# Patient Record
Sex: FEMALE | Race: WHITE | Employment: FULL TIME | URBAN - METROPOLITAN AREA
[De-identification: names, ages, dates, MRNs, and addresses within clinical notes are randomized per-mention and may not be internally consistent; named-entity substitution may affect disease eponyms.]

---

## 2018-03-16 ENCOUNTER — TELEPHONE (OUTPATIENT)
Dept: PALLATIVE CARE | Age: 53
End: 2018-03-16

## 2018-03-16 NOTE — TELEPHONE ENCOUNTER
Patient called with questions regarding her Cymbalta. Dr. Radha Rodriguez had ordered an increase of Cymbalta to 40 mg at bedtime. Pt unable to fill order because insurance would not approve and she could not pay out of pocket. Called to day stating she is only taking Cymbalta 20 mg at bedtime, and requesting an increase in her Fentanyl patch so she could get off of Percocet. Instructed patient to take two Cymbalta 20 mg at bedtime to equal the 40 mg that doctor had prescribed. She agrees to trying this and states she does not need an increase in fentanyl and can wait to discuss with Dr. Radha Rodriguez at her next appointment 4/5.

## 2018-03-19 ENCOUNTER — EVALUATION (OUTPATIENT)
Dept: PHYSICAL THERAPY | Age: 53
End: 2018-03-19
Payer: COMMERCIAL

## 2018-03-19 DIAGNOSIS — M25.50 ARTHRALGIA, UNSPECIFIED JOINT: ICD-10-CM

## 2018-03-19 DIAGNOSIS — T45.1X5A CHEMOTHERAPY-INDUCED NEUROPATHY (HCC): Primary | ICD-10-CM

## 2018-03-19 DIAGNOSIS — G62.0 CHEMOTHERAPY-INDUCED NEUROPATHY (HCC): Primary | ICD-10-CM

## 2018-03-19 DIAGNOSIS — C50.512 MALIGNANT NEOPLASM OF LOWER-OUTER QUADRANT OF LEFT FEMALE BREAST, UNSPECIFIED ESTROGEN RECEPTOR STATUS (HCC): ICD-10-CM

## 2018-03-19 PROCEDURE — 97162 PT EVAL MOD COMPLEX 30 MIN: CPT | Performed by: PHYSICAL THERAPIST

## 2018-03-19 NOTE — PROGRESS NOTES
[facility] OUTPATIENT REHABILITATION  PHYSICAL THERAPY INITIAL EVALUATION    DIAGNOSIS:   Chemotherapy-induced neuropathy (HCC)  - Primary       Arthralgia, unspecified joint       Malignant neoplasm of lower-outer quadrant of left female breast, unspecified estrogen receptor status ([facility]         REFERRING PROVIDER: Pamela Quarles MD      SUBJECTIVE:  Fransisca Campos reports decreased functional mobility over the past year due to breast cancer, chemotherapy, and radiation. She has been diagnosed with chemotherapy-induced neuropathy both legs and arthralgia throughout body; which she says is due to a reaction to Arimidex. She adds she has been on different forms of Arimidex, and each one causes worse joint discomfort. She says the current plan is to stay on the medication and use pain medications and modalities for pain relief. She currently reports aching constant pain 6/10; which can flare up to 10/10. She states the pain is located in all of her joints. Denies muscle pain. Lower leg symptoms of numbness due to neuropathy are unpredictable, appear suddenly, and last 10-20 seconds. She says it gets so bad she cannot feel her legs. She has fallen as a result. Surgery for breast cancer has included lumpectomy and lymph node resection. She states lymphedema around the breast continues to be a problem; which she has drained occasionally. She also reports shoulder tightness and changes to the skin around the breast from radiation. She believes her shoulder tightness is from disuse. Treatment: Gabapentin, pain medications (see medication list), and turmeric. She began taking turmeric on her own; however she developed some stomach upset. She stopped, but is planning on restarting    Exercise regimen: she walks on a treadmill 15 minutes in the morning and 15 minutes in the evening. Speed is 2.5 to 3 mph. Work: RN with [email or web address].   Physical demands Uncoordinated movement pattern moving toward extension. Flexion, rotation, and side bending are present with reports of discomfort. Trunk   Grossly WFL. Reports back stiffness with all motions. (R) UE within normal limits, no joint edema or warmth noted   (L) UE L shoulder limited to 150 FE. Rotation is WFL. Remainder of L UE is WFL. No joint edema or warmth noted   (R) LE within normal limits, no joint edema or warmth noted   (L) LE within normal limits, no joint edema or warmth noted     MANUAL MUSCLE TESTS        C-Spine 4/5   Trunk   4/5   (R) UE 5/5   (L) UE 4/5   (R) LE 4/5   (L) LE 4/5       ASSESSMENT  Problems:   Pain limiting functional mobility   Decreased functional range of motion  Decreased functional strength  Limited ability to complete ADLs/IADLs    Short Term Goals (2-3 weeks)  Decrease pain 25%  Improved functional mobility    Long Term Goals (4-6 weeks)  Reduce pain to functional, tolerable level  ROM and Strength WFL  Ability to complete ADLs/IADLs safely and to patient satisfaction  Independent with HEP    Rehab Potential: good     PT G-Codes  Functional Limitation: Carrying, moving and handling objects  Carrying, Moving and Handling Objects Current Status (): At least 40 percent but less than 60 percent impaired, limited or restricted  Carrying, Moving and Handling Objects Goal Status (): At least 1 percent but less than 20 percent impaired, limited or restricted    PLAN OF CARE  Patient will be seen 1-3 times per week for 4-6 weeks. Treatment will consist of A/PROM, stretching, therapeutic exercise, strengthening, balance exercises, gait training, home program development. Thank you for the opportunity to work with your patient. If you have questions or comments, please contact me at [phone]; fax: [phone].     Bhavna Blackman, PT    I CERTIFY THAT THE ABOVE EVALUATION AND PLAN OF CARE FOR PHYSICAL THERAPY SERVICES ARE APPROPRIATE AND MEDICALLY

## 2018-03-19 NOTE — PROGRESS NOTES
Physical Therapy Daily Treatment Note    Date: 3/19/2018  Patient Name: Concepcion Johnson  : 1965   MRN: [id number]  DOInjury:   DIAGNOSIS:   Chemotherapy-induced neuropathy ([facility]  - Primary       Arthralgia, unspecified joint       Malignant neoplasm of lower-outer quadrant of left female breast, unspecified estrogen receptor status ([facility]          REFERRING PROVIDER: Sharon Floyd MD      PT G-Codes  Functional Limitation: Carrying, moving and handling objects  Carrying, Moving and Handling Objects Current Status (): At least 40 percent but less than 60 percent impaired, limited or restricted  Carrying, Moving and Handling Objects Goal Status (): At least 1 percent but less than 20 percent impaired, limited or restricted    S: See eval  O:  Time 3791-2793    Visit  G CODE EVERY 10 VISITS   Pain 6/10    ROM     Modalities          Exercise     Nustep       Elbow isometrics flex and ext 3 different angles 10-45 seconds     Rows H-M-L     Shrugs     Shoulder Press     Biceps curl     Triceps ext          Quad sets     [employer] [facility]     Alt. Sidekicks     Knee ext machine     HS curl machine     Sit/Stands          Gait training          Marching Gait     Sidestepping                              A:  Tolerated well. Above added to written HEP.   P: Continue with rehab plan  Michelle Quijano PT

## 2018-03-20 ENCOUNTER — HOSPITAL ENCOUNTER (OUTPATIENT)
Dept: INFUSION THERAPY | Age: 53
Discharge: HOME | End: 2018-03-20

## 2018-03-23 ENCOUNTER — TELEPHONE (OUTPATIENT)
Dept: PHYSICAL THERAPY | Age: 53
End: 2018-03-23

## 2018-04-05 ENCOUNTER — OFFICE VISIT (OUTPATIENT)
Dept: PALLATIVE CARE | Age: 53
End: 2018-04-05
Payer: COMMERCIAL

## 2018-04-05 ENCOUNTER — TELEPHONE (OUTPATIENT)
Dept: BREAST CENTER | Age: 53
End: 2018-04-05

## 2018-04-05 VITALS
BODY MASS INDEX: 26.78 KG/M2 | HEART RATE: 92 BPM | WEIGHT: 171 LBS | DIASTOLIC BLOOD PRESSURE: 72 MMHG | OXYGEN SATURATION: 100 % | SYSTOLIC BLOOD PRESSURE: 118 MMHG

## 2018-04-05 DIAGNOSIS — Z51.5 PALLIATIVE CARE BY SPECIALIST: ICD-10-CM

## 2018-04-05 DIAGNOSIS — C50.512 MALIGNANT NEOPLASM OF LOWER-OUTER QUADRANT OF LEFT FEMALE BREAST, UNSPECIFIED ESTROGEN RECEPTOR STATUS (HCC): ICD-10-CM

## 2018-04-05 DIAGNOSIS — R52 PAIN: Primary | ICD-10-CM

## 2018-04-05 DIAGNOSIS — N60.02 BREAST CYST, LEFT: Primary | ICD-10-CM

## 2018-04-05 PROCEDURE — G8427 DOCREV CUR MEDS BY ELIG CLIN: HCPCS | Performed by: FAMILY MEDICINE

## 2018-04-05 PROCEDURE — 3014F SCREEN MAMMO DOC REV: CPT | Performed by: FAMILY MEDICINE

## 2018-04-05 PROCEDURE — 1036F TOBACCO NON-USER: CPT | Performed by: FAMILY MEDICINE

## 2018-04-05 PROCEDURE — G8419 CALC BMI OUT NRM PARAM NOF/U: HCPCS | Performed by: FAMILY MEDICINE

## 2018-04-05 PROCEDURE — 99212 OFFICE O/P EST SF 10 MIN: CPT

## 2018-04-05 PROCEDURE — 3017F COLORECTAL CA SCREEN DOC REV: CPT | Performed by: FAMILY MEDICINE

## 2018-04-05 PROCEDURE — 99214 OFFICE O/P EST MOD 30 MIN: CPT | Performed by: FAMILY MEDICINE

## 2018-04-05 RX ORDER — FENTANYL 75 UG/H
1 PATCH TRANSDERMAL
Qty: 10 PATCH | Refills: 0 | Status: SHIPPED | OUTPATIENT
Start: 2018-04-05 | End: 2018-05-03 | Stop reason: SDUPTHER

## 2018-04-05 RX ORDER — OXYCODONE AND ACETAMINOPHEN 10; 325 MG/1; MG/1
1 TABLET ORAL EVERY 6 HOURS PRN
Qty: 120 TABLET | Refills: 0 | Status: SHIPPED | OUTPATIENT
Start: 2018-04-05 | End: 2018-05-03 | Stop reason: SDUPTHER

## 2018-04-06 ENCOUNTER — HOSPITAL ENCOUNTER (OUTPATIENT)
Dept: GENERAL RADIOLOGY | Age: 53
Discharge: HOME | End: 2018-04-08
Payer: COMMERCIAL

## 2018-04-06 DIAGNOSIS — N64.4 BREAST PAIN: ICD-10-CM

## 2018-04-06 DIAGNOSIS — N60.02 BREAST CYST, LEFT: ICD-10-CM

## 2018-04-06 PROCEDURE — 76642 ULTRASOUND BREAST LIMITED: CPT

## 2018-04-06 PROCEDURE — 76942 ECHO GUIDE FOR BIOPSY: CPT

## 2018-04-06 PROCEDURE — 2500000003 HC RX 250 WO HCPCS

## 2018-05-03 ENCOUNTER — HOSPITAL ENCOUNTER (OUTPATIENT)
Dept: INFUSION THERAPY | Age: 53
Discharge: HOME | End: 2018-05-03
Payer: COMMERCIAL

## 2018-05-03 ENCOUNTER — OFFICE VISIT (OUTPATIENT)
Dept: PALLATIVE CARE | Age: 53
End: 2018-05-03
Payer: COMMERCIAL

## 2018-05-03 VITALS
OXYGEN SATURATION: 96 % | BODY MASS INDEX: 27.25 KG/M2 | DIASTOLIC BLOOD PRESSURE: 60 MMHG | SYSTOLIC BLOOD PRESSURE: 102 MMHG | HEART RATE: 82 BPM | WEIGHT: 174 LBS

## 2018-05-03 DIAGNOSIS — G62.0 PERIPHERAL NEUROPATHY DUE TO CHEMOTHERAPY (HCC): ICD-10-CM

## 2018-05-03 DIAGNOSIS — R52 PAIN: ICD-10-CM

## 2018-05-03 DIAGNOSIS — Z51.5 PALLIATIVE CARE BY SPECIALIST: ICD-10-CM

## 2018-05-03 DIAGNOSIS — C80.1 CANCER (HCC): ICD-10-CM

## 2018-05-03 DIAGNOSIS — C50.512 MALIGNANT NEOPLASM OF LOWER-OUTER QUADRANT OF LEFT FEMALE BREAST, UNSPECIFIED ESTROGEN RECEPTOR STATUS (HCC): Primary | ICD-10-CM

## 2018-05-03 DIAGNOSIS — T45.1X5A PERIPHERAL NEUROPATHY DUE TO CHEMOTHERAPY (HCC): ICD-10-CM

## 2018-05-03 DIAGNOSIS — M25.50 ARTHRALGIA, UNSPECIFIED JOINT: ICD-10-CM

## 2018-05-03 PROCEDURE — G8417 CALC BMI ABV UP PARAM F/U: HCPCS | Performed by: FAMILY MEDICINE

## 2018-05-03 PROCEDURE — 6360000002 HC RX W HCPCS: Performed by: INTERNAL MEDICINE

## 2018-05-03 PROCEDURE — 99214 OFFICE O/P EST MOD 30 MIN: CPT | Performed by: FAMILY MEDICINE

## 2018-05-03 PROCEDURE — G8427 DOCREV CUR MEDS BY ELIG CLIN: HCPCS | Performed by: FAMILY MEDICINE

## 2018-05-03 PROCEDURE — 3017F COLORECTAL CA SCREEN DOC REV: CPT | Performed by: FAMILY MEDICINE

## 2018-05-03 PROCEDURE — 99212 OFFICE O/P EST SF 10 MIN: CPT

## 2018-05-03 PROCEDURE — 1036F TOBACCO NON-USER: CPT | Performed by: FAMILY MEDICINE

## 2018-05-03 PROCEDURE — 2580000003 HC RX 258: Performed by: INTERNAL MEDICINE

## 2018-05-03 PROCEDURE — 96523 IRRIG DRUG DELIVERY DEVICE: CPT

## 2018-05-03 RX ORDER — OXYCODONE AND ACETAMINOPHEN 10; 325 MG/1; MG/1
1 TABLET ORAL EVERY 6 HOURS PRN
Qty: 120 TABLET | Refills: 0 | Status: SHIPPED | OUTPATIENT
Start: 2018-05-03 | End: 2018-05-31 | Stop reason: SDUPTHER

## 2018-05-03 RX ORDER — NORTRIPTYLINE HYDROCHLORIDE 10 MG/1
10 CAPSULE ORAL NIGHTLY
Qty: 30 CAPSULE | Refills: 0 | Status: SHIPPED | OUTPATIENT
Start: 2018-05-03 | End: 2018-05-31 | Stop reason: SDUPTHER

## 2018-05-03 RX ORDER — OXYCODONE AND ACETAMINOPHEN 10; 325 MG/1; MG/1
1 TABLET ORAL EVERY 6 HOURS PRN
Qty: 120 TABLET | Refills: 0 | Status: SHIPPED | OUTPATIENT
Start: 2018-05-03 | End: 2018-05-03 | Stop reason: SDUPTHER

## 2018-05-03 RX ORDER — FENTANYL 75 UG/H
1 PATCH TRANSDERMAL
Qty: 10 PATCH | Refills: 0 | Status: SHIPPED | OUTPATIENT
Start: 2018-05-03 | End: 2018-05-31 | Stop reason: SDUPTHER

## 2018-05-03 RX ORDER — SODIUM CHLORIDE 0.9 % (FLUSH) 0.9 %
10 SYRINGE (ML) INJECTION PRN
Status: DISCONTINUED | OUTPATIENT
Start: 2018-05-03 | End: 2018-05-04 | Stop reason: HOSPADM

## 2018-05-03 RX ORDER — HEPARIN SODIUM (PORCINE) LOCK FLUSH IV SOLN 100 UNIT/ML 100 UNIT/ML
500 SOLUTION INTRAVENOUS PRN
Status: DISCONTINUED | OUTPATIENT
Start: 2018-05-03 | End: 2018-05-04 | Stop reason: HOSPADM

## 2018-05-03 RX ORDER — HEPARIN SODIUM (PORCINE) LOCK FLUSH IV SOLN 100 UNIT/ML 100 UNIT/ML
500 SOLUTION INTRAVENOUS PRN
Status: CANCELLED | OUTPATIENT
Start: 2018-05-03

## 2018-05-03 RX ORDER — SODIUM CHLORIDE 0.9 % (FLUSH) 0.9 %
10 SYRINGE (ML) INJECTION PRN
Status: CANCELLED | OUTPATIENT
Start: 2018-05-03

## 2018-05-03 RX ORDER — NORTRIPTYLINE HYDROCHLORIDE 10 MG/1
10 CAPSULE ORAL NIGHTLY
Qty: 30 CAPSULE | Refills: 0 | Status: SHIPPED | OUTPATIENT
Start: 2018-05-03 | End: 2018-05-03 | Stop reason: SDUPTHER

## 2018-05-03 RX ORDER — FENTANYL 75 UG/H
1 PATCH TRANSDERMAL
Qty: 10 PATCH | Refills: 0 | Status: SHIPPED | OUTPATIENT
Start: 2018-05-03 | End: 2018-05-03 | Stop reason: SDUPTHER

## 2018-05-03 RX ADMIN — Medication 20 ML: at 08:50

## 2018-05-03 RX ADMIN — Medication 500 UNITS: at 08:50

## 2018-05-15 ENCOUNTER — TELEPHONE (OUTPATIENT)
Dept: PHYSICAL THERAPY | Age: 53
End: 2018-05-15

## 2018-05-31 ENCOUNTER — OFFICE VISIT (OUTPATIENT)
Dept: PALLATIVE CARE | Age: 53
End: 2018-05-31
Payer: COMMERCIAL

## 2018-05-31 VITALS
SYSTOLIC BLOOD PRESSURE: 110 MMHG | DIASTOLIC BLOOD PRESSURE: 64 MMHG | HEART RATE: 80 BPM | BODY MASS INDEX: 26.78 KG/M2 | WEIGHT: 171 LBS | OXYGEN SATURATION: 99 %

## 2018-05-31 DIAGNOSIS — G62.0 PERIPHERAL NEUROPATHY DUE TO CHEMOTHERAPY (HCC): ICD-10-CM

## 2018-05-31 DIAGNOSIS — T45.1X5A PERIPHERAL NEUROPATHY DUE TO CHEMOTHERAPY (HCC): ICD-10-CM

## 2018-05-31 DIAGNOSIS — Z51.5 PALLIATIVE CARE BY SPECIALIST: ICD-10-CM

## 2018-05-31 DIAGNOSIS — R52 PAIN: ICD-10-CM

## 2018-05-31 DIAGNOSIS — M25.50 ARTHRALGIA, UNSPECIFIED JOINT: ICD-10-CM

## 2018-05-31 DIAGNOSIS — C50.512 MALIGNANT NEOPLASM OF LOWER-OUTER QUADRANT OF LEFT FEMALE BREAST, UNSPECIFIED ESTROGEN RECEPTOR STATUS (HCC): Primary | ICD-10-CM

## 2018-05-31 PROCEDURE — 3017F COLORECTAL CA SCREEN DOC REV: CPT | Performed by: FAMILY MEDICINE

## 2018-05-31 PROCEDURE — G8427 DOCREV CUR MEDS BY ELIG CLIN: HCPCS | Performed by: FAMILY MEDICINE

## 2018-05-31 PROCEDURE — 1036F TOBACCO NON-USER: CPT | Performed by: FAMILY MEDICINE

## 2018-05-31 PROCEDURE — 99212 OFFICE O/P EST SF 10 MIN: CPT | Performed by: FAMILY MEDICINE

## 2018-05-31 PROCEDURE — 99214 OFFICE O/P EST MOD 30 MIN: CPT | Performed by: FAMILY MEDICINE

## 2018-05-31 PROCEDURE — G8417 CALC BMI ABV UP PARAM F/U: HCPCS | Performed by: FAMILY MEDICINE

## 2018-05-31 RX ORDER — OXYCODONE AND ACETAMINOPHEN 10; 325 MG/1; MG/1
1 TABLET ORAL EVERY 6 HOURS PRN
Qty: 120 TABLET | Refills: 0 | Status: SHIPPED | OUTPATIENT
Start: 2018-05-31 | End: 2018-06-21 | Stop reason: SDUPTHER

## 2018-05-31 RX ORDER — NORTRIPTYLINE HYDROCHLORIDE 25 MG/1
25 CAPSULE ORAL NIGHTLY
Qty: 30 CAPSULE | Refills: 1 | Status: SHIPPED | OUTPATIENT
Start: 2018-05-31 | End: 2018-06-21 | Stop reason: SDUPTHER

## 2018-05-31 RX ORDER — FENTANYL 75 UG/H
1 PATCH TRANSDERMAL
Qty: 10 PATCH | Refills: 0 | Status: SHIPPED | OUTPATIENT
Start: 2018-05-31 | End: 2018-06-21 | Stop reason: SDUPTHER

## 2018-06-21 ENCOUNTER — OFFICE VISIT (OUTPATIENT)
Dept: PALLATIVE CARE | Age: 53
End: 2018-06-21
Payer: COMMERCIAL

## 2018-06-21 VITALS
TEMPERATURE: 98.9 F | SYSTOLIC BLOOD PRESSURE: 124 MMHG | WEIGHT: 172 LBS | DIASTOLIC BLOOD PRESSURE: 78 MMHG | BODY MASS INDEX: 26.94 KG/M2

## 2018-06-21 DIAGNOSIS — R52 PAIN: ICD-10-CM

## 2018-06-21 DIAGNOSIS — T45.1X5A PERIPHERAL NEUROPATHY DUE TO CHEMOTHERAPY (HCC): ICD-10-CM

## 2018-06-21 DIAGNOSIS — M25.50 ARTHRALGIA, UNSPECIFIED JOINT: ICD-10-CM

## 2018-06-21 DIAGNOSIS — Z51.5 PALLIATIVE CARE BY SPECIALIST: ICD-10-CM

## 2018-06-21 DIAGNOSIS — J40 BRONCHITIS: ICD-10-CM

## 2018-06-21 DIAGNOSIS — G62.0 PERIPHERAL NEUROPATHY DUE TO CHEMOTHERAPY (HCC): ICD-10-CM

## 2018-06-21 DIAGNOSIS — C50.512 MALIGNANT NEOPLASM OF LOWER-OUTER QUADRANT OF LEFT FEMALE BREAST, UNSPECIFIED ESTROGEN RECEPTOR STATUS (HCC): Primary | ICD-10-CM

## 2018-06-21 PROCEDURE — G8427 DOCREV CUR MEDS BY ELIG CLIN: HCPCS | Performed by: FAMILY MEDICINE

## 2018-06-21 PROCEDURE — G8417 CALC BMI ABV UP PARAM F/U: HCPCS | Performed by: FAMILY MEDICINE

## 2018-06-21 PROCEDURE — 99212 OFFICE O/P EST SF 10 MIN: CPT | Performed by: FAMILY MEDICINE

## 2018-06-21 PROCEDURE — 3017F COLORECTAL CA SCREEN DOC REV: CPT | Performed by: FAMILY MEDICINE

## 2018-06-21 PROCEDURE — 1036F TOBACCO NON-USER: CPT | Performed by: FAMILY MEDICINE

## 2018-06-21 PROCEDURE — 99215 OFFICE O/P EST HI 40 MIN: CPT | Performed by: FAMILY MEDICINE

## 2018-06-21 RX ORDER — FENTANYL 75 UG/H
1 PATCH TRANSDERMAL
Qty: 10 PATCH | Refills: 0 | Status: SHIPPED | OUTPATIENT
Start: 2018-06-21 | End: 2018-06-21 | Stop reason: SDUPTHER

## 2018-06-21 RX ORDER — FENTANYL 75 UG/H
1 PATCH TRANSDERMAL
Qty: 10 PATCH | Refills: 0 | Status: SHIPPED | OUTPATIENT
Start: 2018-06-21 | End: 2018-07-19

## 2018-06-21 RX ORDER — NORTRIPTYLINE HYDROCHLORIDE 50 MG/1
50 CAPSULE ORAL NIGHTLY
Qty: 30 CAPSULE | Refills: 1 | Status: SHIPPED | OUTPATIENT
Start: 2018-06-21 | End: 2018-08-23 | Stop reason: SDUPTHER

## 2018-06-21 RX ORDER — NORTRIPTYLINE HYDROCHLORIDE 50 MG/1
50 CAPSULE ORAL NIGHTLY
Qty: 30 CAPSULE | Refills: 1 | Status: SHIPPED | OUTPATIENT
Start: 2018-06-21 | End: 2018-06-21 | Stop reason: SDUPTHER

## 2018-06-21 RX ORDER — OXYCODONE AND ACETAMINOPHEN 10; 325 MG/1; MG/1
1 TABLET ORAL EVERY 6 HOURS PRN
Qty: 120 TABLET | Refills: 0 | Status: SHIPPED | OUTPATIENT
Start: 2018-06-21 | End: 2018-06-29 | Stop reason: SDUPTHER

## 2018-06-21 RX ORDER — CEFDINIR 300 MG/1
300 CAPSULE ORAL 2 TIMES DAILY
Qty: 20 CAPSULE | Refills: 0 | Status: SHIPPED | OUTPATIENT
Start: 2018-06-21 | End: 2018-07-01

## 2018-06-21 RX ORDER — CEFDINIR 300 MG/1
300 CAPSULE ORAL 2 TIMES DAILY
Qty: 20 CAPSULE | Refills: 0 | Status: SHIPPED | OUTPATIENT
Start: 2018-06-21 | End: 2018-06-21 | Stop reason: SDUPTHER

## 2018-06-21 RX ORDER — OXYCODONE AND ACETAMINOPHEN 10; 325 MG/1; MG/1
1 TABLET ORAL EVERY 6 HOURS PRN
Qty: 120 TABLET | Refills: 0 | Status: SHIPPED | OUTPATIENT
Start: 2018-06-21 | End: 2018-06-21 | Stop reason: SDUPTHER

## 2018-07-19 ENCOUNTER — OFFICE VISIT (OUTPATIENT)
Dept: PALLATIVE CARE | Age: 53
End: 2018-07-19
Payer: COMMERCIAL

## 2018-07-19 VITALS
DIASTOLIC BLOOD PRESSURE: 68 MMHG | OXYGEN SATURATION: 99 % | BODY MASS INDEX: 26.47 KG/M2 | SYSTOLIC BLOOD PRESSURE: 102 MMHG | HEART RATE: 92 BPM | WEIGHT: 169 LBS

## 2018-07-19 DIAGNOSIS — R52 PAIN: ICD-10-CM

## 2018-07-19 DIAGNOSIS — M25.50 ARTHRALGIA, UNSPECIFIED JOINT: ICD-10-CM

## 2018-07-19 DIAGNOSIS — T45.1X5A CHEMOTHERAPY-INDUCED NEUROPATHY (HCC): ICD-10-CM

## 2018-07-19 DIAGNOSIS — C50.512 MALIGNANT NEOPLASM OF LOWER-OUTER QUADRANT OF LEFT FEMALE BREAST, UNSPECIFIED ESTROGEN RECEPTOR STATUS (HCC): Primary | ICD-10-CM

## 2018-07-19 DIAGNOSIS — C50.512 MALIGNANT NEOPLASM OF LOWER-OUTER QUADRANT OF LEFT BREAST OF FEMALE, ESTROGEN RECEPTOR POSITIVE (HCC): ICD-10-CM

## 2018-07-19 DIAGNOSIS — T45.1X5A PERIPHERAL NEUROPATHY DUE TO CHEMOTHERAPY (HCC): ICD-10-CM

## 2018-07-19 DIAGNOSIS — Z17.0 MALIGNANT NEOPLASM OF LOWER-OUTER QUADRANT OF LEFT BREAST OF FEMALE, ESTROGEN RECEPTOR POSITIVE (HCC): ICD-10-CM

## 2018-07-19 DIAGNOSIS — Z51.5 PALLIATIVE CARE BY SPECIALIST: ICD-10-CM

## 2018-07-19 DIAGNOSIS — G62.0 CHEMOTHERAPY-INDUCED NEUROPATHY (HCC): ICD-10-CM

## 2018-07-19 DIAGNOSIS — G62.0 PERIPHERAL NEUROPATHY DUE TO CHEMOTHERAPY (HCC): ICD-10-CM

## 2018-07-19 PROCEDURE — G8417 CALC BMI ABV UP PARAM F/U: HCPCS | Performed by: FAMILY MEDICINE

## 2018-07-19 PROCEDURE — 1036F TOBACCO NON-USER: CPT | Performed by: FAMILY MEDICINE

## 2018-07-19 PROCEDURE — G8427 DOCREV CUR MEDS BY ELIG CLIN: HCPCS | Performed by: FAMILY MEDICINE

## 2018-07-19 PROCEDURE — 99215 OFFICE O/P EST HI 40 MIN: CPT | Performed by: FAMILY MEDICINE

## 2018-07-19 PROCEDURE — 3017F COLORECTAL CA SCREEN DOC REV: CPT | Performed by: FAMILY MEDICINE

## 2018-07-19 PROCEDURE — 99212 OFFICE O/P EST SF 10 MIN: CPT | Performed by: FAMILY MEDICINE

## 2018-07-19 RX ORDER — FENTANYL 50 UG/H
1 PATCH TRANSDERMAL
Qty: 5 PATCH | Refills: 0 | Status: SHIPPED | OUTPATIENT
Start: 2018-07-19 | End: 2018-08-10 | Stop reason: SDUPTHER

## 2018-07-19 RX ORDER — OXYCODONE AND ACETAMINOPHEN 10; 325 MG/1; MG/1
1 TABLET ORAL EVERY 6 HOURS PRN
Qty: 120 TABLET | Refills: 0 | Status: SHIPPED | OUTPATIENT
Start: 2018-07-19 | End: 2018-08-23 | Stop reason: SDUPTHER

## 2018-07-19 NOTE — PROGRESS NOTES
focus of metastatic carcinoma measures 11 mm in maximum dimension; Extranodal extension: Present. · pT3 pN3a Mx  Re-excision of postero-lateral wall of lumpectomy cavity was performed on 2016 with negative margins. Tumor markers were normal; CT abdomen/pelvis and bone scan were negative for metastatic disease. CT chest noted Mildly enlarged AP window lymph nodes measuring 11 x 7.5 mm. Pulmonary team consult appreciated (In view of the difficulty in approaching that lymph node by EBUS, Dr. Anselm Soulier will follow this LN with CT chest in 3 months). PET/CT scan negative for mediastinal LN uptake. Focal uptake in the proximal stomach, recommend correlate with EGD. (Hx of PUD with many EGDs in the past by Dr. Simran Delcid). GI team (Dr. Simran Delcid) consulted for repeat EGD (performed on 2016 and noted hiatal hernia and mild gastritis). We recommended systemic chemotherapy consisting of Dose-Dense AC-T followed by RT followed lastly by hormonal therapy (Arimidex 1 mg po daily for at least 5 years). Side effects of AC-T reviewed with patient. She agreed to proceed. Mediport was placed by Dr. Tee Sierra. 2DEcho noted EF 52%. Cycle # 7 weekly Taxol is scheduled for today 2016. Bone pain after chemo; cannot take Ibuprofen due to gastritis. Tylenol alone doesn't help. Palliative care team consulted for sx management. RTC next week for Cycle # 8 weekly Taxol.     Past Medical History:   Diagnosis Date    [facility] ([facility] [facility]     MTHFR mutation ([facility] [address] [facility]     Neuropathy ([facility] [address] [facility]        Past Surgical History:   Procedure Laterality Date    BREAST LUMPECTOMY       SECTION         Current Outpatient Prescriptions on File Prior to Visit   Medication Sig Dispense Refill    predniSONE (DELTASONE) 10 MG tablet 2 tabs x 2 days, 1 tab x 2 days 6 tablet 0    tamoxifen (NOLVADEX) 20 MG tablet Take 20 mg by mouth      TURMERIC PO Take 750 mg by mouth      oxyCODONE-acetaminophen (PERCOCET)  MG per tablet Take 1 rectal pain, diarrhea, constipation, . GENITOURINARY:  Burning, frequency, urgency, incontinence, discharge  INTEGUMENTARY: rash, wound, pruritis  HEMATOLOGIC/LYMPHATIC:  Swelling, sores, gum bleeding, easy bruising, pica. MUSCULOSKELETAL:  pain, edema, joint swelling or redness  NEUROLOGICAL:  light headed, dizziness, loss of consciousness, weakness, change                                   in memory, seizures, tremors    Social history:  Social History     Social History    Marital status:      Spouse name: N/A    Number of children: N/A    Years of education: N/A     Occupational History    nurse- [employer]     Social History Main Topics    Smoking status: Former Smoker     Packs/day: 1.50     Years: 20.00     Types: Cigarettes     Quit date: 6/16/2016    Smokeless tobacco: Never Used      Comment: quit smoking june 2016    Alcohol use No    Drug use: No    Sexual activity: Not on file     Other Topics Concern    Not on file     Social History Narrative    Lives in [place] with daughter Wilner Funk. Works as an RN. Family history:  Family History   Problem Relation Age of Onset    Cancer Father         prostate    Diabetes Mother         HTN / cholesterol    Diabetes Brother            PE: Vitals: There were no vitals filed for this visit.     Gen: WD, WN, NAD, awake, alert, able to voice their needs/thoughts   HEENT: normocephalic, atraumatic, sclera nonicteric, PERRLA, EOMI, MMM, good speech tone and quality  Neck: no LAD, no JVD, supple, no masses, normal speech, trachea midline,   Lungs: bilaterally clear to auscultation, good aeration, symmetric  Heart: regular rate and rhythm, no murmurs/rubs/gallops/ectopy appreciated, PMI WNL  Abd.: non-distended, soft, nontender, non-distended, normal bowel sounds  Ext.: no clubbing, cyanosis or edema, no joint tenderness  Skin: warm, adequate hydration without acute lesions  Neuro: awake, alert, oriented x 3, conversive, consult appreciated (In view of the difficulty in approaching that lymph node by EBUS, Dr. Ema Link will follow this LN with CT chest in 3 months). PET/CT scan negative for mediastinal LN uptake. Focal uptake in the proximal stomach, recommend correlate with EGD. (Hx of PUD with many EGDs in the past by Dr. Shadi Kingsley). GI team (Dr. Shadi Kingsley) consulted for repeat EGD (performed on 12/21/2016 and noted hiatal hernia and mild gastritis). We recommended systemic chemotherapy consisting of Dose-Dense AC-T followed by RT followed lastly by hormonal therapy (Arimidex 1 mg po daily for at least 5 years). Side effects of AC-T reviewed with patient. She agreed to proceed. Mediport was placed by Dr. Jacklein Herzog. 2DEcho noted EF 52%. Cycle # 7 weekly Taxol is scheduled for today 12/30/2016. Bone pain after chemo; cannot take Ibuprofen due to gastritis. Tylenol alone doesn't help. Palliative care team consulted for sx management. RTC next week for Cycle # 8 weekly Taxol. 01/03/17:  O[facility]RS report reviewed today revealing Robitussin-AC prescribed in November 2016, Percocet 7550 milligram tablets ×28 prescribed Dr. Monserrat Cordero filled on 10/07/16, Norco 5325 milligram tablets ×30 prescribed 3 times by Dr. Jackelin Herzog filled on August 24, July 23 and July 6 2016 and diazepam 10 mg tablet ×1 prescribed by the same physician filled on 06/13/16.     Chemotherapeutic agent review:  Anastrozole/Arimidex:  - Is a nonsteroidal aromatase inhibitor  - Indicated for metastatic breast cancer  Drug interactions include:  - None well-characterized  Toxicities include:  - Asthenia is most common occurring in 20% of patients  - Mild nausea, vomiting, constipation, diarrhea  - Hot flashes occur in 10% of patients  - Dry, scaling skin rash  - Arthralgias occur in up to 15% of patients involving hands, knees, hips, lower back and shoulders with early morning stiffness as usual presentation.  - Headache  - Peripheral edema and 7% of patients  - Flulike syndrome approximately a month. Patient states that occasionally she takes the Percocet during the day secondary to her increasing pain as it \"takes the edge off\". Patient states that secondary to the myalgias and arthralgias she still is taking Advil approximately 6 daily and knows that she should not. Patient also complains of fatigue as she is not sleeping well with decreased appetite and occasional nausea. Patient has 4 tablets of Percocet remaining. Options were discussed and I advised her not to take Advil but we did prescribe Mobic 7.5 mg q.12 hours with food p.r.n. time 60 tablets no refill. I advised her that if she does not need to take these and do not. We are continuing the Pamelor 10 mg daily that she takes in the afternoon 30 tablets with no refill. Patient was prescribed unchanged Percocet 5325 milligram tablets 1 p.o. q.6 hours p.r.n. instead of q.h.s. secondary to her worsening pain time 60 tablets. We also initiated Neurontin 300 mg 1 p.o. q.h.s. ×30 capsules with no refills. We will see her back in 4 weeks or sooner if she needs us and she was advised to call if she has any problems. She voiced understanding. 3/2:  Per Dr. Flaquita Cortez note 2/10:  ASSESSMENT/PLAN: Marisa Alejandra is doing well overall. Discussede about the role of adjuvant XRT to the left breast, the left supraclavicular fossae and the left axilla, to improve local control and may impact on survival. Mediastinal lymphadenopathy and subcentimeter pulmonary nodules are suspicious. She is under surveillance and also seeing pulmonologist. Left internal mammary lymph nodes are normal on CT, and especially with the findings in the chest, these would not be included as wouldn't impact on prognosis. This was discussed with her. Side effects of XRT were enumerated including statistical results. All her questions were answered. She gave her consent to proceed.   CT simulation date set up. Patient seen in room with no family present.  She help as well. We will see her back in 4 weeks or sooner if she needs us and at this time look the benefits of the Cymbalta, possibly titrating benefits of continue Nexium and of course the Zofran. Patient advised to call immediately if she has any difficulty with the Zofran and she voiced understanding. Zyprexa low dose may be an option for her as well but I am hoping that neuropathy treatment may help.  04/13/17: As per medical oncology assessment from 04/04/17:  RT was started on 03/13/2017 and will be completed on 04/26/2017. Presents today, 04/04/2017 to discuss hormonal therapy. Side effects of arimidex explained: including but not limited to hot flashes, aches, osteoporosis, edema, vasodilation, rash, GI upset, mood changes, sob/cough, dyslipidemia, thrombophlebitis, anemia, leukopenia, thromboembolic disorder (rare, more so with tamoxifen), hypersensitivity, vaginal bleeding (rare, more so with tamoxifen), pain. Will need DEXA scan prior to starting Arimidex. D/C Cymbalta in the interim. Biochemical testing drawn today; LMP was about 2 years ago per patient. RTC in 3 weeks to review DEXA scan and biochemical testing. Patient presents to the exam room today in no acute distress without sign of sedation and/or confusion able to voice her needs. Patient states that beginning approximately 1 week after she began the new Cymbalta she notices significant decrease in her bilateral leg symptoms stating that she was able to stand up in the morning without significant pain. Patient states that her medication was stopped by medical oncology as the above note and she has noticed after 4 days that her symptoms have increased. Patient denies any other new or uncontrolled symptoms.   Options were discussed and I reviewed the case with her medical oncologist Dr. Tiarra Orona as well as pharmacy here and we do not know of an interaction between Cymbalta and Arimidex or any other medications that she is on therefore She tolerated the treatments quite well with mild fatigue and grade 1 reaction. Towards the end she developed grade 2 reaction in inframammary area with yeast superinfection. This responded to topical measures. She was able to complete the anticipated therapy  Follow-up: 1 month    As per phone discussion with Lilly Hopson on 05/03/17:   Patient called to notify palliative clinic that she is \"afraid to take the cymbalta because an NP told me that it could possibly make the arimidex less effective. \" She said that the NP had offered to order her effexor instead. Patient unsure if effexor will help with her nerve pain as much as the cymbalta did. I notified Dr. Gabriela Cullen. He said that the effexor might possibly help her nerve pain and that it is okay if patient wants to follow through with the prescription. I called patient and updated her. She says that she will call NP and talk with her about getting med. Patient to be seen in palliative clinic May 11. Patient presents to the exam room today unaccompanied in no acute distress, talkative and smiling without sign of sedation and/or confusion able to voice her needs. Patient states that she was uncomfortable taking Cymbalta after talking to the nurse practitioner who prescribed Effexor 37.5 mg daily in which she started this 5 days ago. Patient states that this morning she thought she maybe noticed some benefit from it but prior has not yet. Patient states that she is taking her Percocet on the average of 3 times a day and has 3 remaining as per her history. Patient also compliant with Neurontin 3 mg q.h.s. stating that she still not is sleeping well. Patient denies any new and/or uncontrolled symptoms other than an overall not feeling well. Patient feels that this is worse since she started the Arimidex.   Options were discussed and I advised her that I feel that Effexor may help with her symptoms but possibly not for another couple of weeks and she voiced with other physicians involved in their care as directed (including but not limited to Medical Oncology, Primary Care, Pulmonary, and Surgery). As per medical oncology assessment from 05/31/17:  RT was started on 03/13/2017 and completed on 04/27/2017. DEXA scan on 04/18/2017: Normal study. Repeat 1 year (2018). Biochemical testing on 04/04/2017 confirmed post-menopausal state. Arimidex 1 mg po daily was started on 04/28/2017 which she is tolerating fairly well. Mild arthralgias and hot flashes, not interfering with quality of life. Continue Arimidex, Ca/VitD. RTC June 2017 with prior mammogram (at [facility])  Patient presents to the exam room in no acute distress unaccompanied without any sign of sedation and/or confusion able to voice her needs ambulating well. Patient states that the arthralgias are significant with the Arimidex that she takes nightly. Patient was phoned in [facility] by Iris Sandra on June 2 ×24 tablets and states that she has 6 tablets remaining. Patient states that the Percocet helps 50% for approximately 3 hours without sedation. Patient states that she has been taking the Effexor 75 for the past 4 days and is compliant with the Neurontin 300 mg q.h.s. that she does not feel sedation from. Patient states that the arthralgias are constant and worse with activity. 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 Options were discussed and today we are increasing her Percocet by 50% to 7. 5325 milligram tablets 1 p.o. q.6 hours p.r.n. holding for sedation prescribing 120 tablets today. We are also increasing her Neurontin from 300 mg q.h.s. to t.i.d. electronically prescribing. Patient will continue on her Effexor 75 mg daily and when we see her back in 3 weeks we will look at titrating this medication and the benefits from the above.   Patient asking if there is an alternative medicine for her cancer and I advised her to follow-up with her oncologist if she is intolerant to the medication. I advised the patient that with titrations of medication should be able to provide her some relief hopefully allowing her to continue the medication. Patient states that she sees Dr. Maribel Anderson at the end of June.  06/29/17:  Medical records reviewed and as per phone call by Christ Cuevas on 06/02/17:  Refilled prescription for percocet 5-325mg q6h prn pain for [facility]. Patient had 8 pills left (#90 prescribed on 5/11/17) and has been taking 4 times per day secondary to increasing pain. Next appointment on 6/8/17 with palliative medicine. Prescription electronically prescribed for #24 pills with no refills. Nesha Tuttle RN, MSN, [facility]  Patient presents to the exam office today unaccompanied in no acute distress but stating that her arthralgias have significantly increased over the past month. Patient states that she did not refill the Arimidex and took her last tablet Monday evening. Patient states that she sees Dr. Maribel Anderson tomorrow morning and will ask if she can begin something else because she cannot tolerate the Arimidex. Patient very apologetic concerning this but states that she has tried and feels that she is tough but states that it feels like she has the flu 24 7. Patient states that she takes the Percocet 7. 5325 milligram tablet on the average of 5 times daily receiving 1-1/2-2 hours relief and tries to ochoa it out until it's time to take another. Patient denies oversedation from this. Patient did not notice any difference from increasing the Neurontin to 300 mg t.i.d. for the Effexor 75 mg daily. Patient states that she will be starting a second job throughout the summer but does have some vacation coming up.   Options were discussed and I introduced the topic of a long-acting opioid that she is resistant to hoping that she can experience less arthralgias with a different medication therefore she deferred today against my suggestions of a Duragesic 12 µg q.72 hours. Today we increased her Percocet to 10325 milligram tablets 1 p.o. q.6 hours p.r.n. prescribing 60 tablets today as well as increased her Effexor  mg daily. We will monitor her closely seeing her back in 2 weeks and at this time look at her appointment with oncology tomorrow, symptoms, benefits from the above changes and add the Duragesic 12 µg patch if she agrees and still indicated. We will also look at increasing the Neurontin at that time as well. Patient was refractory to any other changes today other than the above.  07/13/17:   Medical records reviewed and as per medical oncology assessment from 06/30/17:  Bilateral Diagnostic Mammogram on 06/23/2017 Negative for malignancy. U/S guided seroma aspiration Left Breast: GS noted no PMN. No organisms seen. Body fluid Cx Growth not present. Very poor tolerance to Arimidex lately. She c/o significant arthralgias, affecting quality of life. She d/c Arimidex on 06/26/2017. We recommended Femara 2.5 mg po daily instead. 30 tabs of Femara sent to [facility]. RTC 4 weeks for Femara toxicity check. Patient presents to the exam room ambulatory without sign of sedation and/or confusion able to voice her needs. Patient states that she stop the Femara Monday which was 3 days ago secondary to worsening arthralgias, myalgias even as compared to the Arimidex. Patient states that she is awakening at 4 AM with the aches and pains. Patient states that she feels slightly better this morning but still has the same symptoms. Patient states she has been taking the medications as approved and with the new/worsening bodyaches cannot notice a difference with the increased Effexor. Patient states increased Percocet lasts approximate 4-1/2 hours which is slightly improved from previous. Patient states that she will be seeing Alize Carter tomorrow as Dr. Sandeep Galeana is out of town.   Options were discussed and patient confusion and/or sedation able to voice her needs. Patient states that 2 days ago she stopped the Arimidex stating that she cannot sleep secondary to the pain which is also throughout the day. Patient states that she sees Dr. Gloria Coon tomorrow and will discuss everything with him. Patient states \"this is no way to live\". Patient states that with the initiation of the Duragesic 12 µg q.72 hours she now can stretch the Percocet out to approximately every 5-1/2 hours but still needs to take them. Patient denies oversedation from them. Patient did state that with the increase of the Neurontin to 600 mg at night and does help her leg pain. 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 Patient also takes Xanax 0.5 mg one half tablet q.h.s. to help her sleep. Options were discussed and today we're doubling her Duragesic to 25 µg q.72 hours prescribing 10 patches today. We are continuing unchanged her Percocet 10325 milligram tablets 1 p.o. q.6 hours p.r.n. prescribing 120 tablets today, increasing her Neurontin to 600 mg t.i.d. 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 We are also continuing her Xanax 0.5 mg one half tablet q.h.s. p.r.n. as per Epic. We represcribed unchanged her Effexor 150 mg daily as per Epic. We will see her back 4 weeks or sooner if she needs us and I advised her that my goal is to continue titrating upwards the Neurontin and Duragesic to the point where she does not need the Percocet. Patient is interested in weaning off of the Duragesic if possible. 8/24/17:  Medical records reviewed and as per medical oncology assessment from 08/16/17:  Jeri polanco/neymar'sherri on 07/10/2017. Re-started Arimidex 1 mg daily 07/14/2017. She stopped the Arimidex on 07/25/2017 stating that she cannot sleep secondary to the pain which is also throughout the day.   We recommended Aromasin 25 mg difficulty from the increase in Duragesic to give us a call immediately. 09/21/17:  Medical records reviewed and as per Dr. Vinicio Arreola on 09/12/17:  Persistent edema of breast with significant discomfort. On the relief is when she applies a heating pad. Erythema has decreased on antibiotic therapy. However, cultures have never been positive of seroma aspirations. Having nipple discharge with seroma reaccumulation. 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 Will review literature to see if sclerosing seroma cavity has ever been effective in this situation. If not, we discussed the possibility of simple mastectomy. We discussed the possible consideration of delayed tissue-based reconstruction. Patient brought up contralateral prophylactic mastectomy. In light of her complications and extensive disease upon presentation I deferred this discussion.     Patient will be rescheduled for aspiration of left breast seroma. Hopefully, this will alleviate her discomfort. She will continue to wear a supportive bra and use heat as desired for relief of discomfort. Patient presents today ambulating well in no acute distress at her baseline without sign of sedation and/or confusion able to voice her needs. Patient started her Duragesic 75 µg patch yesterday evening and also picked up a short prescription of the Percocet 10. Patient complaining of pain previous and before, intolerant to an increase in Neurontin throughout the day taking 300 mg, 300 mg, 600 mg q.h.s. Cymbalta helped significantly with her symptoms in the past but medical oncology felt that it was unsafe to use with her previous aromatase inhibitor. Patient is on Aromasin currently and we will question medical oncology about Cymbalta usage with this aromatase inhibitor. Patient states that aspiration of her left breast helps some with the pain for approximately 2 days but then returns.   Patient complains the potential option of a completion left simple mastectomy. 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 Plastic surgery would need to be involved in light of the fact that a flap may need to be utilized to close her wound if ischemia demonstrated using the SPY. Plan:   Patient Will keep track of her left breast discomfort at this point, letting us know how long it takes for her discomfort in the left breast and axilla to recur. Seroma reaccumulation is usually associated with nipple discharge. We will also check results of the fluid cytology that was sent today. Office visit on a p.r.n. basis. Patient will call us with any questions. Patient presents ambulatory in no acute distress at her baseline without sign of sedation and/or confusion able to voice her needs. Patient states that she has noticed some decrease in her leg pain since beginning the Cymbalta without other good or bad effects. Patient is compliant with Duragesic patches with 0 remaining and still routinely takes her Percocet having 4 remaining as per her history. Patient states that she is having good days and bad days and called an  to question disability yesterday but also is applying for another job not knowing exactly what she can or wants to do. Patient also states that she had stomach flu but this is resolved since her last saw her. Patient also states that she stop the Neurontin thinking that she did not need it but had severe aches and pains therefore restarted it.   Options were discussed and we are increasing the Cymbalta to 20 mg b.i.d., continuing unchanged Duragesic 75 µg q.72 hours prescribing 10 patches today and the Percocet 10325 milligram tablets 1 p.o. q.6 hours p.r.n. prescribing 120 tablets today, Neurontin 300 mg, 300 mg and 600 mg daily unchanged that she will need a prescription for before January. I told her my goal is to continue to titrate the Cymbalta, possibly the Duragesic patch and then begin weaning the Percocet eventually. She reluctantly voiced understanding because of her symptoms. We will see her back in 4 weeks or sooner if she needs us. 12/14/17:  Medical records reviewed and as per Dr. Leonor Murray phone call on 12/11/17:  Romain Birmingham with pathology results-left message to return call to [phone]. Fine-needle aspiration cytology of her left breast seroma performed on 11/14/17 was inconclusive for malignant cells. Have discussed with radiology potential imaging modalities for the edematous left breast.  Concerned that most modalities would result in false positive imaging due to level of inflammation of the breast.  It is been a year and a half since patient was initially initially diagnosed with her locally advanced left breast cancer. At this point, prior to any further intervention, restaging including scan CAT scans of the chest abdomen and pelvis and bone scan would be appropriate. Left message for patient to call us back. Discussed all above with Dr. Geoff Porras. Patient presents today ambulating in no acute distress without sign of sedation and/or confusion able to voice her needs. Patient states that she had a bad month with a UTI, Keflex, nausea, back pain as well as chest pain left-sided requiring more Percocet stating that she is out. Patient also took a per Duragesic patch for 4 days for a drug screen for a new job and had worsening pain therefore took more Percocet. Patient cannot notice a difference from the b.i.d. Cymbalta. Patient does state that if she does take Neurontin 600 mg t.i.d. this helped significantly with her pain but it is too sedating during the day. Patient continues on 300, 300, 600 daily. Patient asking if we can do anything else as she desires to decrease the Percocet as she is afraid of the Tylenol.   Options were discussed and I educated her and initiated methadone 5 mg q.12 hours that she will start after we obtain an EKG. Patient denies any palpitations, syncope but does complain of left chest pain expecting to be from her cancer. Patient was advised that I would expect her requirement for Percocet to decrease with the methadone and she voiced understanding. Patient will call if she has any problems and hold for sedation. Today we also prescribed unchanged her Duragesic 75 µg q.72 hours prescribing 10 patches today, Percocet 10325 milligram tablets 1 p.o. q.6 hours p.r.n. prescribing 120 tablets today that she knows my goal is to wean her down from these. We also prescribed her Xanax unchanged as per Epic and she will continue the Neurontin 300, 300, 600. We also increased her Cymbalta to 60 mg nightly and she will continue her other medicines unchanged. We will see her back in 4 weeks or sooner if she needs us and we will call her after reviewing the EKG telling her to start the methadone. She will also call me next week for an update or sooner if she has any problems. 02/08/18:  Medical records reviewed and as per medical oncology assessment from 12/19/17:  Left breast discomfort; aspiration x 3 (08/16/2017, 08/30/2017 and 09/12/2017); 2 courses of Clindamycin. Culture no organisms seen. Cytology negative for malignant cells. Left Breast FNA on 11/14/2017 inconclusive for malignant cells. Breast Surgical Oncology team recommendations: Re-staging scans and left mastectomy if scans negative for metastatic disease. She has poor tolerance to Aromasin (significant arthralgias affecting quality of life). She has exhausted every possible hormonal therapy option; Referred to [facility] for clinical trial evaluation. As per phone discussion with Boni Haddad on 01/12/18:  Phone call from Tiffanie Naqvi asking about her PET CT. I reviewed the PET CT impression report verbatim with her. She has not made the apt.  With Dr. Colton Kitchen for clinical 75 µg patch q.72 hours ×10 patches, Percocet 10325 milligram tablets 1 p.o. q.6 hours p.r.n. holding for sedation ×120 tablets and Neurontin unchanged at 300, 300, 600 mg and she will continue her other medicines unchanged. We will see her back in 4 weeks and I advised her to call me at any time and she voiced understanding. 03/08/18:  Medical records reviewed and as per medical oncology appointment on 02/20/18:  PET scan done on 12/27/17 shows uptake at seroma and overall no significant FDG avidity consistent with disease. Patient reports still having joint pain though significantly improved since stopping Aromasin. Seen by Dr. Colton Kitchen on 02/14/2018 who recommended MRI Left breast given her left breast pain impacting her quality of life; To see a surgeon at [facility] for surgical evaluation (mastectomy/reconstruction ?); Review MTHFR mutation; Going back on Arimidex again; Exercise 30 minutes 5 days a week;   RTC 03/30/2018  Patient presents today frustrated and more tearful, appropriate without sign of sedation and/or confusion able to voice her needs without sign of sedation and/or confusion. Patient felt that the methadone in the morning was too sedating therefore she is only taking 2.5 mg nightly. Patient tried not taking the Percocet with significant increasing arthralgias. Patient feels that decreasing the Cymbalta has not helped but possibly hurt and she is not knowing what to do to help with her persistent symptoms. Patient reviewed with me there desire to do a mastectomy and patient back on Arimidex again. Patient states she has approximately 10 Percocet remaining. Patient has gained 3 pounds. Options were discussed and I reviewed her entire chart especially my notes. I talked her about physical therapy and we are ordering this to help with her arthralgias, neuropathic symptoms. Patient obviously more depressed but because of her daytime sedation we are changing her Cymbalta to 40 mg q.h.s.   We are Epic since I last saw her. Patient presents today ambulating at her baseline without sign of sedation and/or confusion able to voice her needs. Patient states that she has not noticed any difference from the Pamelor 10 mg q.h.s. good or bad and is still compliant with her other medications unchanged as outlined above. Patient complaining of left hand and arm lymphedema working outside in her garden and flowers. Patient denies new and/or uncontrolled symptoms but states that morning peripheral neuropathy is the worse. Options were discussed and we are increasing Pamelor to 25 mg q.h.s., continuing unchanged Cymbalta 20 mg q.h.s., Percocet 10325 milligram tablets 1 p.o. q.6 hours p.r.n. prescribing 120 tablets today, Duragesic 75 µg patches one patch q.72 hours prescribing 10 patches today and Neurontin unchanged as well. In review of her chart she complained of date times fatigue with methadone in the past.  Patient did not want to retry this today but may want on subsequent visits. We will see her back in 5 weeks or sooner if she needs us. 06/21/18:  Medical records reviewed and no documented physician visits in [facility] since I last saw her. Patient presents today at her baseline ambulatory in no acute distress without sign of sedation and/or confusion able to voice her needs with a significant cough and wheezing over the past multiple days. Patient states she feels like she has pneumonia again that she was treated for in October by her pulmonologist.  Patient states that she is able to put her feet on the ground better in the morning after the increase in Pamelor and states there's no change in how she is taken her medications otherwise and I prescribed voicing complaints. Patient states that she saw the plastic surgeon who wants to fix her left side with flap, cutting her trapezius muscle as he feels there is nerve entrapment before he touches the right side.   Patient unsure if she desires to pursue all PFTs next ofice visit for evaluation her COPD. FOLLOW UP   Follow up in 8 weeks. Patient presents today ambulatory at her baseline without sign of sedation and/or confusion able to voice her needs. Patient states that she notices some improvement of her lower extremities and pain with increasing the Pamelor without side effects. Patient states is no change to how she is taking her medication has tried to get rid of the afternoon Percocet but with worsening pain in the evening. Patient is going for a CT scan soon by pulmonology as above and will stop in at physical therapy to make an appointment at that time. Options were discussed and today we are decreasing Duragesic to 50 µg patch q.72 hours prescribing 5 patches today and continuing unchanged her Percocet 10325 milligram tablets 1 p.o. q.6 hours p.r.n. prescribing 120 tablets. Patient will try to take one half of a tablet if she can in the afternoon as per her suggestion. Patient advised to call me if her pain is much worse and of course prior to running out of her new Duragesic patches since I only prescribed prescribing 5. We will see her back in 4 weeks or sooner if she needs us and she will continue her other medications unchanged including Cymbalta 20 mg q.h.s., Pamelor 50 mg q.h.s., Neurontin 300, 300, 600. Controlled Substances Monitoring: Attestation: The Prescription Monitoring Report for this patient was reviewed today. (Kendrick Lezama MD)  Documentation: Possible medication side effects, risk of tolerance/dependence & alternative treatments discussed., No signs of potential drug abuse or diversion identified.  (Kendrick Lezama MD)  Time in minutes:    [] 15   [] 30   [] 45   [] 60   [x] 75   [] 90  Chart review/documentation:  [x] 15   [] 30   [] 45   [] 60   [] 75   [] 90  Assessment:     [x] 15   [] 30   [] 45   [] 60   [] 75   [] 90  Conversation patient/family/staff: [] 15   [] 30   [x] 45   [] 60   [] 75   []

## 2018-07-30 ENCOUNTER — TELEPHONE (OUTPATIENT)
Dept: PALLATIVE CARE | Age: 53
End: 2018-07-30

## 2018-08-10 ENCOUNTER — TELEPHONE (OUTPATIENT)
Dept: PALLATIVE CARE | Age: 53
End: 2018-08-10

## 2018-08-10 DIAGNOSIS — G62.0 PERIPHERAL NEUROPATHY DUE TO CHEMOTHERAPY (HCC): ICD-10-CM

## 2018-08-10 DIAGNOSIS — T45.1X5A PERIPHERAL NEUROPATHY DUE TO CHEMOTHERAPY (HCC): ICD-10-CM

## 2018-08-10 DIAGNOSIS — C50.512 MALIGNANT NEOPLASM OF LOWER-OUTER QUADRANT OF LEFT FEMALE BREAST, UNSPECIFIED ESTROGEN RECEPTOR STATUS (HCC): ICD-10-CM

## 2018-08-10 DIAGNOSIS — M25.50 ARTHRALGIA, UNSPECIFIED JOINT: ICD-10-CM

## 2018-08-10 RX ORDER — FENTANYL 50 UG/H
1 PATCH TRANSDERMAL
Qty: 5 PATCH | Refills: 0 | Status: SHIPPED | OUTPATIENT
Start: 2018-08-10 | End: 2018-08-23 | Stop reason: SDUPTHER

## 2018-08-23 ENCOUNTER — OFFICE VISIT (OUTPATIENT)
Dept: PALLATIVE CARE | Age: 53
End: 2018-08-23
Payer: COMMERCIAL

## 2018-08-23 ENCOUNTER — HOSPITAL ENCOUNTER (OUTPATIENT)
Dept: INFUSION THERAPY | Age: 53
Discharge: HOME | End: 2018-08-23
Payer: COMMERCIAL

## 2018-08-23 VITALS
DIASTOLIC BLOOD PRESSURE: 68 MMHG | HEART RATE: 89 BPM | WEIGHT: 177.8 LBS | OXYGEN SATURATION: 99 % | SYSTOLIC BLOOD PRESSURE: 108 MMHG | BODY MASS INDEX: 27.85 KG/M2

## 2018-08-23 DIAGNOSIS — T45.1X5A CHEMOTHERAPY-INDUCED NEUROPATHY (HCC): ICD-10-CM

## 2018-08-23 DIAGNOSIS — C50.512 MALIGNANT NEOPLASM OF LOWER-OUTER QUADRANT OF LEFT FEMALE BREAST, UNSPECIFIED ESTROGEN RECEPTOR STATUS (HCC): Primary | ICD-10-CM

## 2018-08-23 DIAGNOSIS — M25.50 ARTHRALGIA, UNSPECIFIED JOINT: ICD-10-CM

## 2018-08-23 DIAGNOSIS — G62.0 CHEMOTHERAPY-INDUCED NEUROPATHY (HCC): ICD-10-CM

## 2018-08-23 DIAGNOSIS — C80.1 CANCER (HCC): ICD-10-CM

## 2018-08-23 DIAGNOSIS — Z51.5 PALLIATIVE CARE BY SPECIALIST: ICD-10-CM

## 2018-08-23 DIAGNOSIS — T45.1X5A PERIPHERAL NEUROPATHY DUE TO CHEMOTHERAPY (HCC): ICD-10-CM

## 2018-08-23 DIAGNOSIS — G62.0 PERIPHERAL NEUROPATHY DUE TO CHEMOTHERAPY (HCC): ICD-10-CM

## 2018-08-23 PROCEDURE — G8417 CALC BMI ABV UP PARAM F/U: HCPCS | Performed by: FAMILY MEDICINE

## 2018-08-23 PROCEDURE — 1036F TOBACCO NON-USER: CPT | Performed by: FAMILY MEDICINE

## 2018-08-23 PROCEDURE — 99215 OFFICE O/P EST HI 40 MIN: CPT | Performed by: FAMILY MEDICINE

## 2018-08-23 PROCEDURE — 96523 IRRIG DRUG DELIVERY DEVICE: CPT

## 2018-08-23 PROCEDURE — 6360000002 HC RX W HCPCS: Performed by: INTERNAL MEDICINE

## 2018-08-23 PROCEDURE — G8427 DOCREV CUR MEDS BY ELIG CLIN: HCPCS | Performed by: FAMILY MEDICINE

## 2018-08-23 PROCEDURE — 99212 OFFICE O/P EST SF 10 MIN: CPT

## 2018-08-23 PROCEDURE — 99212 OFFICE O/P EST SF 10 MIN: CPT | Performed by: FAMILY MEDICINE

## 2018-08-23 PROCEDURE — 3017F COLORECTAL CA SCREEN DOC REV: CPT | Performed by: FAMILY MEDICINE

## 2018-08-23 PROCEDURE — 2580000003 HC RX 258: Performed by: INTERNAL MEDICINE

## 2018-08-23 RX ORDER — FENTANYL 50 UG/H
1 PATCH TRANSDERMAL
Qty: 10 PATCH | Refills: 0 | Status: SHIPPED | OUTPATIENT
Start: 2018-08-23 | End: 2018-09-20 | Stop reason: SDUPTHER

## 2018-08-23 RX ORDER — HEPARIN SODIUM (PORCINE) LOCK FLUSH IV SOLN 100 UNIT/ML 100 UNIT/ML
500 SOLUTION INTRAVENOUS PRN
Status: CANCELLED | OUTPATIENT
Start: 2018-08-23

## 2018-08-23 RX ORDER — NORTRIPTYLINE HYDROCHLORIDE 25 MG/1
25 CAPSULE ORAL NIGHTLY
Qty: 90 CAPSULE | Refills: 1 | Status: SHIPPED | OUTPATIENT
Start: 2018-08-23 | End: 2018-09-20 | Stop reason: SDUPTHER

## 2018-08-23 RX ORDER — OXYCODONE AND ACETAMINOPHEN 10; 325 MG/1; MG/1
1 TABLET ORAL EVERY 6 HOURS PRN
Qty: 120 TABLET | Refills: 0 | Status: SHIPPED | OUTPATIENT
Start: 2018-08-23 | End: 2018-08-23 | Stop reason: SDUPTHER

## 2018-08-23 RX ORDER — DULOXETIN HYDROCHLORIDE 20 MG/1
20 CAPSULE, DELAYED RELEASE ORAL DAILY
Qty: 90 CAPSULE | Refills: 1 | Status: SHIPPED
Start: 2018-08-23 | End: 2018-08-23 | Stop reason: SDUPTHER

## 2018-08-23 RX ORDER — HEPARIN SODIUM (PORCINE) LOCK FLUSH IV SOLN 100 UNIT/ML 100 UNIT/ML
500 SOLUTION INTRAVENOUS PRN
Status: DISCONTINUED | OUTPATIENT
Start: 2018-08-23 | End: 2018-08-24 | Stop reason: HOSPADM

## 2018-08-23 RX ORDER — SODIUM CHLORIDE 0.9 % (FLUSH) 0.9 %
10 SYRINGE (ML) INJECTION PRN
Status: DISCONTINUED | OUTPATIENT
Start: 2018-08-23 | End: 2018-08-24 | Stop reason: HOSPADM

## 2018-08-23 RX ORDER — DULOXETIN HYDROCHLORIDE 20 MG/1
20 CAPSULE, DELAYED RELEASE ORAL DAILY
Qty: 90 CAPSULE | Refills: 1 | Status: SHIPPED | OUTPATIENT
Start: 2018-08-23 | End: 2018-11-08 | Stop reason: SDUPTHER

## 2018-08-23 RX ORDER — NORTRIPTYLINE HYDROCHLORIDE 25 MG/1
25 CAPSULE ORAL NIGHTLY
Qty: 90 CAPSULE | Refills: 1 | Status: SHIPPED | OUTPATIENT
Start: 2018-08-23 | End: 2018-08-23 | Stop reason: SDUPTHER

## 2018-08-23 RX ORDER — FENTANYL 50 UG/H
1 PATCH TRANSDERMAL
Qty: 10 PATCH | Refills: 0 | Status: SHIPPED | OUTPATIENT
Start: 2018-08-23 | End: 2018-08-23 | Stop reason: SDUPTHER

## 2018-08-23 RX ORDER — SODIUM CHLORIDE 0.9 % (FLUSH) 0.9 %
10 SYRINGE (ML) INJECTION PRN
Status: CANCELLED | OUTPATIENT
Start: 2018-08-23

## 2018-08-23 RX ORDER — OXYCODONE AND ACETAMINOPHEN 10; 325 MG/1; MG/1
1 TABLET ORAL EVERY 6 HOURS PRN
Qty: 120 TABLET | Refills: 0 | Status: SHIPPED | OUTPATIENT
Start: 2018-08-23 | End: 2018-09-20 | Stop reason: SDUPTHER

## 2018-08-23 RX ADMIN — Medication 10 ML: at 15:15

## 2018-08-23 RX ADMIN — Medication 500 UNITS: at 15:14

## 2018-08-23 NOTE — PROGRESS NOTES
Palliative Medicine Outpatient Visit  2018    Provider: Bharati Graves MD        Referring Provider:    Reason for Consult:  [] Advanced Care Planning  [] Assist with goals of care  [] Transition of care  [x] Symptom Management    See below for recommendations, as indicated, concernin. Advanced Care Planning  2. Anticipatory Guidance  3. Transition of Care  4. Symptom Management      CC: Arthralgias     HPI:   As per medical oncology's assessment from 16:  45 y/o post-menopausal  female who underwent Stereotactic Left Breast mass core biopsy on 2016 revealing Invasive Lobular carcinoma, well differentiated. Associated lobular carcinoma in-situ. ER + (100%); OR + (100%) and HER-2/chay negative (1+)  FNA Right Breast Cyst FNA aspiration on 2016: NEGATIVE for malignancy. Cytologic findings consistent with cyst contents. 2016: Left breast excision/SLNB/Left axillary dissection was performed by Dr. Sharad Hair. Left sentinel and left breast sentinel node # 1 biopsy: Two lymph nodes with metastatic carcinoma. Lymph node, left axillary, left breast sentinel node # 2 biopsy: One lymph node with metastatic carcinoma. Breast, left, excision of mass:  Invasive lobular carcinoma. Lymph nodes, left axillary, biopsy with left axillary dissection (10/22): Metastatic carcinoma. · Comment: Sections demonstrate a large amount of residual invasive lobular carcinoma. · Invasive tumor estimated to be at least 6.0 cm in greatest dimension. · Histologic grade:   ¨ Glandular differentiation- score 3: < 10%of tumor area forming glandular/tubular structures. ¨ Nuclear pleomorphism- Score 1: Nuclei small with little increase in size in comparison with normal breast epithelial cells. ¨ Mitotic count: Score 1  ¨ Overall grade: Grade 1   · Carcinoma focally involves the posterolateral inked margin of excision.    · Total of 13/23 lymph nodes involved by metastatic carcinoma, largest focus of metastatic carcinoma measures 11 mm in maximum dimension; Extranodal extension: Present. · pT3 pN3a Mx  Re-excision of postero-lateral wall of lumpectomy cavity was performed on 2016 with negative margins. Tumor markers were normal; CT abdomen/pelvis and bone scan were negative for metastatic disease. CT chest noted Mildly enlarged AP window lymph nodes measuring 11 x 7.5 mm. Pulmonary team consult appreciated (In view of the difficulty in approaching that lymph node by EBUS, Dr. Iraj Tate will follow this LN with CT chest in 3 months). PET/CT scan negative for mediastinal LN uptake. Focal uptake in the proximal stomach, recommend correlate with EGD. (Hx of PUD with many EGDs in the past by Dr. Yenni Murphy). GI team (Dr. Yenni Murphy) consulted for repeat EGD (performed on 2016 and noted hiatal hernia and mild gastritis). We recommended systemic chemotherapy consisting of Dose-Dense AC-T followed by RT followed lastly by hormonal therapy (Arimidex 1 mg po daily for at least 5 years). Side effects of AC-T reviewed with patient. She agreed to proceed. Mediport was placed by Dr. Qian Ray. 2DEcho noted EF 52%. Cycle # 7 weekly Taxol is scheduled for today 2016. Bone pain after chemo; cannot take Ibuprofen due to gastritis. Tylenol alone doesn't help. Palliative care team consulted for sx management. RTC next week for Cycle # 8 weekly Taxol. Past Medical History:   Diagnosis Date    [facility] ([facility] [address]     MTHFR mutation ([facility] [address]     Neuropathy        Past Surgical History:   Procedure Laterality Date    BREAST LUMPECTOMY       SECTION         Current Outpatient Prescriptions on File Prior to Visit   Medication Sig Dispense Refill    fentaNYL (DURAGESIC) 50 MCG/HR Place 1 patch onto the skin every 72 hours for 15 days. Hold for increased sedation.  5 patch 0    predniSONE (DELTASONE) 10 MG tablet 2 tabs x 2 days, 1 tab x 2 days 6 tablet 0    tamoxifen (NOLVADEX) 20 MG tablet Take 20 mg by mouth      TURMERIC PO Take 750 mg by mouth      nortriptyline (PAMELOR) 50 MG capsule Take 1 capsule by mouth nightly 30 capsule 1    DULoxetine (CYMBALTA) 20 MG extended release capsule Take 1 capsule by mouth nightly 90 capsule 1    DULoxetine 40 MG CPEP Take 40 mg by mouth nightly 30 capsule 0    anastrozole (ARIMIDEX) 1 MG tablet Take 1 tablet by mouth daily 90 tablet 0    gabapentin (NEURONTIN) 300 MG capsule Take 2 capsules by mouth 3 times daily for 180 days. 540 capsule 1    ALPRAZolam (XANAX) 0.5 MG tablet 1/2 to 1 tablet orally before bed as needed for sleep/anxiety. 30 tablet 0    albuterol sulfate HFA (PROVENTIL HFA) 108 (90 Base) MCG/ACT inhaler Inhale 2 puffs into the lungs every 4 hours as needed for Wheezing 1 Inhaler 1    exemestane (AROMASIN) 25 MG chemo tablet Take 1 tablet by mouth daily 90 tablet 0    folic acid (FOLVITE) 1 MG tablet Take 1 mg by mouth daily       No current facility-administered medications on file prior to visit. Allergies:    Demerol hcl [meperidine]; Azithromycin; and Motrin [ibuprofen]    Symptom Assessment:       Symptom   Present   Medication   # Doses/24h    Pain           Dyspnea           N/V          Constipation          Insomnia           Pruritis       Anxiety/Depression          Decreased Intake          Weight Loss          Fatigue          Declining  Performance Status         ADL Dependent        ROS: UNLESS STATED ABOVE PATIENT DENIES:  CONSTITUTIONAL:  fever, chill, rigors, nausea, vomiting, fatigue. HEENT: blurry vision, double vision, hearing problem, tinnitus, hoarseness, dysphagia,               odynophagia  RESPIRATORY: cough, shortness of breath, sputum expectoration. CARDIOVASCULAR:  Chest pain/pressure, palpitation, syncope, irregular beats  GASTROINTESTINAL:  abdominal or rectal pain, diarrhea, constipation, .   GENITOURINARY:  Burning, frequency, urgency, incontinence, discharge  INTEGUMENTARY: rash, wound, Stereotactic Left Breast mass core biopsy on 06/17/2016 revealing Invasive Lobular carcinoma, well differentiated. Associated lobular carcinoma in-situ. ER + (100%); SD + (100%) and HER-2/chay negative (1+)  FNA Right Breast Cyst FNA aspiration on 06/23/2016: NEGATIVE for malignancy. Cytologic findings consistent with cyst contents. 07/06/2016: Left breast excision/SLNB/Left axillary dissection was performed by Dr. Leon Mata. Left sentinel and left breast sentinel node # 1 biopsy: Two lymph nodes with metastatic carcinoma. Lymph node, left axillary, left breast sentinel node # 2 biopsy: One lymph node with metastatic carcinoma. Breast, left, excision of mass:  Invasive lobular carcinoma. Lymph nodes, left axillary, biopsy with left axillary dissection (10/22): Metastatic carcinoma. · Comment: Sections demonstrate a large amount of residual invasive lobular carcinoma. · Invasive tumor estimated to be at least 6.0 cm in greatest dimension. · Histologic grade:   ¨ Glandular differentiation- score 3: < 10%of tumor area forming glandular/tubular structures. ¨ Nuclear pleomorphism- Score 1: Nuclei small with little increase in size in comparison with normal breast epithelial cells. ¨ Mitotic count: Score 1  ¨ Overall grade: Grade 1   · Carcinoma focally involves the posterolateral inked margin of excision. · Total of 13/23 lymph nodes involved by metastatic carcinoma, largest focus of metastatic carcinoma measures 11 mm in maximum dimension; Extranodal extension: Present. · pT3 pN3a Mx  Re-excision of postero-lateral wall of lumpectomy cavity was performed on 07/22/2016 with negative margins. Tumor markers were normal; CT abdomen/pelvis and bone scan were negative for metastatic disease. CT chest noted Mildly enlarged AP window lymph nodes measuring 11 x 7.5 mm.    Pulmonary team consult appreciated (In view of the difficulty in approaching that lymph node by EBUS, Dr. Elizabeth Hoffmann will follow this LN with CT chest in 3 months). PET/CT scan negative for mediastinal LN uptake. Focal uptake in the proximal stomach, recommend correlate with EGD. (Hx of PUD with many EGDs in the past by Dr. Rodolfo Feliciano). GI team (Dr. Rodolfo Feliciano) consulted for repeat EGD (performed on 12/21/2016 and noted hiatal hernia and mild gastritis). We recommended systemic chemotherapy consisting of Dose-Dense AC-T followed by RT followed lastly by hormonal therapy (Arimidex 1 mg po daily for at least 5 years). Side effects of AC-T reviewed with patient. She agreed to proceed. Mediport was placed by Dr. Jeffery Villeda. 2DEcho noted EF 52%. Cycle # 7 weekly Taxol is scheduled for today 12/30/2016. Bone pain after chemo; cannot take Ibuprofen due to gastritis. Tylenol alone doesn't help. Palliative care team consulted for sx management. RTC next week for Cycle # 8 weekly Taxol. 01/03/17:  [facility] report reviewed today revealing Robitussin-AC prescribed in November 2016, Percocet 3752 milligram tablets ×28 prescribed Dr. Sandeep Galeana filled on 10/07/16, Norco 5325 milligram tablets ×30 prescribed 3 times by Dr. Jeffery Villeda filled on August 24, July 23 and July 6 2016 and diazepam 10 mg tablet ×1 prescribed by the same physician filled on 06/13/16. Chemotherapeutic agent review:  Anastrozole/Arimidex:  - Is a nonsteroidal aromatase inhibitor  - Indicated for metastatic breast cancer  Drug interactions include:  - None well-characterized  Toxicities include:  - Asthenia is most common occurring in 20% of patients  - Mild nausea, vomiting, constipation, diarrhea  - Hot flashes occur in 10% of patients  - Dry, scaling skin rash  - Arthralgias occur in up to 15% of patients involving hands, knees, hips, lower back and shoulders with early morning stiffness as usual presentation.  - Headache  - Peripheral edema and 7% of patients  - Flulike syndrome in the form of fever, malaise, myalgias.   Paclitaxel/Taxol:  - Isolated from the bark of the [address] [place] [address] Splash tree  - Active in pain as it \"takes the edge off\". Patient states that secondary to the myalgias and arthralgias she still is taking Advil approximately 6 daily and knows that she should not. Patient also complains of fatigue as she is not sleeping well with decreased appetite and occasional nausea. Patient has 4 tablets of Percocet remaining. Options were discussed and I advised her not to take Advil but we did prescribe Mobic 7.5 mg q.12 hours with food p.r.n. time 60 tablets no refill. I advised her that if she does not need to take these and do not. We are continuing the Pamelor 10 mg daily that she takes in the afternoon 30 tablets with no refill. Patient was prescribed unchanged Percocet 5325 milligram tablets 1 p.o. q.6 hours p.r.n. instead of q.h.s. secondary to her worsening pain time 60 tablets. We also initiated Neurontin 300 mg 1 p.o. q.h.s. ×30 capsules with no refills. We will see her back in 4 weeks or sooner if she needs us and she was advised to call if she has any problems. She voiced understanding. 3/2:  Per Dr. Jaden Lyn note 2/10:  ASSESSMENT/PLAN: Clif Payne is doing well overall. Discussede about the role of adjuvant XRT to the left breast, the left supraclavicular fossae and the left axilla, to improve local control and may impact on survival. Mediastinal lymphadenopathy and subcentimeter pulmonary nodules are suspicious. She is under surveillance and also seeing pulmonologist. Left internal mammary lymph nodes are normal on CT, and especially with the findings in the chest, these would not be included as wouldn't impact on prognosis. This was discussed with her. Side effects of XRT were enumerated including statistical results. All her questions were answered. She gave her consent to proceed.   CT simulation date set up. Patient seen in room with no family present. She is alert/oriented and able to voice her concerns.  She appears anxious and states that she is frustrated with persistent pain. Patient states that she finished her chemotherapy 2/3 and subsequently had an increase in leg pain/neuropathy, nausea/vomiting. She stopped her pamelor, neurontin and mobic at that time. She continued to take the percocet taking 2 per day for pain. The pain persisted so she restarted the neurontin 300mg qhs approximately one week ago. She has noticed a slight improvement. C/O pain that is sharp and radiating to b/l hips and legs. She has neuropathies to b/l feet. She continues to use 2 percocet per day stating that she was hesitant to use more often. She also experienced nausea/vomiting and feels this is related to her anxiety/pain. She has had previous problems with gastritis and was told to stay off NSAIDs by her GI physician. She was also prescribed Nexium 20mg qday and has not been taking this. Her appetite is fair and she often has to force herself to eat. She also feels her appetite is affected by persistent pain/anxiety. Occasional problem with constipation for which she uses dulcolax tabs or miralax as needed. Encouraged her to increase fluids and take medication if no BM for 3 days. She notes increasing and profound fatigue. She is spacing her activities and resting as much as she is able to with work. She understands that the fatigue is a result of the treatment and will improve in the future. Options reviewed. Discussed taking the percocet q6h scheduled for several days to see if she is able to get in front of her pain. She will decrease to three times per day if pain controlled. Prescription provided for Percocet 5-325mg #60/no refills. Also to continue the neurontin 300mg qhs, no refill needed. Stop the pamelor as she did not feel this was effective. Added xanax 0.5mg 2x daily prn for anxiety (patient previously used xanax with good results). Prescription provided for xanax 0.5mg #30/no refill. She will take her nexium 20mg daily and stay off all NSAIDs.  She declined any medication for nausea should be able to provide her some relief hopefully allowing her to continue the medication. Patient states that she sees Dr. Daina Moctezuma at the end of June.  06/29/17:  Medical records reviewed and as per phone call by Komal Early on 06/02/17:  Refilled prescription for percocet 5-325mg q6h prn pain for [facility]. Patient had 8 pills left (#90 prescribed on 5/11/17) and has been taking 4 times per day secondary to increasing pain. Next appointment on 6/8/17 with palliative medicine. Prescription electronically prescribed for #24 pills with no refills. Paula Santamaria RN, MSN, [facility]  Patient presents to the exam office today unaccompanied in no acute distress but stating that her arthralgias have significantly increased over the past month. Patient states that she did not refill the Arimidex and took her last tablet Monday evening. Patient states that she sees Dr. Daina Moctezuma tomorrow morning and will ask if she can begin something else because she cannot tolerate the Arimidex. Patient very apologetic concerning this but states that she has tried and feels that she is tough but states that it feels like she has the flu 24 7. Patient states that she takes the Percocet 7. 5325 milligram tablet on the average of 5 times daily receiving 1-1/2-2 hours relief and tries to ochoa it out until it's time to take another. Patient denies oversedation from this. Patient did not notice any difference from increasing the Neurontin to 300 mg t.i.d. for the Effexor 75 mg daily. Patient states that she will be starting a second job throughout the summer but does have some vacation coming up. Options were discussed and I introduced the topic of a long-acting opioid that she is resistant to hoping that she can experience less arthralgias with a different medication therefore she deferred today against my suggestions of a Duragesic 12 µg q.72 hours.   Today we increased her Percocet to 10325 milligram tablets 1 p.o. q.6 the symptoms. Patient reluctantly and finally accepted a prescription for Duragesic 12 µg patch q.72 hours as directed and 5 patches were prescribed today. I advised her that this patch is equivalent to 2 of her Percocets over a 24-hour period and she felt slightly more comfortable with this. Patient also prescribed unchanged her Percocet 10325 milligram tablets 1 p.o. q.6 hours p.r.n. holding for sedation prescribing 60 tablets today. Patient states that she took her last tablet this morning of the 60 tablets are prescribed 2 weeks ago. Patient reluctant to increase her Neurontin but did agree with me 2 now takes Neurontin 300 mg in the morning, 300 mg in the afternoon and 600 mg in the evening. We will see her back in 2 weeks or sooner if she needs us, evaluate recommendations from Marky/oncology and the effects of the above. I would hope at that time she would allow me to increase the Duragesic further as well as the Neurontin.  07/27/17:  Medical records reviewed and as per medical oncology assessment from 07/14/17:  She presents today, 07/14/2017 for early toxicity check. C/O significant arthralgias that have significantly impacted her quality of life. She stopped the Femara on 07/10/2017 and presents today to discuss alternatives. Long discussion regarding options. She is not a candidate for Tamoxifen due to her MTHFR mutation first noted in 2001 (Miscarriage requiring 6 months of Heparin therapy). After further discussion, she would like to try Arimidex again, as she feels she tolerated that better than the Femara. Femara d/c'd on 07/10/2017. Will re-start Arimidex 1 mg daily today, 07/14/2017 (she has enough of the prescription at home) and return to see Dr. Axel Paiz on 07/28/2017 for toxicity check. Patient presents ambulatory today unaccompanied without sign of confusion and/or sedation able to voice her needs.   Patient states that 2 days ago she stopped the Arimidex stating that she cannot assessment on 09/12/17:  Persistent edema of breast with significant discomfort. On the relief is when she applies a heating pad. Erythema has decreased on antibiotic therapy. However, cultures have never been positive of seroma aspirations. Having nipple discharge with seroma reaccumulation. 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 Will review literature to see if sclerosing seroma cavity has ever been effective in this situation. If not, we discussed the possibility of simple mastectomy. We discussed the possible consideration of delayed tissue-based reconstruction. Patient brought up contralateral prophylactic mastectomy. In light of her complications and extensive disease upon presentation I deferred this discussion.     Patient will be rescheduled for aspiration of left breast seroma. Hopefully, this will alleviate her discomfort. She will continue to wear a supportive bra and use heat as desired for relief of discomfort. Patient presents today ambulating well in no acute distress at her baseline without sign of sedation and/or confusion able to voice her needs. Patient started her Duragesic 75 µg patch yesterday evening and also picked up a short prescription of the Percocet 10. Patient complaining of pain previous and before, intolerant to an increase in Neurontin throughout the day taking 300 mg, 300 mg, 600 mg q.h.s. Cymbalta helped significantly with her symptoms in the past but medical oncology felt that it was unsafe to use with her previous aromatase inhibitor. Patient is on Aromasin currently and we will question medical oncology about Cymbalta usage with this aromatase inhibitor. Patient states that aspiration of her left breast helps some with the pain for approximately 2 days but then returns.   Patient complains of constant seepage around her incision site and nipple throughout the day needing to wear a large pad to give compression over the or Percocet, Neurontin and denies new and/or uncontrolled symptoms. Patient states that the cough is more improved than previous. Options were discussed and she will continue the wean off the Effexor next week and then start Cymbalta 20 mg daily after that. We prescribed unchanged her Duragesic 75 µg q.72 hours prescribing 10 patches today as well as Percocet 10325 milligram tablets 1 p.o. q.6 hours p.r.n. holding for sedation prescribing 120 tablets today. Patient will continue her Neurontin 300 mg, 300 mg, 600 mg q.h.s. We will see her back in 4 weeks or sooner if she needs us and she knows that she can call us at any time. At this time we will look at titrating aggressively the Cymbalta as indicated. 11/16/17:  Medical records reviewed and as per Dr. Caterina Tyler on 11/14/17:  Chely Diehl 08/30/2017 for follow up of mastitis of left breast after 10 days of Clindamycin. Clinical breast examination reveals persistent edema and erythema of the left breast, left nipple inversion, induration of the left breast scar, and left axillary adenopathy. Increased discomfort with palpation. Locally advanced left breast cancer with marked breast edema and left axillary fibrosis leading to discomfort in her breast axilla and arm. She also has markedly limited range of motion of the left shoulder especially when fluid re-accumulates in the lumpectomy site. Aspiration in the office a day for 20 mL of clear fluid which in the past has been cytology negative. Relief of discomfort noted. Long discussion about options of therapy going forward. I reiterated that neither he nor ice would likely alleviate the chronic breast edema. The breast edema is attributable to her extensive surgery, complete axillary dissection, extensive poppy involvement, and regional radiation therapy. We discussed the potential option of a completion left simple mastectomy.   We discussed the fact that there might be significant problems with but does complain of left chest pain expecting to be from her cancer. Patient was advised that I would expect her requirement for Percocet to decrease with the methadone and she voiced understanding. Patient will call if she has any problems and hold for sedation. Today we also prescribed unchanged her Duragesic 75 µg q.72 hours prescribing 10 patches today, Percocet 10325 milligram tablets 1 p.o. q.6 hours p.r.n. prescribing 120 tablets today that she knows my goal is to wean her down from these. We also prescribed her Xanax unchanged as per Epic and she will continue the Neurontin 300, 300, 600. We also increased her Cymbalta to 60 mg nightly and she will continue her other medicines unchanged. We will see her back in 4 weeks or sooner if she needs us and we will call her after reviewing the EKG telling her to start the methadone. She will also call me next week for an update or sooner if she has any problems. 02/08/18:  Medical records reviewed and as per medical oncology assessment from 12/19/17:  Left breast discomfort; aspiration x 3 (08/16/2017, 08/30/2017 and 09/12/2017); 2 courses of Clindamycin. Culture no organisms seen. Cytology negative for malignant cells. Left Breast FNA on 11/14/2017 inconclusive for malignant cells. Breast Surgical Oncology team recommendations: Re-staging scans and left mastectomy if scans negative for metastatic disease. She has poor tolerance to Aromasin (significant arthralgias affecting quality of life). She has exhausted every possible hormonal therapy option; Referred to [facility] for clinical trial evaluation. As per phone discussion with Daniel Dale on 01/12/18:  Phone call from Lorenzo Redman asking about her PET CT. I reviewed the PET CT impression report verbatim with her. She has not made the apt. With Dr. Blossom Antony for clinical trial evaluation as of yet. She just \"wants the breasts taken off\".   Long conversation with her regarding the importance of the Duragesic 75 µg one patch q.72 hours prescribing 10 today, Percocet 10325 milligram tablets 1 p.o. q.6 hours p.r.n. holding for sedation prescribing 120 tablets today. Patient also states that she decreased her Neurontin down to 300 mg b.i.d. on her own and I am advising her to go back to 300, 300, 600 especially since her sedation was not any better with her decrease but her arthralgias are worse. We'll see her back in 4 weeks and our  met with her again today. She knows that she can call us at any time. We will monitor what her affect is like on subsequent visits and possibly increase her Cymbalta or even add Pamelor if we have not tried this. I'm hoping that physical therapy will help her affect is well. 04/05/18:  Medical records reviewed in [address] [place] [address] since my last visit. Patient presents today unaccompanied at her baseline without sign of sedation and/or confusion able to voice her needs. Patient states that she now is back on tamoxifen by a [facility] oncologist/trial and was told to begin weaning off of her Cymbalta in which she has been. Patient never increased to Cymbalta 40 mg daily because insurance would not cover them and now is taking 20 mg every other day weaning herself off. She is awaiting a call from [facility] oncologist to make sure she needs to come off of the Cymbalta. Patient did not have good results from Effexor prior to Cymbalta. Patient states is no change in how she is taking her medications otherwise. Patient still complains of bilateral leg neuropathy and is regressed indicating getting her left chest wall drained as per her history. Patient was evaluated by physical therapy but did not go back this week but will try next week.   Options were discussed and we are continuing her same medications Duragesic 75 µg 1 patch q.72 hours prescribing 10 patches today, Percocet 10325 milligram tablets 1 p.o. q.6 hours p.r.n. prescribing 120 tablets today and continues her Neurontin and other medications unchanged. In the future if she is staying on Cymbalta and we may increase and if not we'll try Pamelor if we have not or readdress the methadone again to help with her neuropathy. We will see her back in 4 weeks or sooner if she needs us. 05/03/18:  Medical records reviewed and no documented physician visits in [facility] since my last visit with her. Patient presents today ambulatory in no acute distress without sign of sedation and/or confusion able to voice her needs. Patient states that she definitely feels less neuropathy although it is still present on the Cymbalta low-dose 20 mg daily that her oncologist agreed to. Patient denies side effects but does state that she has had increased stress, hot flashes and occasional headaches attributing the stress and headaches to taking care of her father who has dementia. Patient states there is no change in how she is taking the other medications and denies new and/or uncontrolled symptoms otherwise. Patient also complaining of right tennis elbow and will be seeing her physical therapist about this. Options were discussed and today we are initiating Pamelor 10 mg q.h.s. and educated her on the medication. We are continuing unchanged her Cymbalta 20 mg q.h.s., Percocet 10325 milligram tablets 1 p.o. q.6 hours p.r.n. prescribing 120 tablets today, Duragesic 75 µg one patch q.72 hours prescribing 10 patches today and her Neurontin unchanged. Patient knows that she can call us at any time with any concerns. We will see her back in 4 weeks or sooner if she needs us and at this time as we talked about today we will consider weaning down the Percocet possibly to 7.5 in an attempt to wean the opioids. If symptoms do not allow we will not do this next time. 05/31/18:  Medical records reviewed and no documented physician visits in [facility] since I last saw her.   Patient presents today ambulating at her baseline without sign of sedation and/or confusion

## 2018-08-23 NOTE — PROGRESS NOTES
PORT FLUSH    Patient presents to clinic for [facility] today. single  SQ port accessed per policy using 26F, 1 inch Olvera needle for good blood return. .  Site flushed easily with 10 mL NSS followed by 5 mL Heparin solution 100 units/ml rinse prior to de-access. Dry sterile dressing to area. Tolerated procedure well. Encouraged to schedule port flush every 4 weeks.

## 2018-09-11 ENCOUNTER — HOSPITAL ENCOUNTER (OUTPATIENT)
Age: 53
Discharge: HOME | End: 2018-09-13
Payer: COMMERCIAL

## 2018-09-11 DIAGNOSIS — J84.89 INTERSTITIAL PNEUMONITIS (HCC): ICD-10-CM

## 2018-09-11 LAB — EOSINOPHILS ABSOLUTE COUNT: 260 /UL (ref 50–250)

## 2018-09-11 PROCEDURE — 86005 ALLG SPEC IGE MULTIALLG SCR: CPT

## 2018-09-11 PROCEDURE — 85048 AUTOMATED LEUKOCYTE COUNT: CPT

## 2018-09-11 PROCEDURE — 86331 IMMUNODIFFUSION OUCHTERLONY: CPT

## 2018-09-11 PROCEDURE — 86003 ALLG SPEC IGE CRUDE XTRC EA: CPT

## 2018-09-11 PROCEDURE — 82785 ASSAY OF IGE: CPT

## 2018-09-11 PROCEDURE — 86606 ASPERGILLUS ANTIBODY: CPT

## 2018-09-14 LAB
Lab: NORMAL
REPORT: NORMAL
THIS TEST SENT TO: NORMAL

## 2018-09-18 ENCOUNTER — HOSPITAL ENCOUNTER (OUTPATIENT)
Dept: RADIATION ONCOLOGY | Age: 53
Discharge: HOME | End: 2018-09-18
Payer: COMMERCIAL

## 2018-09-18 DIAGNOSIS — C50.512 MALIGNANT NEOPLASM OF LOWER-OUTER QUADRANT OF LEFT FEMALE BREAST, UNSPECIFIED ESTROGEN RECEPTOR STATUS (HCC): Primary | ICD-10-CM

## 2018-09-18 LAB
Lab: NORMAL
REPORT: NORMAL
THIS TEST SENT TO: NORMAL

## 2018-09-18 PROCEDURE — 99212 OFFICE O/P EST SF 10 MIN: CPT

## 2018-09-18 PROCEDURE — 99213 OFFICE O/P EST LOW 20 MIN: CPT | Performed by: NURSE PRACTITIONER

## 2018-09-18 NOTE — PROGRESS NOTES
Issues: incontinence, frequency 0 (3)   3. Ambulatory: use of assistive devices (walker, cane, off-loading devices),        attached to equipment (IV pole, oxygen) 0 (2)   4.  Sensory Limitations: dizziness, vertigo, impaired vision 0 (3)   5. Age less than 65                 0 (0)   6. Age 72 or greater 0 (1)   7. Medication: diuretics, strong analgesics, hypnotics, sedatives,        antihypertensive agents 0 (3)   8. Falls:  recent history of falls within the last 3 months (not to include slipping or        tripping) 0 (7)   TOTAL 0  If score of 4 or greater was education given? No         TABLE 2   Risk Score Risk Level Plan of Care   0-3 Little or  No Risk 1. Provide assistance as indicated for ambulation activities  2. Reorient confused/cognitively impaired patient  3. Call-light/bell within patient's reach  4. Chair/bed in low position, stretcher/bed with siderails up except when performing patient care activities  5. Educate patient/family/caregiver on falls prevention  6.  Reassess in 12 weeks or with any noted change in patient condition which places them at a risk for a fall   4-6 Moderate Risk 1. Provide assistance as indicated for ambulation activities  2. Reorient confused/cognitively impaired patient  3. Call-light/bell within patient's reach  4. Chair/bed in low position, stretcher/bed with siderails up except when performing patient care activities  5. Educate patient/family/caregiver on falls prevention  6. Falls risk precaution (Yellow sticker Level II) placed on patient chart   7 or   Higher High Risk 1. Place patient in easily observable treatment room  2. Patient attended at all times by family member or staff  3. Provide assistance as indicated for ambulation activities  4. Reorient confused/cognitively impaired patient  5. Call-light/bell within patient's reach  6.   Chair/bed in low position, stretcher/bed with siderails up except when performing patient care

## 2018-09-18 NOTE — PROGRESS NOTES
Radiation Oncology   Follow Up Note        9/18/18    Jennifer Braswell  Vitals:   BP: 121/80  HR: 94  RR: 18  TEMP: 97.7 F      Wt Readings from Last 1 Encounters:   09/18/18 170 lb 12.8 oz (77.5 kg)         No primary care provider on file.,      CC: Patient is here for follow up for post-radiation completion. HPI:  Michelle Michaels is a pleasant 52 y.o. female with a diagnosis of invasive ductal carcinoma left breast (ER+, AK+), status post conservative surgery followed by chemotherapy. The patient underwent a course of radiation therapy to the left breast, which was completed on 4/27/17. States that she is having many issues with her breast and admits to this even being a problem prior to radiation initiation. She states that she has had her breast drained in the past (seroma) and the pain was alleviated for about a week before it \"refilled again\" per her report. She feels that it could be drained now but she doesn't know if she wants to do it yet. She has met with a surgeons at [address] [place] [address] to discuss mastectomy/reconstruction but she states \"I might want a second opinion. \"    Patient was previously following with Dr Geoff Porras for medical oncology. She is now being seen by Dr. Richelle Guzman at [address] [place] [address], last visit on 3/28/2018. Instructed to start Tamoxifen at the time d/t intolerance of multiple other endocrine therapies. Saw surgeons (Dr Jason Mejias 3/28/18 for breast surgery and Dr Romain Noguera for plastic surgery) at [address] [place] [address] for surgical evaluation (mastectomy/reconstruction). Does not know if she wants to have surgery done and may want a second opinion prior to making her final decision. Patient is currently on Tamoxifen, tolerating better than the other medications. Next appt with Dr Richelle Guzman in October 2018. Patient also follows with Dr Gabriela Cullen for palliative medicine. Last seen 8/23/18, see progress note. Will follow up again on 9/20/18.     Patient also follows with Dr Ezequiel Finley d/t pulmonary nodules found on CT pulmonary opacities most severe in the   left lower lobe. 2. Pleural parenchymal opacities peripherally in the left upper lobe   are increased in the interval possibly related to prior radiation   therapy, infection, other inflammation. Neoplasm not excluded. 3. Nodularity in the left axillary region should be further assessed   with physical exam. Scar versus tumor. 4. Mildly enlarged mediastinal lymph nodes could be reactive or   possibly neoplastic. MRI BREAST W WO CONTRAST, 2/21/18: IMPRESSION:    1.  NO EVIDENCE OF MALIGNANCY IN EITHER BREAST    2. POST  LUMPECTOMY AND POST RADIATION CHANGES IN THE LEFT BREAST WITH   PROMINENT SKIN THICKENING AND MILD SKIN ENHANCEMENT PRESUMABLY ALL   RELATED TO RADIATION. 3.  SEROMA LEFT BREAST AT LUMPECTOMY SITE.  THIS IS RECURRENT AFTER   MULTIPLE DRAINAGES. 4. PATCHY DENSITIES AND ENHANCEMENT IN THE LINGULA LIKELY RELATED TO POST   RADIATION CHANGE (BETTER VISUALIZED RECENT PET CT).   RECOMMEND   CORRELATION WITH REPORT FROM THAT STUDY AND CONSIDER SHORT INTERVAL   FOLLOW UP BY CT IMAGING. OVERALL BIRADS ASSESSMENT:    RIGHT BREAST: BI-RADS 1.  NEGATIVE    LEFT BREAST: BI-RADS  2. BENIGN  ___________________________________________________________________________      PET CT, [place]K, 12/27/17:  Impression   1. Improving but persistent cystic lesion in the left breast with mild   metabolic activity which may be due to inflammatory process with   resolving hematoma, seroma or necrotic malignancy. There is also   improving but persistent left axillary lymph nodes with improving   metabolic activity concerning for improving metastatic involvement in   the axillary lymph nodes or inflammation from surgery. There is also   skin thickening with edema and mild inflammation in the left breast   with a marginal improvement.       2. Small nodular infiltrative densities in the left upper lobe with   mild metabolic activity probably inflammatory.  Metastatic nurses notes were reviewed and incorporated into this assessment and plan.           Sincerely,    Lorraine Cordero, MSN, RN, APRN-CNP  Nurse Practitioner for Radiation Oncology    PHYSICIANS [facility]) [facility]: [phone] ([facility]: [phone])  [facility]:  [phone] ([facility]:  [phone])  [facility]: [phone] ([facility]: [phone])

## 2018-09-19 VITALS
RESPIRATION RATE: 18 BRPM | HEART RATE: 94 BPM | BODY MASS INDEX: 26.75 KG/M2 | DIASTOLIC BLOOD PRESSURE: 80 MMHG | WEIGHT: 170.8 LBS | TEMPERATURE: 97.7 F | SYSTOLIC BLOOD PRESSURE: 121 MMHG

## 2018-09-20 ENCOUNTER — OFFICE VISIT (OUTPATIENT)
Dept: PALLATIVE CARE | Age: 53
End: 2018-09-20
Payer: COMMERCIAL

## 2018-09-20 VITALS — WEIGHT: 169 LBS | SYSTOLIC BLOOD PRESSURE: 110 MMHG | BODY MASS INDEX: 26.47 KG/M2 | DIASTOLIC BLOOD PRESSURE: 72 MMHG

## 2018-09-20 DIAGNOSIS — C50.512 MALIGNANT NEOPLASM OF LOWER-OUTER QUADRANT OF LEFT FEMALE BREAST, UNSPECIFIED ESTROGEN RECEPTOR STATUS (HCC): ICD-10-CM

## 2018-09-20 DIAGNOSIS — M25.50 ARTHRALGIA, UNSPECIFIED JOINT: ICD-10-CM

## 2018-09-20 DIAGNOSIS — Z17.0 MALIGNANT NEOPLASM OF LOWER-OUTER QUADRANT OF LEFT BREAST OF FEMALE, ESTROGEN RECEPTOR POSITIVE (HCC): ICD-10-CM

## 2018-09-20 DIAGNOSIS — T45.1X5A CHEMOTHERAPY-INDUCED NEUROPATHY (HCC): ICD-10-CM

## 2018-09-20 DIAGNOSIS — C50.512 MALIGNANT NEOPLASM OF LOWER-OUTER QUADRANT OF LEFT BREAST OF FEMALE, ESTROGEN RECEPTOR POSITIVE (HCC): ICD-10-CM

## 2018-09-20 DIAGNOSIS — G62.0 PERIPHERAL NEUROPATHY DUE TO CHEMOTHERAPY (HCC): Primary | ICD-10-CM

## 2018-09-20 DIAGNOSIS — Z51.5 PALLIATIVE CARE BY SPECIALIST: ICD-10-CM

## 2018-09-20 DIAGNOSIS — G62.0 CHEMOTHERAPY-INDUCED NEUROPATHY (HCC): ICD-10-CM

## 2018-09-20 DIAGNOSIS — T45.1X5A PERIPHERAL NEUROPATHY DUE TO CHEMOTHERAPY (HCC): Primary | ICD-10-CM

## 2018-09-20 DIAGNOSIS — R52 PAIN: ICD-10-CM

## 2018-09-20 PROCEDURE — 3017F COLORECTAL CA SCREEN DOC REV: CPT | Performed by: FAMILY MEDICINE

## 2018-09-20 PROCEDURE — G8428 CUR MEDS NOT DOCUMENT: HCPCS | Performed by: FAMILY MEDICINE

## 2018-09-20 PROCEDURE — 99214 OFFICE O/P EST MOD 30 MIN: CPT | Performed by: FAMILY MEDICINE

## 2018-09-20 PROCEDURE — G8417 CALC BMI ABV UP PARAM F/U: HCPCS | Performed by: FAMILY MEDICINE

## 2018-09-20 PROCEDURE — 1036F TOBACCO NON-USER: CPT | Performed by: FAMILY MEDICINE

## 2018-09-20 PROCEDURE — 99212 OFFICE O/P EST SF 10 MIN: CPT | Performed by: FAMILY MEDICINE

## 2018-09-20 RX ORDER — NORTRIPTYLINE HYDROCHLORIDE 50 MG/1
50 CAPSULE ORAL NIGHTLY
Qty: 90 CAPSULE | Refills: 1 | Status: SHIPPED | OUTPATIENT
Start: 2018-09-20 | End: 2019-02-14 | Stop reason: SDUPTHER

## 2018-09-20 RX ORDER — OXYCODONE AND ACETAMINOPHEN 10; 325 MG/1; MG/1
1 TABLET ORAL EVERY 6 HOURS PRN
Qty: 120 TABLET | Refills: 0 | Status: SHIPPED | OUTPATIENT
Start: 2018-09-20 | End: 2018-10-18 | Stop reason: SDUPTHER

## 2018-09-20 RX ORDER — FENTANYL 50 UG/H
1 PATCH TRANSDERMAL
Qty: 10 PATCH | Refills: 0 | Status: SHIPPED | OUTPATIENT
Start: 2018-09-20 | End: 2018-09-20 | Stop reason: SDUPTHER

## 2018-09-20 RX ORDER — GABAPENTIN 300 MG/1
600 CAPSULE ORAL 3 TIMES DAILY
Qty: 540 CAPSULE | Refills: 1 | Status: SHIPPED | OUTPATIENT
Start: 2018-09-20 | End: 2019-02-14 | Stop reason: SDUPTHER

## 2018-09-20 RX ORDER — FENTANYL 50 UG/H
1 PATCH TRANSDERMAL
Qty: 10 PATCH | Refills: 0 | Status: SHIPPED | OUTPATIENT
Start: 2018-09-20 | End: 2018-10-18

## 2018-09-20 NOTE — PROGRESS NOTES
focus of metastatic carcinoma measures 11 mm in maximum dimension; Extranodal extension: Present. · pT3 pN3a Mx  Re-excision of postero-lateral wall of lumpectomy cavity was performed on 2016 with negative margins. Tumor markers were normal; CT abdomen/pelvis and bone scan were negative for metastatic disease. CT chest noted Mildly enlarged AP window lymph nodes measuring 11 x 7.5 mm. Pulmonary team consult appreciated (In view of the difficulty in approaching that lymph node by EBUS, Dr. Karly Sutton will follow this LN with CT chest in 3 months). PET/CT scan negative for mediastinal LN uptake. Focal uptake in the proximal stomach, recommend correlate with EGD. (Hx of PUD with many EGDs in the past by Dr. Frankie Mas). GI team (Dr. Frankie Mas) consulted for repeat EGD (performed on 2016 and noted hiatal hernia and mild gastritis). We recommended systemic chemotherapy consisting of Dose-Dense AC-T followed by RT followed lastly by hormonal therapy (Arimidex 1 mg po daily for at least 5 years). Side effects of AC-T reviewed with patient. She agreed to proceed. Mediport was placed by Dr. Sivan Nye. 2DEcho noted EF 52%. Cycle # 7 weekly Taxol is scheduled for today 2016. Bone pain after chemo; cannot take Ibuprofen due to gastritis. Tylenol alone doesn't help. Palliative care team consulted for sx management. RTC next week for Cycle # 8 weekly Taxol.     Past Medical History:   Diagnosis Date    [facility]     MTHFR mutation ([facility]     Neuropathy        Past Surgical History:   Procedure Laterality Date    BREAST LUMPECTOMY       SECTION         Current Outpatient Prescriptions on File Prior to Visit   Medication Sig Dispense Refill    Fluticasone Furoate-Vilanterol (BREO ELLIPTA) 200-25 MCG/INH AEPB Inhale 1 puff into the lungs daily 30 each 6    DULoxetine (CYMBALTA) 20 MG extended release capsule Take 1 capsule by mouth daily 90 capsule 1    fentaNYL (DURAGESIC) 50 MCG/HR Place 1 patch onto the skin every 72 hours for 30 days. Hold for increased sedation. 10 patch 0    nortriptyline (PAMELOR) 25 MG capsule Take 1 capsule by mouth nightly 90 capsule 1    oxyCODONE-acetaminophen (PERCOCET)  MG per tablet Take 1 tablet by mouth every 6 hours as needed for Pain (hold for sedation) for up to 30 days. . 120 tablet 0    tamoxifen (NOLVADEX) 20 MG tablet Take 20 mg by mouth      TURMERIC PO Take 750 mg by mouth      DULoxetine 40 MG CPEP Take 40 mg by mouth nightly 30 capsule 0    gabapentin (NEURONTIN) 300 MG capsule Take 2 capsules by mouth 3 times daily for 180 days. 540 capsule 1    ALPRAZolam (XANAX) 0.5 MG tablet 1/2 to 1 tablet orally before bed as needed for sleep/anxiety. 30 tablet 0    albuterol sulfate HFA (PROVENTIL HFA) 108 (90 Base) MCG/ACT inhaler Inhale 2 puffs into the lungs every 4 hours as needed for Wheezing 1 Inhaler 1     No current facility-administered medications on file prior to visit. Allergies:    Demerol hcl [meperidine]; Azithromycin; and Motrin [ibuprofen]    Symptom Assessment:       Symptom   Present   Medication   # Doses/24h    Pain           Dyspnea           N/V          Constipation          Insomnia           Pruritis       Anxiety/Depression          Decreased Intake          Weight Loss          Fatigue          Declining  Performance Status         ADL Dependent        ROS: UNLESS STATED ABOVE PATIENT DENIES:  CONSTITUTIONAL:  fever, chill, rigors, nausea, vomiting, fatigue. HEENT: blurry vision, double vision, hearing problem, tinnitus, hoarseness, dysphagia,               odynophagia  RESPIRATORY: cough, shortness of breath, sputum expectoration. CARDIOVASCULAR:  Chest pain/pressure, palpitation, syncope, irregular beats  GASTROINTESTINAL:  abdominal or rectal pain, diarrhea, constipation, .   GENITOURINARY:  Burning, frequency, urgency, incontinence, discharge  INTEGUMENTARY: rash, wound, pruritis  HEMATOLOGIC/LYMPHATIC:  Swelling, sores, gum bleeding, easy bruising, pica. MUSCULOSKELETAL:  pain, edema, joint swelling or redness  NEUROLOGICAL:  light headed, dizziness, loss of consciousness, weakness, change                                   in memory, seizures, tremors    Social history:  Social History     Social History    Marital status:      Spouse name: N/A    Number of children: N/A    Years of education: N/A     Occupational History    nurse- [employer]     Social History Main Topics    Smoking status: Former Smoker     Packs/day: 1.50     Years: 20.00     Types: Cigarettes     Quit date: 6/16/2016    Smokeless tobacco: Never Used      Comment: quit smoking june 2016    Alcohol use No    Drug use: No    Sexual activity: Not on file     Other Topics Concern    Not on file     Social History Narrative    Lives in [place] with daughter Uvaldo Deshpande. Works as an RN. Family history:  Family History   Problem Relation Age of Onset    Cancer Father         prostate    Diabetes Mother         HTN / cholesterol    Diabetes Brother            PE: Vitals: There were no vitals filed for this visit.     Gen: WD, WN, NAD, awake, alert, able to voice their needs/thoughts   HEENT: normocephalic, atraumatic, sclera nonicteric, PERRLA, EOMI, MMM, good speech tone and quality  Neck: no LAD, no JVD, supple, no masses, normal speech, trachea midline,   Lungs: bilaterally clear to auscultation, good aeration, symmetric  Heart: regular rate and rhythm, no murmurs/rubs/gallops/ectopy appreciated, PMI WNL  Abd.: non-distended, soft, nontender, non-distended, normal bowel sounds  Ext.: no clubbing, cyanosis or edema, no joint tenderness  Skin: warm, adequate hydration without acute lesions  Neuro: awake, alert, oriented x 3, conversive,     Impression:  As per medical oncology's assessment from 12/30/16:  45 y/o post-menopausal  female who underwent Stereotactic Left Breast mass core biopsy on 06/17/2016 revealing Invasive Lobular carcinoma, well differentiated. Associated lobular carcinoma in-situ. ER + (100%); OH + (100%) and HER-2/chay negative (1+)  FNA Right Breast Cyst FNA aspiration on 06/23/2016: NEGATIVE for malignancy. Cytologic findings consistent with cyst contents. 07/06/2016: Left breast excision/SLNB/Left axillary dissection was performed by Dr. El Roa. Left sentinel and left breast sentinel node # 1 biopsy: Two lymph nodes with metastatic carcinoma. Lymph node, left axillary, left breast sentinel node # 2 biopsy: One lymph node with metastatic carcinoma. Breast, left, excision of mass:  Invasive lobular carcinoma. Lymph nodes, left axillary, biopsy with left axillary dissection (10/22): Metastatic carcinoma. · Comment: Sections demonstrate a large amount of residual invasive lobular carcinoma. · Invasive tumor estimated to be at least 6.0 cm in greatest dimension. · Histologic grade:   ¨ Glandular differentiation- score 3: < 10%of tumor area forming glandular/tubular structures. ¨ Nuclear pleomorphism- Score 1: Nuclei small with little increase in size in comparison with normal breast epithelial cells. ¨ Mitotic count: Score 1  ¨ Overall grade: Grade 1   · Carcinoma focally involves the posterolateral inked margin of excision. · Total of 13/23 lymph nodes involved by metastatic carcinoma, largest focus of metastatic carcinoma measures 11 mm in maximum dimension; Extranodal extension: Present. · pT3 pN3a Mx  Re-excision of postero-lateral wall of lumpectomy cavity was performed on 07/22/2016 with negative margins. Tumor markers were normal; CT abdomen/pelvis and bone scan were negative for metastatic disease. CT chest noted Mildly enlarged AP window lymph nodes measuring 11 x 7.5 mm. Pulmonary team consult appreciated (In view of the difficulty in approaching that lymph node by EBUS, Dr. Jerome Stock will follow this LN with CT chest in 3 months). PET/CT scan negative for mediastinal LN uptake. Focal uptake in the proximal stomach, recommend correlate with EGD. (Hx of PUD with many EGDs in the past by Dr. Pascual Rey). GI team (Dr. Pascual Rey) consulted for repeat EGD (performed on 12/21/2016 and noted hiatal hernia and mild gastritis). We recommended systemic chemotherapy consisting of Dose-Dense AC-T followed by RT followed lastly by hormonal therapy (Arimidex 1 mg po daily for at least 5 years). Side effects of AC-T reviewed with patient. She agreed to proceed. Mediport was placed by Dr. Josefina Wang. 2DEcho noted EF 52%. Cycle # 7 weekly Taxol is scheduled for today 12/30/2016. Bone pain after chemo; cannot take Ibuprofen due to gastritis. Tylenol alone doesn't help. Palliative care team consulted for sx management. RTC next week for Cycle # 8 weekly Taxol. 01/03/17:  [facility]RS report reviewed today revealing Robitussin-AC prescribed in November 2016, Percocet 2658 milligram tablets ×28 prescribed Dr. Goran Ojeda filled on 10/07/16, Norco 5325 milligram tablets ×30 prescribed 3 times by Dr. Josefina Wang filled on August 24, July 23 and July 6 2016 and diazepam 10 mg tablet ×1 prescribed by the same physician filled on 06/13/16. Chemotherapeutic agent review:  Anastrozole/Arimidex:  - Is a nonsteroidal aromatase inhibitor  - Indicated for metastatic breast cancer  Drug interactions include:  - None well-characterized  Toxicities include:  - Asthenia is most common occurring in 20% of patients  - Mild nausea, vomiting, constipation, diarrhea  - Hot flashes occur in 10% of patients  - Dry, scaling skin rash  - Arthralgias occur in up to 15% of patients involving hands, knees, hips, lower back and shoulders with early morning stiffness as usual presentation.  - Headache  - Peripheral edema and 7% of patients  - Flulike syndrome in the form of fever, malaise, myalgias.   Paclitaxel/Taxol:  - Isolated from the bark of the [address] [place] [address] [employer] tree  - Active in mitosis  - Indicated for ovarian, breast, lung, head and neck, esophageal, prostate, bladder cancers, AIDS related Kaposi's sarcoma. Drug interactions include:  - Is a radiosensitizing agent. - Phenytoin, phenobarbital accelerate its metabolism. - Cisplatin, carboplatin, cyclophosphamide use increases myelosuppression  - Reduces the plasma clearance of doxorubicin by 30% resulting in increased severity of myelosuppression. Toxicities include: - Myelosuppression with neutropenia and need are days 8-10, recovery by day 15-21. - Infusion reactions up to 40% of patients with generalized skin rash, flushing, erythema, hypotension, dyspnea, bronchospasm usually within the first 2-3 minutes and almost always within 10 minutes. - Neurotoxicity in the form of sensory neuropathy with numbness and paresthesias which is dose dependent.  - Transient asymptomatic sinus bradycardia occurring in 30% of patients with sometimes other rhythm disturbances including Mobitz type I and 2, third-degree heart block and ventricular arrhythmias. - Alopecia and nearly all patients. - Mucositis and/or diarrhea and up to 40% of patients, mild to moderate nausea and vomiting.  - Transient elevations in serum transaminases, bilirubin and alkaline phosphatase. - Onycholysis mainly observed after 6 courses on the weekly schedule not seen with q.3 week schedule. Patient presents today to the exam room in no acute distress able to voice her needs without sign of sedation and/or confusion. Patient's 1 complaint is \"arthralgias\". Patient does complain of numbness tingling to her fingers time which is mild. Patient states that she had an EGD performed 2 weeks ago which revealed gastritis and was advised not to take anti-inflammatory/NSAIDs. Patient then stated that the surgeon advised her to always take a PPI if she has to secondary to her pain. Patient states that she is taking during the day 1 OTC Advil every \"couple of hours\" which helps approximately 50%.   Patient states that she only does this the myalgias and arthralgias she still is taking Advil approximately 6 daily and knows that she should not. Patient also complains of fatigue as she is not sleeping well with decreased appetite and occasional nausea. Patient has 4 tablets of Percocet remaining. Options were discussed and I advised her not to take Advil but we did prescribe Mobic 7.5 mg q.12 hours with food p.r.n. time 60 tablets no refill. I advised her that if she does not need to take these and do not. We are continuing the Pamelor 10 mg daily that she takes in the afternoon 30 tablets with no refill. Patient was prescribed unchanged Percocet 5325 milligram tablets 1 p.o. q.6 hours p.r.n. instead of q.h.s. secondary to her worsening pain time 60 tablets. We also initiated Neurontin 300 mg 1 p.o. q.h.s. ×30 capsules with no refills. We will see her back in 4 weeks or sooner if she needs us and she was advised to call if she has any problems. She voiced understanding. 3/2:  Per Dr. Oren Mckinney note 2/10:  ASSESSMENT/PLAN: Laurence Tabares is doing well overall. Discussede about the role of adjuvant XRT to the left breast, the left supraclavicular fossae and the left axilla, to improve local control and may impact on survival. Mediastinal lymphadenopathy and subcentimeter pulmonary nodules are suspicious. She is under surveillance and also seeing pulmonologist. Left internal mammary lymph nodes are normal on CT, and especially with the findings in the chest, these would not be included as wouldn't impact on prognosis. This was discussed with her. Side effects of XRT were enumerated including statistical results. All her questions were answered. She gave her consent to proceed.   CT simulation date set up. Patient seen in room with no family present. She is alert/oriented and able to voice her concerns. She appears anxious and states that she is frustrated with persistent pain.  Patient states that she finished her chemotherapy 2/3 and the Zofran. Patient advised to call immediately if she has any difficulty with the Zofran and she voiced understanding. Zyprexa low dose may be an option for her as well but I am hoping that neuropathy treatment may help.  04/13/17: As per medical oncology assessment from 04/04/17:  RT was started on 03/13/2017 and will be completed on 04/26/2017. Presents today, 04/04/2017 to discuss hormonal therapy. Side effects of arimidex explained: including but not limited to hot flashes, aches, osteoporosis, edema, vasodilation, rash, GI upset, mood changes, sob/cough, dyslipidemia, thrombophlebitis, anemia, leukopenia, thromboembolic disorder (rare, more so with tamoxifen), hypersensitivity, vaginal bleeding (rare, more so with tamoxifen), pain. Will need DEXA scan prior to starting Arimidex. D/C Cymbalta in the interim. Biochemical testing drawn today; LMP was about 2 years ago per patient. RTC in 3 weeks to review DEXA scan and biochemical testing. Patient presents to the exam room today in no acute distress without sign of sedation and/or confusion able to voice her needs. Patient states that beginning approximately 1 week after she began the new Cymbalta she notices significant decrease in her bilateral leg symptoms stating that she was able to stand up in the morning without significant pain. Patient states that her medication was stopped by medical oncology as the above note and she has noticed after 4 days that her symptoms have increased. Patient denies any other new or uncontrolled symptoms. Options were discussed and I reviewed the case with her medical oncologist Dr. Laurence Pedroza as well as pharmacy here and we do not know of an interaction between Cymbalta and Arimidex or any other medications that she is on therefore today I am restarting Cymbalta at the same dose 20 mg daily and we will see her back in 4 weeks or sooner if she needs us.   At this time we will titrate the medication if there is still room for improvement. We also continued unchanged her Percocet as above ×90 tablets in which she still takes 2-3 tablets a day as well as her Neurontin 300 mg q.h.s.  05/11/17:  Medical records reviewed and as per medical oncology assessment on 04/25/17:  RT was started on 03/13/2017 and will be completed on 04/27/2017. DEXA scan on 04/18/2017: Normal study. Repeat 1 year (2018). Biochemical testing on 04/04/2017 confirmed post-menopausal state. Arimidex 1 mg po daily will be started on 04/28/2017. To take Ca+/Vit D while on Arimidex. Side effects of arimidex explained: including but not limited to hot flashes, aches, osteoporosis, edema, vasodilation, rash, GI upset, mood changes, sob/cough, dyslipidemia, thrombophlebitis, anemia, leukopenia, thromboembolic disorder (rare, more so with tamoxifen), hypersensitivity, vaginal bleeding (rare, more so with tamoxifen), pain - pt understands and agrees to proceed. RTC 4 weeks for Arimidex toxicity check. As per radiation oncology assessment on 05/05/17:  Diagnosis: Invasive ductal carcinoma left breast, status post conservative surgery followed by chemotherapy. Stage IIIc, ER/GA positive  Narrative: Patient just completed a course of adjuvant XRT to the left breast/left SC/ax. Radiation therapy was started on 3/13/17 and completed on 4/27/17. The left breast received a dose of 5040 cGy in 28 fractions, ED 38. Treatment was delivered with tangential beams, 6 MV photons. The left SC received a dose of 4500 cGy in 25 fractions. This was treated with 15 MV photons. A PAB boost was applied to take the midplane dose to 4500 cGy. Following this the tumor bed received an additional dose of 1000 cGy in 5 fractions. This was treated with 6/15 MV photons 3-D technique   Response/Tolerance: She tolerated the treatments quite well with mild fatigue and grade 1 reaction. Towards the end she developed grade 2 reaction in inframammary area with yeast superinfection.  This responded to topical measures. She was able to complete the anticipated therapy  Follow-up: 1 month    As per phone discussion with Pamela Rollins on 05/03/17:   Patient called to notify palliative clinic that she is \"afraid to take the cymbalta because an NP told me that it could possibly make the arimidex less effective. \" She said that the NP had offered to order her effexor instead. Patient unsure if effexor will help with her nerve pain as much as the cymbalta did. I notified Dr. Ajit Yoder. He said that the effexor might possibly help her nerve pain and that it is okay if patient wants to follow through with the prescription. I called patient and updated her. She says that she will call NP and talk with her about getting med. Patient to be seen in palliative clinic May 11. Patient presents to the exam room today unaccompanied in no acute distress, talkative and smiling without sign of sedation and/or confusion able to voice her needs. Patient states that she was uncomfortable taking Cymbalta after talking to the nurse practitioner who prescribed Effexor 37.5 mg daily in which she started this 5 days ago. Patient states that this morning she thought she maybe noticed some benefit from it but prior has not yet. Patient states that she is taking her Percocet on the average of 3 times a day and has 3 remaining as per her history. Patient also compliant with Neurontin 3 mg q.h.s. stating that she still not is sleeping well. Patient denies any new and/or uncontrolled symptoms other than an overall not feeling well. Patient feels that this is worse since she started the Arimidex. Options were discussed and I advised her that I feel that Effexor may help with her symptoms but possibly not for another couple of weeks and she voiced understanding.   I prescribed a higher dose of Effexor 75 mg daily that she will start after her 30 days of the lower dose, unchanged Percocet ×90 tablets as above and electronically her Neurontin 300 mg continue the medication. Patient states that she sees Dr. Ines Villanueva at the end of June.  06/29/17:  Medical records reviewed and as per phone call by Byron Self on 06/02/17:  Refilled prescription for percocet 5-325mg q6h prn pain for [facility]. Patient had 8 pills left (#90 prescribed on 5/11/17) and has been taking 4 times per day secondary to increasing pain. Next appointment on 6/8/17 with palliative medicine. Prescription electronically prescribed for #24 pills with no refills. Delma Kenyon RN, MSN, [facility]  Patient presents to the exam office today unaccompanied in no acute distress but stating that her arthralgias have significantly increased over the past month. Patient states that she did not refill the Arimidex and took her last tablet Monday evening. Patient states that she sees Dr. Ines Villanueva tomorrow morning and will ask if she can begin something else because she cannot tolerate the Arimidex. Patient very apologetic concerning this but states that she has tried and feels that she is tough but states that it feels like she has the flu 24 7. Patient states that she takes the Percocet 7. 5325 milligram tablet on the average of 5 times daily receiving 1-1/2-2 hours relief and tries to ochoa it out until it's time to take another. Patient denies oversedation from this. Patient did not notice any difference from increasing the Neurontin to 300 mg t.i.d. for the Effexor 75 mg daily. Patient states that she will be starting a second job throughout the summer but does have some vacation coming up. Options were discussed and I introduced the topic of a long-acting opioid that she is resistant to hoping that she can experience less arthralgias with a different medication therefore she deferred today against my suggestions of a Duragesic 12 µg q.72 hours.   Today we increased her Percocet to 10325 milligram tablets 1 p.o. q.6 hours p.r.n. prescribing 60 tablets today as well as increased her significant discomfort. On the relief is when she applies a heating pad. Erythema has decreased on antibiotic therapy. However, cultures have never been positive of seroma aspirations. Having nipple discharge with seroma reaccumulation. 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 Will review literature to see if sclerosing seroma cavity has ever been effective in this situation. If not, we discussed the possibility of simple mastectomy. We discussed the possible consideration of delayed tissue-based reconstruction. Patient brought up contralateral prophylactic mastectomy. In light of her complications and extensive disease upon presentation I deferred this discussion.     Patient will be rescheduled for aspiration of left breast seroma. Hopefully, this will alleviate her discomfort. She will continue to wear a supportive bra and use heat as desired for relief of discomfort. Patient presents today ambulating well in no acute distress at her baseline without sign of sedation and/or confusion able to voice her needs. Patient started her Duragesic 75 µg patch yesterday evening and also picked up a short prescription of the Percocet 10. Patient complaining of pain previous and before, intolerant to an increase in Neurontin throughout the day taking 300 mg, 300 mg, 600 mg q.h.s. Cymbalta helped significantly with her symptoms in the past but medical oncology felt that it was unsafe to use with her previous aromatase inhibitor. Patient is on Aromasin currently and we will question medical oncology about Cymbalta usage with this aromatase inhibitor. Patient states that aspiration of her left breast helps some with the pain for approximately 2 days but then returns. Patient complains of constant seepage around her incision site and nipple throughout the day needing to wear a large pad to give compression over the incision.   Patient reviews with me Dr. Elizabeth Hams assessment. Options were discussed and we are questioning medical oncology concerning the above as she had dramatic improvement of her symptoms on Cymbalta for short period of time and continuing Duragesic 75 µg q.72 hours and Percocet 10325 milligram tablets 1 p.o. q.6 hours p.r.n. prescribing 120 tablets today, Neurontin 300 mg, 300 mg, 600 mg q.h.s., Effexor  mg daily and her other medications unchanged. We will contact the patient about medical oncology's recommendations and let her know she can restart the Cymbalta. We will see her back in 4 weeks or sooner if she needs us. 10/19/17:  Medical records reviewed and as per medical oncology assessment from 09/26/17:  Mary Parsons d/c'd on 07/10/2017. Re-started Arimidex 1 mg daily 07/14/2017. She stopped the Arimidex on 07/25/2017 stating that she cannot sleep secondary to the pain which is also throughout the day. We recommended Aromasin 25 mg po daily; Aromasin was started on 07/28/2017 with better tolerance so far. Left breast discomfort; aspiration x 3 (08/16/2017, 08/30/2017 and 09/12/2017); 2 courses of Clindamycin. Culture no organisms seen. Cytology negative for malignant cells. Breast Surgical Oncology team recommendations: Continue to wear a supportive bra and use heat as desired for relief of discomfort. Literature search to see if sclerosing seroma cavity has ever been effective in this situation. If not,they also discussed the possibility of simple mastectomy. She will contact Dr. Cici Buck team this week. Continue Aromasin, Ca/VitD. RTC in 4 months. Patient presents ambulatory in no acute distress at her baseline without sign of sedation and/or confusion able to voice her needs. Patient states that she has noticed increased aches and pains especially the week that she was sick URI as she has been weaning off of the Effexor.   Patient states there's been no change in how she is taking her patches or Percocet, Neurontin and denies new and/or uncontrolled symptoms. Patient states that the cough is more improved than previous. Options were discussed and she will continue the wean off the Effexor next week and then start Cymbalta 20 mg daily after that. We prescribed unchanged her Duragesic 75 µg q.72 hours prescribing 10 patches today as well as Percocet 10325 milligram tablets 1 p.o. q.6 hours p.r.n. holding for sedation prescribing 120 tablets today. Patient will continue her Neurontin 300 mg, 300 mg, 600 mg q.h.s. We will see her back in 4 weeks or sooner if she needs us and she knows that she can call us at any time. At this time we will look at titrating aggressively the Cymbalta as indicated. 11/16/17:  Medical records reviewed and as per Dr. Luzma Verma on 11/14/17:  [facility] [place] 08/30/2017 for follow up of mastitis of left breast after 10 days of Clindamycin. Clinical breast examination reveals persistent edema and erythema of the left breast, left nipple inversion, induration of the left breast scar, and left axillary adenopathy. Increased discomfort with palpation. Locally advanced left breast cancer with marked breast edema and left axillary fibrosis leading to discomfort in her breast axilla and arm. She also has markedly limited range of motion of the left shoulder especially when fluid re-accumulates in the lumpectomy site. Aspiration in the office a day for 20 mL of clear fluid which in the past has been cytology negative. Relief of discomfort noted. Long discussion about options of therapy going forward. I reiterated that neither he nor ice would likely alleviate the chronic breast edema. The breast edema is attributable to her extensive surgery, complete axillary dissection, extensive poppy involvement, and regional radiation therapy. We discussed the potential option of a completion left simple mastectomy.   We discussed the fact that there might be significant problems with wound healing due to the fact that she has voiced understanding because of her symptoms. We will see her back in 4 weeks or sooner if she needs us. 12/14/17:  Medical records reviewed and as per Dr. Murillo Level phone call on 12/11/17:  Ana Goyal with pathology results-left message to return call to [phone]. Fine-needle aspiration cytology of her left breast seroma performed on 11/14/17 was inconclusive for malignant cells. Have discussed with radiology potential imaging modalities for the edematous left breast.  Concerned that most modalities would result in false positive imaging due to level of inflammation of the breast.  It is been a year and a half since patient was initially initially diagnosed with her locally advanced left breast cancer. At this point, prior to any further intervention, restaging including scan CAT scans of the chest abdomen and pelvis and bone scan would be appropriate. Left message for patient to call us back. Discussed all above with Dr. Katy Pierre. Patient presents today ambulating in no acute distress without sign of sedation and/or confusion able to voice her needs. Patient states that she had a bad month with a UTI, Keflex, nausea, back pain as well as chest pain left-sided requiring more Percocet stating that she is out. Patient also took a per Duragesic patch for 4 days for a drug screen for a new job and had worsening pain therefore took more Percocet. Patient cannot notice a difference from the b.i.d. Cymbalta. Patient does state that if she does take Neurontin 600 mg t.i.d. this helped significantly with her pain but it is too sedating during the day. Patient continues on 300, 300, 600 daily. Patient asking if we can do anything else as she desires to decrease the Percocet as she is afraid of the Tylenol. Options were discussed and I educated her and initiated methadone 5 mg q.12 hours that she will start after we obtain an EKG.   Patient denies any palpitations, syncope but does complain of left chest pain expecting to be from her cancer. Patient was advised that I would expect her requirement for Percocet to decrease with the methadone and she voiced understanding. Patient will call if she has any problems and hold for sedation. Today we also prescribed unchanged her Duragesic 75 µg q.72 hours prescribing 10 patches today, Percocet 10325 milligram tablets 1 p.o. q.6 hours p.r.n. prescribing 120 tablets today that she knows my goal is to wean her down from these. We also prescribed her Xanax unchanged as per Epic and she will continue the Neurontin 300, 300, 600. We also increased her Cymbalta to 60 mg nightly and she will continue her other medicines unchanged. We will see her back in 4 weeks or sooner if she needs us and we will call her after reviewing the EKG telling her to start the methadone. She will also call me next week for an update or sooner if she has any problems. 02/08/18:  Medical records reviewed and as per medical oncology assessment from 12/19/17:  Left breast discomfort; aspiration x 3 (08/16/2017, 08/30/2017 and 09/12/2017); 2 courses of Clindamycin. Culture no organisms seen. Cytology negative for malignant cells. Left Breast FNA on 11/14/2017 inconclusive for malignant cells. Breast Surgical Oncology team recommendations: Re-staging scans and left mastectomy if scans negative for metastatic disease. She has poor tolerance to Aromasin (significant arthralgias affecting quality of life). She has exhausted every possible hormonal therapy option; Referred to [facility] for clinical trial evaluation. As per phone discussion with Abdoul Rubi on 01/12/18:  Phone call from Ishmael Santillan asking about her PET CT. I reviewed the PET CT impression report verbatim with her. She has not made the apt. With Dr. Chelsey Lundberg for clinical trial evaluation as of yet. She just \"wants the breasts taken off\".   Long conversation with her regarding the importance of the endocrine therapy, which she has been 10 today, Percocet 10325 milligram tablets 1 p.o. q.6 hours p.r.n. holding for sedation prescribing 120 tablets today. Patient also states that she decreased her Neurontin down to 300 mg b.i.d. on her own and I am advising her to go back to 300, 300, 600 especially since her sedation was not any better with her decrease but her arthralgias are worse. We'll see her back in 4 weeks and our  met with her again today. She knows that she can call us at any time. We will monitor what her affect is like on subsequent visits and possibly increase her Cymbalta or even add Pamelor if we have not tried this. I'm hoping that physical therapy will help her affect is well. 04/05/18:  Medical records reviewed in [address] [place] [address] since my last visit. Patient presents today unaccompanied at her baseline without sign of sedation and/or confusion able to voice her needs. Patient states that she now is back on tamoxifen by a [facility] oncologist/trial and was told to begin weaning off of her Cymbalta in which she has been. Patient never increased to Cymbalta 40 mg daily because insurance would not cover them and now is taking 20 mg every other day weaning herself off. She is awaiting a call from [facility] oncologist to make sure she needs to come off of the Cymbalta. Patient did not have good results from Effexor prior to Cymbalta. Patient states is no change in how she is taking her medications otherwise. Patient still complains of bilateral leg neuropathy and is regressed indicating getting her left chest wall drained as per her history. Patient was evaluated by physical therapy but did not go back this week but will try next week. Options were discussed and we are continuing her same medications Duragesic 75 µg 1 patch q.72 hours prescribing 10 patches today, Percocet 10325 milligram tablets 1 p.o. q.6 hours p.r.n. prescribing 120 tablets today and continues her Neurontin and other medications unchanged.   In the future if she is staying on Cymbalta and we may increase and if not we'll try Pamelor if we have not or readdress the methadone again to help with her neuropathy. We will see her back in 4 weeks or sooner if she needs us. 05/03/18:  Medical records reviewed and no documented physician visits in [facility] since my last visit with her. Patient presents today ambulatory in no acute distress without sign of sedation and/or confusion able to voice her needs. Patient states that she definitely feels less neuropathy although it is still present on the Cymbalta low-dose 20 mg daily that her oncologist agreed to. Patient denies side effects but does state that she has had increased stress, hot flashes and occasional headaches attributing the stress and headaches to taking care of her father who has dementia. Patient states there is no change in how she is taking the other medications and denies new and/or uncontrolled symptoms otherwise. Patient also complaining of right tennis elbow and will be seeing her physical therapist about this. Options were discussed and today we are initiating Pamelor 10 mg q.h.s. and educated her on the medication. We are continuing unchanged her Cymbalta 20 mg q.h.s., Percocet 10325 milligram tablets 1 p.o. q.6 hours p.r.n. prescribing 120 tablets today, Duragesic 75 µg one patch q.72 hours prescribing 10 patches today and her Neurontin unchanged. Patient knows that she can call us at any time with any concerns. We will see her back in 4 weeks or sooner if she needs us and at this time as we talked about today we will consider weaning down the Percocet possibly to 7.5 in an attempt to wean the opioids. If symptoms do not allow we will not do this next time. 05/31/18:  Medical records reviewed and no documented physician visits in [facility] since I last saw her. Patient presents today ambulating at her baseline without sign of sedation and/or confusion able to voice her needs.   Patient states that she has able to voice her needs. Patient states that she notices some improvement of her lower extremities and pain with increasing the Pamelor without side effects. Patient states is no change to how she is taking her medication has tried to get rid of the afternoon Percocet but with worsening pain in the evening. Patient is going for a CT scan soon by pulmonology as above and will stop in at physical therapy to make an appointment at that time. Options were discussed and today we are decreasing Duragesic to 50 µg patch q.72 hours prescribing 5 patches today and continuing unchanged her Percocet 10325 milligram tablets 1 p.o. q.6 hours p.r.n. prescribing 120 tablets. Patient will try to take one half of a tablet if she can in the afternoon as per her suggestion. Patient advised to call me if her pain is much worse and of course prior to running out of her new Duragesic patches since I only prescribed prescribing 5. We will see her back in 4 weeks or sooner if she needs us and she will continue her other medications unchanged including Cymbalta 20 mg q.h.s., Pamelor 50 mg q.h.s., Neurontin 300, 300, 600.  08/23/18:  Medical records reviewed and as per phone discussion with Aydee Acevedo our nurse on 08/10/18:  Patient called for fentanyl patch refill. Patient states she notices more pain to legs with decreased fentanyl dose, but she can tolerate it is just \"bothersome\". Dr. José Luis Baer notified, advised to refill 50mcq. ДМИРТИЙ Alcantar notified, refill e-prescribed. Patient aware. Patient presents today distress without sign of sedation and or confusion able to voice her needs ambulating well. Patient states that she has worsening numbness and tingling to bilateral legs that she can tolerate with a decrease in the fentanyl. Patient states there's no change in how she is taking her Percocet sometimes taking one half of a tablet and afternoon but not routinely.   Patient is complaining of activation at night, grogginess in the morning and is wondering if it is the Cymbalta at night or 2 higher dose of Pamelor. Patient tried taking the Neurontin but with extreme sedation. Options were discussed and she will move the Cymbalta 20 mg daily to the morning instead of at night and we're decreasing the Pamelor to 25 mg q.h.s. hoping to help with her morning fatigue. We are reordering physical therapy as she was unsatisfied with [facility] results stating that \"that estevan didn't want to do anything\". We are continuing unchanged her Duragesic 50 µg 1 patch q.72 hours prescribing 10 patches today, unchanged Percocet 10325 milligram tablets 1 p.o. q.6 hours p.r.n. prescribing 120 tablets today and she will also continue her Neurontin 300, 300, 600. We'll see her back extended her appointment up to 8 weeks and she knows to call prior to running out of her medications and we will electronically prescribed unchanged. 09/20/18:  Medical records reviewed including Dr. Edison Krueger on 09/11/18: Flora Black was seen today for pneumonia, follow-up from hospital and pulmonary nodule. Diagnoses and all orders for this visit:  Infiltrate noted on imaging study  Pulmonary nodules  -     FULL PFT STUDY WITH PRE AND POST  COPD, moderate (HCC)  -     FULL PFT STUDY WITH PRE AND POST  Interstitial pneumonitis (HCC)  -     FULL PFT STUDY WITH PRE AND POST  -     Fluticasone Furoate-Vilanterol (BREO ELLIPTA) 200-25 MCG/INH AEPB; Inhale 1 puff into the lungs daily  -     MISCELLANEOUS SENDOUT 1; Future  -     EOSINOPHIL COUNT; Future  -     Hypersensitivity Pneumonitis Extended Panel; Future  Other orders  -     Cancel: CT CHEST WO CONTRAST; Future  Will follow up ct in 608 weeks. Start Breo 200/5 one puff daily. CHeck hypersensitivity panel and IgE and Eosinophil count. FOLLOW UP  Return in about 4 weeks (around 10/11/2018) for ct scan.   As per radiation oncology assessment from 09/18/18:   Patient is doing well

## 2018-10-18 ENCOUNTER — OFFICE VISIT (OUTPATIENT)
Dept: PALLATIVE CARE | Age: 53
End: 2018-10-18
Payer: COMMERCIAL

## 2018-10-18 VITALS
SYSTOLIC BLOOD PRESSURE: 117 MMHG | DIASTOLIC BLOOD PRESSURE: 71 MMHG | WEIGHT: 175 LBS | BODY MASS INDEX: 27.41 KG/M2 | HEART RATE: 94 BPM

## 2018-10-18 DIAGNOSIS — M25.50 ARTHRALGIA, UNSPECIFIED JOINT: ICD-10-CM

## 2018-10-18 DIAGNOSIS — Z51.5 PALLIATIVE CARE BY SPECIALIST: ICD-10-CM

## 2018-10-18 DIAGNOSIS — C50.512 MALIGNANT NEOPLASM OF LOWER-OUTER QUADRANT OF LEFT BREAST OF FEMALE, ESTROGEN RECEPTOR POSITIVE (HCC): ICD-10-CM

## 2018-10-18 DIAGNOSIS — T45.1X5A CHEMOTHERAPY-INDUCED NEUROPATHY (HCC): ICD-10-CM

## 2018-10-18 DIAGNOSIS — Z17.0 MALIGNANT NEOPLASM OF LOWER-OUTER QUADRANT OF LEFT BREAST OF FEMALE, ESTROGEN RECEPTOR POSITIVE (HCC): ICD-10-CM

## 2018-10-18 DIAGNOSIS — C50.512 MALIGNANT NEOPLASM OF LOWER-OUTER QUADRANT OF LEFT FEMALE BREAST, UNSPECIFIED ESTROGEN RECEPTOR STATUS (HCC): ICD-10-CM

## 2018-10-18 DIAGNOSIS — G62.0 PERIPHERAL NEUROPATHY DUE TO CHEMOTHERAPY (HCC): Primary | ICD-10-CM

## 2018-10-18 DIAGNOSIS — G62.0 CHEMOTHERAPY-INDUCED NEUROPATHY (HCC): ICD-10-CM

## 2018-10-18 DIAGNOSIS — T45.1X5A PERIPHERAL NEUROPATHY DUE TO CHEMOTHERAPY (HCC): Primary | ICD-10-CM

## 2018-10-18 PROCEDURE — 99212 OFFICE O/P EST SF 10 MIN: CPT | Performed by: FAMILY MEDICINE

## 2018-10-18 PROCEDURE — 99214 OFFICE O/P EST MOD 30 MIN: CPT | Performed by: FAMILY MEDICINE

## 2018-10-18 PROCEDURE — 3017F COLORECTAL CA SCREEN DOC REV: CPT | Performed by: FAMILY MEDICINE

## 2018-10-18 PROCEDURE — G8417 CALC BMI ABV UP PARAM F/U: HCPCS | Performed by: FAMILY MEDICINE

## 2018-10-18 PROCEDURE — G8484 FLU IMMUNIZE NO ADMIN: HCPCS | Performed by: FAMILY MEDICINE

## 2018-10-18 PROCEDURE — 1036F TOBACCO NON-USER: CPT | Performed by: FAMILY MEDICINE

## 2018-10-18 PROCEDURE — G8428 CUR MEDS NOT DOCUMENT: HCPCS | Performed by: FAMILY MEDICINE

## 2018-10-18 RX ORDER — OXYCODONE AND ACETAMINOPHEN 10; 325 MG/1; MG/1
1 TABLET ORAL EVERY 6 HOURS PRN
Qty: 120 TABLET | Refills: 0 | Status: SHIPPED | OUTPATIENT
Start: 2018-10-18 | End: 2018-11-15 | Stop reason: SDUPTHER

## 2018-10-18 RX ORDER — FENTANYL 25 UG/H
1 PATCH TRANSDERMAL
Qty: 10 PATCH | Refills: 0 | Status: SHIPPED | OUTPATIENT
Start: 2018-10-18 | End: 2018-11-15

## 2018-10-18 NOTE — PROGRESS NOTES
revealing Invasive Lobular carcinoma, well differentiated. Associated lobular carcinoma in-situ. ER + (100%); IA + (100%) and HER-2/chay negative (1+)  FNA Right Breast Cyst FNA aspiration on 06/23/2016: NEGATIVE for malignancy. Cytologic findings consistent with cyst contents. 07/06/2016: Left breast excision/SLNB/Left axillary dissection was performed by Dr. Sandeep Rodriges. Left sentinel and left breast sentinel node # 1 biopsy: Two lymph nodes with metastatic carcinoma. Lymph node, left axillary, left breast sentinel node # 2 biopsy: One lymph node with metastatic carcinoma. Breast, left, excision of mass:  Invasive lobular carcinoma. Lymph nodes, left axillary, biopsy with left axillary dissection (10/22): Metastatic carcinoma. · Comment: Sections demonstrate a large amount of residual invasive lobular carcinoma. · Invasive tumor estimated to be at least 6.0 cm in greatest dimension. · Histologic grade:   ¨ Glandular differentiation- score 3: < 10%of tumor area forming glandular/tubular structures. ¨ Nuclear pleomorphism- Score 1: Nuclei small with little increase in size in comparison with normal breast epithelial cells. ¨ Mitotic count: Score 1  ¨ Overall grade: Grade 1   · Carcinoma focally involves the posterolateral inked margin of excision. · Total of 13/23 lymph nodes involved by metastatic carcinoma, largest focus of metastatic carcinoma measures 11 mm in maximum dimension; Extranodal extension: Present. · pT3 pN3a Mx  Re-excision of postero-lateral wall of lumpectomy cavity was performed on 07/22/2016 with negative margins. Tumor markers were normal; CT abdomen/pelvis and bone scan were negative for metastatic disease. CT chest noted Mildly enlarged AP window lymph nodes measuring 11 x 7.5 mm. Pulmonary team consult appreciated (In view of the difficulty in approaching that lymph node by EBUS, Dr. Loida Delgado will follow this LN with CT chest in 3 months).    PET/CT scan negative for mediastinal on the days after her Taxol. Patient states that prior she was taking the Percocet 5-325 milligram tabs q.h.s. p.r.n. from Sunday through Tuesday night which helped her sleep but also helped 100% with the symptoms. Upon further questioning she was still taking the Advil during the day at this time. Patient states that Abigail Suzie gave her migraine headaches and Tylenol or Aleve do not help. Patient states that she just needs something to get her through the next 5 weeks of Taxol treatment. Upon questioning she does complain of tearfulness and depression at times with all of the above. Patient states that approximately 3 years ago she was prescribed Xanax 0.5 mg tablets that she used only around 4 times yearly but now uses 3 times monthly. Patient was introduced to Palliative Medicine, signed a pain contract and a UDS was prescribed and sent today. Patient denies any street drugs or any other medications. Options were discussed and today we prescribed Percocet 5-325 milligram tablets 1 p.o. q.h.s. p.r.n. ×30 tablets, initiated Pamelor 10 mg q.h.s. ×30 capsules and will see her back in 4 weeks or sooner if she needs us. Patient advised trying to not take the Advil during the day and was introduced to the idea of meloxicam if an anti-inflammatory would be needed down the road. I am hopeful that the above medications will help and she will stop the Advil. 02/02/17:  UDS from January 2017 WNL. Patient presents in no acute distress without sign of sedation and/or confusion. Patient states that she is now taking the Pamelor 10 mg in the afternoon which works well for her as it appears to be activating if she takes it at night. Patient states she is undergoing her last chemotherapy tomorrow and radiation therapy will start in approximately a month. Patient states that occasionally she takes the Percocet during the day secondary to her increasing pain as it \"takes the edge off\".   Patient states that secondary to improve as her pain/anxiety improves. We will see her back in 2 weeks or sooner if needed. 03/16/17:  Medical records reviewed and patient saw Dr. Angus Serna since her last visit with us with a good report, continuation of therapy. Patient presents to the exam room today alert, oriented with no sign of distress but complaining of persistent nausea, bilateral calf pain and persistent fatigue. Patient states that she has not tried the Xanax because her anxiety has been \"okay\". Patient states that she takes the Percocet 2-3 times a day, tried 4 times a day but this \"whacked her out\". Patient has approximately 25 tablets remaining. Patient is compliant with Nexium 20 mg daily for the past 2 weeks and still sneaks in some anti-inflammatories against our suggestions. Patient states she has not noticed a difference in her symptoms since stopping the Pamelor and is compliant with the gabapentin 300 mg q.h.s. Patient states that in the past Norco gave her migraines and she is questioning but is not sure if Percocet aggravates her nausea. Patient and review of medications had severe shakes with chemotherapy when she had Compazine requiring medications and also tried Zofran with the second chemotherapy and had shakes as well. Patient has never tried Zofran since but states that she would like to. Options were discussed and I feel that many of her symptoms are neuropathic in nature. I explained this to her and we represcribed unchanged her Percocet as above ×90 tablets. Today we added Cymbalta 20 mg q.h.s. and she will continue her Neurontin 300 mg q.h.s. Patient also desires to try Zofran for her persistent nausea and she has a bottle remaining at home. Patient advised that the Cymbalta may help with the nausea and Xanax p.r.n. can help as well.   We will see her back in 4 weeks or sooner if she needs us and at this time look the benefits of the Cymbalta, possibly titrating benefits of continue Nexium and of course q.h.s. We will see her back in 4 weeks and continue to titrate the medications as indicated. 06/08/17:  Medical records reviewed and as per medical oncology assessment from 05/23/17: As per radiation oncology assessment from 05/25/17:  Invasive ductal carcinoma left breast, status post conservative surgery followed by chemotherapy. Stage IIIc, ER/MI positive  Subjective: On 4/27/17, Audrey Pencarmela completed 5040 cGy in 28 fractions directed to the left breast/left SC/ax for management of left breast cancer. States she is doing well but notes her energy isn't improving as she feels it should be. She was started on Effexor at a lower dose and was instructed to increase the dose with her next refill. Skin is improving but she notes itching especially to the left supraclav. Has been using Aquaphor. Pt is following with Dr Cal Cabral for medical oncology. Tolerating Arimidex, but notes increasing joint pain. States that she continues to take Percocet to get through her work day. Mammogram planned prior to next follow up in June 2017. Pain: Joint aches and pains. Patient is doing well post-radiation completion. Skin care and sun care reviewed. Continue to use Aquaphor and start hydrocortisone OTC for itching. Imaging per med onc, planned mammogram prior to next follow up in June 2017. Monthly self breast exams encouraged. Encouraged to come back sooner if redness/heat or increased firmness noted to left breast, or fever noted. Verbalized understanding. Check TSH if continues with decreased energy at next follow up. I discussed follow up plans with Audrey Miller. At this time, Dr Vero Gonzalez will see the patient back in 3 months for a post-radiation completion follow-up visit. Audrey Miller is to follow up with other physicians involved in their care as directed (including but not limited to Medical Oncology, Primary Care, Pulmonary, and Surgery).    As per medical oncology assessment from 05/31/17:  RT was Effexor  mg daily. We will monitor her closely seeing her back in 2 weeks and at this time look at her appointment with oncology tomorrow, symptoms, benefits from the above changes and add the Duragesic 12 µg patch if she agrees and still indicated. We will also look at increasing the Neurontin at that time as well. Patient was refractory to any other changes today other than the above.  07/13/17:   Medical records reviewed and as per medical oncology assessment from 06/30/17:  Bilateral Diagnostic Mammogram on 06/23/2017 Negative for malignancy. U/S guided seroma aspiration Left Breast: GS noted no PMN. No organisms seen. Body fluid Cx Growth not present. Very poor tolerance to Arimidex lately. She c/o significant arthralgias, affecting quality of life. She d/c Arimidex on 06/26/2017. We recommended Femara 2.5 mg po daily instead. 30 tabs of Femara sent to [facility]. RTC 4 weeks for Femara toxicity check. Patient presents to the exam room ambulatory without sign of sedation and/or confusion able to voice her needs. Patient states that she stop the Femara Monday which was 3 days ago secondary to worsening arthralgias, myalgias even as compared to the Arimidex. Patient states that she is awakening at 4 AM with the aches and pains. Patient states that she feels slightly better this morning but still has the same symptoms. Patient states she has been taking the medications as approved and with the new/worsening bodyaches cannot notice a difference with the increased Effexor. Patient states increased Percocet lasts approximate 4-1/2 hours which is slightly improved from previous. Patient states that she will be seeing [facility] tomorrow as Dr. Lore Alcala is out of town. Options were discussed and patient very reluctant to adjust her medications upward as she is trying to get off of them. Patient is aware that she needs help with the symptoms.   Patient reluctantly and finally accepted a prescription for Duragesic 12 µg patch q.72 hours as directed and 5 patches were prescribed today. I advised her that this patch is equivalent to 2 of her Percocets over a 24-hour period and she felt slightly more comfortable with this. Patient also prescribed unchanged her Percocet  milligram tablets 1 p.o. q.6 hours p.r.n. holding for sedation prescribing 60 tablets today. Patient states that she took her last tablet this morning of the 60 tablets are prescribed 2 weeks ago. Patient reluctant to increase her Neurontin but did agree with me 2 now takes Neurontin 300 mg in the morning, 300 mg in the afternoon and 600 mg in the evening. We will see her back in 2 weeks or sooner if she needs us, evaluate recommendations from Marky/oncology and the effects of the above. I would hope at that time she would allow me to increase the Duragesic further as well as the Neurontin.  07/27/17:  Medical records reviewed and as per medical oncology assessment from 07/14/17:  She presents today, 07/14/2017 for early toxicity check. C/O significant arthralgias that have significantly impacted her quality of life. She stopped the Femara on 07/10/2017 and presents today to discuss alternatives. Long discussion regarding options. She is not a candidate for Tamoxifen due to her MTHFR mutation first noted in 2001 (Miscarriage requiring 6 months of Heparin therapy). After further discussion, she would like to try Arimidex again, as she feels she tolerated that better than the Femara. Femara d/c'd on 07/10/2017. Will re-start Arimidex 1 mg daily today, 07/14/2017 (she has enough of the prescription at home) and return to see Dr. Zheng Garcia on 07/28/2017 for toxicity check. Patient presents ambulatory today unaccompanied without sign of confusion and/or sedation able to voice her needs.   Patient states that 2 days ago she stopped the Arimidex stating that she cannot sleep secondary to the pain which is also throughout the day.  Patient states that she sees Dr. Dannie Little tomorrow and will discuss everything with him. Patient states \"this is no way to live\". Patient states that with the initiation of the Duragesic 12 µg q.72 hours she now can stretch the Percocet out to approximately every 5-1/2 hours but still needs to take them. Patient denies oversedation from them. Patient did state that with the increase of the Neurontin to 600 mg at night and does help her leg pain. 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 Patient also takes Xanax 0.5 mg one half tablet q.h.s. to help her sleep. Options were discussed and today we're doubling her Duragesic to 25 µg q.72 hours prescribing 10 patches today. We are continuing unchanged her Percocet  milligram tablets 1 p.o. q.6 hours p.r.n. prescribing 120 tablets today, increasing her Neurontin to 600 mg t.i.d. 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 We are also continuing her Xanax 0.5 mg one half tablet q.h.s. p.r.n. as per Epic. We represcribed unchanged her Effexor 150 mg daily as per Epic. We will see her back 4 weeks or sooner if she needs us and I advised her that my goal is to continue titrating upwards the Neurontin and Duragesic to the point where she does not need the Percocet. Patient is interested in weaning off of the Duragesic if possible. 8/24/17:  Medical records reviewed and as per medical oncology assessment from 08/16/17:  Ara Cockayne d/neymar'd on 07/10/2017. Re-started Arimidex 1 mg daily 07/14/2017. She stopped the Arimidex on 07/25/2017 stating that she cannot sleep secondary to the pain which is also throughout the day. We recommended Aromasin 25 mg po daily. \"  Started Aromasin 25 mg daily by Dr. Dannie Little on 07/31/2017 with fair tolerance to date. Presents today, 08/16/2017 for complaints of recurrent seroma.   Clinical breast assessment. Options were discussed and we are questioning medical oncology concerning the above as she had dramatic improvement of her symptoms on Cymbalta for short period of time and continuing Duragesic 75 µg q.72 hours and Percocet  milligram tablets 1 p.o. q.6 hours p.r.n. prescribing 120 tablets today, Neurontin 300 mg, 300 mg, 600 mg q.h.s., Effexor  mg daily and her other medications unchanged. We will contact the patient about medical oncology's recommendations and let her know she can restart the Cymbalta. We will see her back in 4 weeks or sooner if she needs us. 10/19/17:  Medical records reviewed and as per medical oncology assessment from 09/26/17:  Romie Han d/neymar'd on 07/10/2017. Re-started Arimidex 1 mg daily 07/14/2017. She stopped the Arimidex on 07/25/2017 stating that she cannot sleep secondary to the pain which is also throughout the day. We recommended Aromasin 25 mg po daily; Aromasin was started on 07/28/2017 with better tolerance so far. Left breast discomfort; aspiration x 3 (08/16/2017, 08/30/2017 and 09/12/2017); 2 courses of Clindamycin. Culture no organisms seen. Cytology negative for malignant cells. Breast Surgical Oncology team recommendations: Continue to wear a supportive bra and use heat as desired for relief of discomfort. Literature search to see if sclerosing seroma cavity has ever been effective in this situation. If not,they also discussed the possibility of simple mastectomy. She will contact Dr. oRd Cope team this week. Continue Aromasin, Ca/VitD. RTC in 4 months. Patient presents ambulatory in no acute distress at her baseline without sign of sedation and/or confusion able to voice her needs. Patient states that she has noticed increased aches and pains especially the week that she was sick URI as she has been weaning off of the Effexor.   Patient states there's been no change in how she is taking her patches or Percocet, Neurontin and denies new and/or uncontrolled symptoms. Patient states that the cough is more improved than previous. Options were discussed and she will continue the wean off the Effexor next week and then start Cymbalta 20 mg daily after that. We prescribed unchanged her Duragesic 75 µg q.72 hours prescribing 10 patches today as well as Percocet  milligram tablets 1 p.o. q.6 hours p.r.n. holding for sedation prescribing 120 tablets today. Patient will continue her Neurontin 300 mg, 300 mg, 600 mg q.h.s. We will see her back in 4 weeks or sooner if she needs us and she knows that she can call us at any time. At this time we will look at titrating aggressively the Cymbalta as indicated. 11/16/17:  Medical records reviewed and as per Dr. Smitha Han on 11/14/17:  [address] 08/30/2017 for follow up of mastitis of left breast after 10 days of Clindamycin. Clinical breast examination reveals persistent edema and erythema of the left breast, left nipple inversion, induration of the left breast scar, and left axillary adenopathy. Increased discomfort with palpation. Locally advanced left breast cancer with marked breast edema and left axillary fibrosis leading to discomfort in her breast axilla and arm. She also has markedly limited range of motion of the left shoulder especially when fluid re-accumulates in the lumpectomy site. Aspiration in the office a day for 20 mL of clear fluid which in the past has been cytology negative. Relief of discomfort noted. Long discussion about options of therapy going forward. I reiterated that neither he nor ice would likely alleviate the chronic breast edema. The breast edema is attributable to her extensive surgery, complete axillary dissection, extensive poppy involvement, and regional radiation therapy. We discussed the potential option of a completion left simple mastectomy.   We discussed the fact that there might be significant problems with wound healing due to the fact that she has unable to tolerate to date. She also was intermittently compliant when she did take it. I also advised her to make an apt. With Dr. Roberth Braga as soon as possible, and to take a copy of her imaging on CD with her to that apt. She is reluctant, but verbalizes understanding and agrees. I personally gave her Dr. Tony Prabhakar phone number today to follow up with his office for an appointment. Patient presents today unaccompanied in no acute distress at her baseline but frustrated without sign of sedation and/or confusion able to voice her needs. Patient reviewing with me her appointment coming up on Valentine's Day at [facility] with Dr. Roberth Braga.  Patient states that she could not tolerate methadone 5 mg q.12 hours secondary to sedation therefore is only taking 5 mg q.h.s. that sometime she takes around midnight with some morning sedation. Patient states that instead of taking 4-5 Percocet daily she is now taking 3-4 still complaining of significant arthralgias. Patient has been off of her Arimidex for 3-1/2 weeks as per her history secondary to intolerance with some improvement of her arthralgias. Patient also feels that she is more depressed after going up on the Cymbalta 60 mg q.h.s. and desires to back off. I did point out that she is going through more stressors with a recent CAT scan PET scan in up coming appointment and she voiced understanding. Options were discussed and as per her wish we are decreasing Cymbalta to 20 mg b.i.d. the dosage that she had been on prior and I advised her that if she notices her symptoms worsening we can always go back up and she voiced understanding. Patient now will take methadone 5 mg tablets one half tablet q.12 hours and again tried to decrease her Percocet as appropriate.   Today I prescribed unchanged Duragesic 75 µg patch q.72 hours ×10 patches, Percocet  milligram tablets 1 p.o. q.6 hours p.r.n. holding for sedation ×120 tablets and Neurontin unchanged at 300, 300, 600 other medications unchanged including Cymbalta 20 mg q.h.s., Percocet  milligram tablets 1 p.o. q.6 hours p.r.n. prescribing 120 tablets today, Duragesic 75 µg patches one patch q.72 hours prescribing 10 patches today and Neurontin unchanged as well. We'll see her back in 4 weeks or sooner if she needs us and look at the benefit of the increased Pamelor and resolution of her URI.  07/19/18:  Medical records reviewed and as per pulmonology assessment from 07/03/18: Neisha Helton is a 45y.o. year old female here for evaluation of her  pulmonary status. She was follow by Dr. Mindy Rueda up to April 2017. In summary:  Bob Braswell is a pleasant 45 y. o. female with a diagnosis of invasive ductal carcinoma left breast (ER+, [place]+), status post conservative surgery followed by chemotherapy.  The patient underwent a course of radiation therapy to the left breast, which was completed on 4/27/17.    During her treatment she was at one pint on taxol and following that developed acute interstitial pneumonitis which improved after changing her taxol and a course of prednisone. Her follow up CT in March 2017 showed resolution of the GGO on her previous CT scans . A follow up Ct in December 2017 showed new EBENEZER nodules. She is for follow up and further evaluation. Cherri Russell was seen today for consultation, cancer and results. diagnoses and all orders for this visit:  Pulmonary nodules  Smoking history  Cough  Interstitial pneumonitis ([address]  EBENEZER nodular infiltrates on CT 12/2017 were new in compare to the CT scan in 03/2017. PET scan did not show any FDG actiity. Patient continues to have a chronic cough . Will obtain a CT scan now for 6 months follow up. Will give her short course prednisone and mucinex for her bronchitis. Stephan schedule her for PFTs next ofice visit for evaluation her COPD. FOLLOW UP   Follow up in 8 weeks.   Patient presents today ambulatory at her baseline without sign of sedation and/or confusion unchanged her Percocet  milligram tablets 1 p.o. q.6 hours p.r.n. prescribing 120 tablets as she has 3 remaining, Pamelor 50 mg q.h.s. unchanged. Patient advised to begin increasing her Neurontin from the above amount possibly adding an afternoon capsule and then an evening capsule to help with her nighttime symptoms. Patient will report to me results and we will see her back 4 weeks from now or sooner if she needs us. Controlled Substances Monitoring: Attestation: The Prescription Monitoring Report for this patient was reviewed today. (Adelina Aguilera MD)  Documentation: Possible medication side effects, risk of tolerance/dependence & alternative treatments discussed., No signs of potential drug abuse or diversion identified.  (Adelina Aguilera MD)  Time in minutes:    [] 15   [] 30   [] 45   [] 60   [x] 75   [] 90  Chart review/documentation:  [x] 15   [] 30   [] 45   [] 60   [] 75   [] 90  Assessment:     [x] 15   [] 30   [] 45   [] 60   [] 75   [] 90  Conversation patient/family/staff: [] 15   [] 30   [x] 45   [] 60   [] 75   [] 90    Adelina Aguilera MD    10/18/2018

## 2018-11-08 ENCOUNTER — TELEPHONE (OUTPATIENT)
Dept: PALLATIVE CARE | Age: 53
End: 2018-11-08

## 2018-11-08 RX ORDER — DULOXETIN HYDROCHLORIDE 20 MG/1
20 CAPSULE, DELAYED RELEASE ORAL DAILY
Qty: 90 CAPSULE | Refills: 1 | Status: SHIPPED | OUTPATIENT
Start: 2018-11-08 | End: 2019-04-04 | Stop reason: SDUPTHER

## 2018-11-15 ENCOUNTER — OFFICE VISIT (OUTPATIENT)
Dept: PALLATIVE CARE | Age: 53
End: 2018-11-15
Payer: COMMERCIAL

## 2018-11-15 VITALS
SYSTOLIC BLOOD PRESSURE: 106 MMHG | WEIGHT: 176 LBS | BODY MASS INDEX: 27.57 KG/M2 | OXYGEN SATURATION: 98 % | DIASTOLIC BLOOD PRESSURE: 68 MMHG | HEART RATE: 104 BPM

## 2018-11-15 DIAGNOSIS — G62.0 PERIPHERAL NEUROPATHY DUE TO CHEMOTHERAPY (HCC): ICD-10-CM

## 2018-11-15 DIAGNOSIS — M25.50 ARTHRALGIA, UNSPECIFIED JOINT: ICD-10-CM

## 2018-11-15 DIAGNOSIS — C50.512 MALIGNANT NEOPLASM OF LOWER-OUTER QUADRANT OF LEFT FEMALE BREAST, UNSPECIFIED ESTROGEN RECEPTOR STATUS (HCC): ICD-10-CM

## 2018-11-15 DIAGNOSIS — G89.3 CHRONIC PAIN DUE TO NEOPLASM: Primary | ICD-10-CM

## 2018-11-15 DIAGNOSIS — Z51.5 PALLIATIVE CARE BY SPECIALIST: ICD-10-CM

## 2018-11-15 DIAGNOSIS — T45.1X5A PERIPHERAL NEUROPATHY DUE TO CHEMOTHERAPY (HCC): ICD-10-CM

## 2018-11-15 PROCEDURE — 3017F COLORECTAL CA SCREEN DOC REV: CPT | Performed by: FAMILY MEDICINE

## 2018-11-15 PROCEDURE — G8484 FLU IMMUNIZE NO ADMIN: HCPCS | Performed by: FAMILY MEDICINE

## 2018-11-15 PROCEDURE — 99215 OFFICE O/P EST HI 40 MIN: CPT | Performed by: NURSE PRACTITIONER

## 2018-11-15 PROCEDURE — 99212 OFFICE O/P EST SF 10 MIN: CPT | Performed by: FAMILY MEDICINE

## 2018-11-15 PROCEDURE — G8427 DOCREV CUR MEDS BY ELIG CLIN: HCPCS | Performed by: FAMILY MEDICINE

## 2018-11-15 PROCEDURE — G8417 CALC BMI ABV UP PARAM F/U: HCPCS | Performed by: FAMILY MEDICINE

## 2018-11-15 PROCEDURE — 1036F TOBACCO NON-USER: CPT | Performed by: FAMILY MEDICINE

## 2018-11-15 RX ORDER — FENTANYL 50 UG/H
1 PATCH TRANSDERMAL
Qty: 10 PATCH | Refills: 0 | Status: SHIPPED | OUTPATIENT
Start: 2018-11-15 | End: 2018-12-12 | Stop reason: SDUPTHER

## 2018-11-15 RX ORDER — OXYCODONE AND ACETAMINOPHEN 10; 325 MG/1; MG/1
1 TABLET ORAL EVERY 6 HOURS PRN
Qty: 120 TABLET | Refills: 0 | Status: SHIPPED | OUTPATIENT
Start: 2018-11-15 | End: 2018-12-12 | Stop reason: SDUPTHER

## 2018-11-15 NOTE — PROGRESS NOTES
Palliative Medicine Outpatient Visit  11/15/2018    Provider: Herrera NOLASCO        Referring Provider:    Reason for Consult:  [] Advanced Care Planning  [] Assist with goals of care  [] Transition of care  [x] Symptom Management    See below for recommendations, as indicated, concernin. Advanced Care Planning  2. Anticipatory Guidance  3. Transition of Care  4. Symptom Management      CC: Arthralgias     HPI:   As per medical oncology's assessment from 16:  45 y/o post-menopausal  female who underwent Stereotactic Left Breast mass core biopsy on 2016 revealing Invasive Lobular carcinoma, well differentiated. Associated lobular carcinoma in-situ. ER + (100%); TX + (100%) and HER-2/chay negative (1+)  FNA Right Breast Cyst FNA aspiration on 2016: NEGATIVE for malignancy. Cytologic findings consistent with cyst contents. 2016: Left breast excision/SLNB/Left axillary dissection was performed by Dr. Margarito Tellez. Left sentinel and left breast sentinel node # 1 biopsy: Two lymph nodes with metastatic carcinoma. Lymph node, left axillary, left breast sentinel node # 2 biopsy: One lymph node with metastatic carcinoma. Breast, left, excision of mass:  Invasive lobular carcinoma. Lymph nodes, left axillary, biopsy with left axillary dissection (10/22): Metastatic carcinoma. · Comment: Sections demonstrate a large amount of residual invasive lobular carcinoma. · Invasive tumor estimated to be at least 6.0 cm in greatest dimension. · Histologic grade:   ¨ Glandular differentiation- score 3: < 10%of tumor area forming glandular/tubular structures. ¨ Nuclear pleomorphism- Score 1: Nuclei small with little increase in size in comparison with normal breast epithelial cells. ¨ Mitotic count: Score 1  ¨ Overall grade: Grade 1   · Carcinoma focally involves the posterolateral inked margin of excision.    · Total of 13/23 lymph nodes involved by metastatic carcinoma, largest focus of metastatic carcinoma measures 11 mm in maximum dimension; Extranodal extension: Present. · pT3 pN3a Mx  Re-excision of postero-lateral wall of lumpectomy cavity was performed on 2016 with negative margins. Tumor markers were normal; CT abdomen/pelvis and bone scan were negative for metastatic disease. CT chest noted Mildly enlarged AP window lymph nodes measuring 11 x 7.5 mm. Pulmonary team consult appreciated (In view of the difficulty in approaching that lymph node by EBUS, Dr. Dana Dunlap will follow this LN with CT chest in 3 months). PET/CT scan negative for mediastinal LN uptake. Focal uptake in the proximal stomach, recommend correlate with EGD. (Hx of PUD with many EGDs in the past by Dr. Chriss Ron). GI team (Dr. Chriss Ron) consulted for repeat EGD (performed on 2016 and noted hiatal hernia and mild gastritis). We recommended systemic chemotherapy consisting of Dose-Dense AC-T followed by RT followed lastly by hormonal therapy (Arimidex 1 mg po daily for at least 5 years). Side effects of AC-T reviewed with patient. She agreed to proceed. Mediport was placed by Dr. Bre Farrell. 2DEcho noted EF 52%. Cycle # 7 weekly Taxol is scheduled for today 2016. Bone pain after chemo; cannot take Ibuprofen due to gastritis. Tylenol alone doesn't help. Palliative care team consulted for sx management. RTC next week for Cycle # 8 weekly Taxol.     Past Medical History:   Diagnosis Date    [facility] ([facility] [address]     MTHFR mutation ([facility] [address]     Neuropathy        Past Surgical History:   Procedure Laterality Date    BREAST LUMPECTOMY       SECTION         Current Outpatient Prescriptions on File Prior to Visit   Medication Sig Dispense Refill    DULoxetine (CYMBALTA) 20 MG extended release capsule Take 1 capsule by mouth daily 90 capsule 1    nortriptyline (PAMELOR) 50 MG capsule Take 1 capsule by mouth nightly 90 capsule 1    gabapentin (NEURONTIN) 300 MG capsule Take 2 capsules by mouth 3 medical oncology's assessment from 12/30/16:  47 y/o post-menopausal  female who underwent Stereotactic Left Breast mass core biopsy on 06/17/2016 revealing Invasive Lobular carcinoma, well differentiated. Associated lobular carcinoma in-situ. ER + (100%); DE + (100%) and HER-2/chay negative (1+)  FNA Right Breast Cyst FNA aspiration on 06/23/2016: NEGATIVE for malignancy. Cytologic findings consistent with cyst contents. 07/06/2016: Left breast excision/SLNB/Left axillary dissection was performed by Dr. Landrum Oppenheim. Left sentinel and left breast sentinel node # 1 biopsy: Two lymph nodes with metastatic carcinoma. Lymph node, left axillary, left breast sentinel node # 2 biopsy: One lymph node with metastatic carcinoma. Breast, left, excision of mass:  Invasive lobular carcinoma. Lymph nodes, left axillary, biopsy with left axillary dissection (10/22): Metastatic carcinoma. · Comment: Sections demonstrate a large amount of residual invasive lobular carcinoma. · Invasive tumor estimated to be at least 6.0 cm in greatest dimension. · Histologic grade:   ¨ Glandular differentiation- score 3: < 10%of tumor area forming glandular/tubular structures. ¨ Nuclear pleomorphism- Score 1: Nuclei small with little increase in size in comparison with normal breast epithelial cells. ¨ Mitotic count: Score 1  ¨ Overall grade: Grade 1   · Carcinoma focally involves the posterolateral inked margin of excision. · Total of 13/23 lymph nodes involved by metastatic carcinoma, largest focus of metastatic carcinoma measures 11 mm in maximum dimension; Extranodal extension: Present. · pT3 pN3a Mx  Re-excision of postero-lateral wall of lumpectomy cavity was performed on 07/22/2016 with negative margins. Tumor markers were normal; CT abdomen/pelvis and bone scan were negative for metastatic disease. CT chest noted Mildly enlarged AP window lymph nodes measuring 11 x 7.5 mm.    Pulmonary team consult appreciated (In view voice her concerns. She appears anxious and states that she is frustrated with persistent pain. Patient states that she finished her chemotherapy 2/3 and subsequently had an increase in leg pain/neuropathy, nausea/vomiting. She stopped her pamelor, neurontin and mobic at that time. She continued to take the percocet taking 2 per day for pain. The pain persisted so she restarted the neurontin 300mg qhs approximately one week ago. She has noticed a slight improvement. C/O pain that is sharp and radiating to b/l hips and legs. She has neuropathies to b/l feet. She continues to use 2 percocet per day stating that she was hesitant to use more often. She also experienced nausea/vomiting and feels this is related to her anxiety/pain. She has had previous problems with gastritis and was told to stay off NSAIDs by her GI physician. She was also prescribed Nexium 20mg qday and has not been taking this. Her appetite is fair and she often has to force herself to eat. She also feels her appetite is affected by persistent pain/anxiety. Occasional problem with constipation for which she uses dulcolax tabs or miralax as needed. Encouraged her to increase fluids and take medication if no BM for 3 days. She notes increasing and profound fatigue. She is spacing her activities and resting as much as she is able to with work. She understands that the fatigue is a result of the treatment and will improve in the future. Options reviewed. Discussed taking the percocet q6h scheduled for several days to see if she is able to get in front of her pain. She will decrease to three times per day if pain controlled. Prescription provided for Percocet 5-325mg #60/no refills. Also to continue the neurontin 300mg qhs, no refill needed. Stop the pamelor as she did not feel this was effective. Added xanax 0.5mg 2x daily prn for anxiety (patient previously used xanax with good results). Prescription provided for xanax 0.5mg #30/no refill.  She will 12 µg q.72 hours. Today we increased her Percocet to  milligram tablets 1 p.o. q.6 hours p.r.n. prescribing 60 tablets today as well as increased her Effexor  mg daily. We will monitor her closely seeing her back in 2 weeks and at this time look at her appointment with oncology tomorrow, symptoms, benefits from the above changes and add the Duragesic 12 µg patch if she agrees and still indicated. We will also look at increasing the Neurontin at that time as well. Patient was refractory to any other changes today other than the above.  07/13/17:   Medical records reviewed and as per medical oncology assessment from 06/30/17:  Bilateral Diagnostic Mammogram on 06/23/2017 Negative for malignancy. U/S guided seroma aspiration Left Breast: GS noted no PMN. No organisms seen. Body fluid Cx Growth not present. Very poor tolerance to Arimidex lately. She c/o significant arthralgias, affecting quality of life. She d/c Arimidex on 06/26/2017. We recommended Femara 2.5 mg po daily instead. 30 tabs of Femara sent to [facility]. RTC 4 weeks for Femara toxicity check. Patient presents to the exam room ambulatory without sign of sedation and/or confusion able to voice her needs. Patient states that she stop the Femara Monday which was 3 days ago secondary to worsening arthralgias, myalgias even as compared to the Arimidex. Patient states that she is awakening at 4 AM with the aches and pains. Patient states that she feels slightly better this morning but still has the same symptoms. Patient states she has been taking the medications as approved and with the new/worsening bodyaches cannot notice a difference with the increased Effexor. Patient states increased Percocet lasts approximate 4-1/2 hours which is slightly improved from previous. Patient states that she will be seeing Bela Klein tomorrow as Dr. Devi Guevara is out of town.   Options were discussed and patient very reluctant to adjust her to voice her needs. Patient states that 2 days ago she stopped the Arimidex stating that she cannot sleep secondary to the pain which is also throughout the day. Patient states that she sees Dr. Millie Bryant tomorrow and will discuss everything with him. Patient states \"this is no way to live\". Patient states that with the initiation of the Duragesic 12 µg q.72 hours she now can stretch the Percocet out to approximately every 5-1/2 hours but still needs to take them. Patient denies oversedation from them. Patient did state that with the increase of the Neurontin to 600 mg at night and does help her leg pain. 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 Patient also takes Xanax 0.5 mg one half tablet q.h.s. to help her sleep. Options were discussed and today we're doubling her Duragesic to 25 µg q.72 hours prescribing 10 patches today. We are continuing unchanged her Percocet  milligram tablets 1 p.o. q.6 hours p.r.n. prescribing 120 tablets today, increasing her Neurontin to 600 mg t.i.d. 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 We are also continuing her Xanax 0.5 mg one half tablet q.h.s. p.r.n. as per Epic. We represcribed unchanged her Effexor 150 mg daily as per Epic. We will see her back 4 weeks or sooner if she needs us and I advised her that my goal is to continue titrating upwards the Neurontin and Duragesic to the point where she does not need the Percocet. Patient is interested in weaning off of the Duragesic if possible. 8/24/17:  Medical records reviewed and as per medical oncology assessment from 08/16/17:  Vinicio Smyth d/c'd on 07/10/2017. Re-started Arimidex 1 mg daily 07/14/2017. She stopped the Arimidex on 07/25/2017 stating that she cannot sleep secondary to the pain which is also throughout the day. We recommended Aromasin 25 mg po daily. \"  Started Aromasin 25 off of the Effexor. Patient states there's been no change in how she is taking her patches or Percocet, Neurontin and denies new and/or uncontrolled symptoms. Patient states that the cough is more improved than previous. Options were discussed and she will continue the wean off the Effexor next week and then start Cymbalta 20 mg daily after that. We prescribed unchanged her Duragesic 75 µg q.72 hours prescribing 10 patches today as well as Percocet  milligram tablets 1 p.o. q.6 hours p.r.n. holding for sedation prescribing 120 tablets today. Patient will continue her Neurontin 300 mg, 300 mg, 600 mg q.h.s. We will see her back in 4 weeks or sooner if she needs us and she knows that she can call us at any time. At this time we will look at titrating aggressively the Cymbalta as indicated. 11/16/17:  Medical records reviewed and as per Dr. Azul Clayton on 11/14/17:  [address] 08/30/2017 for follow up of mastitis of left breast after 10 days of Clindamycin. Clinical breast examination reveals persistent edema and erythema of the left breast, left nipple inversion, induration of the left breast scar, and left axillary adenopathy. Increased discomfort with palpation. Locally advanced left breast cancer with marked breast edema and left axillary fibrosis leading to discomfort in her breast axilla and arm. She also has markedly limited range of motion of the left shoulder especially when fluid re-accumulates in the lumpectomy site. Aspiration in the office a day for 20 mL of clear fluid which in the past has been cytology negative. Relief of discomfort noted. Long discussion about options of therapy going forward. I reiterated that neither he nor ice would likely alleviate the chronic breast edema. The breast edema is attributable to her extensive surgery, complete axillary dissection, extensive poppy involvement, and regional radiation therapy.   We discussed the potential option of a completion is to continue to titrate the Cymbalta, possibly the Duragesic patch and then begin weaning the Percocet eventually. She reluctantly voiced understanding because of her symptoms. We will see her back in 4 weeks or sooner if she needs us. 12/14/17:  Medical records reviewed and as per Dr. Boris Morrison phone call on 12/11/17:  Tim Schrader with pathology results-left message to return call to [phone]. Fine-needle aspiration cytology of her left breast seroma performed on 11/14/17 was inconclusive for malignant cells. Have discussed with radiology potential imaging modalities for the edematous left breast.  Concerned that most modalities would result in false positive imaging due to level of inflammation of the breast.  It is been a year and a half since patient was initially initially diagnosed with her locally advanced left breast cancer. At this point, prior to any further intervention, restaging including scan CAT scans of the chest abdomen and pelvis and bone scan would be appropriate. Left message for patient to call us back. Discussed all above with Dr. Corina Jacques. Patient presents today ambulating in no acute distress without sign of sedation and/or confusion able to voice her needs. Patient states that she had a bad month with a UTI, Keflex, nausea, back pain as well as chest pain left-sided requiring more Percocet stating that she is out. Patient also took a per Duragesic patch for 4 days for a drug screen for a new job and had worsening pain therefore took more Percocet. Patient cannot notice a difference from the b.i.d. Cymbalta. Patient does state that if she does take Neurontin 600 mg t.i.d. this helped significantly with her pain but it is too sedating during the day. Patient continues on 300, 300, 600 daily. Patient asking if we can do anything else as she desires to decrease the Percocet as she is afraid of the Tylenol.   Options were discussed and I educated her and initiated methadone 5 mg q.12 \"wants the breasts taken off\". Long conversation with her regarding the importance of the endocrine therapy, which she has been unable to tolerate to date. She also was intermittently compliant when she did take it. I also advised her to make an apt. With Dr. Sarah Hall as soon as possible, and to take a copy of her imaging on CD with her to that apt. She is reluctant, but verbalizes understanding and agrees. I personally gave her Dr. Brittanie Kenny phone number today to follow up with his office for an appointment. Patient presents today unaccompanied in no acute distress at her baseline but frustrated without sign of sedation and/or confusion able to voice her needs. Patient reviewing with me her appointment coming up on Valentine's Day at [facility] with Dr. Sarah Hall.  Patient states that she could not tolerate methadone 5 mg q.12 hours secondary to sedation therefore is only taking 5 mg q.h.s. that sometime she takes around midnight with some morning sedation. Patient states that instead of taking 4-5 Percocet daily she is now taking 3-4 still complaining of significant arthralgias. Patient has been off of her Arimidex for 3-1/2 weeks as per her history secondary to intolerance with some improvement of her arthralgias. Patient also feels that she is more depressed after going up on the Cymbalta 60 mg q.h.s. and desires to back off. I did point out that she is going through more stressors with a recent CAT scan PET scan in up coming appointment and she voiced understanding. Options were discussed and as per her wish we are decreasing Cymbalta to 20 mg b.i.d. the dosage that she had been on prior and I advised her that if she notices her symptoms worsening we can always go back up and she voiced understanding. Patient now will take methadone 5 mg tablets one half tablet q.12 hours and again tried to decrease her Percocet as appropriate.   Today I prescribed unchanged Duragesic 75 µg patch q.72 hours ×10 patches, her Percocet sometimes taking one half of a tablet and afternoon but not routinely. Patient is complaining of activation at night, grogginess in the morning and is wondering if it is the Cymbalta at night or 2 higher dose of Pamelor. Patient tried taking the Neurontin but with extreme sedation. Options were discussed and she will move the Cymbalta 20 mg daily to the morning instead of at night and we're decreasing the Pamelor to 25 mg q.h.s. hoping to help with her morning fatigue. We are reordering physical therapy as she was unsatisfied with [facility] physical therapies results stating that \"that estevan didn't want to do anything\". We are continuing unchanged her Duragesic 50 µg 1 patch q.72 hours prescribing 10 patches today, unchanged Percocet  milligram tablets 1 p.o. q.6 hours p.r.n. prescribing 120 tablets today and she will also continue her Neurontin 300, 300, 600. We'll see her back extended her appointment up to 8 weeks and she knows to call prior to running out of her medications and we will electronically prescribed unchanged. 09/20/18:  Medical records reviewed including Dr. Papito Kerr on 09/11/18: Susy Phillips was seen today for pneumonia, follow-up from hospital and pulmonary nodule. Diagnoses and all orders for this visit:  Infiltrate noted on imaging study  Pulmonary nodules  -     FULL PFT STUDY WITH PRE AND POST  COPD, moderate (HCC)  -     FULL PFT STUDY WITH PRE AND POST  Interstitial pneumonitis (HCC)  -     FULL PFT STUDY WITH PRE AND POST  -     Fluticasone Furoate-Vilanterol (BREO ELLIPTA) 200-25 MCG/INH AEPB; Inhale 1 puff into the lungs daily  -     MISCELLANEOUS SENDOUT 1; Future  -     EOSINOPHIL COUNT; Future  -     Hypersensitivity Pneumonitis Extended Panel; Future  Other orders  -     Cancel: CT CHEST WO CONTRAST; Future  Will follow up ct in 608 weeks. Start Breo 200/5 one puff daily. CHeck hypersensitivity panel and IgE and Eosinophil count.   FOLLOW UP  Return in

## 2018-12-12 DIAGNOSIS — M25.50 ARTHRALGIA, UNSPECIFIED JOINT: ICD-10-CM

## 2018-12-12 DIAGNOSIS — T45.1X5A PERIPHERAL NEUROPATHY DUE TO CHEMOTHERAPY (HCC): ICD-10-CM

## 2018-12-12 DIAGNOSIS — Z51.5 PALLIATIVE CARE BY SPECIALIST: ICD-10-CM

## 2018-12-12 DIAGNOSIS — C50.512 MALIGNANT NEOPLASM OF LOWER-OUTER QUADRANT OF LEFT FEMALE BREAST, UNSPECIFIED ESTROGEN RECEPTOR STATUS (HCC): ICD-10-CM

## 2018-12-12 DIAGNOSIS — G89.3 CHRONIC PAIN DUE TO NEOPLASM: ICD-10-CM

## 2018-12-12 DIAGNOSIS — G62.0 PERIPHERAL NEUROPATHY DUE TO CHEMOTHERAPY (HCC): ICD-10-CM

## 2018-12-12 RX ORDER — FENTANYL 50 UG/H
1 PATCH TRANSDERMAL
Qty: 10 PATCH | Refills: 0 | Status: SHIPPED | OUTPATIENT
Start: 2018-12-12 | End: 2019-01-07 | Stop reason: SDUPTHER

## 2018-12-12 RX ORDER — OXYCODONE AND ACETAMINOPHEN 10; 325 MG/1; MG/1
1 TABLET ORAL EVERY 6 HOURS PRN
Qty: 120 TABLET | Refills: 0 | Status: SHIPPED | OUTPATIENT
Start: 2018-12-12 | End: 2019-01-07 | Stop reason: SDUPTHER

## 2018-12-20 ENCOUNTER — HOSPITAL ENCOUNTER (OUTPATIENT)
Dept: INFUSION THERAPY | Age: 53
Discharge: HOME | End: 2018-12-20
Payer: COMMERCIAL

## 2018-12-20 ENCOUNTER — OFFICE VISIT (OUTPATIENT)
Dept: PALLATIVE CARE | Age: 53
End: 2018-12-20
Payer: COMMERCIAL

## 2018-12-20 VITALS
BODY MASS INDEX: 28.19 KG/M2 | HEART RATE: 96 BPM | WEIGHT: 180 LBS | SYSTOLIC BLOOD PRESSURE: 128 MMHG | DIASTOLIC BLOOD PRESSURE: 70 MMHG

## 2018-12-20 DIAGNOSIS — Z17.0 MALIGNANT NEOPLASM OF LOWER-OUTER QUADRANT OF LEFT BREAST OF FEMALE, ESTROGEN RECEPTOR POSITIVE (HCC): ICD-10-CM

## 2018-12-20 DIAGNOSIS — T45.1X5A CHEMOTHERAPY-INDUCED NEUROPATHY (HCC): ICD-10-CM

## 2018-12-20 DIAGNOSIS — G89.3 CHRONIC PAIN DUE TO NEOPLASM: ICD-10-CM

## 2018-12-20 DIAGNOSIS — G62.0 CHEMOTHERAPY-INDUCED NEUROPATHY (HCC): ICD-10-CM

## 2018-12-20 DIAGNOSIS — T45.1X5A PERIPHERAL NEUROPATHY DUE TO CHEMOTHERAPY (HCC): ICD-10-CM

## 2018-12-20 DIAGNOSIS — C50.512 MALIGNANT NEOPLASM OF LOWER-OUTER QUADRANT OF LEFT FEMALE BREAST, UNSPECIFIED ESTROGEN RECEPTOR STATUS (HCC): Primary | ICD-10-CM

## 2018-12-20 DIAGNOSIS — C50.512 MALIGNANT NEOPLASM OF LOWER-OUTER QUADRANT OF LEFT BREAST OF FEMALE, ESTROGEN RECEPTOR POSITIVE (HCC): ICD-10-CM

## 2018-12-20 DIAGNOSIS — G62.0 PERIPHERAL NEUROPATHY DUE TO CHEMOTHERAPY (HCC): ICD-10-CM

## 2018-12-20 DIAGNOSIS — M25.50 ARTHRALGIA, UNSPECIFIED JOINT: ICD-10-CM

## 2018-12-20 DIAGNOSIS — Z51.5 PALLIATIVE CARE BY SPECIALIST: ICD-10-CM

## 2018-12-20 PROCEDURE — 3017F COLORECTAL CA SCREEN DOC REV: CPT | Performed by: FAMILY MEDICINE

## 2018-12-20 PROCEDURE — 99212 OFFICE O/P EST SF 10 MIN: CPT | Performed by: FAMILY MEDICINE

## 2018-12-20 PROCEDURE — 1036F TOBACCO NON-USER: CPT | Performed by: FAMILY MEDICINE

## 2018-12-20 PROCEDURE — G8417 CALC BMI ABV UP PARAM F/U: HCPCS | Performed by: FAMILY MEDICINE

## 2018-12-20 PROCEDURE — G8428 CUR MEDS NOT DOCUMENT: HCPCS | Performed by: FAMILY MEDICINE

## 2018-12-20 PROCEDURE — 96523 IRRIG DRUG DELIVERY DEVICE: CPT

## 2018-12-20 PROCEDURE — 99214 OFFICE O/P EST MOD 30 MIN: CPT | Performed by: FAMILY MEDICINE

## 2018-12-20 PROCEDURE — G8484 FLU IMMUNIZE NO ADMIN: HCPCS | Performed by: FAMILY MEDICINE

## 2018-12-20 NOTE — PROGRESS NOTES
30 days. Hold for sedation. 10 patch 0    predniSONE (DELTASONE) 10 MG tablet 6 tabs x 4 days, 4 tabs x 4 days, 2 tabs x 4 days, 1 x 4 days 52 tablet 0    DULoxetine (CYMBALTA) 20 MG extended release capsule Take 1 capsule by mouth daily 90 capsule 1    nortriptyline (PAMELOR) 50 MG capsule Take 1 capsule by mouth nightly 90 capsule 1    gabapentin (NEURONTIN) 300 MG capsule Take 2 capsules by mouth 3 times daily for 180 days. . (Patient taking differently: Take 600 mg by mouth 3 times daily. Julio César Georges ) 540 capsule 1    Fluticasone Furoate-Vilanterol (BREO ELLIPTA) 200-25 MCG/INH AEPB Inhale 1 puff into the lungs daily 30 each 6    tamoxifen (NOLVADEX) 20 MG tablet Take 20 mg by mouth      TURMERIC PO Take 750 mg by mouth      ALPRAZolam (XANAX) 0.5 MG tablet 1/2 to 1 tablet orally before bed as needed for sleep/anxiety. 30 tablet 0    albuterol sulfate HFA (PROVENTIL HFA) 108 (90 Base) MCG/ACT inhaler Inhale 2 puffs into the lungs every 4 hours as needed for Wheezing 1 Inhaler 1     No current facility-administered medications on file prior to visit. Allergies:    Demerol hcl [meperidine]; Azithromycin; and Motrin [ibuprofen]    Symptom Assessment:       Symptom   Present   Medication   # Doses/24h    Pain           Dyspnea           N/V          Constipation          Insomnia           Pruritis       Anxiety/Depression          Decreased Intake          Weight Loss          Fatigue          Declining  Performance Status         ADL Dependent        ROS: UNLESS STATED ABOVE PATIENT DENIES:  CONSTITUTIONAL:  fever, chill, rigors, nausea, vomiting, fatigue. HEENT: blurry vision, double vision, hearing problem, tinnitus, hoarseness, dysphagia,               odynophagia  RESPIRATORY: cough, shortness of breath, sputum expectoration. CARDIOVASCULAR:  Chest pain/pressure, palpitation, syncope, irregular beats  GASTROINTESTINAL:  abdominal or rectal pain, diarrhea, constipation, .   GENITOURINARY:  Burning, the same dose 20 mg daily and we will see her back in 4 weeks or sooner if she needs us. At this time we will titrate the medication if there is still room for improvement. We also continued unchanged her Percocet as above ×90 tablets in which she still takes 2-3 tablets a day as well as her Neurontin 300 mg q.h.s.  05/11/17:  Medical records reviewed and as per medical oncology assessment on 04/25/17:  RT was started on 03/13/2017 and will be completed on 04/27/2017. DEXA scan on 04/18/2017: Normal study. Repeat 1 year (2018). Biochemical testing on 04/04/2017 confirmed post-menopausal state. Arimidex 1 mg po daily will be started on 04/28/2017. To take Ca+/Vit D while on Arimidex. Side effects of arimidex explained: including but not limited to hot flashes, aches, osteoporosis, edema, vasodilation, rash, GI upset, mood changes, sob/cough, dyslipidemia, thrombophlebitis, anemia, leukopenia, thromboembolic disorder (rare, more so with tamoxifen), hypersensitivity, vaginal bleeding (rare, more so with tamoxifen), pain - pt understands and agrees to proceed. RTC 4 weeks for Arimidex toxicity check. As per radiation oncology assessment on 05/05/17:  Diagnosis: Invasive ductal carcinoma left breast, status post conservative surgery followed by chemotherapy. Stage IIIc, ER/MN positive  Narrative: Patient just completed a course of adjuvant XRT to the left breast/left SC/ax. Radiation therapy was started on 3/13/17 and completed on 4/27/17. The left breast received a dose of 5040 cGy in 28 fractions, ED 38. Treatment was delivered with tangential beams, 6 MV photons. The left SC received a dose of 4500 cGy in 25 fractions. This was treated with 15 MV photons. A PAB boost was applied to take the midplane dose to 4500 cGy. Following this the tumor bed received an additional dose of 1000 cGy in 5 fractions.  This was treated with 6/15 MV photons 3-D technique   Response/Tolerance: She tolerated the treatments as directed (including but not limited to Medical Oncology, Primary Care, Pulmonary, and Surgery). As per medical oncology assessment from 05/31/17:  RT was started on 03/13/2017 and completed on 04/27/2017. DEXA scan on 04/18/2017: Normal study. Repeat 1 year (2018). Biochemical testing on 04/04/2017 confirmed post-menopausal state. Arimidex 1 mg po daily was started on 04/28/2017 which she is tolerating fairly well. Mild arthralgias and hot flashes, not interfering with quality of life. Continue Arimidex, Ca/VitD. RTC June 2017 with prior [place] ([place] [facility] [place]  Patient presents to the exam room in no acute distress unaccompanied without any sign of sedation and/or confusion able to voice her needs ambulating well. Patient states that the arthralgias are significant with the Arimidex that she takes nightly. Patient was phoned in [employer] by Linda Camp on June 2 ×24 tablets and states that she has 6 tablets remaining. Patient states that the Percocet helps 50% for approximately 3 hours without sedation. Patient states that she has been taking the Effexor 75 for the past 4 days and is compliant with the Neurontin 300 mg q.h.s. that she does not feel sedation from. Patient states that the arthralgias are constant and worse with activity. 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 Options were discussed and today we are increasing her Percocet by 50% to 7. 5-325 milligram tablets 1 p.o. q.6 hours p.r.n. holding for sedation prescribing 120 tablets today. We are also increasing her Neurontin from 300 mg q.h.s. to t.i.d. electronically prescribing. Patient will continue on her Effexor 75 mg daily and when we see her back in 3 weeks we will look at titrating this medication and the benefits from the above.   Patient asking if there is an alternative medicine for her cancer and I advised her to follow-up with her oncologist if she is medications upward as she is trying to get off of them. Patient is aware that she needs help with the symptoms. Patient reluctantly and finally accepted a prescription for Duragesic 12 µg patch q.72 hours as directed and 5 patches were prescribed today. I advised her that this patch is equivalent to 2 of her Percocets over a 24-hour period and she felt slightly more comfortable with this. Patient also prescribed unchanged her Percocet  milligram tablets 1 p.o. q.6 hours p.r.n. holding for sedation prescribing 60 tablets today. Patient states that she took her last tablet this morning of the 60 tablets are prescribed 2 weeks ago. Patient reluctant to increase her Neurontin but did agree with me 2 now takes Neurontin 300 mg in the morning, 300 mg in the afternoon and 600 mg in the evening. We will see her back in 2 weeks or sooner if she needs us, evaluate recommendations from Marky/oncology and the effects of the above. I would hope at that time she would allow me to increase the Duragesic further as well as the Neurontin.  07/27/17:  Medical records reviewed and as per medical oncology assessment from 07/14/17:  She presents today, 07/14/2017 for early toxicity check. C/O significant arthralgias that have significantly impacted her quality of life. She stopped the Femara on 07/10/2017 and presents today to discuss alternatives. Long discussion regarding options. She is not a candidate for Tamoxifen due to her MTHFR mutation first noted in 2001 (Miscarriage requiring 6 months of Heparin therapy). After further discussion, she would like to try Arimidex again, as she feels she tolerated that better than the Femara. Femara d/c'd on 07/10/2017. Will re-start Arimidex 1 mg daily today, 07/14/2017 (she has enough of the prescription at home) and return to see Dr. Alban Frankel on 07/28/2017 for toxicity check.    Patient presents ambulatory today unaccompanied without sign of confusion and/or sedation able is to continue to titrate the Cymbalta, possibly the Duragesic patch and then begin weaning the Percocet eventually. She reluctantly voiced understanding because of her symptoms. We will see her back in 4 weeks or sooner if she needs us. 12/14/17:  Medical records reviewed and as per Dr. Siri Toussaint phone call on 12/11/17:  Kayden Parr with pathology results-left message to return call to [phone]. Fine-needle aspiration cytology of her left breast seroma performed on 11/14/17 was inconclusive for malignant cells. Have discussed with radiology potential imaging modalities for the edematous left breast.  Concerned that most modalities would result in false positive imaging due to level of inflammation of the breast.  It is been a year and a half since patient was initially initially diagnosed with her locally advanced left breast cancer. At this point, prior to any further intervention, restaging including scan CAT scans of the chest abdomen and pelvis and bone scan would be appropriate. Left message for patient to call us back. Discussed all above with Dr. Cuca Little. Patient presents today ambulating in no acute distress without sign of sedation and/or confusion able to voice her needs. Patient states that she had a bad month with a UTI, Keflex, nausea, back pain as well as chest pain left-sided requiring more Percocet stating that she is out. Patient also took a per Duragesic patch for 4 days for a drug screen for a new job and had worsening pain therefore took more Percocet. Patient cannot notice a difference from the b.i.d. Cymbalta. Patient does state that if she does take Neurontin 600 mg t.i.d. this helped significantly with her pain but it is too sedating during the day. Patient continues on 300, 300, 600 daily. Patient asking if we can do anything else as she desires to decrease the Percocet as she is afraid of the Tylenol.   Options were discussed and I educated her and initiated methadone 5 mg q.12 hours that she will start after we obtain an EKG. Patient denies any palpitations, syncope but does complain of left chest pain expecting to be from her cancer. Patient was advised that I would expect her requirement for Percocet to decrease with the methadone and she voiced understanding. Patient will call if she has any problems and hold for sedation. Today we also prescribed unchanged her Duragesic 75 µg q.72 hours prescribing 10 patches today, Percocet  milligram tablets 1 p.o. q.6 hours p.r.n. prescribing 120 tablets today that she knows my goal is to wean her down from these. We also prescribed her Xanax unchanged as per Epic and she will continue the Neurontin 300, 300, 600. We also increased her Cymbalta to 60 mg nightly and she will continue her other medicines unchanged. We will see her back in 4 weeks or sooner if she needs us and we will call her after reviewing the EKG telling her to start the methadone. She will also call me next week for an update or sooner if she has any problems. 02/08/18:  Medical records reviewed and as per medical oncology assessment from 12/19/17:  Left breast discomfort; aspiration x 3 (08/16/2017, 08/30/2017 and 09/12/2017); 2 courses of Clindamycin. Culture no organisms seen. Cytology negative for malignant cells. Left Breast FNA on 11/14/2017 inconclusive for malignant cells. Breast Surgical Oncology team recommendations: Re-staging scans and left mastectomy if scans negative for metastatic disease. She has poor tolerance to Aromasin (significant arthralgias affecting quality of life). She has exhausted every possible hormonal therapy option; Referred to [facility] for clinical trial evaluation. As per phone discussion with Kye Dumont on 01/12/18:  Phone call from [facility] asking about her PET CT. I reviewed the PET CT impression report verbatim with her. She has not made the apt. With Dr. Isai Chacon for clinical trial evaluation as of yet.   She just presents today ambulating at her baseline without sign of sedation and/or confusion able to voice her needs. Patient states that she has not noticed any difference from the Pamelor 10 mg q.h.s. good or bad and is still compliant with her other medications unchanged as outlined above. Patient complaining of left hand and arm lymphedema working outside in her garden and flowers. Patient denies new and/or uncontrolled symptoms but states that morning peripheral neuropathy is the worse. Options were discussed and we are increasing Pamelor to 25 mg q.h.s., continuing unchanged Cymbalta 20 mg q.h.s., Percocet  milligram tablets 1 p.o. q.6 hours p.r.n. prescribing 120 tablets today, Duragesic 75 µg patches one patch q.72 hours prescribing 10 patches today and Neurontin unchanged as well. In review of her chart she complained of date times fatigue with methadone in the past.  Patient did not want to retry this today but may want on subsequent visits. We will see her back in 5 weeks or sooner if she needs us. 06/21/18:  Medical records reviewed and no documented physician visits in [facility] since I last saw her. Patient presents today at her baseline ambulatory in no acute distress without sign of sedation and/or confusion able to voice her needs with a significant cough and wheezing over the past multiple days. Patient states she feels like she has pneumonia again that she was treated for in October by her pulmonologist.  Patient states that she is able to put her feet on the ground better in the morning after the increase in Pamelor and states there's no change in how she is taken her medications otherwise and I prescribed voicing complaints. Patient states that she saw the plastic surgeon who wants to fix her left side with flap, cutting her trapezius muscle as he feels there is nerve entrapment before he touches the right side. Patient unsure if she desires to pursue all of this.   Options were discussed and today we put her back on Omnicef ×10 days that she had been on in the past.  Today we increased Pamelor to 50 mg q.h.s. and continued her other medications unchanged including Cymbalta 20 mg q.h.s., Percocet  milligram tablets 1 p.o. q.6 hours p.r.n. prescribing 120 tablets today, Duragesic 75 µg patches one patch q.72 hours prescribing 10 patches today and Neurontin unchanged as well. We'll see her back in 4 weeks or sooner if she needs us and look at the benefit of the increased Pamelor and resolution of her URI.  07/19/18:  Medical records reviewed and as per pulmonology assessment from 07/03/18: Baruch Councilman is a 45y.o. year old female here for evaluation of her  pulmonary status. She was follow by Dr. Gisella Ozuna up to April 2017. In summary:  Dave Braswell is a pleasant 45 y. o. female with a diagnosis of invasive ductal carcinoma left breast (ER+, SC+), status post conservative surgery followed by chemotherapy.  The patient underwent a course of radiation therapy to the left breast, which was completed on 4/27/17.    During her treatment she was at one pint on taxol and following that developed acute interstitial pneumonitis which improved after changing her taxol and a course of prednisone. Her follow up CT in March 2017 showed resolution of the GGO on her previous CT scans . A follow up Ct in December 2017 showed new EBENEZER nodules. She is for follow up and further evaluation. Kike Miroslava was seen today for consultation, cancer and results. diagnoses and all orders for this visit:  Pulmonary nodules  Smoking history  Cough  Interstitial pneumonitis ([address]  EBENEZER nodular infiltrates on CT 12/2017 were new in compare to the CT scan in 03/2017. PET scan did not show any FDG actiity. Patient continues to have a chronic cough . Will obtain a CT scan now for 6 months follow up. Will give her short course prednisone and mucinex for her bronchitis.   Stephan schedule her for PFTs next ofice visit for evaluation the way it made her feel, mobile, stomach problem with a history of PUD. Options were discussed and we increased back to the original strength Pamelor 50 mg q.h.s. and continued  unchanged her Duragesic 50 µg 1 patch q.72 hours prescribing 10 patches today, unchanged Percocet  milligram tablets 1 p.o. q.6 hours p.r.n. prescribing 120 tablets today and she will also continue her Neurontin 300, 300, 600, Cymbalta 20 mg each morning. We will see her back in 4 weeks or sooner if she needs us. We will look at titrating Neurontin upward if she still with unacceptable symptoms at this time. 10/18/18:  Medical records reviewed and as per pulmonary assessment on 10/11/18: Loc Officer was seen today for pulmonary nodule. Diagnoses and all orders for this visit   COPD, moderate ([address]  Pulmonary nodules  Ground glass opacity present on imaging of lung  Follow up CT scan shows complete resolution of GGO. This could have been from an viral infection or based on her Hypersensitivity panel an acute HP which is resolved. Patient is asymptomatic at his time. Cough has resolved and she is complaint with her Breo,   Will obtain follow up CT for pulmonary nodules in 6 months. FOLLOW UP   6 months, ct  Patient presents today ambulatory in no acute distress at her baseline without sign of sedation and/or confusion able to voice her needs. Patient still complaining of significant bilateral leg pain especially when the weather is damp. Patient unable to take anti-inflammatories as previously documented. Patient taking Percocet 4 times day stating that she'll try to take less but then wakes up in the middle the night with pain. Patient still only taken Neurontin 300, 300, 600 and denies significant problems from it. Patient has gained 6 pounds and denies new and/or uncontrolled symptoms otherwise.   Options were discussed and we are decreasing as per her wish fentanyl to 25 µg one patch q.72 hours prescribing 10 patches today Monitoring: Attestation: The Prescription Monitoring Report for this patient was reviewed today. (Beulah Dial MD)  Documentation: Possible medication side effects, risk of tolerance/dependence & alternative treatments discussed., No signs of potential drug abuse or diversion identified.  (Beulah Dial MD)  Time in minutes:    [] 15   [] 30   [] 45   [] 60   [x] 75   [] 90  Chart review/documentation:  [x] 15   [] 30   [] 45   [] 60   [] 75   [] 90  Assessment:     [x] 15   [] 30   [] 45   [] 60   [] 75   [] 90  Conversation patient/family/staff: [] 15   [] 30   [x] 45   [] 60   [] 75   [] Yazan Nation MD    12/20/2018

## 2019-01-07 DIAGNOSIS — G62.0 PERIPHERAL NEUROPATHY DUE TO CHEMOTHERAPY (HCC): ICD-10-CM

## 2019-01-07 DIAGNOSIS — Z51.5 PALLIATIVE CARE BY SPECIALIST: ICD-10-CM

## 2019-01-07 DIAGNOSIS — G89.3 CHRONIC PAIN DUE TO NEOPLASM: ICD-10-CM

## 2019-01-07 DIAGNOSIS — C50.512 MALIGNANT NEOPLASM OF LOWER-OUTER QUADRANT OF LEFT FEMALE BREAST, UNSPECIFIED ESTROGEN RECEPTOR STATUS (HCC): ICD-10-CM

## 2019-01-07 DIAGNOSIS — T45.1X5A PERIPHERAL NEUROPATHY DUE TO CHEMOTHERAPY (HCC): ICD-10-CM

## 2019-01-07 DIAGNOSIS — M25.50 ARTHRALGIA, UNSPECIFIED JOINT: ICD-10-CM

## 2019-01-07 RX ORDER — OXYCODONE AND ACETAMINOPHEN 10; 325 MG/1; MG/1
1 TABLET ORAL EVERY 6 HOURS PRN
Qty: 120 TABLET | Refills: 0 | Status: SHIPPED | OUTPATIENT
Start: 2019-01-07 | End: 2019-02-04 | Stop reason: SDUPTHER

## 2019-01-07 RX ORDER — FENTANYL 50 UG/H
1 PATCH TRANSDERMAL
Qty: 10 PATCH | Refills: 0 | Status: SHIPPED | OUTPATIENT
Start: 2019-01-07 | End: 2019-02-04 | Stop reason: SDUPTHER

## 2019-02-04 DIAGNOSIS — M25.50 ARTHRALGIA, UNSPECIFIED JOINT: ICD-10-CM

## 2019-02-04 DIAGNOSIS — G62.0 PERIPHERAL NEUROPATHY DUE TO CHEMOTHERAPY (HCC): ICD-10-CM

## 2019-02-04 DIAGNOSIS — G89.3 CHRONIC PAIN DUE TO NEOPLASM: ICD-10-CM

## 2019-02-04 DIAGNOSIS — T45.1X5A PERIPHERAL NEUROPATHY DUE TO CHEMOTHERAPY (HCC): ICD-10-CM

## 2019-02-04 DIAGNOSIS — C50.512 MALIGNANT NEOPLASM OF LOWER-OUTER QUADRANT OF LEFT FEMALE BREAST, UNSPECIFIED ESTROGEN RECEPTOR STATUS (HCC): ICD-10-CM

## 2019-02-04 DIAGNOSIS — Z51.5 PALLIATIVE CARE BY SPECIALIST: ICD-10-CM

## 2019-02-04 RX ORDER — FENTANYL 50 UG/H
1 PATCH TRANSDERMAL
Qty: 10 PATCH | Refills: 0 | Status: SHIPPED | OUTPATIENT
Start: 2019-02-04 | End: 2019-03-15 | Stop reason: SDUPTHER

## 2019-02-04 RX ORDER — OXYCODONE AND ACETAMINOPHEN 10; 325 MG/1; MG/1
1 TABLET ORAL EVERY 6 HOURS PRN
Qty: 120 TABLET | Refills: 0 | Status: SHIPPED | OUTPATIENT
Start: 2019-02-04 | End: 2019-03-01 | Stop reason: SDUPTHER

## 2019-02-14 ENCOUNTER — OFFICE VISIT (OUTPATIENT)
Dept: PALLATIVE CARE | Age: 54
End: 2019-02-14
Payer: COMMERCIAL

## 2019-02-14 VITALS
WEIGHT: 180 LBS | BODY MASS INDEX: 28.19 KG/M2 | HEART RATE: 102 BPM | SYSTOLIC BLOOD PRESSURE: 125 MMHG | DIASTOLIC BLOOD PRESSURE: 67 MMHG

## 2019-02-14 DIAGNOSIS — C50.512 MALIGNANT NEOPLASM OF LOWER-OUTER QUADRANT OF LEFT BREAST OF FEMALE, ESTROGEN RECEPTOR POSITIVE (HCC): ICD-10-CM

## 2019-02-14 DIAGNOSIS — G62.0 CHEMOTHERAPY-INDUCED NEUROPATHY (HCC): ICD-10-CM

## 2019-02-14 DIAGNOSIS — Z51.5 PALLIATIVE CARE BY SPECIALIST: ICD-10-CM

## 2019-02-14 DIAGNOSIS — Z17.0 MALIGNANT NEOPLASM OF LOWER-OUTER QUADRANT OF LEFT BREAST OF FEMALE, ESTROGEN RECEPTOR POSITIVE (HCC): ICD-10-CM

## 2019-02-14 DIAGNOSIS — G89.3 CHRONIC PAIN DUE TO NEOPLASM: ICD-10-CM

## 2019-02-14 DIAGNOSIS — T45.1X5A PERIPHERAL NEUROPATHY DUE TO CHEMOTHERAPY (HCC): ICD-10-CM

## 2019-02-14 DIAGNOSIS — M25.50 ARTHRALGIA, UNSPECIFIED JOINT: ICD-10-CM

## 2019-02-14 DIAGNOSIS — G62.0 PERIPHERAL NEUROPATHY DUE TO CHEMOTHERAPY (HCC): ICD-10-CM

## 2019-02-14 DIAGNOSIS — C50.512 MALIGNANT NEOPLASM OF LOWER-OUTER QUADRANT OF LEFT FEMALE BREAST, UNSPECIFIED ESTROGEN RECEPTOR STATUS (HCC): Primary | ICD-10-CM

## 2019-02-14 DIAGNOSIS — T45.1X5A CHEMOTHERAPY-INDUCED NEUROPATHY (HCC): ICD-10-CM

## 2019-02-14 PROCEDURE — 1036F TOBACCO NON-USER: CPT | Performed by: FAMILY MEDICINE

## 2019-02-14 PROCEDURE — G8428 CUR MEDS NOT DOCUMENT: HCPCS | Performed by: FAMILY MEDICINE

## 2019-02-14 PROCEDURE — G8484 FLU IMMUNIZE NO ADMIN: HCPCS | Performed by: FAMILY MEDICINE

## 2019-02-14 PROCEDURE — 99214 OFFICE O/P EST MOD 30 MIN: CPT | Performed by: FAMILY MEDICINE

## 2019-02-14 PROCEDURE — 3017F COLORECTAL CA SCREEN DOC REV: CPT | Performed by: FAMILY MEDICINE

## 2019-02-14 PROCEDURE — 99212 OFFICE O/P EST SF 10 MIN: CPT | Performed by: FAMILY MEDICINE

## 2019-02-14 PROCEDURE — G8417 CALC BMI ABV UP PARAM F/U: HCPCS | Performed by: FAMILY MEDICINE

## 2019-02-14 RX ORDER — GABAPENTIN 300 MG/1
600 CAPSULE ORAL 3 TIMES DAILY
Qty: 540 CAPSULE | Refills: 1 | Status: SHIPPED | OUTPATIENT
Start: 2019-02-14 | End: 2019-04-04 | Stop reason: SDUPTHER

## 2019-02-14 RX ORDER — NORTRIPTYLINE HYDROCHLORIDE 50 MG/1
50 CAPSULE ORAL NIGHTLY
Qty: 90 CAPSULE | Refills: 1 | Status: SHIPPED | OUTPATIENT
Start: 2019-02-14 | End: 2019-07-25 | Stop reason: SDUPTHER

## 2019-03-01 ENCOUNTER — TELEPHONE (OUTPATIENT)
Dept: PALLATIVE CARE | Age: 54
End: 2019-03-01

## 2019-03-01 DIAGNOSIS — Z51.5 PALLIATIVE CARE BY SPECIALIST: ICD-10-CM

## 2019-03-01 DIAGNOSIS — G89.3 CHRONIC PAIN DUE TO NEOPLASM: ICD-10-CM

## 2019-03-01 DIAGNOSIS — C50.512 MALIGNANT NEOPLASM OF LOWER-OUTER QUADRANT OF LEFT FEMALE BREAST, UNSPECIFIED ESTROGEN RECEPTOR STATUS (HCC): ICD-10-CM

## 2019-03-01 RX ORDER — OXYCODONE AND ACETAMINOPHEN 10; 325 MG/1; MG/1
1 TABLET ORAL EVERY 6 HOURS PRN
Qty: 120 TABLET | Refills: 0 | Status: SHIPPED | OUTPATIENT
Start: 2019-03-01 | End: 2019-03-29 | Stop reason: SDUPTHER

## 2019-03-15 DIAGNOSIS — Z51.5 PALLIATIVE CARE BY SPECIALIST: ICD-10-CM

## 2019-03-15 DIAGNOSIS — C50.512 MALIGNANT NEOPLASM OF LOWER-OUTER QUADRANT OF LEFT FEMALE BREAST, UNSPECIFIED ESTROGEN RECEPTOR STATUS (HCC): ICD-10-CM

## 2019-03-15 DIAGNOSIS — T45.1X5A PERIPHERAL NEUROPATHY DUE TO CHEMOTHERAPY (HCC): ICD-10-CM

## 2019-03-15 DIAGNOSIS — G89.3 CHRONIC PAIN DUE TO NEOPLASM: ICD-10-CM

## 2019-03-15 DIAGNOSIS — M25.50 ARTHRALGIA, UNSPECIFIED JOINT: ICD-10-CM

## 2019-03-15 DIAGNOSIS — G62.0 PERIPHERAL NEUROPATHY DUE TO CHEMOTHERAPY (HCC): ICD-10-CM

## 2019-03-15 RX ORDER — FENTANYL 50 UG/H
1 PATCH TRANSDERMAL
Qty: 10 PATCH | Refills: 0 | Status: SHIPPED | OUTPATIENT
Start: 2019-03-15 | End: 2019-04-04 | Stop reason: SDUPTHER

## 2019-03-29 DIAGNOSIS — C50.512 MALIGNANT NEOPLASM OF LOWER-OUTER QUADRANT OF LEFT FEMALE BREAST, UNSPECIFIED ESTROGEN RECEPTOR STATUS (HCC): ICD-10-CM

## 2019-03-29 DIAGNOSIS — Z51.5 PALLIATIVE CARE BY SPECIALIST: ICD-10-CM

## 2019-03-29 DIAGNOSIS — G89.3 CHRONIC PAIN DUE TO NEOPLASM: ICD-10-CM

## 2019-03-29 RX ORDER — NALOXONE HYDROCHLORIDE 4 MG/.1ML
1 SPRAY NASAL PRN
Qty: 1 EACH | Refills: 5 | Status: SHIPPED | OUTPATIENT
Start: 2019-03-29 | End: 2019-10-03

## 2019-03-29 RX ORDER — OXYCODONE AND ACETAMINOPHEN 10; 325 MG/1; MG/1
1 TABLET ORAL EVERY 6 HOURS PRN
Qty: 120 TABLET | Refills: 0 | Status: SHIPPED | OUTPATIENT
Start: 2019-03-29 | End: 2019-04-26 | Stop reason: SDUPTHER

## 2019-04-04 ENCOUNTER — OFFICE VISIT (OUTPATIENT)
Dept: PALLATIVE CARE | Age: 54
End: 2019-04-04
Payer: COMMERCIAL

## 2019-04-04 ENCOUNTER — HOSPITAL ENCOUNTER (OUTPATIENT)
Dept: INFUSION THERAPY | Age: 54
Discharge: HOME | End: 2019-04-04
Payer: COMMERCIAL

## 2019-04-04 VITALS
SYSTOLIC BLOOD PRESSURE: 132 MMHG | DIASTOLIC BLOOD PRESSURE: 78 MMHG | WEIGHT: 180 LBS | BODY MASS INDEX: 28.19 KG/M2 | HEART RATE: 93 BPM

## 2019-04-04 DIAGNOSIS — G89.3 CHRONIC PAIN DUE TO NEOPLASM: ICD-10-CM

## 2019-04-04 DIAGNOSIS — T45.1X5A CHEMOTHERAPY-INDUCED NEUROPATHY (HCC): ICD-10-CM

## 2019-04-04 DIAGNOSIS — M25.50 ARTHRALGIA, UNSPECIFIED JOINT: ICD-10-CM

## 2019-04-04 DIAGNOSIS — C50.512 MALIGNANT NEOPLASM OF LOWER-OUTER QUADRANT OF LEFT FEMALE BREAST, UNSPECIFIED ESTROGEN RECEPTOR STATUS (HCC): Primary | ICD-10-CM

## 2019-04-04 DIAGNOSIS — C50.512 MALIGNANT NEOPLASM OF LOWER-OUTER QUADRANT OF LEFT BREAST OF FEMALE, ESTROGEN RECEPTOR POSITIVE (HCC): ICD-10-CM

## 2019-04-04 DIAGNOSIS — Z17.0 MALIGNANT NEOPLASM OF LOWER-OUTER QUADRANT OF LEFT BREAST OF FEMALE, ESTROGEN RECEPTOR POSITIVE (HCC): ICD-10-CM

## 2019-04-04 DIAGNOSIS — T45.1X5A PERIPHERAL NEUROPATHY DUE TO CHEMOTHERAPY (HCC): ICD-10-CM

## 2019-04-04 DIAGNOSIS — Z51.5 PALLIATIVE CARE BY SPECIALIST: ICD-10-CM

## 2019-04-04 DIAGNOSIS — G62.0 PERIPHERAL NEUROPATHY DUE TO CHEMOTHERAPY (HCC): ICD-10-CM

## 2019-04-04 DIAGNOSIS — G62.0 CHEMOTHERAPY-INDUCED NEUROPATHY (HCC): ICD-10-CM

## 2019-04-04 PROCEDURE — G8427 DOCREV CUR MEDS BY ELIG CLIN: HCPCS | Performed by: FAMILY MEDICINE

## 2019-04-04 PROCEDURE — 1036F TOBACCO NON-USER: CPT | Performed by: FAMILY MEDICINE

## 2019-04-04 PROCEDURE — 99214 OFFICE O/P EST MOD 30 MIN: CPT | Performed by: FAMILY MEDICINE

## 2019-04-04 PROCEDURE — 3017F COLORECTAL CA SCREEN DOC REV: CPT | Performed by: FAMILY MEDICINE

## 2019-04-04 PROCEDURE — 99212 OFFICE O/P EST SF 10 MIN: CPT | Performed by: FAMILY MEDICINE

## 2019-04-04 PROCEDURE — G8417 CALC BMI ABV UP PARAM F/U: HCPCS | Performed by: FAMILY MEDICINE

## 2019-04-04 RX ORDER — DULOXETIN HYDROCHLORIDE 20 MG/1
20 CAPSULE, DELAYED RELEASE ORAL DAILY
Qty: 90 CAPSULE | Refills: 1 | Status: SHIPPED | OUTPATIENT
Start: 2019-04-04 | End: 2019-10-08 | Stop reason: SDUPTHER

## 2019-04-04 RX ORDER — HEPARIN SODIUM (PORCINE) LOCK FLUSH IV SOLN 100 UNIT/ML 100 UNIT/ML
SOLUTION INTRAVENOUS
Status: DISPENSED
Start: 2019-04-04 | End: 2019-04-04

## 2019-04-04 RX ORDER — GABAPENTIN 300 MG/1
600 CAPSULE ORAL 3 TIMES DAILY
Qty: 540 CAPSULE | Refills: 1 | Status: SHIPPED | OUTPATIENT
Start: 2019-04-04 | End: 2019-07-25 | Stop reason: ALTCHOICE

## 2019-04-04 RX ORDER — FENTANYL 50 UG/H
1 PATCH TRANSDERMAL
Qty: 10 PATCH | Refills: 0 | Status: SHIPPED | OUTPATIENT
Start: 2019-04-04 | End: 2019-05-10 | Stop reason: SDUPTHER

## 2019-04-04 NOTE — PROGRESS NOTES
Palliative Medicine Outpatient Visit  2019    Provider: Ciro Kyle MD        Referring Provider:    Reason for Consult:  [] Advanced Care Planning  [] Assist with goals of care  [] Transition of care  [x] Symptom Management    See below for recommendations, as indicated, concernin. Advanced Care Planning  2. Anticipatory Guidance  3. Transition of Care  4. Symptom Management      CC: Arthralgias     HPI:   As per medical oncology's assessment from 16:  47 y/o post-menopausal  female who underwent Stereotactic Left Breast mass core biopsy on 2016 revealing Invasive Lobular carcinoma, well differentiated. Associated lobular carcinoma in-situ. ER + (100%); ID + (100%) and HER-2/chay negative (1+)  FNA Right Breast Cyst FNA aspiration on 2016: NEGATIVE for malignancy. Cytologic findings consistent with cyst contents. 2016: Left breast excision/SLNB/Left axillary dissection was performed by Dr. Madelin Gold. Left sentinel and left breast sentinel node # 1 biopsy: Two lymph nodes with metastatic carcinoma. Lymph node, left axillary, left breast sentinel node # 2 biopsy: One lymph node with metastatic carcinoma. Breast, left, excision of mass:  Invasive lobular carcinoma. Lymph nodes, left axillary, biopsy with left axillary dissection (10/22): Metastatic carcinoma. · Comment: Sections demonstrate a large amount of residual invasive lobular carcinoma. · Invasive tumor estimated to be at least 6.0 cm in greatest dimension. · Histologic grade:   ¨ Glandular differentiation- score 3: < 10%of tumor area forming glandular/tubular structures. ¨ Nuclear pleomorphism- Score 1: Nuclei small with little increase in size in comparison with normal breast epithelial cells. ¨ Mitotic count: Score 1  ¨ Overall grade: Grade 1   · Carcinoma focally involves the posterolateral inked margin of excision.    · Total of 13/23 lymph nodes involved by metastatic carcinoma, largest syncope, irregular beats  GASTROINTESTINAL:  abdominal or rectal pain, diarrhea, constipation, . GENITOURINARY:  Burning, frequency, urgency, incontinence, discharge  INTEGUMENTARY: rash, wound, pruritis  HEMATOLOGIC/LYMPHATIC:  Swelling, sores, gum bleeding, easy bruising, pica.   MUSCULOSKELETAL:  pain, edema, joint swelling or redness  NEUROLOGICAL:  light headed, dizziness, loss of consciousness, weakness, change                                   in memory, seizures, tremors    Social history:  Social History     Socioeconomic History    Marital status:      Spouse name: Not on file    Number of children: Not on file    Years of education: Not on file    Highest education level: Not on file   Occupational History    Occupation: nurse- RN     Employer: [employer] [address] resource strain: Not on file    Food insecurity:     Worry: Not on file     Inability: Not on file    Transportation needs:     Medical: Not on file     Non-medical: Not on file   Tobacco Use    Smoking status: Former Smoker     Packs/day: 1.50     Years: 20.00     Pack years: 30.00     Types: Cigarettes     Last attempt to quit: 2016     Years since quittin.8    Smokeless tobacco: Never Used    Tobacco comment: quit smoking 2016   Substance and Sexual Activity    Alcohol use: No    Drug use: No    Sexual activity: Not on file   Lifestyle    Physical activity:     Days per week: Not on file     Minutes per session: Not on file    Stress: Not on file   Relationships    Social connections:     Talks on phone: Not on file     Gets together: Not on file     Attends Oriental orthodox service: Not on file     Active member of club or organization: Not on file     Attends meetings of clubs or organizations: Not on file     Relationship status: Not on file    Intimate partner violence:     Fear of current or ex partner: Not on file     Emotionally abused: Not on file     Physically abused: Not on file Forced sexual activity: Not on file   Other Topics Concern    Not on file   Social History Narrative    Lives in [place] with daughter Primo Burgos. Works as an RN. Family history:  Family History   Problem Relation Age of Onset    Cancer Father         prostate    Diabetes Mother         HTN / cholesterol    Diabetes Brother            PE: Vitals: There were no vitals filed for this visit. Gen: WD, WN, NAD, awake, alert, able to voice their needs/thoughts   HEENT: normocephalic, atraumatic, sclera nonicteric, PERRLA, EOMI, MMM, good speech tone and quality  Neck: no LAD, no JVD, supple, no masses, normal speech, trachea midline,   Lungs: bilaterally clear to auscultation, good aeration, symmetric  Heart: regular rate and rhythm, no murmurs/rubs/gallops/ectopy appreciated, PMI WNL  Abd.: non-distended, soft, nontender, non-distended, normal bowel sounds  Ext.: no clubbing, cyanosis or edema, no joint tenderness  Skin: warm, adequate hydration without acute lesions  Neuro: awake, alert, oriented x 3, conversive,     Impression:  As per medical oncology's assessment from 12/30/16:  45 y/o post-menopausal  female who underwent Stereotactic Left Breast mass core biopsy on 06/17/2016 revealing Invasive Lobular carcinoma, well differentiated. Associated lobular carcinoma in-situ. ER + (100%); TX + (100%) and HER-2/chay negative (1+)  FNA Right Breast Cyst FNA aspiration on 06/23/2016: NEGATIVE for malignancy. Cytologic findings consistent with cyst contents. 07/06/2016: Left breast excision/SLNB/Left axillary dissection was performed by Dr. Jesenia Prince. Left sentinel and left breast sentinel node # 1 biopsy: Two lymph nodes with metastatic carcinoma. Lymph node, left axillary, left breast sentinel node # 2 biopsy: One lymph node with metastatic carcinoma. Breast, left, excision of mass:  Invasive lobular carcinoma.   Lymph nodes, left axillary, biopsy with left axillary dissection (10/22): Metastatic carcinoma. · Comment: Sections demonstrate a large amount of residual invasive lobular carcinoma. · Invasive tumor estimated to be at least 6.0 cm in greatest dimension. · Histologic grade:   ¨ Glandular differentiation- score 3: < 10%of tumor area forming glandular/tubular structures. ¨ Nuclear pleomorphism- Score 1: Nuclei small with little increase in size in comparison with normal breast epithelial cells. ¨ Mitotic count: Score 1  ¨ Overall grade: Grade 1   · Carcinoma focally involves the posterolateral inked margin of excision. · Total of 13/23 lymph nodes involved by metastatic carcinoma, largest focus of metastatic carcinoma measures 11 mm in maximum dimension; Extranodal extension: Present. · pT3 pN3a Mx  Re-excision of postero-lateral wall of lumpectomy cavity was performed on 07/22/2016 with negative margins. Tumor markers were normal; CT abdomen/pelvis and bone scan were negative for metastatic disease. CT chest noted Mildly enlarged AP window lymph nodes measuring 11 x 7.5 mm. Pulmonary team consult appreciated (In view of the difficulty in approaching that lymph node by EBUS, Dr. Mary Hoffman will follow this LN with CT chest in 3 months). PET/CT scan negative for mediastinal LN uptake. Focal uptake in the proximal stomach, recommend correlate with EGD. (Hx of PUD with many EGDs in the past by Dr. Kimmie Gonzalez). GI team (Dr. Kimmie Gonzalez) consulted for repeat EGD (performed on 12/21/2016 and noted hiatal hernia and mild gastritis). We recommended systemic chemotherapy consisting of Dose-Dense AC-T followed by RT followed lastly by hormonal therapy (Arimidex 1 mg po daily for at least 5 years). Side effects of AC-T reviewed with patient. She agreed to proceed. Mediport was placed by Dr. Lamine Gupta. 2DEcho noted EF 52%. Cycle # 7 weekly Taxol is scheduled for today 12/30/2016. Bone pain after chemo; cannot take Ibuprofen due to gastritis. Tylenol alone doesn't help.  Palliative care team consulted for sx management. RTC next week for Cycle # 8 weekly Taxol. 01/03/17:  O[facility]RS report reviewed today revealing Robitussin-AC prescribed in November 2016, Percocet 5-325 milligram tablets ×28 prescribed Dr. Jahaira Dunlap filled on 10/07/16, Norco 5-325 milligram tablets ×30 prescribed 3 times by Dr. Jesenia Prince filled on August 24, July 23 and July 6 2016 and diazepam 10 mg tablet ×1 prescribed by the same physician filled on 06/13/16. Chemotherapeutic agent review:  Anastrozole/Arimidex:  - Is a nonsteroidal aromatase inhibitor  - Indicated for metastatic breast cancer  Drug interactions include:  - None well-characterized  Toxicities include:  - Asthenia is most common occurring in 20% of patients  - Mild nausea, vomiting, constipation, diarrhea  - Hot flashes occur in 10% of patients  - Dry, scaling skin rash  - Arthralgias occur in up to 15% of patients involving hands, knees, hips, lower back and shoulders with early morning stiffness as usual presentation.  - Headache  - Peripheral edema and 7% of patients  - Flulike syndrome in the form of fever, malaise, myalgias. Paclitaxel/Taxol:  - Isolated from the bark of the [address] [place] [address] [employer] tree  - Active in mitosis  - Indicated for ovarian, breast, lung, head and neck, esophageal, prostate, bladder cancers, AIDS related Kaposi's sarcoma. Drug interactions include:  - Is a radiosensitizing agent. - Phenytoin, phenobarbital accelerate its metabolism. - Cisplatin, carboplatin, cyclophosphamide use increases myelosuppression  - Reduces the plasma clearance of doxorubicin by 30% resulting in increased severity of myelosuppression. Toxicities include: - Myelosuppression with neutropenia and need are days 8-10, recovery by day 15-21. - Infusion reactions up to 40% of patients with generalized skin rash, flushing, erythema, hypotension, dyspnea, bronchospasm usually within the first 2-3 minutes and almost always within 10 minutes.   - Neurotoxicity in the form of times yearly but now uses 3 times monthly. Patient was introduced to Palliative Medicine, signed a pain contract and a UDS was prescribed and sent today. Patient denies any street drugs or any other medications. Options were discussed and today we prescribed Percocet 5-325 milligram tablets 1 p.o. q.h.s. p.r.n. ×30 tablets, initiated Pamelor 10 mg q.h.s. ×30 capsules and will see her back in 4 weeks or sooner if she needs us. Patient advised trying to not take the Advil during the day and was introduced to the idea of meloxicam if an anti-inflammatory would be needed down the road. I am hopeful that the above medications will help and she will stop the Advil. 02/02/17:  UDS from January 2017 WNL. Patient presents in no acute distress without sign of sedation and/or confusion. Patient states that she is now taking the Pamelor 10 mg in the afternoon which works well for her as it appears to be activating if she takes it at night. Patient states she is undergoing her last chemotherapy tomorrow and radiation therapy will start in approximately a month. Patient states that occasionally she takes the Percocet during the day secondary to her increasing pain as it \"takes the edge off\". Patient states that secondary to the myalgias and arthralgias she still is taking Advil approximately 6 daily and knows that she should not. Patient also complains of fatigue as she is not sleeping well with decreased appetite and occasional nausea. Patient has 4 tablets of Percocet remaining. Options were discussed and I advised her not to take Advil but we did prescribe Mobic 7.5 mg q.12 hours with food p.r.n. time 60 tablets no refill. I advised her that if she does not need to take these and do not. We are continuing the Pamelor 10 mg daily that she takes in the afternoon 30 tablets with no refill.   Patient was prescribed unchanged Percocet 5-325 milligram tablets 1 p.o. q.6 hours p.r.n. instead of q.h.s. secondary to her worsening pain time 60 tablets. We also initiated Neurontin 300 mg 1 p.o. q.h.s. ×30 capsules with no refills. We will see her back in 4 weeks or sooner if she needs us and she was advised to call if she has any problems. She voiced understanding. 3/2:  Per Dr. Rosette Helms note 2/10:  ASSESSMENT/PLAN: Celsa Ramirez is doing well overall. Discussede about the role of adjuvant XRT to the left breast, the left supraclavicular fossae and the left axilla, to improve local control and may impact on survival. Mediastinal lymphadenopathy and subcentimeter pulmonary nodules are suspicious. She is under surveillance and also seeing pulmonologist. Left internal mammary lymph nodes are normal on CT, and especially with the findings in the chest, these would not be included as wouldn't impact on prognosis. This was discussed with her. Side effects of XRT were enumerated including statistical results. All her questions were answered. She gave her consent to proceed.   CT simulation date set up. Patient seen in room with no family present. She is alert/oriented and able to voice her concerns. She appears anxious and states that she is frustrated with persistent pain. Patient states that she finished her chemotherapy 2/3 and subsequently had an increase in leg pain/neuropathy, nausea/vomiting. She stopped her pamelor, neurontin and mobic at that time. She continued to take the percocet taking 2 per day for pain. The pain persisted so she restarted the neurontin 300mg qhs approximately one week ago. She has noticed a slight improvement. C/O pain that is sharp and radiating to b/l hips and legs. She has neuropathies to b/l feet. She continues to use 2 percocet per day stating that she was hesitant to use more often. She also experienced nausea/vomiting and feels this is related to her anxiety/pain. She has had previous problems with gastritis and was told to stay off NSAIDs by her GI physician.  She was also prescribed Nexium 20mg qday and has not been taking this. Her appetite is fair and she often has to force herself to eat. She also feels her appetite is affected by persistent pain/anxiety. Occasional problem with constipation for which she uses dulcolax tabs or miralax as needed. Encouraged her to increase fluids and take medication if no BM for 3 days. She notes increasing and profound fatigue. She is spacing her activities and resting as much as she is able to with work. She understands that the fatigue is a result of the treatment and will improve in the future. Options reviewed. Discussed taking the percocet q6h scheduled for several days to see if she is able to get in front of her pain. She will decrease to three times per day if pain controlled. Prescription provided for Percocet 5-325mg #60/no refills. Also to continue the neurontin 300mg qhs, no refill needed. Stop the pamelor as she did not feel this was effective. Added xanax 0.5mg 2x daily prn for anxiety (patient previously used xanax with good results). Prescription provided for xanax 0.5mg #30/no refill. She will take her nexium 20mg daily and stay off all NSAIDs. She declined any medication for nausea stating that she did not tolerate it and feels her nausea will improve as her pain/anxiety improves. We will see her back in 2 weeks or sooner if needed. 03/16/17:  Medical records reviewed and patient saw Dr. Aruna Ovalle since her last visit with us with a good report, continuation of therapy. Patient presents to the exam room today alert, oriented with no sign of distress but complaining of persistent nausea, bilateral calf pain and persistent fatigue. Patient states that she has not tried the Xanax because her anxiety has been \"okay\". Patient states that she takes the Percocet 2-3 times a day, tried 4 times a day but this \"whacked her out\". Patient has approximately 25 tablets remaining.   Patient is compliant with Nexium 20 mg daily for the past 2 weeks and still (rare, more so with tamoxifen), pain. Will need DEXA scan prior to starting Arimidex. D/C Cymbalta in the interim. Biochemical testing drawn today; LMP was about 2 years ago per patient. RTC in 3 weeks to review DEXA scan and biochemical testing. Patient presents to the exam room today in no acute distress without sign of sedation and/or confusion able to voice her needs. Patient states that beginning approximately 1 week after she began the new Cymbalta she notices significant decrease in her bilateral leg symptoms stating that she was able to stand up in the morning without significant pain. Patient states that her medication was stopped by medical oncology as the above note and she has noticed after 4 days that her symptoms have increased. Patient denies any other new or uncontrolled symptoms. Options were discussed and I reviewed the case with her medical oncologist Dr. Marla Min as well as pharmacy here and we do not know of an interaction between Cymbalta and Arimidex or any other medications that she is on therefore today I am restarting Cymbalta at the same dose 20 mg daily and we will see her back in 4 weeks or sooner if she needs us. At this time we will titrate the medication if there is still room for improvement. We also continued unchanged her Percocet as above ×90 tablets in which she still takes 2-3 tablets a day as well as her Neurontin 300 mg q.h.s.  05/11/17:  Medical records reviewed and as per medical oncology assessment on 04/25/17:  RT was started on 03/13/2017 and will be completed on 04/27/2017. DEXA scan on 04/18/2017: Normal study. Repeat 1 year (2018). Biochemical testing on 04/04/2017 confirmed post-menopausal state. Arimidex 1 mg po daily will be started on 04/28/2017. To take Ca+/Vit D while on Arimidex.   Side effects of arimidex explained: including but not limited to hot flashes, aches, osteoporosis, edema, vasodilation, rash, GI upset, mood changes, sob/cough, dyslipidemia, thrombophlebitis, anemia, leukopenia, thromboembolic disorder (rare, more so with tamoxifen), hypersensitivity, vaginal bleeding (rare, more so with tamoxifen), pain - pt understands and agrees to proceed. RTC 4 weeks for Arimidex toxicity check. As per radiation oncology assessment on 05/05/17:  Diagnosis: Invasive ductal carcinoma left breast, status post conservative surgery followed by chemotherapy. Stage IIIc, ER/SD positive  Narrative: Patient just completed a course of adjuvant XRT to the left breast/left SC/ax. Radiation therapy was started on 3/13/17 and completed on 4/27/17. The left breast received a dose of 5040 cGy in 28 fractions, ED 38. Treatment was delivered with tangential beams, 6 MV photons. The left SC received a dose of 4500 cGy in 25 fractions. This was treated with 15 MV photons. A PAB boost was applied to take the midplane dose to 4500 cGy. Following this the tumor bed received an additional dose of 1000 cGy in 5 fractions. This was treated with 6/15 MV photons 3-D technique   Response/Tolerance: She tolerated the treatments quite well with mild fatigue and grade 1 reaction. Towards the end she developed grade 2 reaction in inframammary area with yeast superinfection. This responded to topical measures. She was able to complete the anticipated therapy  Follow-up: 1 month    As per phone discussion with Kiersten Guillermo on 05/03/17:   Patient called to notify palliative clinic that she is \"afraid to take the cymbalta because an NP told me that it could possibly make the arimidex less effective. \" She said that the NP had offered to order her effexor instead. Patient unsure if effexor will help with her nerve pain as much as the cymbalta did. I notified Dr. Adilene Benton. He said that the effexor might possibly help her nerve pain and that it is okay if patient wants to follow through with the prescription. I called patient and updated her.  She says that she will call NP and talk with her about getting med. Patient to be seen in palliative clinic May 11. Patient presents to the exam room today unaccompanied in no acute distress, talkative and smiling without sign of sedation and/or confusion able to voice her needs. Patient states that she was uncomfortable taking Cymbalta after talking to the nurse practitioner who prescribed Effexor 37.5 mg daily in which she started this 5 days ago. Patient states that this morning she thought she maybe noticed some benefit from it but prior has not yet. Patient states that she is taking her Percocet on the average of 3 times a day and has 3 remaining as per her history. Patient also compliant with Neurontin 3 mg q.h.s. stating that she still not is sleeping well. Patient denies any new and/or uncontrolled symptoms other than an overall not feeling well. Patient feels that this is worse since she started the Arimidex. Options were discussed and I advised her that I feel that Effexor may help with her symptoms but possibly not for another couple of weeks and she voiced understanding. I prescribed a higher dose of Effexor 75 mg daily that she will start after her 30 days of the lower dose, unchanged Percocet ×90 tablets as above and electronically her Neurontin 300 mg q.h.s. We will see her back in 4 weeks and continue to titrate the medications as indicated. 06/08/17:  Medical records reviewed and as per medical oncology assessment from 05/23/17: As per radiation oncology assessment from 05/25/17:  Invasive ductal carcinoma left breast, status post conservative surgery followed by chemotherapy. Stage IIIc, ER/SC positive  Subjective: On 4/27/17, Chiki Cedeno completed 5040 cGy in 28 fractions directed to the left breast/left SC/ax for management of left breast cancer. States she is doing well but notes her energy isn't improving as she feels it should be.  She was started on Effexor at a lower dose and was instructed to increase the dose with her next refill. Skin is improving but she notes itching especially to the left supraclav. Has been using Aquaphor. Pt is following with Dr Sonia Colby for medical oncology. Tolerating Arimidex, but notes increasing joint pain. States that she continues to take Percocet to get through her work day. Mammogram planned prior to next follow up in June 2017. Pain: Joint aches and pains. Patient is doing well post-radiation completion. Skin care and sun care reviewed. Continue to use Aquaphor and start hydrocortisone OTC for itching. Imaging per med onc, planned mammogram prior to next follow up in June 2017. Monthly self breast exams encouraged. Encouraged to come back sooner if redness/heat or increased firmness noted to left breast, or fever noted. Verbalized understanding. Check TSH if continues with decreased energy at next follow up. I discussed follow up plans with Ольга Judge. At this time, Dr Vivian Jenkins will see the patient back in 3 months for a post-radiation completion follow-up visit. Ольга Judge is to follow up with other physicians involved in their care as directed (including but not limited to Medical Oncology, Primary Care, Pulmonary, and Surgery). As per medical oncology assessment from 05/31/17:  RT was started on 03/13/2017 and completed on 04/27/2017. DEXA scan on 04/18/2017: Normal study. Repeat 1 year (2018). Biochemical testing on 04/04/2017 confirmed post-menopausal state. Arimidex 1 mg po daily was started on 04/28/2017 which she is tolerating fairly well. Mild arthralgias and hot flashes, not interfering with quality of life. Continue Arimidex, Ca/VitD. RTC June 2017 with prior mammogram (at [facility])  Patient presents to the exam room in no acute distress unaccompanied without any sign of sedation and/or confusion able to voice her needs ambulating well. Patient states that the arthralgias are significant with the Arimidex that she takes nightly.   Patient was phoned in [employer] by Padma Benton on June 2 ×24 tablets and states that she has 6 tablets remaining. Patient states that the Percocet helps 50% for approximately 3 hours without sedation. Patient states that she has been taking the Effexor 75 for the past 4 days and is compliant with the Neurontin 300 mg q.h.s. that she does not feel sedation from. Patient states that the arthralgias are constant and worse with activity. 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 Options were discussed and today we are increasing her Percocet by 50% to 7. 5-325 milligram tablets 1 p.o. q.6 hours p.r.n. holding for sedation prescribing 120 tablets today. We are also increasing her Neurontin from 300 mg q.h.s. to t.i.d. electronically prescribing. Patient will continue on her Effexor 75 mg daily and when we see her back in 3 weeks we will look at titrating this medication and the benefits from the above. Patient asking if there is an alternative medicine for her cancer and I advised her to follow-up with her oncologist if she is intolerant to the medication. I advised the patient that with titrations of medication should be able to provide her some relief hopefully allowing her to continue the medication. Patient states that she sees Dr. Garrett Boston at the end of June.  06/29/17:  Medical records reviewed and as per phone call by Padma Benton on 06/02/17:  Refilled prescription for percocet 5-325mg q6h prn pain for [facility]. Patient had 8 pills left (#90 prescribed on 5/11/17) and has been taking 4 times per day secondary to increasing pain. Next appointment on 6/8/17 with palliative medicine. Prescription electronically prescribed for #24 pills with no refills. Janet Moseley RN, MSN, [facility]  Patient presents to the exam office today unaccompanied in no acute distress but stating that her arthralgias have significantly increased over the past month.   Patient states that she did not refill the Arimidex and took her last tablet Monday evening. Patient states that she sees Dr. Klarissa Franz tomorrow morning and will ask if she can begin something else because she cannot tolerate the Arimidex. Patient very apologetic concerning this but states that she has tried and feels that she is tough but states that it feels like she has the flu 24 7. Patient states that she takes the Percocet 7. 5-325 milligram tablet on the average of 5 times daily receiving 1-1/2-2 hours relief and tries to ochoa it out until it's time to take another. Patient denies oversedation from this. Patient did not notice any difference from increasing the Neurontin to 300 mg t.i.d. for the Effexor 75 mg daily. Patient states that she will be starting a second job throughout the summer but does have some vacation coming up. Options were discussed and I introduced the topic of a long-acting opioid that she is resistant to hoping that she can experience less arthralgias with a different medication therefore she deferred today against my suggestions of a Duragesic 12 µg q.72 hours. Today we increased her Percocet to  milligram tablets 1 p.o. q.6 hours p.r.n. prescribing 60 tablets today as well as increased her Effexor  mg daily. We will monitor her closely seeing her back in 2 weeks and at this time look at her appointment with oncology tomorrow, symptoms, benefits from the above changes and add the Duragesic 12 µg patch if she agrees and still indicated. We will also look at increasing the Neurontin at that time as well. Patient was refractory to any other changes today other than the above.  07/13/17:   Medical records reviewed and as per medical oncology assessment from 06/30/17:  Bilateral Diagnostic Mammogram on 06/23/2017 Negative for malignancy. U/S guided seroma aspiration Left Breast: GS noted no PMN. No organisms seen. Body fluid Cx Growth not present.    Very poor tolerance to Arimidex lately. She c/o significant arthralgias, affecting quality of life. She d/c Arimidex on 06/26/2017. We recommended Femara 2.5 mg po daily instead. 30 tabs of Femara sent to [email or web address]. RTC 4 weeks for Femara toxicity check. Patient presents to the exam room ambulatory without sign of sedation and/or confusion able to voice her needs. Patient states that she stop the Femara Monday which was 3 days ago secondary to worsening arthralgias, myalgias even as compared to the Arimidex. Patient states that she is awakening at 4 AM with the aches and pains. Patient states that she feels slightly better this morning but still has the same symptoms. Patient states she has been taking the medications as approved and with the new/worsening bodyaches cannot notice a difference with the increased Effexor. Patient states increased Percocet lasts approximate 4-1/2 hours which is slightly improved from previous. Patient states that she will be seeing [facility] tomorrow as Dr. Blade Lewis is out of town. Options were discussed and patient very reluctant to adjust her medications upward as she is trying to get off of them. Patient is aware that she needs help with the symptoms. Patient reluctantly and finally accepted a prescription for Duragesic 12 µg patch q.72 hours as directed and 5 patches were prescribed today. I advised her that this patch is equivalent to 2 of her Percocets over a 24-hour period and she felt slightly more comfortable with this. Patient also prescribed unchanged her Percocet  milligram tablets 1 p.o. q.6 hours p.r.n. holding for sedation prescribing 60 tablets today. Patient states that she took her last tablet this morning of the 60 tablets are prescribed 2 weeks ago. Patient reluctant to increase her Neurontin but did agree with me 2 now takes Neurontin 300 mg in the morning, 300 mg in the afternoon and 600 mg in the evening.   We will see her back in 2 weeks or sooner if she needs us, evaluate recommendations from Marky/oncology and the effects of the above. I would hope at that time she would allow me to increase the Duragesic further as well as the Neurontin.  07/27/17:  Medical records reviewed and as per medical oncology assessment from 07/14/17:  She presents today, 07/14/2017 for early toxicity check. C/O significant arthralgias that have significantly impacted her quality of life. She stopped the Femara on 07/10/2017 and presents today to discuss alternatives. Long discussion regarding options. She is not a candidate for Tamoxifen due to her MTHFR mutation first noted in 2001 (Miscarriage requiring 6 months of Heparin therapy). After further discussion, she would like to try Arimidex again, as she feels she tolerated that better than the Femara. Femara d/c'd on 07/10/2017. Will re-start Arimidex 1 mg daily today, 07/14/2017 (she has enough of the prescription at home) and return to see Dr. Klarissa Franz on 07/28/2017 for toxicity check. Patient presents ambulatory today unaccompanied without sign of confusion and/or sedation able to voice her needs. Patient states that 2 days ago she stopped the Arimidex stating that she cannot sleep secondary to the pain which is also throughout the day. Patient states that she sees Dr. Klarissa Franz tomorrow and will discuss everything with him. Patient states \"this is no way to live\". Patient states that with the initiation of the Duragesic 12 µg q.72 hours she now can stretch the Percocet out to approximately every 5-1/2 hours but still needs to take them. Patient denies oversedation from them. Patient did state that with the increase of the Neurontin to 600 mg at night and does help her leg pain. 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 Patient also takes Xanax 0.5 mg one half tablet q.h.s. to help her sleep.   Options were discussed and today we're doubling her Duragesic to 25 µg q.72 hours prescribing 10 patches today. We are continuing unchanged her Percocet  milligram tablets 1 p.o. q.6 hours p.r.n. prescribing 120 tablets today, increasing her Neurontin to 600 mg t.i.d. 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 We are also continuing her Xanax 0.5 mg one half tablet q.h.s. p.r.n. as per Epic. We represcribed unchanged her Effexor 150 mg daily as per Epic. We will see her back 4 weeks or sooner if she needs us and I advised her that my goal is to continue titrating upwards the Neurontin and Duragesic to the point where she does not need the Percocet. Patient is interested in weaning off of the Duragesic if possible. 8/24/17:  Medical records reviewed and as per medical oncology assessment from 08/16/17:  Kuldeep Tinajero d/c'd on 07/10/2017. Re-started Arimidex 1 mg daily 07/14/2017. She stopped the Arimidex on 07/25/2017 stating that she cannot sleep secondary to the pain which is also throughout the day. We recommended Aromasin 25 mg po daily. \"  Started Aromasin 25 mg daily by Dr. Garrett Boston on 07/31/2017 with fair tolerance to date. Presents today, 08/16/2017 for complaints of recurrent seroma. Clinical breast examination reveals significant edema and erythema of the left breast, left nipple inversion, induration of the left breast scar, and left axillary adenopathy. 1.  Check Left diagnostic mammogram, left breast US, and US left axillary adenopathy with possible drainage/biopsy. 2. Clindamycin to pharmacy for possible underlying infectious component. 3. RTC 2 weeks for re-evaluation. 4. Hx tobacco abuse. Quit smoking 1 year ago. Encouraged. 5. Continue follow up with Medical Oncology/Dr. Garrett Boston. Patient presents today Unaccompanied stating that she did not notice any difference or sedation from the doubling of the Duragesic.   Patient states that it has been itching over the this discussion.     Patient will be rescheduled for aspiration of left breast seroma. Hopefully, this will alleviate her discomfort. She will continue to wear a supportive bra and use heat as desired for relief of discomfort. Patient presents today ambulating well in no acute distress at her baseline without sign of sedation and/or confusion able to voice her needs. Patient started her Duragesic 75 µg patch yesterday evening and also picked up a short prescription of the Percocet 10. Patient complaining of pain previous and before, intolerant to an increase in Neurontin throughout the day taking 300 mg, 300 mg, 600 mg q.h.s. Cymbalta helped significantly with her symptoms in the past but medical oncology felt that it was unsafe to use with her previous aromatase inhibitor. Patient is on Aromasin currently and we will question medical oncology about Cymbalta usage with this aromatase inhibitor. Patient states that aspiration of her left breast helps some with the pain for approximately 2 days but then returns. Patient complains of constant seepage around her incision site and nipple throughout the day needing to wear a large pad to give compression over the incision. Patient reviews with me Dr. Syeda Thakkar. Options were discussed and we are questioning medical oncology concerning the above as she had dramatic improvement of her symptoms on Cymbalta for short period of time and continuing Duragesic 75 µg q.72 hours and Percocet  milligram tablets 1 p.o. q.6 hours p.r.n. prescribing 120 tablets today, Neurontin 300 mg, 300 mg, 600 mg q.h.s., Effexor  mg daily and her other medications unchanged. We will contact the patient about medical oncology's recommendations and let her know she can restart the Cymbalta. We will see her back in 4 weeks or sooner if she needs us. 10/19/17:  Medical records reviewed and as per medical oncology assessment from 09/26/17:  Selvin polanco/sharath on 07/10/2017. per Dr. Andrae Ellington on 11/14/17:  Abrahan Dela Cruz 08/30/2017 for follow up of mastitis of left breast after 10 days of Clindamycin. Clinical breast examination reveals persistent edema and erythema of the left breast, left nipple inversion, induration of the left breast scar, and left axillary adenopathy. Increased discomfort with palpation. Locally advanced left breast cancer with marked breast edema and left axillary fibrosis leading to discomfort in her breast axilla and arm. She also has markedly limited range of motion of the left shoulder especially when fluid re-accumulates in the lumpectomy site. Aspiration in the office a day for 20 mL of clear fluid which in the past has been cytology negative. Relief of discomfort noted. Long discussion about options of therapy going forward. I reiterated that neither he nor ice would likely alleviate the chronic breast edema. The breast edema is attributable to her extensive surgery, complete axillary dissection, extensive poppy involvement, and regional radiation therapy. We discussed the potential option of a completion left simple mastectomy. 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 Plastic surgery would need to be involved in light of the fact that a flap may need to be utilized to close her wound if ischemia demonstrated using the SPY. Plan:   Patient Will keep track of her left breast discomfort at this point, letting us know how long it takes for her discomfort in the left breast and axilla to recur. Seroma reaccumulation is usually associated with nipple discharge. We will also check results of the fluid cytology that was sent today. Office visit on a p.r.n. basis. Patient will call us with any questions.   Patient presents ambulatory in no acute distress at her baseline without sign of sedation and/or confusion able to voice her needs. Patient states that she has noticed some decrease in her leg pain since beginning the Cymbalta without other good or bad effects. Patient is compliant with Duragesic patches with 0 remaining and still routinely takes her Percocet having 4 remaining as per her history. Patient states that she is having good days and bad days and called an  to question disability yesterday but also is applying for another job not knowing exactly what she can or wants to do. Patient also states that she had stomach flu but this is resolved since her last saw her. Patient also states that she stop the Neurontin thinking that she did not need it but had severe aches and pains therefore restarted it. Options were discussed and we are increasing the Cymbalta to 20 mg b.i.d., continuing unchanged Duragesic 75 µg q.72 hours prescribing 10 patches today and the Percocet  milligram tablets 1 p.o. q.6 hours p.r.n. prescribing 120 tablets today, Neurontin 300 mg, 300 mg and 600 mg daily unchanged that she will need a prescription for before January. I told her my goal is to continue to titrate the Cymbalta, possibly the Duragesic patch and then begin weaning the Percocet eventually. She reluctantly voiced understanding because of her symptoms. We will see her back in 4 weeks or sooner if she needs us. 12/14/17:  Medical records reviewed and as per Dr. Russ Turner phone call on 12/11/17:  Yuri Gimenez with pathology results-left message to return call to [phone]. Fine-needle aspiration cytology of her left breast seroma performed on 11/14/17 was inconclusive for malignant cells. Have discussed with radiology potential imaging modalities for the edematous left breast.  Concerned that most modalities would result in false positive imaging due to level of inflammation of the breast.  It is been a year and a half since patient was initially initially diagnosed with her locally advanced left breast cancer.   At this point, prior to any further intervention, restaging including scan CAT scans of the chest abdomen and pelvis and bone scan would be appropriate. Left message for patient to call us back. Discussed all above with Dr. Keisha Lay. Patient presents today ambulating in no acute distress without sign of sedation and/or confusion able to voice her needs. Patient states that she had a bad month with a UTI, Keflex, nausea, back pain as well as chest pain left-sided requiring more Percocet stating that she is out. Patient also took a per Duragesic patch for 4 days for a drug screen for a new job and had worsening pain therefore took more Percocet. Patient cannot notice a difference from the b.i.d. Cymbalta. Patient does state that if she does take Neurontin 600 mg t.i.d. this helped significantly with her pain but it is too sedating during the day. Patient continues on 300, 300, 600 daily. Patient asking if we can do anything else as she desires to decrease the Percocet as she is afraid of the Tylenol. Options were discussed and I educated her and initiated methadone 5 mg q.12 hours that she will start after we obtain an EKG. Patient denies any palpitations, syncope but does complain of left chest pain expecting to be from her cancer. Patient was advised that I would expect her requirement for Percocet to decrease with the methadone and she voiced understanding. Patient will call if she has any problems and hold for sedation. Today we also prescribed unchanged her Duragesic 75 µg q.72 hours prescribing 10 patches today, Percocet  milligram tablets 1 p.o. q.6 hours p.r.n. prescribing 120 tablets today that she knows my goal is to wean her down from these. We also prescribed her Xanax unchanged as per Epic and she will continue the Neurontin 300, 300, 600. We also increased her Cymbalta to 60 mg nightly and she will continue her other medicines unchanged.   We will see her back in 4 weeks or sooner if she needs us and we will call her after reviewing the EKG telling her to start the methadone. She will also call me next week for an update or sooner if she has any problems. 02/08/18:  Medical records reviewed and as per medical oncology assessment from 12/19/17:  Left breast discomfort; aspiration x 3 (08/16/2017, 08/30/2017 and 09/12/2017); 2 courses of Clindamycin. Culture no organisms seen. Cytology negative for malignant cells. Left Breast FNA on 11/14/2017 inconclusive for malignant cells. Breast Surgical Oncology team recommendations: Re-staging scans and left mastectomy if scans negative for metastatic disease. She has poor tolerance to Aromasin (significant arthralgias affecting quality of life). She has exhausted every possible hormonal therapy option; Referred to [facility] for clinical trial evaluation. As per phone discussion with El Rider on 01/12/18:  Phone call from [facility] asking about her PET CT. I reviewed the PET CT impression report verbatim with her. She has not made the apt. With Dr. Angle Andersen for clinical trial evaluation as of yet. She just \"wants the breasts taken off\". Long conversation with her regarding the importance of the endocrine therapy, which she has been unable to tolerate to date. She also was intermittently compliant when she did take it. I also advised her to make an apt. With Dr. Angle Andersen as soon as possible, and to take a copy of her imaging on CD with her to that apt. She is reluctant, but verbalizes understanding and agrees. I personally gave her Dr. Miranda Shirley phone number today to follow up with his office for an appointment. Patient presents today unaccompanied in no acute distress at her baseline but frustrated without sign of sedation and/or confusion able to voice her needs.   Patient reviewing with me her appointment coming up on Valentine's Day at [facility] with Dr. Angle Andersen.  Patient states that she could not tolerate methadone 5 mg q.12 hours secondary to sedation therefore is only taking 5 mg q.h.s. that sometime she takes around midnight with some morning sedation. Patient states that instead of taking 4-5 Percocet daily she is now taking 3-4 still complaining of significant arthralgias. Patient has been off of her Arimidex for 3-1/2 weeks as per her history secondary to intolerance with some improvement of her arthralgias. Patient also feels that she is more depressed after going up on the Cymbalta 60 mg q.h.s. and desires to back off. I did point out that she is going through more stressors with a recent CAT scan PET scan in up coming appointment and she voiced understanding. Options were discussed and as per her wish we are decreasing Cymbalta to 20 mg b.i.d. the dosage that she had been on prior and I advised her that if she notices her symptoms worsening we can always go back up and she voiced understanding. Patient now will take methadone 5 mg tablets one half tablet q.12 hours and again tried to decrease her Percocet as appropriate. Today I prescribed unchanged Duragesic 75 µg patch q.72 hours ×10 patches, Percocet  milligram tablets 1 p.o. q.6 hours p.r.n. holding for sedation ×120 tablets and Neurontin unchanged at 300, 300, 600 mg and she will continue her other medicines unchanged. We will see her back in 4 weeks and I advised her to call me at any time and she voiced understanding. 03/08/18:  Medical records reviewed and as per medical oncology appointment on 02/20/18:  PET scan done on 12/27/17 shows uptake at seroma and overall no significant FDG avidity consistent with disease. Patient reports still having joint pain though significantly improved since stopping Aromasin. Seen by Dr. Ricco Rod on 02/14/2018 who recommended MRI Left breast given her left breast pain impacting her quality of life; To see a surgeon at [facility] for surgical evaluation (mastectomy/reconstruction ?); Review MTHFR mutation; Going back on Arimidex again;  Exercise 30 minutes 5 days a week;   [facility] 03/30/2018  Patient presents today frustrated and more tearful, appropriate without sign of sedation and/or confusion able to voice her needs without sign of sedation and/or confusion. Patient felt that the methadone in the morning was too sedating therefore she is only taking 2.5 mg nightly. Patient tried not taking the Percocet with significant increasing arthralgias. Patient feels that decreasing the Cymbalta has not helped but possibly hurt and she is not knowing what to do to help with her persistent symptoms. Patient reviewed with me there desire to do a mastectomy and patient back on Arimidex again. Patient states she has approximately 10 Percocet remaining. Patient has gained 3 pounds. Options were discussed and I reviewed her entire chart especially my notes. I talked her about physical therapy and we are ordering this to help with her arthralgias, neuropathic symptoms. Patient obviously more depressed but because of her daytime sedation we are changing her Cymbalta to 40 mg q.h.s. We are stopping the methadone for now to see if this is attributing to the fatigue which I'm not sure about. We are continuing Duragesic 75 µg one patch q.72 hours prescribing 10 today, Percocet  milligram tablets 1 p.o. q.6 hours p.r.n. holding for sedation prescribing 120 tablets today. Patient also states that she decreased her Neurontin down to 300 mg b.i.d. on her own and I am advising her to go back to 300, 300, 600 especially since her sedation was not any better with her decrease but her arthralgias are worse. We'll see her back in 4 weeks and our  met with her again today. She knows that she can call us at any time. We will monitor what her affect is like on subsequent visits and possibly increase her Cymbalta or even add Pamelor if we have not tried this. I'm hoping that physical therapy will help her affect is well.   04/05/18:  Medical records reviewed in Epic since my last visit. Patient presents today unaccompanied at her baseline without sign of sedation and/or confusion able to voice her needs. Patient states that she now is back on tamoxifen by a [facility] oncologist/trial and was told to begin weaning off of her Cymbalta in which she has been. Patient never increased to Cymbalta 40 mg daily because insurance would not cover them and now is taking 20 mg every other day weaning herself off. She is awaiting a call from [facility] oncologist to make sure she needs to come off of the Cymbalta. Patient did not have good results from Effexor prior to Cymbalta. Patient states is no change in how she is taking her medications otherwise. Patient still complains of bilateral leg neuropathy and is regressed indicating getting her left chest wall drained as per her history. Patient was evaluated by physical therapy but did not go back this week but will try next week. Options were discussed and we are continuing her same medications Duragesic 75 µg 1 patch q.72 hours prescribing 10 patches today, Percocet  milligram tablets 1 p.o. q.6 hours p.r.n. prescribing 120 tablets today and continues her Neurontin and other medications unchanged. In the future if she is staying on Cymbalta and we may increase and if not we'll try Pamelor if we have not or readdress the methadone again to help with her neuropathy. We will see her back in 4 weeks or sooner if she needs us. 05/03/18:  Medical records reviewed and no documented physician visits in [facility] since my last visit with her. Patient presents today ambulatory in no acute distress without sign of sedation and/or confusion able to voice her needs. Patient states that she definitely feels less neuropathy although it is still present on the Cymbalta low-dose 20 mg daily that her oncologist agreed to.   Patient denies side effects but does state that she has had increased stress, hot flashes and occasional headaches attributing the stress and headaches to taking care of her father who has dementia. Patient states there is no change in how she is taking the other medications and denies new and/or uncontrolled symptoms otherwise. Patient also complaining of right tennis elbow and will be seeing her physical therapist about this. Options were discussed and today we are initiating Pamelor 10 mg q.h.s. and educated her on the medication. We are continuing unchanged her Cymbalta 20 mg q.h.s., Percocet  milligram tablets 1 p.o. q.6 hours p.r.n. prescribing 120 tablets today, Duragesic 75 µg one patch q.72 hours prescribing 10 patches today and her Neurontin unchanged. Patient knows that she can call us at any time with any concerns. We will see her back in 4 weeks or sooner if she needs us and at this time as we talked about today we will consider weaning down the Percocet possibly to 7.5 in an attempt to wean the opioids. If symptoms do not allow we will not do this next time. 05/31/18:  Medical records reviewed and no documented physician visits in [facility] since I last saw her. Patient presents today ambulating at her baseline without sign of sedation and/or confusion able to voice her needs. Patient states that she has not noticed any difference from the Pamelor 10 mg q.h.s. good or bad and is still compliant with her other medications unchanged as outlined above. Patient complaining of left hand and arm lymphedema working outside in her garden and flowers. Patient denies new and/or uncontrolled symptoms but states that morning peripheral neuropathy is the worse. Options were discussed and we are increasing Pamelor to 25 mg q.h.s., continuing unchanged Cymbalta 20 mg q.h.s., Percocet  milligram tablets 1 p.o. q.6 hours p.r.n. prescribing 120 tablets today, Duragesic 75 µg patches one patch q.72 hours prescribing 10 patches today and Neurontin unchanged as well.   In review of her chart she complained of date times fatigue left breast (ER+, IA+), status post conservative surgery followed by chemotherapy.  The patient underwent a course of radiation therapy to the left breast, which was completed on 4/27/17.    During her treatment she was at one pint on taxol and following that developed acute interstitial pneumonitis which improved after changing her taxol and a course of prednisone. Her follow up CT in March 2017 showed resolution of the GGO on her previous CT scans . A follow up Ct in December 2017 showed new EBENEZER nodules. She is for follow up and further evaluation. Alondra Chew was seen today for consultation, cancer and results. diagnoses and all orders for this visit:  Pulmonary nodules  Smoking history  Cough  Interstitial pneumonitis ([address]  EBENEZER nodular infiltrates on CT 12/2017 were new in compare to the CT scan in 03/2017. PET scan did not show any FDG actiity. Patient continues to have a chronic cough . Will obtain a CT scan now for 6 months follow up. Will give her short course prednisone and mucinex for her bronchitis. Stephan schedule her for PFTs next ofice visit for evaluation her COPD. FOLLOW UP   Follow up in 8 weeks. Patient presents today ambulatory at her baseline without sign of sedation and/or confusion able to voice her needs. Patient states that she notices some improvement of her lower extremities and pain with increasing the Pamelor without side effects. Patient states is no change to how she is taking her medication has tried to get rid of the afternoon Percocet but with worsening pain in the evening. Patient is going for a CT scan soon by pulmonology as above and will stop in at physical therapy to make an appointment at that time. Options were discussed and today we are decreasing Duragesic to 50 µg patch q.72 hours prescribing 5 patches today and continuing unchanged her Percocet  milligram tablets 1 p.o. q.6 hours p.r.n. prescribing 120 tablets.   Patient will try to take one half of a tablet if she can in the afternoon as per her suggestion. Patient advised to call me if her pain is much worse and of course prior to running out of her new Duragesic patches since I only prescribed prescribing 5. We will see her back in 4 weeks or sooner if she needs us and she will continue her other medications unchanged including Cymbalta 20 mg q.h.s., Pamelor 50 mg q.h.s., Neurontin 300, 300, 600.  08/23/18:  Medical records reviewed and as per phone discussion with Josseline Griffiths our nurse on 08/10/18:  Patient called for fentanyl patch refill. Patient states she notices more pain to legs with decreased fentanyl dose, but she can tolerate it is just \"bothersome\". Dr. Christine Oh notified, advised to refill 50mcq. ДМИТРИЙ Salcedo notified, refill e-prescribed. Patient aware. Patient presents today distress without sign of sedation and or confusion able to voice her needs ambulating well. Patient states that she has worsening numbness and tingling to bilateral legs that she can tolerate with a decrease in the fentanyl. Patient states there's no change in how she is taking her Percocet sometimes taking one half of a tablet and afternoon but not routinely. Patient is complaining of activation at night, grogginess in the morning and is wondering if it is the Cymbalta at night or 2 higher dose of Pamelor. Patient tried taking the Neurontin but with extreme sedation. Options were discussed and she will move the Cymbalta 20 mg daily to the morning instead of at night and we're decreasing the Pamelor to 25 mg q.h.s. hoping to help with her morning fatigue. We are reordering physical therapy as she was unsatisfied with [facility] physical therapies results stating that \"that estevan didn't want to do anything\".   We are continuing unchanged her Duragesic 50 µg 1 patch q.72 hours prescribing 10 patches today, unchanged Percocet  milligram tablets 1 p.o. q.6 hours p.r.n. prescribing 120 tablets today and she will also continue her Neurontin 300, 300, 600. We'll see her back extended her appointment up to 8 weeks and she knows to call prior to running out of her medications and we will electronically prescribed unchanged. 09/20/18:  Medical records reviewed including Dr. Ace Dick on 09/11/18: Colt Hastings was seen today for pneumonia, follow-up from hospital and pulmonary nodule. Diagnoses and all orders for this visit:  Infiltrate noted on imaging study  Pulmonary nodules  -     FULL PFT STUDY WITH PRE AND POST  COPD, moderate (HCC)  -     FULL PFT STUDY WITH PRE AND POST  Interstitial pneumonitis (HCC)  -     FULL PFT STUDY WITH PRE AND POST  -     Fluticasone Furoate-Vilanterol (BREO ELLIPTA) 200-25 MCG/INH AEPB; Inhale 1 puff into the lungs daily  -     MISCELLANEOUS SENDOUT 1; Future  -     EOSINOPHIL COUNT; Future  -     Hypersensitivity Pneumonitis Extended Panel; Future  Other orders  -     Cancel: CT CHEST WO CONTRAST; Future  Will follow up ct in 608 weeks. Start Breo 200/5 one puff daily. CHeck hypersensitivity panel and IgE and Eosinophil count. FOLLOW UP  Return in about 4 weeks (around 10/11/2018) for ct scan. As per radiation oncology assessment from 09/18/18:   Patient is doing well post-radiation completion. Encouraged to continue monthly self breast exams. Imaging per med onc. Patient was previously following with Dr Darlyn Pacheco for medical oncology. She is now being seen by Dr. Ignacia Montana at [facility], last visit on 3/28/2018. Instructed to start Tamoxifen at the time d/t intolerance of multiple other endocrine therapies. Patient is currently on Tamoxifen, tolerating better than the other medications. Next appt with Dr Ignacia Montana in October 2018. Met with surgeons (Dr Edin Grossman 3/28/18 for breast surgery and Dr Sonja Lr for plastic surgery) at [facility] for surgical evaluation (mastectomy/reconstruction). Does not know if she wants to have surgery done and may want a second opinion prior to making her final decision. lung  Follow up CT scan shows complete resolution of GGO. This could have been from an viral infection or based on her Hypersensitivity panel an acute HP which is resolved. Patient is asymptomatic at his time. Cough has resolved and she is complaint with her Breo,   Will obtain follow up CT for pulmonary nodules in 6 months. FOLLOW UP   6 months, ct  Patient presents today ambulatory in no acute distress at her baseline without sign of sedation and/or confusion able to voice her needs. Patient still complaining of significant bilateral leg pain especially when the weather is damp. Patient unable to take anti-inflammatories as previously documented. Patient taking Percocet 4 times day stating that she'll try to take less but then wakes up in the middle the night with pain. Patient still only taken Neurontin 300, 300, 600 and denies significant problems from it. Patient has gained 6 pounds and denies new and/or uncontrolled symptoms otherwise. Options were discussed and we are decreasing as per her wish fentanyl to 25 µg one patch q.72 hours prescribing 10 patches today and she will place her Duragesic 50 µg patches that she has remaining ×3 in a safe place at home. Patient also will continue unchanged her Percocet  milligram tablets 1 p.o. q.6 hours p.r.n. prescribing 120 tablets as she has 3 remaining, Pamelor 50 mg q.h.s. unchanged. Patient advised to begin increasing her Neurontin from the above amount possibly adding an afternoon capsule and then an evening capsule to help with her nighttime symptoms. Patient will report to me results and we will see her back 4 weeks from now or sooner if she needs us. 11/15/18 100 [facility] records reviewed, no new visits since her last PM encounter. She was restarted on her 50 mcg fentanyl patch, as she had increased her pain following the decrease to 25 µg from her last visit.   She states that she tried to continue with the fentanyl 25 µg patch, states that she was requiring more of her Percocet, and found that she was needing 5 Percocet tablets every day. She has been back on the 50 µg patch for 2 days now, and states that she will be out of the 50 µg patches tomorrow. She states that due to the increase in pain, increased use of her Percocet, but she is now also I'll of her Percocets. She states that she has been using her Neurontin, and has increased as instructed, now taking 300 mg in the morning, 600 mg in the afternoon, and 900 mg at night. She was encouraged to continue to increase her Neurontin over the next few weeks, with a goal of 900 mg 3 times a day, and she is agreeable to this plan. She is continuing to take her Pamelor 50 mg at bedtime unchanged, as well as her Cymbalta 20 mg at bedtime unchanged. Options were discussed, and we will prescribed today fentanyl 50 µg patch every 72 hours, prescribing 10 patches today, and we will re-prescribe Percocet  milligrams every 6 hours as needed for breakthrough pain. We again did discuss plan for further reduction in her pain medications, and she expresses that this is her ultimate goal.  We see her back, we will further discuss this, and we'll potentially decrease her fentanyl patch from 50 µg to 37 µg, utilizing one 25 µg and one 12 µg patch, with the plan ultimately to to continue to decrease her fentanyl until she is able to stop, and manage her pain with when necessary medication only. We discussed with the increase in her adjuvant therapy, and with her increase in fentanyl, that her when necessary usage should reduce to her prior level. We will continue to follow her closely, and we'll see her back in the clinic in 4 weeks, or sooner if needed. As always she knows that she can call us with any questions, concerns, needs, or changes in symptoms. 12/20/18:  Medical records reviewed and patient presents unaccompanied ambulating well without sign of sedation, pleasant and comfortable.   Patient

## 2019-04-05 ENCOUNTER — TELEPHONE (OUTPATIENT)
Dept: PALLATIVE CARE | Age: 54
End: 2019-04-05

## 2019-04-05 NOTE — TELEPHONE ENCOUNTER
Call from [facility] stating pharmacy did not have an order for her gabapentin. Called to the [address] as medication was received by the pharmacy per [facility]. Pharmacist states that [employer] is limiting her to 30 day prescription. Pharmacist will provided 30 day script for patient but must have refill sent every 30 days. Called to [facility] and left message to let her know her script is ready.

## 2019-04-26 DIAGNOSIS — C50.512 MALIGNANT NEOPLASM OF LOWER-OUTER QUADRANT OF LEFT FEMALE BREAST, UNSPECIFIED ESTROGEN RECEPTOR STATUS (HCC): ICD-10-CM

## 2019-04-26 DIAGNOSIS — Z51.5 PALLIATIVE CARE BY SPECIALIST: ICD-10-CM

## 2019-04-26 DIAGNOSIS — G89.3 CHRONIC PAIN DUE TO NEOPLASM: ICD-10-CM

## 2019-04-26 RX ORDER — OXYCODONE AND ACETAMINOPHEN 10; 325 MG/1; MG/1
1 TABLET ORAL EVERY 6 HOURS PRN
Qty: 120 TABLET | Refills: 0 | Status: SHIPPED | OUTPATIENT
Start: 2019-04-26 | End: 2019-05-24 | Stop reason: SDUPTHER

## 2019-05-10 DIAGNOSIS — G89.3 CHRONIC PAIN DUE TO NEOPLASM: ICD-10-CM

## 2019-05-10 DIAGNOSIS — G62.0 PERIPHERAL NEUROPATHY DUE TO CHEMOTHERAPY (HCC): ICD-10-CM

## 2019-05-10 DIAGNOSIS — T45.1X5A PERIPHERAL NEUROPATHY DUE TO CHEMOTHERAPY (HCC): ICD-10-CM

## 2019-05-10 DIAGNOSIS — M25.50 ARTHRALGIA, UNSPECIFIED JOINT: ICD-10-CM

## 2019-05-10 DIAGNOSIS — Z51.5 PALLIATIVE CARE BY SPECIALIST: ICD-10-CM

## 2019-05-10 DIAGNOSIS — C50.512 MALIGNANT NEOPLASM OF LOWER-OUTER QUADRANT OF LEFT FEMALE BREAST, UNSPECIFIED ESTROGEN RECEPTOR STATUS (HCC): ICD-10-CM

## 2019-05-10 RX ORDER — FENTANYL 50 UG/H
1 PATCH TRANSDERMAL
Qty: 10 PATCH | Refills: 0 | Status: SHIPPED | OUTPATIENT
Start: 2019-05-10 | End: 2019-05-30 | Stop reason: SDUPTHER

## 2019-05-24 DIAGNOSIS — C50.512 MALIGNANT NEOPLASM OF LOWER-OUTER QUADRANT OF LEFT FEMALE BREAST, UNSPECIFIED ESTROGEN RECEPTOR STATUS (HCC): ICD-10-CM

## 2019-05-24 DIAGNOSIS — Z51.5 PALLIATIVE CARE BY SPECIALIST: ICD-10-CM

## 2019-05-24 DIAGNOSIS — G89.3 CHRONIC PAIN DUE TO NEOPLASM: ICD-10-CM

## 2019-05-24 RX ORDER — OXYCODONE AND ACETAMINOPHEN 10; 325 MG/1; MG/1
1 TABLET ORAL EVERY 6 HOURS PRN
Qty: 90 TABLET | Refills: 0 | Status: SHIPPED | OUTPATIENT
Start: 2019-05-24 | End: 2019-06-23

## 2019-05-24 RX ORDER — NALOXONE HYDROCHLORIDE 4 MG/.1ML
1 SPRAY NASAL PRN
Qty: 1 EACH | Refills: 5 | Status: SHIPPED | OUTPATIENT
Start: 2019-05-24 | End: 2019-10-03

## 2019-05-30 ENCOUNTER — OFFICE VISIT (OUTPATIENT)
Dept: PALLATIVE CARE | Age: 54
End: 2019-05-30
Payer: COMMERCIAL

## 2019-05-30 ENCOUNTER — HOSPITAL ENCOUNTER (OUTPATIENT)
Dept: INFUSION THERAPY | Age: 54
Discharge: HOME | End: 2019-05-30
Payer: COMMERCIAL

## 2019-05-30 VITALS
BODY MASS INDEX: 27.41 KG/M2 | DIASTOLIC BLOOD PRESSURE: 74 MMHG | HEART RATE: 96 BPM | WEIGHT: 175 LBS | SYSTOLIC BLOOD PRESSURE: 140 MMHG

## 2019-05-30 DIAGNOSIS — G62.0 PERIPHERAL NEUROPATHY DUE TO CHEMOTHERAPY (HCC): ICD-10-CM

## 2019-05-30 DIAGNOSIS — Z51.5 PALLIATIVE CARE BY SPECIALIST: ICD-10-CM

## 2019-05-30 DIAGNOSIS — T45.1X5A CHEMOTHERAPY-INDUCED NEUROPATHY (HCC): ICD-10-CM

## 2019-05-30 DIAGNOSIS — G62.0 CHEMOTHERAPY-INDUCED NEUROPATHY (HCC): ICD-10-CM

## 2019-05-30 DIAGNOSIS — T45.1X5A PERIPHERAL NEUROPATHY DUE TO CHEMOTHERAPY (HCC): ICD-10-CM

## 2019-05-30 DIAGNOSIS — C50.512 MALIGNANT NEOPLASM OF LOWER-OUTER QUADRANT OF LEFT FEMALE BREAST, UNSPECIFIED ESTROGEN RECEPTOR STATUS (HCC): Primary | ICD-10-CM

## 2019-05-30 DIAGNOSIS — M25.50 ARTHRALGIA, UNSPECIFIED JOINT: ICD-10-CM

## 2019-05-30 DIAGNOSIS — G89.3 CHRONIC PAIN DUE TO NEOPLASM: ICD-10-CM

## 2019-05-30 PROCEDURE — 1036F TOBACCO NON-USER: CPT | Performed by: FAMILY MEDICINE

## 2019-05-30 PROCEDURE — G8417 CALC BMI ABV UP PARAM F/U: HCPCS | Performed by: FAMILY MEDICINE

## 2019-05-30 PROCEDURE — 99215 OFFICE O/P EST HI 40 MIN: CPT | Performed by: FAMILY MEDICINE

## 2019-05-30 PROCEDURE — 99212 OFFICE O/P EST SF 10 MIN: CPT | Performed by: FAMILY MEDICINE

## 2019-05-30 PROCEDURE — G8428 CUR MEDS NOT DOCUMENT: HCPCS | Performed by: FAMILY MEDICINE

## 2019-05-30 PROCEDURE — 3017F COLORECTAL CA SCREEN DOC REV: CPT | Performed by: FAMILY MEDICINE

## 2019-05-30 RX ORDER — FENTANYL 50 UG/H
1 PATCH TRANSDERMAL
Qty: 10 PATCH | Refills: 0 | Status: SHIPPED | OUTPATIENT
Start: 2019-05-30 | End: 2019-06-27 | Stop reason: ALTCHOICE

## 2019-05-30 RX ORDER — ALPRAZOLAM 0.5 MG/1
0.5 TABLET ORAL 3 TIMES DAILY PRN
Qty: 30 TABLET | Refills: 0 | Status: SHIPPED | OUTPATIENT
Start: 2019-05-30 | End: 2019-06-09

## 2019-05-30 NOTE — PROGRESS NOTES
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 time to take another. Patient denies oversedation from this. Patient did not notice any difference from increasing the Neurontin to 300 mg t.i.d. for the Effexor 75 mg daily. Patient states that she will be starting a second job throughout the summer but does have some vacation coming up. Options were discussed and I introduced the topic of a long-acting opioid that she is resistant to hoping that she can experience less arthralgias with a different medication therefore she deferred today against my suggestions of a Duragesic 12 µg q.72 hours. Today we increased her Percocet to  milligram tablets 1 p.o. q.6 hours p.r.n. prescribing 60 tablets today as well as increased her Effexor  mg daily. We will monitor her closely seeing her back in 2 weeks and at this time look at her appointment with oncology tomorrow, symptoms, benefits from the above changes and add the Duragesic 12 µg patch if she agrees and still indicated. We will also look at increasing the Neurontin at that time as well. Patient was refractory to any other changes today other than the above.  07/13/17:   Medical records reviewed and as per medical oncology assessment from 06/30/17:  Bilateral Diagnostic Mammogram on 06/23/2017 Negative for malignancy.  U/S guided seroma aspiration Left Breast: GS noted no PMN. No organisms seen. Body fluid Cx Growth not present. Very poor tolerance to Arimidex lately. She c/o significant arthralgias, affecting quality of life. She d/c Arimidex on 06/26/2017. We recommended Femara 2.5 mg po daily instead. 30 tabs of Femara sent to [facility]. RTC 4 weeks for Femara toxicity check. Patient presents to the exam room ambulatory without sign of sedation and/or confusion able to voice her needs. Patient states that she stop the Femara Monday which was 3 days ago secondary to worsening arthralgias, myalgias even as compared to the Arimidex. Patient states that she is awakening at 4 AM with the aches and pains. Patient states that she feels slightly better this morning but still has the same symptoms. Patient states she has been taking the medications as approved and with the new/worsening bodyaches cannot notice a difference with the increased Effexor. Patient states increased Percocet lasts approximate 4-1/2 hours which is slightly improved from previous. Patient states that she will be seeing Mary Mendoza tomorrow as Dr. Bill Reyna is out of town. Options were discussed and patient very reluctant to adjust her medications upward as she is trying to get off of them. Patient is aware that she needs help with the symptoms. Patient reluctantly and finally accepted a prescription for Duragesic 12 µg patch q.72 hours as directed and 5 patches were prescribed today. I advised her that this patch is equivalent to 2 of her Percocets over a 24-hour period and she felt slightly more comfortable with this. Patient also prescribed unchanged her Percocet  milligram tablets 1 p.o. q.6 hours p.r.n. holding for sedation prescribing 60 tablets today. Patient states that she took her last tablet this morning of the 60 tablets are prescribed 2 weeks ago.   Patient reluctant to increase her Neurontin but did agree with me 2 now takes Neurontin 300 mg in the morning, 300 mg in the afternoon and 600 mg in the evening. We will see her back in 2 weeks or sooner if she needs us, evaluate recommendations from Marky/oncology and the effects of the above. I would hope at that time she would allow me to increase the Duragesic further as well as the Neurontin.  07/27/17:  Medical records reviewed and as per medical oncology assessment from 07/14/17:  She presents today, 07/14/2017 for early toxicity check. C/O significant arthralgias that have significantly impacted her quality of life. She stopped the Femara on 07/10/2017 and presents today to discuss alternatives. Long discussion regarding options. She is not a candidate for Tamoxifen due to her MTHFR mutation first noted in 2001 (Miscarriage requiring 6 months of Heparin therapy). After further discussion, she would like to try Arimidex again, as she feels she tolerated that better than the Femara. Femara d/c'd on 07/10/2017. Will re-start Arimidex 1 mg daily today, 07/14/2017 (she has enough of the prescription at home) and return to see Dr. Linda Connolly on 07/28/2017 for toxicity check. Patient presents ambulatory today unaccompanied without sign of confusion and/or sedation able to voice her needs. Patient states that 2 days ago she stopped the Arimidex stating that she cannot sleep secondary to the pain which is also throughout the day. Patient states that she sees Dr. Linda Connolly tomorrow and will discuss everything with him. Patient states \"this is no way to live\". Patient states that with the initiation of the Duragesic 12 µg q.72 hours she now can stretch the Percocet out to approximately every 5-1/2 hours but still needs to take them. Patient denies oversedation from them. Patient did state that with the increase of the Neurontin to 600 mg at night and does help her leg pain. 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   Patient also takes difference or sedation from the doubling of the Duragesic. Patient states that it has been itching over the past month but she also states that she had her left breast drained and an infection with cellulitis on antibiotics. Patient infrequently takes a half of a Xanax at night which helps a little bit. Patient notices significant improvement when she is able to take her Neurontin 600 mg t.i.d. with her feet and leg neuropathy but this does cause sedation therefore when she work she only takes 300, 300, 600. Patient states there's been no change in how she is taking her Percocet took her last one last night. Patient is trying to work 2 jobs. Options were discussed and we are doubling her Duragesic to 50 µg q.72 hours prescribing 10 today, continuing the Percocet  milligram tablets 1 p.o. q.6 hours p.r.n. holding for sedation ×120 today telling her that she should not need as many of the Percocet with the increase of the Duragesic. Patient has enough of the Effexor 150 mg daily as well as Xanax 0.5 mg one half q.h.s. p.r.n. We will see her back in 4 weeks or sooner if she needs us and she was advised that if she experiences any difficulty from the increase in Duragesic to give us a call immediately. 09/21/17:  Medical records reviewed and as per Dr. Mike Koo on 09/12/17:  Persistent edema of breast with significant discomfort. On the relief is when she applies a heating pad. Erythema has decreased on antibiotic therapy. However, cultures have never been positive of seroma aspirations. Having nipple discharge with seroma reaccumulation. 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 Will review literature to see if sclerosing seroma cavity has ever been effective in this situation. If not, we discussed the possibility of simple mastectomy. We discussed the possible consideration of delayed tissue-based reconstruction.   Patient brought up contralateral prophylactic mastectomy. In light of her complications and extensive disease upon presentation I deferred this discussion.     Patient will be rescheduled for aspiration of left breast seroma. Hopefully, this will alleviate her discomfort. She will continue to wear a supportive bra and use heat as desired for relief of discomfort. Patient presents today ambulating well in no acute distress at her baseline without sign of sedation and/or confusion able to voice her needs. Patient started her Duragesic 75 µg patch yesterday evening and also picked up a short prescription of the Percocet 10. Patient complaining of pain previous and before, intolerant to an increase in Neurontin throughout the day taking 300 mg, 300 mg, 600 mg q.h.s. Cymbalta helped significantly with her symptoms in the past but medical oncology felt that it was unsafe to use with her previous aromatase inhibitor. Patient is on Aromasin currently and we will question medical oncology about Cymbalta usage with this aromatase inhibitor. Patient states that aspiration of her left breast helps some with the pain for approximately 2 days but then returns. Patient complains of constant seepage around her incision site and nipple throughout the day needing to wear a large pad to give compression over the incision. Patient reviews with me Dr. Constantine Plummer. Options were discussed and we are questioning medical oncology concerning the above as she had dramatic improvement of her symptoms on Cymbalta for short period of time and continuing Duragesic 75 µg q.72 hours and Percocet  milligram tablets 1 p.o. q.6 hours p.r.n. prescribing 120 tablets today, Neurontin 300 mg, 300 mg, 600 mg q.h.s., Effexor  mg daily and her other medications unchanged. We will contact the patient about medical oncology's recommendations and let her know she can restart the Cymbalta. We will see her back in 4 weeks or sooner if she needs us. 10/19/17:  Medical records reviewed and as per medical oncology assessment from 09/26/17:  Kerline Yenon d/c'd on 07/10/2017. Re-started Arimidex 1 mg daily 07/14/2017. She stopped the Arimidex on 07/25/2017 stating that she cannot sleep secondary to the pain which is also throughout the day. We recommended Aromasin 25 mg po daily; Aromasin was started on 07/28/2017 with better tolerance so far. Left breast discomfort; aspiration x 3 (08/16/2017, 08/30/2017 and 09/12/2017); 2 courses of Clindamycin. Culture no organisms seen. Cytology negative for malignant cells. Breast Surgical Oncology team recommendations: Continue to wear a supportive bra and use heat as desired for relief of discomfort. Literature search to see if sclerosing seroma cavity has ever been effective in this situation. If not,they also discussed the possibility of simple mastectomy. She will contact Dr. Wili Martinez team this week. Continue Aromasin, Ca/VitD. RTC in 4 months. Patient presents ambulatory in no acute distress at her baseline without sign of sedation and/or confusion able to voice her needs. Patient states that she has noticed increased aches and pains especially the week that she was sick URI as she has been weaning off of the Effexor. Patient states there's been no change in how she is taking her patches or Percocet, Neurontin and denies new and/or uncontrolled symptoms. Patient states that the cough is more improved than previous. Options were discussed and she will continue the wean off the Effexor next week and then start Cymbalta 20 mg daily after that. We prescribed unchanged her Duragesic 75 µg q.72 hours prescribing 10 patches today as well as Percocet  milligram tablets 1 p.o. q.6 hours p.r.n. holding for sedation prescribing 120 tablets today. Patient will continue her Neurontin 300 mg, 300 mg, 600 mg q.h.s. We will see her back in 4 weeks or sooner if she needs us and she knows that she can call us at any time. At this time we will look at titrating aggressively the Cymbalta as indicated. 11/16/17:  Medical records reviewed and as per Dr. Linus Hung on 11/14/17:  Magaly Thu 08/30/2017 for follow up of mastitis of left breast after 10 days of Clindamycin. Clinical breast examination reveals persistent edema and erythema of the left breast, left nipple inversion, induration of the left breast scar, and left axillary adenopathy. Increased discomfort with palpation. Locally advanced left breast cancer with marked breast edema and left axillary fibrosis leading to discomfort in her breast axilla and arm. She also has markedly limited range of motion of the left shoulder especially when fluid re-accumulates in the lumpectomy site. Aspiration in the office a day for 20 mL of clear fluid which in the past has been cytology negative. Relief of discomfort noted. Long discussion about options of therapy going forward. I reiterated that neither he nor ice would likely alleviate the chronic breast edema. The breast edema is attributable to her extensive surgery, complete axillary dissection, extensive poppy involvement, and regional radiation therapy. We discussed the potential option of a completion left simple mastectomy. 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 Plastic surgery would need to be involved in light of the fact that a flap may need to be utilized to close her wound if ischemia demonstrated using the SPY. Plan:   Patient Will keep track of her left breast discomfort at this point, letting us know how long it takes for her discomfort in the left breast and axilla to recur. Seroma reaccumulation is usually associated with nipple discharge. We will also check results of the fluid cytology that was sent today. Office visit on a p.r.n. basis.   Patient will call us with any Cymbalta to 60 mg nightly and she will continue her other medicines unchanged. We will see her back in 4 weeks or sooner if she needs us and we will call her after reviewing the EKG telling her to start the methadone. She will also call me next week for an update or sooner if she has any problems. 02/08/18:  Medical records reviewed and as per medical oncology assessment from 12/19/17:  Left breast discomfort; aspiration x 3 (08/16/2017, 08/30/2017 and 09/12/2017); 2 courses of Clindamycin. Culture no organisms seen. Cytology negative for malignant cells. Left Breast FNA on 11/14/2017 inconclusive for malignant cells. Breast Surgical Oncology team recommendations: Re-staging scans and left mastectomy if scans negative for metastatic disease. She has poor tolerance to Aromasin (significant arthralgias affecting quality of life). She has exhausted every possible hormonal therapy option; Referred to [facility] for clinical trial evaluation. As per phone discussion with Brock Vu on 01/12/18:  Phone call from [facility] asking about her PET CT. I reviewed the PET CT impression report verbatim with her. She has not made the apt. With Dr. Javier Esteves for clinical trial evaluation as of yet. She just \"wants the breasts taken off\". Long conversation with her regarding the importance of the endocrine therapy, which she has been unable to tolerate to date. She also was intermittently compliant when she did take it. I also advised her to make an apt. With Dr. Javier Esteves as soon as possible, and to take a copy of her imaging on CD with her to that apt. She is reluctant, but verbalizes understanding and agrees. I personally gave her Dr. Suresh Wren phone number today to follow up with his office for an appointment. Patient presents today unaccompanied in no acute distress at her baseline but frustrated without sign of sedation and/or confusion able to voice her needs.   Patient reviewing with me her appointment coming up on Valentine's Day at [facility] with Dr. Leigha Ramirez.  Patient states that she could not tolerate methadone 5 mg q.12 hours secondary to sedation therefore is only taking 5 mg q.h.s. that sometime she takes around midnight with some morning sedation. Patient states that instead of taking 4-5 Percocet daily she is now taking 3-4 still complaining of significant arthralgias. Patient has been off of her Arimidex for 3-1/2 weeks as per her history secondary to intolerance with some improvement of her arthralgias. Patient also feels that she is more depressed after going up on the Cymbalta 60 mg q.h.s. and desires to back off. I did point out that she is going through more stressors with a recent CAT scan PET scan in up coming appointment and she voiced understanding. Options were discussed and as per her wish we are decreasing Cymbalta to 20 mg b.i.d. the dosage that she had been on prior and I advised her that if she notices her symptoms worsening we can always go back up and she voiced understanding. Patient now will take methadone 5 mg tablets one half tablet q.12 hours and again tried to decrease her Percocet as appropriate. Today I prescribed unchanged Duragesic 75 µg patch q.72 hours ×10 patches, Percocet  milligram tablets 1 p.o. q.6 hours p.r.n. holding for sedation ×120 tablets and Neurontin unchanged at 300, 300, 600 mg and she will continue her other medicines unchanged. We will see her back in 4 weeks and I advised her to call me at any time and she voiced understanding. 03/08/18:  Medical records reviewed and as per medical oncology appointment on 02/20/18:  PET scan done on 12/27/17 shows uptake at seroma and overall no significant FDG avidity consistent with disease. Patient reports still having joint pain though significantly improved since stopping Aromasin. Seen by Dr. Leigha Ramirez on 02/14/2018 who recommended MRI Left breast given her left breast pain impacting her quality of life;  To see a surgeon at [facility] for surgical evaluation (mastectomy/reconstruction ?); Review MTHFR mutation; Going back on Arimidex again; Exercise 30 minutes 5 days a week;   RTC 03/30/2018  Patient presents today frustrated and more tearful, appropriate without sign of sedation and/or confusion able to voice her needs without sign of sedation and/or confusion. Patient felt that the methadone in the morning was too sedating therefore she is only taking 2.5 mg nightly. Patient tried not taking the Percocet with significant increasing arthralgias. Patient feels that decreasing the Cymbalta has not helped but possibly hurt and she is not knowing what to do to help with her persistent symptoms. Patient reviewed with me there desire to do a mastectomy and patient back on Arimidex again. Patient states she has approximately 10 Percocet remaining. Patient has gained 3 pounds. Options were discussed and I reviewed her entire chart especially my notes. I talked her about physical therapy and we are ordering this to help with her arthralgias, neuropathic symptoms. Patient obviously more depressed but because of her daytime sedation we are changing her Cymbalta to 40 mg q.h.s. We are stopping the methadone for now to see if this is attributing to the fatigue which I'm not sure about. We are continuing Duragesic 75 µg one patch q.72 hours prescribing 10 today, Percocet  milligram tablets 1 p.o. q.6 hours p.r.n. holding for sedation prescribing 120 tablets today. Patient also states that she decreased her Neurontin down to 300 mg b.i.d. on her own and I am advising her to go back to 300, 300, 600 especially since her sedation was not any better with her decrease but her arthralgias are worse. We'll see her back in 4 weeks and our  met with her again today. She knows that she can call us at any time.   We will monitor what her affect is like on subsequent visits and possibly increase her Cymbalta or even add Pamelor if we have not tried this. I'm hoping that physical therapy will help her affect is well. 04/05/18:  Medical records reviewed in [address] since my last visit. Patient presents today unaccompanied at her baseline without sign of sedation and/or confusion able to voice her needs. Patient states that she now is back on tamoxifen by a [facility] oncologist/trial and was told to begin weaning off of her Cymbalta in which she has been. Patient never increased to Cymbalta 40 mg daily because insurance would not cover them and now is taking 20 mg every other day weaning herself off. She is awaiting a call from [facility] oncologist to make sure she needs to come off of the Cymbalta. Patient did not have good results from Effexor prior to Cymbalta. Patient states is no change in how she is taking her medications otherwise. Patient still complains of bilateral leg neuropathy and is regressed indicating getting her left chest wall drained as per her history. Patient was evaluated by physical therapy but did not go back this week but will try next week. Options were discussed and we are continuing her same medications Duragesic 75 µg 1 patch q.72 hours prescribing 10 patches today, Percocet  milligram tablets 1 p.o. q.6 hours p.r.n. prescribing 120 tablets today and continues her Neurontin and other medications unchanged. In the future if she is staying on Cymbalta and we may increase and if not we'll try Pamelor if we have not or readdress the methadone again to help with her neuropathy. We will see her back in 4 weeks or sooner if she needs us. 05/03/18:  Medical records reviewed and no documented physician visits in [facility] since my last visit with her. Patient presents today ambulatory in no acute distress without sign of sedation and/or confusion able to voice her needs.   Patient states that she definitely feels less neuropathy although it is still present on the Cymbalta low-dose 20 mg daily that her oncologist agreed to. Patient denies side effects but does state that she has had increased stress, hot flashes and occasional headaches attributing the stress and headaches to taking care of her father who has dementia. Patient states there is no change in how she is taking the other medications and denies new and/or uncontrolled symptoms otherwise. Patient also complaining of right tennis elbow and will be seeing her physical therapist about this. Options were discussed and today we are initiating Pamelor 10 mg q.h.s. and educated her on the medication. We are continuing unchanged her Cymbalta 20 mg q.h.s., Percocet  milligram tablets 1 p.o. q.6 hours p.r.n. prescribing 120 tablets today, Duragesic 75 µg one patch q.72 hours prescribing 10 patches today and her Neurontin unchanged. Patient knows that she can call us at any time with any concerns. We will see her back in 4 weeks or sooner if she needs us and at this time as we talked about today we will consider weaning down the Percocet possibly to 7.5 in an attempt to wean the opioids. If symptoms do not allow we will not do this next time. 05/31/18:  Medical records reviewed and no documented physician visits in [facility] since I last saw her. Patient presents today ambulating at her baseline without sign of sedation and/or confusion able to voice her needs. Patient states that she has not noticed any difference from the Pamelor 10 mg q.h.s. good or bad and is still compliant with her other medications unchanged as outlined above. Patient complaining of left hand and arm lymphedema working outside in her garden and flowers. Patient denies new and/or uncontrolled symptoms but states that morning peripheral neuropathy is the worse.   Options were discussed and we are increasing Pamelor to 25 mg q.h.s., continuing unchanged Cymbalta 20 mg q.h.s., Percocet  milligram tablets 1 p.o. q.6 hours p.r.n. prescribing 120 tablets today, Duragesic 75 µg patches one patch q.72 hours prescribing 10 patches today and Neurontin unchanged as well. In review of her chart she complained of date times fatigue with methadone in the past.  Patient did not want to retry this today but may want on subsequent visits. We will see her back in 5 weeks or sooner if she needs us. 06/21/18:  Medical records reviewed and no documented physician visits in [facility] since I last saw her. Patient presents today at her baseline ambulatory in no acute distress without sign of sedation and/or confusion able to voice her needs with a significant cough and wheezing over the past multiple days. Patient states she feels like she has pneumonia again that she was treated for in October by her pulmonologist.  Patient states that she is able to put her feet on the ground better in the morning after the increase in Pamelor and states there's no change in how she is taken her medications otherwise and I prescribed voicing complaints. Patient states that she saw the plastic surgeon who wants to fix her left side with flap, cutting her trapezius muscle as he feels there is nerve entrapment before he touches the right side. Patient unsure if she desires to pursue all of this. Options were discussed and today we put her back on Omnicef ×10 days that she had been on in the past.  Today we increased Pamelor to 50 mg q.h.s. and continued her other medications unchanged including Cymbalta 20 mg q.h.s., Percocet  milligram tablets 1 p.o. q.6 hours p.r.n. prescribing 120 tablets today, Duragesic 75 µg patches one patch q.72 hours prescribing 10 patches today and Neurontin unchanged as well. We'll see her back in 4 weeks or sooner if she needs us and look at the benefit of the increased Pamelor and resolution of her URI.  07/19/18:  Medical records reviewed and as per pulmonology assessment from 07/03/18: Rory Finn is a 45y.o. year old female here for evaluation of her  pulmonary status.  She was follow by Dr. Albania Villanueva up to April 2017. In summary:  Christoph Braswell is a pleasant 45 y. o. female with a diagnosis of invasive ductal carcinoma left breast (ER+, MD+), status post conservative surgery followed by chemotherapy.  The patient underwent a course of radiation therapy to the left breast, which was completed on 4/27/17.    During her treatment she was at one pint on taxol and following that developed acute interstitial pneumonitis which improved after changing her taxol and a course of prednisone. Her follow up CT in March 2017 showed resolution of the GGO on her previous CT scans . A follow up Ct in December 2017 showed new EBENEZER nodules. She is for follow up and further evaluation. Cornel Nation was seen today for consultation, cancer and results. diagnoses and all orders for this visit:  Pulmonary nodules  Smoking history  Cough  Interstitial pneumonitis ([address]  EBENEZER nodular infiltrates on CT 12/2017 were new in compare to the CT scan in 03/2017. PET scan did not show any FDG actiity. Patient continues to have a chronic cough . Will obtain a CT scan now for 6 months follow up. Will give her short course prednisone and mucinex for her bronchitis. Stephan schedule her for PFTs next ofice visit for evaluation her COPD. FOLLOW UP   Follow up in 8 weeks. Patient presents today ambulatory at her baseline without sign of sedation and/or confusion able to voice her needs. Patient states that she notices some improvement of her lower extremities and pain with increasing the Pamelor without side effects. Patient states is no change to how she is taking her medication has tried to get rid of the afternoon Percocet but with worsening pain in the evening. Patient is going for a CT scan soon by pulmonology as above and will stop in at physical therapy to make an appointment at that time.   Options were discussed and today we are decreasing Duragesic to 50 µg patch q.72 hours prescribing 5 patches today and continuing unchanged her Percocet  milligram tablets 1 p.o. q.6 hours p.r.n. prescribing 120 tablets. Patient will try to take one half of a tablet if she can in the afternoon as per her suggestion. Patient advised to call me if her pain is much worse and of course prior to running out of her new Duragesic patches since I only prescribed prescribing 5. We will see her back in 4 weeks or sooner if she needs us and she will continue her other medications unchanged including Cymbalta 20 mg q.h.s., Pamelor 50 mg q.h.s., Neurontin 300, 300, 600.  08/23/18:  Medical records reviewed and as per phone discussion with Nandini Adamsing our nurse on 08/10/18:  Patient called for fentanyl patch refill. Patient states she notices more pain to legs with decreased fentanyl dose, but she can tolerate it is just \"bothersome\". Dr. Brooklyn Quintanilla notified, advised to refill 50mcq. ДМИТРИЙ Art notified, refill e-prescribed. Patient aware. Patient presents today distress without sign of sedation and or confusion able to voice her needs ambulating well. Patient states that she has worsening numbness and tingling to bilateral legs that she can tolerate with a decrease in the fentanyl. Patient states there's no change in how she is taking her Percocet sometimes taking one half of a tablet and afternoon but not routinely. Patient is complaining of activation at night, grogginess in the morning and is wondering if it is the Cymbalta at night or 2 higher dose of Pamelor. Patient tried taking the Neurontin but with extreme sedation. Options were discussed and she will move the Cymbalta 20 mg daily to the morning instead of at night and we're decreasing the Pamelor to 25 mg q.h.s. hoping to help with her morning fatigue. We are reordering physical therapy as she was unsatisfied with [facility] results stating that \"that estevan didn't want to do anything\".   We are continuing unchanged her Duragesic 50 µg 1 patch q.72 hours prescribing 10 patches today, unchanged Percocet  milligram tablets 1 p.o. q.6 hours p.r.n. prescribing 120 tablets today and she will also continue her Neurontin 300, 300, 600. We'll see her back extended her appointment up to 8 weeks and she knows to call prior to running out of her medications and we will electronically prescribed unchanged. 09/20/18:  Medical records reviewed including Dr. Haque Section on 09/11/18: Gio Bennett was seen today for pneumonia, follow-up from hospital and pulmonary nodule. Diagnoses and all orders for this visit:  Infiltrate noted on imaging study  Pulmonary nodules  -     FULL PFT STUDY WITH PRE AND POST  COPD, moderate (HCC)  -     FULL PFT STUDY WITH PRE AND POST  Interstitial pneumonitis (HCC)  -     FULL PFT STUDY WITH PRE AND POST  -     Fluticasone Furoate-Vilanterol (BREO ELLIPTA) 200-25 MCG/INH AEPB; Inhale 1 puff into the lungs daily  -     MISCELLANEOUS SENDOUT 1; Future  -     EOSINOPHIL COUNT; Future  -     Hypersensitivity Pneumonitis Extended Panel; Future  Other orders  -     Cancel: CT CHEST WO CONTRAST; Future  Will follow up ct in 608 weeks. Start Breo 200/5 one puff daily. CHeck hypersensitivity panel and IgE and Eosinophil count. FOLLOW UP  Return in about 4 weeks (around 10/11/2018) for ct scan. As per radiation oncology assessment from 09/18/18:   Patient is doing well post-radiation completion. Encouraged to continue monthly self breast exams. Imaging per med onc. Patient was previously following with Dr Garrett Boston for medical oncology. She is now being seen by Dr. Kevin Aguirre at [facility], last visit on 3/28/2018. Instructed to start Tamoxifen at the time d/t intolerance of multiple other endocrine therapies. Patient is currently on Tamoxifen, tolerating better than the other medications. Next appt with Dr Kevin Aguirre in October 2018.   Met with surgeons (Dr Lilli Mccarthy 3/28/18 for breast surgery and Dr Ayleen Mayer for plastic surgery) at [facility] for surgical evaluation (mastectomy/reconstruction). Does not know if she wants to have surgery done and may want a second opinion prior to making her final decision. Cont to follow with Dr Migdalia Olvera for palliative medicine. Next appt on 9/20/18. Cont to follow with Dr Ebenezer Egan d/t pulmonary nodules found on CT (results as above). Following closely with imaging planned at next appt. Next appt 10/11/18. I discussed follow up plans with Farshad Dodge. At this time, I will see the patient back in 6 months for a post radiation completion follow up visit. Instructed to follow up with other providers involved in their care as directed (including but not limited to Medical Oncology, Primary Care, Pulmonary, and Surgery). Patient presents today unaccompanied at her baseline without sign of sedation and/or confusion able to voice her needs. Patient states that this month she has had worsening neuropathy especially to her feet when she wakes up in the morning. Patient has not noticed any improvement of fatigue since we decreased the Pamelor. Patient states is no change in how she is taking her medications and has not performed physical therapy. Patient and review of her chart stated that she could not tolerate methadone secondary to the way it made her feel, mobile, stomach problem with a history of PUD. Options were discussed and we increased back to the original strength Pamelor 50 mg q.h.s. and continued  unchanged her Duragesic 50 µg 1 patch q.72 hours prescribing 10 patches today, unchanged Percocet  milligram tablets 1 p.o. q.6 hours p.r.n. prescribing 120 tablets today and she will also continue her Neurontin 300, 300, 600, Cymbalta 20 mg each morning. We will see her back in 4 weeks or sooner if she needs us. We will look at titrating Neurontin upward if she still with unacceptable symptoms at this time. 10/18/18:  Medical records reviewed and as per pulmonary assessment on 10/11/18:   Naomi Brush was seen today for pulmonary nodule. Diagnoses and all orders for this visit   COPD, moderate ([address]  Pulmonary nodules  Ground glass opacity present on imaging of lung  Follow up CT scan shows complete resolution of GGO. This could have been from an viral infection or based on her Hypersensitivity panel an acute HP which is resolved. Patient is asymptomatic at his time. Cough has resolved and she is complaint with her Breo,   Will obtain follow up CT for pulmonary nodules in 6 months. FOLLOW UP   6 months, ct  Patient presents today ambulatory in no acute distress at her baseline without sign of sedation and/or confusion able to voice her needs. Patient still complaining of significant bilateral leg pain especially when the weather is damp. Patient unable to take anti-inflammatories as previously documented. Patient taking Percocet 4 times day stating that she'll try to take less but then wakes up in the middle the night with pain. Patient still only taken Neurontin 300, 300, 600 and denies significant problems from it. Patient has gained 6 pounds and denies new and/or uncontrolled symptoms otherwise. Options were discussed and we are decreasing as per her wish fentanyl to 25 µg one patch q.72 hours prescribing 10 patches today and she will place her Duragesic 50 µg patches that she has remaining ×3 in a safe place at home. Patient also will continue unchanged her Percocet  milligram tablets 1 p.o. q.6 hours p.r.n. prescribing 120 tablets as she has 3 remaining, Pamelor 50 mg q.h.s. unchanged. Patient advised to begin increasing her Neurontin from the above amount possibly adding an afternoon capsule and then an evening capsule to help with her nighttime symptoms. Patient will report to me results and we will see her back 4 weeks from now or sooner if she needs us. 11/15/18 100 [facility] records reviewed, no new visits since her last PM encounter.   She was restarted on her 50 mcg fentanyl patch, as she had increased her pain following the decrease to 25 µg from her last visit. She states that she tried to continue with the fentanyl 25 µg patch, states that she was requiring more of her Percocet, and found that she was needing 5 Percocet tablets every day. She has been back on the 50 µg patch for 2 days now, and states that she will be out of the 50 µg patches tomorrow. She states that due to the increase in pain, increased use of her Percocet, but she is now also I'll of her Percocets. She states that she has been using her Neurontin, and has increased as instructed, now taking 300 mg in the morning, 600 mg in the afternoon, and 900 mg at night. She was encouraged to continue to increase her Neurontin over the next few weeks, with a goal of 900 mg 3 times a day, and she is agreeable to this plan. She is continuing to take her Pamelor 50 mg at bedtime unchanged, as well as her Cymbalta 20 mg at bedtime unchanged. Options were discussed, and we will prescribed today fentanyl 50 µg patch every 72 hours, prescribing 10 patches today, and we will re-prescribe Percocet  milligrams every 6 hours as needed for breakthrough pain. We again did discuss plan for further reduction in her pain medications, and she expresses that this is her ultimate goal.  We see her back, we will further discuss this, and we'll potentially decrease her fentanyl patch from 50 µg to 37 µg, utilizing one 25 µg and one 12 µg patch, with the plan ultimately to to continue to decrease her fentanyl until she is able to stop, and manage her pain with when necessary medication only. We discussed with the increase in her adjuvant therapy, and with her increase in fentanyl, that her when necessary usage should reduce to her prior level. We will continue to follow her closely, and we'll see her back in the clinic in 4 weeks, or sooner if needed.   As always she knows that she can call us with any questions, concerns, needs, or changes in one patch q.72 hours, Cymbalta 20 g q.h.s., Neurontin 600, 600, 900, Pamelor 50 mg h.s. all unchanged. Patient states that she is willing to do this in an attempt to wean herself off the medications. We'll see her back in 8 weeks or sooner if she needs us. 19:  Medical records reviewed and no documented physician visits in [facility] since I last saw her. Patient presents today ambulatory distressed at the recent death of her father otherwise at her baseline without sign of sedation and/or confusion able to voice her needs. Patient's father's  is tomorrow and she is trying to do the best she can and very appropriate. Patient was recently prescribed Percocet  milligram tablets ×90 by myself on May 24 as per the above plan from 19. Patient has actually 3 patches of Duragesic 50 µg and has been using the old prescription of Xanax 0.5 mg over the recent several days to get through the death of her father. Patient states whether of course makes her arthralgias worse. Options were discussed and I prescribed again Xanax 0.5 mg 1 tablet t.i.d. p.r.n. holding for sedation ×30 tablets today. We are continuing unchanged her fentanyl 50 µg one patch q.72 hours and the Percocet  milligram tablets 1 p.o. q.6 hours p.r.n. but not to exceed 3 in a 24-hour period prescribing as above. Next time she calls for refill we will decrease the dosage to 7. 5-325 milligram tablets prescribing 90 and continue to wean accordingly. She will also continue the Cymbalta 20 g q.h.s., Neurontin 600, 600, 900, Pamelor 50 mg q.h.s. and the remainder of her medications. We will see her back in 4 weeks. Patient was given a number for [facility] and she states she will certainly contact them. Controlled Substances Monitoring:   RX Monitoring 2019   Attestation The Prescription Monitoring Report was requested today but not available.    Acute Pain Prescriptions -   Chronic Pain Routine Monitoring -   Chronic Pain > 50 MEDD -   Chronic Pain > 80 MEDD -     Time in minutes:    [] 15   [] 30   [] 45   [] 60   [x] 75   [] 90  Chart review/documentation:  [x] 15   [] 30   [] 45   [] 60   [] 75   [] 90  Assessment:     [x] 15   [] 30   [] 45   [] 60   [] 75   [] 90  Conversation patient/family/staff: [] 15   [] 30   [x] 45   [] 60   [] 75   [] 90    Shelley Riley MD    5/30/2019

## 2019-06-27 ENCOUNTER — OFFICE VISIT (OUTPATIENT)
Dept: PALLATIVE CARE | Age: 54
End: 2019-06-27
Payer: COMMERCIAL

## 2019-06-27 ENCOUNTER — HOSPITAL ENCOUNTER (OUTPATIENT)
Dept: INFUSION THERAPY | Age: 54
Discharge: HOME | End: 2019-06-27
Payer: COMMERCIAL

## 2019-06-27 VITALS
BODY MASS INDEX: 27.25 KG/M2 | DIASTOLIC BLOOD PRESSURE: 77 MMHG | SYSTOLIC BLOOD PRESSURE: 139 MMHG | HEART RATE: 98 BPM | WEIGHT: 174 LBS

## 2019-06-27 DIAGNOSIS — T45.1X5A CHEMOTHERAPY-INDUCED NEUROPATHY (HCC): ICD-10-CM

## 2019-06-27 DIAGNOSIS — M25.50 ARTHRALGIA, UNSPECIFIED JOINT: ICD-10-CM

## 2019-06-27 DIAGNOSIS — G62.0 PERIPHERAL NEUROPATHY DUE TO CHEMOTHERAPY (HCC): ICD-10-CM

## 2019-06-27 DIAGNOSIS — C50.512 MALIGNANT NEOPLASM OF LOWER-OUTER QUADRANT OF LEFT FEMALE BREAST, UNSPECIFIED ESTROGEN RECEPTOR STATUS (HCC): Primary | ICD-10-CM

## 2019-06-27 DIAGNOSIS — Z51.5 PALLIATIVE CARE BY SPECIALIST: ICD-10-CM

## 2019-06-27 DIAGNOSIS — T45.1X5A PERIPHERAL NEUROPATHY DUE TO CHEMOTHERAPY (HCC): ICD-10-CM

## 2019-06-27 DIAGNOSIS — G89.3 CHRONIC PAIN DUE TO NEOPLASM: ICD-10-CM

## 2019-06-27 DIAGNOSIS — G62.0 CHEMOTHERAPY-INDUCED NEUROPATHY (HCC): ICD-10-CM

## 2019-06-27 PROCEDURE — 99215 OFFICE O/P EST HI 40 MIN: CPT | Performed by: FAMILY MEDICINE

## 2019-06-27 PROCEDURE — G8417 CALC BMI ABV UP PARAM F/U: HCPCS | Performed by: FAMILY MEDICINE

## 2019-06-27 PROCEDURE — G8428 CUR MEDS NOT DOCUMENT: HCPCS | Performed by: FAMILY MEDICINE

## 2019-06-27 PROCEDURE — 99212 OFFICE O/P EST SF 10 MIN: CPT | Performed by: FAMILY MEDICINE

## 2019-06-27 PROCEDURE — 3017F COLORECTAL CA SCREEN DOC REV: CPT | Performed by: FAMILY MEDICINE

## 2019-06-27 PROCEDURE — 96523 IRRIG DRUG DELIVERY DEVICE: CPT

## 2019-06-27 PROCEDURE — 1036F TOBACCO NON-USER: CPT | Performed by: FAMILY MEDICINE

## 2019-06-27 RX ORDER — OXYCODONE AND ACETAMINOPHEN 7.5; 325 MG/1; MG/1
1 TABLET ORAL EVERY 6 HOURS PRN
Qty: 120 TABLET | Refills: 0 | Status: SHIPPED | OUTPATIENT
Start: 2019-06-27 | End: 2019-07-25 | Stop reason: SDUPTHER

## 2019-06-27 RX ORDER — FENTANYL 25 UG/H
1 PATCH TRANSDERMAL
Qty: 10 PATCH | Refills: 0 | Status: SHIPPED | OUTPATIENT
Start: 2019-06-27 | End: 2019-07-25 | Stop reason: SDUPTHER

## 2019-06-27 NOTE — PROGRESS NOTES
PORT FLUSH    Patient presents to clinic for [facility] today. single  SQ port accessed per policy using 67L, 1 inch Olvera needle for good blood return. Site flushed easily with 10 mL NSS followed by 5 mL Heparin solution 100 units/ml rinse prior to de-access. Dry sterile dressing to area. Tolerated procedure well. Encouraged to schedule port flush every 4 weeks.

## 2019-07-22 ENCOUNTER — HOSPITAL ENCOUNTER (OUTPATIENT)
Dept: GENERAL RADIOLOGY | Age: 54
Discharge: HOME | End: 2019-07-24
Payer: COMMERCIAL

## 2019-07-22 DIAGNOSIS — Z13.9 VISIT FOR SCREENING: ICD-10-CM

## 2019-07-22 PROCEDURE — 77063 BREAST TOMOSYNTHESIS BI: CPT

## 2019-07-25 ENCOUNTER — OFFICE VISIT (OUTPATIENT)
Dept: PALLATIVE CARE | Age: 54
End: 2019-07-25
Payer: COMMERCIAL

## 2019-07-25 VITALS
DIASTOLIC BLOOD PRESSURE: 77 MMHG | WEIGHT: 172 LBS | SYSTOLIC BLOOD PRESSURE: 125 MMHG | HEART RATE: 86 BPM | BODY MASS INDEX: 26.94 KG/M2

## 2019-07-25 DIAGNOSIS — Z51.5 PALLIATIVE CARE BY SPECIALIST: Primary | ICD-10-CM

## 2019-07-25 DIAGNOSIS — T45.1X5A CHEMOTHERAPY-INDUCED NEUROPATHY (HCC): ICD-10-CM

## 2019-07-25 DIAGNOSIS — G89.3 CHRONIC PAIN DUE TO NEOPLASM: ICD-10-CM

## 2019-07-25 DIAGNOSIS — C50.512 MALIGNANT NEOPLASM OF LOWER-OUTER QUADRANT OF LEFT FEMALE BREAST, UNSPECIFIED ESTROGEN RECEPTOR STATUS (HCC): ICD-10-CM

## 2019-07-25 DIAGNOSIS — T45.1X5A PERIPHERAL NEUROPATHY DUE TO CHEMOTHERAPY (HCC): ICD-10-CM

## 2019-07-25 DIAGNOSIS — G62.0 CHEMOTHERAPY-INDUCED NEUROPATHY (HCC): ICD-10-CM

## 2019-07-25 DIAGNOSIS — G62.0 PERIPHERAL NEUROPATHY DUE TO CHEMOTHERAPY (HCC): ICD-10-CM

## 2019-07-25 PROCEDURE — 99212 OFFICE O/P EST SF 10 MIN: CPT

## 2019-07-25 PROCEDURE — 99215 OFFICE O/P EST HI 40 MIN: CPT | Performed by: NURSE PRACTITIONER

## 2019-07-25 RX ORDER — NORTRIPTYLINE HYDROCHLORIDE 50 MG/1
50 CAPSULE ORAL NIGHTLY
Qty: 90 CAPSULE | Refills: 1 | Status: SHIPPED | OUTPATIENT
Start: 2019-07-25 | End: 2019-10-03

## 2019-07-25 RX ORDER — FENTANYL 25 UG/H
1 PATCH TRANSDERMAL
Qty: 10 PATCH | Refills: 0 | Status: SHIPPED | OUTPATIENT
Start: 2019-07-25 | End: 2019-08-22 | Stop reason: ALTCHOICE

## 2019-07-25 RX ORDER — PREGABALIN 75 MG/1
75 CAPSULE ORAL 3 TIMES DAILY
Qty: 90 CAPSULE | Refills: 0 | Status: SHIPPED | OUTPATIENT
Start: 2019-07-25 | End: 2019-09-17 | Stop reason: SDUPTHER

## 2019-07-25 RX ORDER — OXYCODONE AND ACETAMINOPHEN 7.5; 325 MG/1; MG/1
1 TABLET ORAL EVERY 6 HOURS PRN
Qty: 120 TABLET | Refills: 0 | Status: SHIPPED | OUTPATIENT
Start: 2019-07-25 | End: 2019-08-22 | Stop reason: SDUPTHER

## 2019-07-25 NOTE — PROGRESS NOTES
capsule Take 1 capsule by mouth daily 90 capsule 1    naloxone 4 MG/0.1ML LIQD nasal spray 1 spray by Nasal route as needed for Opioid Reversal 1 each 5    naloxone 4 MG/0.1ML LIQD nasal spray 1 spray by Nasal route as needed for Opioid Reversal 1 each 5    predniSONE (DELTASONE) 10 MG tablet 6 tabs x 4 days, 4 tabs x 4 days, 2 tabs x 4 days, 1 x 4 days 52 tablet 0    tamoxifen (NOLVADEX) 20 MG tablet Take 20 mg by mouth      TURMERIC PO Take 750 mg by mouth      ALPRAZolam (XANAX) 0.5 MG tablet 1/2 to 1 tablet orally before bed as needed for sleep/anxiety. 30 tablet 0    albuterol sulfate HFA (PROVENTIL HFA) 108 (90 Base) MCG/ACT inhaler Inhale 2 puffs into the lungs every 4 hours as needed for Wheezing 1 Inhaler 1     No current facility-administered medications on file prior to visit. Allergies:    Demerol hcl [meperidine]; Azithromycin; and Motrin [ibuprofen]    Symptom Assessment:       Symptom   Present   Medication   # Doses/24h    Pain           Dyspnea           N/V          Constipation          Insomnia           Pruritis       Anxiety/Depression          Decreased Intake          Weight Loss          Fatigue          Declining  Performance Status         ADL Dependent        ROS: UNLESS STATED ABOVE PATIENT DENIES:  CONSTITUTIONAL:  fever, chill, rigors, nausea, vomiting, fatigue. HEENT: blurry vision, double vision, hearing problem, tinnitus, hoarseness, dysphagia,               odynophagia  RESPIRATORY: cough, shortness of breath, sputum expectoration. CARDIOVASCULAR:  Chest pain/pressure, palpitation, syncope, irregular beats  GASTROINTESTINAL:  abdominal or rectal pain, diarrhea, constipation, . GENITOURINARY:  Burning, frequency, urgency, incontinence, discharge  INTEGUMENTARY: rash, wound, pruritis  HEMATOLOGIC/LYMPHATIC:  Swelling, sores, gum bleeding, easy bruising, pica.   MUSCULOSKELETAL:  pain, edema, joint swelling or redness  NEUROLOGICAL:  light headed, dizziness, loss of 0954   BP: 125/77   Pulse: 86   Weight: 172 lb (78 kg)       Gen: WD, WN, NAD, awake, alert, able to voice their needs/thoughts   HEENT: normocephalic, atraumatic, sclera nonicteric, PERRLA, EOMI, MMM, good speech tone and quality  Neck: no LAD, no JVD, supple, no masses, normal speech, trachea midline,   Lungs: bilaterally clear to auscultation, good aeration, symmetric  Heart: regular rate and rhythm, no murmurs/rubs/gallops/ectopy appreciated, PMI WNL  Abd.: non-distended, soft, nontender, non-distended, normal bowel sounds  Ext.: no clubbing, cyanosis or edema, no joint tenderness  Skin: warm, adequate hydration without acute lesions  Neuro: awake, alert, oriented x 3, conversive,       [place] Symptom Assessment Score   [place] Score 7/25/2019 6/27/2019 5/30/2019 4/4/2019 2/14/2019   Pain Score 5 7 7 5 5   Tiredness Score 4 3 3 5 4   Nausea Score Not nauseated Not nauseated Not nauseated Not nauseated Not nauseated   Depression Score Not depressed 2 Not depressed Not depressed 3   Anxiety Score 4 2 5 3 3   Drowsiness Score Not drowsy Not drowsy Not drowsy 3 4   Appetite Score 5 5 5 5 1   Wellbeing Score 5 6 5 5 1   Dyspnea Score No shortness of breath No shortness of breath No shortness of breath No shortness of breath No shortness of breath   Other Problem Score Best possible response Best possible response Best possible response Best possible response Best possible response   Total Assessment Score(calculated) 23 25 25 26 21     Impression:  As per medical oncology's assessment from 12/30/16:  45 y/o post-menopausal  female who underwent Stereotactic Left Breast mass core biopsy on 06/17/2016 revealing Invasive Lobular carcinoma, well differentiated. Associated lobular carcinoma in-situ. ER + (100%); PA + (100%) and HER-2/chay negative (1+)  FNA Right Breast Cyst FNA aspiration on 06/23/2016: NEGATIVE for malignancy. Cytologic findings consistent with cyst contents.    07/06/2016: Left breast started on 03/13/2017 and will be completed on 04/26/2017. Presents today, 04/04/2017 to discuss hormonal therapy. Side effects of arimidex explained: including but not limited to hot flashes, aches, osteoporosis, edema, vasodilation, rash, GI upset, mood changes, sob/cough, dyslipidemia, thrombophlebitis, anemia, leukopenia, thromboembolic disorder (rare, more so with tamoxifen), hypersensitivity, vaginal bleeding (rare, more so with tamoxifen), pain. Will need DEXA scan prior to starting Arimidex. D/C Cymbalta in the interim. Biochemical testing drawn today; LMP was about 2 years ago per patient. RTC in 3 weeks to review DEXA scan and biochemical testing. Patient presents to the exam room today in no acute distress without sign of sedation and/or confusion able to voice her needs. Patient states that beginning approximately 1 week after she began the new Cymbalta she notices significant decrease in her bilateral leg symptoms stating that she was able to stand up in the morning without significant pain. Patient states that her medication was stopped by medical oncology as the above note and she has noticed after 4 days that her symptoms have increased. Patient denies any other new or uncontrolled symptoms. Options were discussed and I reviewed the case with her medical oncologist Dr. Cali Garces as well as pharmacy here and we do not know of an interaction between Cymbalta and Arimidex or any other medications that she is on therefore today I am restarting Cymbalta at the same dose 20 mg daily and we will see her back in 4 weeks or sooner if she needs us. At this time we will titrate the medication if there is still room for improvement.   We also continued unchanged her Percocet as above ×90 tablets in which she still takes 2-3 tablets a day as well as her Neurontin 300 mg q.h.s.  05/11/17:  Medical records reviewed and as per medical oncology assessment on 04/25/17:  RT was started on 03/13/2017 and will be Neurontin at that time as well. Patient was refractory to any other changes today other than the above.  07/13/17:   Medical records reviewed and as per medical oncology assessment from 06/30/17:  Bilateral Diagnostic Mammogram on 06/23/2017 Negative for malignancy. U/S guided seroma aspiration Left Breast: GS noted no PMN. No organisms seen. Body fluid Cx Growth not present. Very poor tolerance to Arimidex lately. She c/o significant arthralgias, affecting quality of life. She d/c Arimidex on 06/26/2017. We recommended Femara 2.5 mg po daily instead. 30 tabs of Femara sent to [facility]. RTC 4 weeks for Femara toxicity check. Patient presents to the exam room ambulatory without sign of sedation and/or confusion able to voice her needs. Patient states that she stop the Femara Monday which was 3 days ago secondary to worsening arthralgias, myalgias even as compared to the Arimidex. Patient states that she is awakening at 4 AM with the aches and pains. Patient states that she feels slightly better this morning but still has the same symptoms. Patient states she has been taking the medications as approved and with the new/worsening bodyaches cannot notice a difference with the increased Effexor. Patient states increased Percocet lasts approximate 4-1/2 hours which is slightly improved from previous. Patient states that she will be seeing Donnie Rosario tomorrow as Dr. Chrissie Berry is out of town. Options were discussed and patient very reluctant to adjust her medications upward as she is trying to get off of them. Patient is aware that she needs help with the symptoms. Patient reluctantly and finally accepted a prescription for Duragesic 12 µg patch q.72 hours as directed and 5 patches were prescribed today. I advised her that this patch is equivalent to 2 of her Percocets over a 24-hour period and she felt slightly more comfortable with this.   Patient also prescribed unchanged her Percocet  milligram 120 tablets today, Neurontin 300 mg, 300 mg, 600 mg q.h.s., Effexor  mg daily and her other medications unchanged. We will contact the patient about medical oncology's recommendations and let her know she can restart the Cymbalta. We will see her back in 4 weeks or sooner if she needs us. 10/19/17:  Medical records reviewed and as per medical oncology assessment from 09/26/17:  Ortega Simone d/c'd on 07/10/2017. Re-started Arimidex 1 mg daily 07/14/2017. She stopped the Arimidex on 07/25/2017 stating that she cannot sleep secondary to the pain which is also throughout the day. We recommended Aromasin 25 mg po daily; Aromasin was started on 07/28/2017 with better tolerance so far. Left breast discomfort; aspiration x 3 (08/16/2017, 08/30/2017 and 09/12/2017); 2 courses of Clindamycin. Culture no organisms seen. Cytology negative for malignant cells. Breast Surgical Oncology team recommendations: Continue to wear a supportive bra and use heat as desired for relief of discomfort. Literature search to see if sclerosing seroma cavity has ever been effective in this situation. If not,they also discussed the possibility of simple mastectomy. She will contact Dr. Doreen Mandel team this week. Continue Aromasin, Ca/VitD. RTC in 4 months. Patient presents ambulatory in no acute distress at her baseline without sign of sedation and/or confusion able to voice her needs. Patient states that she has noticed increased aches and pains especially the week that she was sick URI as she has been weaning off of the Effexor. Patient states there's been no change in how she is taking her patches or Percocet, Neurontin and denies new and/or uncontrolled symptoms. Patient states that the cough is more improved than previous. Options were discussed and she will continue the wean off the Effexor next week and then start Cymbalta 20 mg daily after that.   We prescribed unchanged her Duragesic 75 µg q.72 hours prescribing 10 patches today as well as Percocet  milligram tablets 1 p.o. q.6 hours p.r.n. holding for sedation prescribing 120 tablets today. Patient will continue her Neurontin 300 mg, 300 mg, 600 mg q.h.s. We will see her back in 4 weeks or sooner if she needs us and she knows that she can call us at any time. At this time we will look at titrating aggressively the Cymbalta as indicated. 11/16/17:  Medical records reviewed and as per Dr. Saeed Mcdonald on 11/14/17:  [address] 08/30/2017 for follow up of mastitis of left breast after 10 days of Clindamycin. Clinical breast examination reveals persistent edema and erythema of the left breast, left nipple inversion, induration of the left breast scar, and left axillary adenopathy. Increased discomfort with palpation. Locally advanced left breast cancer with marked breast edema and left axillary fibrosis leading to discomfort in her breast axilla and arm. She also has markedly limited range of motion of the left shoulder especially when fluid re-accumulates in the lumpectomy site. Aspiration in the office a day for 20 mL of clear fluid which in the past has been cytology negative. Relief of discomfort noted. Long discussion about options of therapy going forward. I reiterated that neither he nor ice would likely alleviate the chronic breast edema. The breast edema is attributable to her extensive surgery, complete axillary dissection, extensive poppy involvement, and regional radiation therapy. We discussed the potential option of a completion left simple mastectomy. 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 Plastic surgery would need to be involved in light of the fact that a flap may need to be utilized to close her wound if ischemia demonstrated using the SPY.    Plan:   Patient Will keep track of her left breast discomfort at this point, letting left breast seroma performed on 11/14/17 was inconclusive for malignant cells. Have discussed with radiology potential imaging modalities for the edematous left breast.  Concerned that most modalities would result in false positive imaging due to level of inflammation of the breast.  It is been a year and a half since patient was initially initially diagnosed with her locally advanced left breast cancer. At this point, prior to any further intervention, restaging including scan CAT scans of the chest abdomen and pelvis and bone scan would be appropriate. Left message for patient to call us back. Discussed all above with Dr. Palmer December. Patient presents today ambulating in no acute distress without sign of sedation and/or confusion able to voice her needs. Patient states that she had a bad month with a UTI, Keflex, nausea, back pain as well as chest pain left-sided requiring more Percocet stating that she is out. Patient also took a per Duragesic patch for 4 days for a drug screen for a new job and had worsening pain therefore took more Percocet. Patient cannot notice a difference from the b.i.d. Cymbalta. Patient does state that if she does take Neurontin 600 mg t.i.d. this helped significantly with her pain but it is too sedating during the day. Patient continues on 300, 300, 600 daily. Patient asking if we can do anything else as she desires to decrease the Percocet as she is afraid of the Tylenol. Options were discussed and I educated her and initiated methadone 5 mg q.12 hours that she will start after we obtain an EKG. Patient denies any palpitations, syncope but does complain of left chest pain expecting to be from her cancer. Patient was advised that I would expect her requirement for Percocet to decrease with the methadone and she voiced understanding. Patient will call if she has any problems and hold for sedation.   Today we also prescribed unchanged her Duragesic 75 µg q.72 hours prescribing 10 morning. We will see her back in 4 weeks or sooner if she needs us. We will look at titrating Neurontin upward if she still with unacceptable symptoms at this time. 10/18/18:  Medical records reviewed and as per pulmonary assessment on 10/11/18: Julia Guerin was seen today for pulmonary nodule. Diagnoses and all orders for this visit   COPD, moderate ([address]  Pulmonary nodules  Ground glass opacity present on imaging of lung  Follow up CT scan shows complete resolution of GGO. This could have been from an viral infection or based on her Hypersensitivity panel an acute HP which is resolved. Patient is asymptomatic at his time. Cough has resolved and she is complaint with her Breo,   Will obtain follow up CT for pulmonary nodules in 6 months. FOLLOW UP   6 months, ct  Patient presents today ambulatory in no acute distress at her baseline without sign of sedation and/or confusion able to voice her needs. Patient still complaining of significant bilateral leg pain especially when the weather is damp. Patient unable to take anti-inflammatories as previously documented. Patient taking Percocet 4 times day stating that she'll try to take less but then wakes up in the middle the night with pain. Patient still only taken Neurontin 300, 300, 600 and denies significant problems from it. Patient has gained 6 pounds and denies new and/or uncontrolled symptoms otherwise. Options were discussed and we are decreasing as per her wish fentanyl to 25 µg one patch q.72 hours prescribing 10 patches today and she will place her Duragesic 50 µg patches that she has remaining ×3 in a safe place at home. Patient also will continue unchanged her Percocet  milligram tablets 1 p.o. q.6 hours p.r.n. prescribing 120 tablets as she has 3 remaining, Pamelor 50 mg q.h.s. unchanged.   Patient advised to begin increasing her Neurontin from the above amount possibly adding an afternoon capsule and then an evening capsule to help with her liking this idea. We will then decrease the oxycodone content as well as frequency down the road. I advised patient when she is down to taking only 1 or 2 Percocet daily that we will decrease the fentanyl patch. Today we refilled ×10 her Duragesic 50 µg patch one patch q.72 hours, Cymbalta 20 g q.h.s., Neurontin 600, 600, 900, Pamelor 50 mg h.s. all unchanged. Patient states that she is willing to do this in an attempt to wean herself off the medications. We'll see her back in 8 weeks or sooner if she needs us. 19:  Medical records reviewed and no documented physician visits in [facility] since I last saw her. Patient presents today ambulatory distressed at the recent death of her father otherwise at her baseline without sign of sedation and/or confusion able to voice her needs. Patient's father's  is tomorrow and she is trying to do the best she can and very appropriate. Patient was recently prescribed Percocet  milligram tablets ×90 by myself on May 24 as per the above plan from 19. Patient has actually 3 patches of Duragesic 50 µg and has been using the old prescription of Xanax 0.5 mg over the recent several days to get through the death of her father. Patient states whether of course makes her arthralgias worse. Options were discussed and I prescribed again Xanax 0.5 mg 1 tablet t.i.d. p.r.n. holding for sedation ×30 tablets today. We are continuing unchanged her fentanyl 50 µg one patch q.72 hours and the Percocet  milligram tablets 1 p.o. q.6 hours p.r.n. but not to exceed 3 in a 24-hour period prescribing as above. Next time she calls for refill we will decrease the dosage to 7.5-325 milligram tablets prescribing 90 and continue to wean accordingly. She will also continue the Cymbalta 20 g q.h.s., Neurontin 600, 600, 900, Pamelor 50 mg q.h.s. and the remainder of her medications. We will see her back in 4 weeks.   Patient was given a number for [facility]

## 2019-08-22 ENCOUNTER — HOSPITAL ENCOUNTER (OUTPATIENT)
Age: 54
Discharge: HOME | End: 2019-08-24
Payer: COMMERCIAL

## 2019-08-22 ENCOUNTER — OFFICE VISIT (OUTPATIENT)
Dept: PALLATIVE CARE | Age: 54
End: 2019-08-22
Payer: COMMERCIAL

## 2019-08-22 VITALS
BODY MASS INDEX: 27.72 KG/M2 | SYSTOLIC BLOOD PRESSURE: 133 MMHG | DIASTOLIC BLOOD PRESSURE: 66 MMHG | WEIGHT: 177 LBS | HEART RATE: 84 BPM

## 2019-08-22 DIAGNOSIS — C50.512 MALIGNANT NEOPLASM OF LOWER-OUTER QUADRANT OF LEFT FEMALE BREAST, UNSPECIFIED ESTROGEN RECEPTOR STATUS (HCC): ICD-10-CM

## 2019-08-22 DIAGNOSIS — T45.1X5A PERIPHERAL NEUROPATHY DUE TO CHEMOTHERAPY (HCC): ICD-10-CM

## 2019-08-22 DIAGNOSIS — Z51.5 PALLIATIVE CARE BY SPECIALIST: Primary | ICD-10-CM

## 2019-08-22 DIAGNOSIS — G62.0 CHEMOTHERAPY-INDUCED NEUROPATHY (HCC): ICD-10-CM

## 2019-08-22 DIAGNOSIS — G62.0 PERIPHERAL NEUROPATHY DUE TO CHEMOTHERAPY (HCC): ICD-10-CM

## 2019-08-22 DIAGNOSIS — T45.1X5A CHEMOTHERAPY-INDUCED NEUROPATHY (HCC): ICD-10-CM

## 2019-08-22 DIAGNOSIS — G89.3 CHRONIC PAIN DUE TO NEOPLASM: ICD-10-CM

## 2019-08-22 LAB
AMPHETAMINE SCREEN, URINE: NOT DETECTED
BARBITURATE SCREEN URINE: NOT DETECTED
BENZODIAZEPINE SCREEN, URINE: NOT DETECTED
CANNABINOID SCREEN URINE: NOT DETECTED
COCAINE METABOLITE SCREEN URINE: NOT DETECTED
Lab: NORMAL
METHADONE SCREEN, URINE: NOT DETECTED
OPIATE SCREEN URINE: NOT DETECTED
PHENCYCLIDINE SCREEN URINE: NOT DETECTED
PROPOXYPHENE SCREEN: NOT DETECTED

## 2019-08-22 PROCEDURE — 99212 OFFICE O/P EST SF 10 MIN: CPT

## 2019-08-22 PROCEDURE — 99214 OFFICE O/P EST MOD 30 MIN: CPT | Performed by: NURSE PRACTITIONER

## 2019-08-22 PROCEDURE — G0480 DRUG TEST DEF 1-7 CLASSES: HCPCS

## 2019-08-22 PROCEDURE — 80307 DRUG TEST PRSMV CHEM ANLYZR: CPT

## 2019-08-22 RX ORDER — OXYCODONE AND ACETAMINOPHEN 7.5; 325 MG/1; MG/1
1 TABLET ORAL EVERY 4 HOURS PRN
Qty: 180 TABLET | Refills: 0 | Status: SHIPPED | OUTPATIENT
Start: 2019-08-22 | End: 2019-09-19 | Stop reason: SDUPTHER

## 2019-08-22 NOTE — PROGRESS NOTES
Palliative Medicine Outpatient Visit  2019  Provider: Gelacio NOLASCO      Referring Provider:    Reason for Consult:  [] Advanced Care Planning  [] Assist with goals of care  [] Transition of care  [x] Symptom Management    See below for recommendations, as indicated, concernin. Advanced Care Planning  2. Anticipatory Guidance  3. Transition of Care  4. Symptom Management      CC: Arthralgias     HPI:   As per medical oncology's assessment from 16:  45 y/o post-menopausal  female who underwent Stereotactic Left Breast mass core biopsy on 2016 revealing Invasive Lobular carcinoma, well differentiated. Associated lobular carcinoma in-situ. ER + (100%); MO + (100%) and HER-2/chay negative (1+)  FNA Right Breast Cyst FNA aspiration on 2016: NEGATIVE for malignancy. Cytologic findings consistent with cyst contents. 2016: Left breast excision/SLNB/Left axillary dissection was performed by Dr. Ronnie Turner. Left sentinel and left breast sentinel node # 1 biopsy: Two lymph nodes with metastatic carcinoma. Lymph node, left axillary, left breast sentinel node # 2 biopsy: One lymph node with metastatic carcinoma. Breast, left, excision of mass:  Invasive lobular carcinoma. Lymph nodes, left axillary, biopsy with left axillary dissection (10/22): Metastatic carcinoma. · Comment: Sections demonstrate a large amount of residual invasive lobular carcinoma. · Invasive tumor estimated to be at least 6.0 cm in greatest dimension. · Histologic grade:   ¨ Glandular differentiation- score 3: < 10%of tumor area forming glandular/tubular structures. ¨ Nuclear pleomorphism- Score 1: Nuclei small with little increase in size in comparison with normal breast epithelial cells. ¨ Mitotic count: Score 1  ¨ Overall grade: Grade 1   · Carcinoma focally involves the posterolateral inked margin of excision.    · Total of 13/23 lymph nodes involved by metastatic carcinoma, largest focus supply: 30 days 120 tablet 0    nortriptyline (PAMELOR) 50 MG capsule Take 1 capsule by mouth nightly 90 capsule 1    fluticasone-vilanterol (BREO ELLIPTA) 100-25 MCG/INH AEPB inhaler Inhale 1 puff into the lungs daily 60 each 6    naloxone 4 MG/0.1ML LIQD nasal spray 1 spray by Nasal route as needed for Opioid Reversal 1 each 5    DULoxetine (CYMBALTA) 20 MG extended release capsule Take 1 capsule by mouth daily 90 capsule 1    naloxone 4 MG/0.1ML LIQD nasal spray 1 spray by Nasal route as needed for Opioid Reversal 1 each 5    naloxone 4 MG/0.1ML LIQD nasal spray 1 spray by Nasal route as needed for Opioid Reversal 1 each 5    predniSONE (DELTASONE) 10 MG tablet 6 tabs x 4 days, 4 tabs x 4 days, 2 tabs x 4 days, 1 x 4 days 52 tablet 0    tamoxifen (NOLVADEX) 20 MG tablet Take 20 mg by mouth      TURMERIC PO Take 750 mg by mouth      ALPRAZolam (XANAX) 0.5 MG tablet 1/2 to 1 tablet orally before bed as needed for sleep/anxiety. 30 tablet 0    albuterol sulfate HFA (PROVENTIL HFA) 108 (90 Base) MCG/ACT inhaler Inhale 2 puffs into the lungs every 4 hours as needed for Wheezing 1 Inhaler 1     No current facility-administered medications on file prior to visit. Allergies:    Demerol hcl [meperidine]; Azithromycin; and Motrin [ibuprofen]    Symptom Assessment:       Symptom   Present   Medication   # Doses/24h    Pain           Dyspnea           N/V          Constipation          Insomnia           Pruritis       Anxiety/Depression          Decreased Intake          Weight Loss          Fatigue          Declining  Performance Status         ADL Dependent        ROS: UNLESS STATED ABOVE PATIENT DENIES:  CONSTITUTIONAL:  fever, chill, rigors, nausea, vomiting, fatigue. HEENT: blurry vision, double vision, hearing problem, tinnitus, hoarseness, dysphagia,               odynophagia  RESPIRATORY: cough, shortness of breath, sputum expectoration.   CARDIOVASCULAR:  Chest pain/pressure, palpitation, syncope, irregular beats  GASTROINTESTINAL:  abdominal or rectal pain, diarrhea, constipation, . GENITOURINARY:  Burning, frequency, urgency, incontinence, discharge  INTEGUMENTARY: rash, wound, pruritis  HEMATOLOGIC/LYMPHATIC:  Swelling, sores, gum bleeding, easy bruising, pica.   MUSCULOSKELETAL:  pain, edema, joint swelling or redness  NEUROLOGICAL:  light headed, dizziness, loss of consciousness, weakness, change                                   in memory, seizures, tremors    Social history:  Social History     Socioeconomic History    Marital status:      Spouse name: Not on file    Number of children: Not on file    Years of education: Not on file    Highest education level: Not on file   Occupational History    Occupation: nurse- RN     Employer: [address] resource strain: Not on file    Food insecurity:     Worry: Not on file     Inability: Not on file    Transportation needs:     Medical: Not on file     Non-medical: Not on file   Tobacco Use    Smoking status: Former Smoker     Packs/day: 1.50     Years: 20.00     Pack years: 30.00     Types: Cigarettes     Last attempt to quit: 6/16/2016     Years since quitting: 3.1    Smokeless tobacco: Never Used    Tobacco comment: quit smoking june 2016   Substance and Sexual Activity    Alcohol use: No    Drug use: No    Sexual activity: Not Currently   Lifestyle    Physical activity:     Days per week: Not on file     Minutes per session: Not on file    Stress: Not on file   Relationships    Social connections:     Talks on phone: Not on file     Gets together: Not on file     Attends [employer] service: Not on file     Active member of club or organization: Not on file     Attends meetings of clubs or organizations: Not on file     Relationship status: Not on file    Intimate partner violence:     Fear of current or ex partner: Not on file     Emotionally abused: Not on file     Physically abused: Not on file     Forced post-menopausal  female who underwent Stereotactic Left Breast mass core biopsy on 06/17/2016 revealing Invasive Lobular carcinoma, well differentiated. Associated lobular carcinoma in-situ. ER + (100%); AR + (100%) and HER-2/chay negative (1+)  FNA Right Breast Cyst FNA aspiration on 06/23/2016: NEGATIVE for malignancy. Cytologic findings consistent with cyst contents. 07/06/2016: Left breast excision/SLNB/Left axillary dissection was performed by Dr. Chang Murray. Left sentinel and left breast sentinel node # 1 biopsy: Two lymph nodes with metastatic carcinoma. Lymph node, left axillary, left breast sentinel node # 2 biopsy: One lymph node with metastatic carcinoma. Breast, left, excision of mass:  Invasive lobular carcinoma. Lymph nodes, left axillary, biopsy with left axillary dissection (10/22): Metastatic carcinoma. · Comment: Sections demonstrate a large amount of residual invasive lobular carcinoma. · Invasive tumor estimated to be at least 6.0 cm in greatest dimension. · Histologic grade:   ¨ Glandular differentiation- score 3: < 10%of tumor area forming glandular/tubular structures. ¨ Nuclear pleomorphism- Score 1: Nuclei small with little increase in size in comparison with normal breast epithelial cells. ¨ Mitotic count: Score 1  ¨ Overall grade: Grade 1   · Carcinoma focally involves the posterolateral inked margin of excision. · Total of 13/23 lymph nodes involved by metastatic carcinoma, largest focus of metastatic carcinoma measures 11 mm in maximum dimension; Extranodal extension: Present. · pT3 pN3a Mx  Re-excision of postero-lateral wall of lumpectomy cavity was performed on 07/22/2016 with negative margins. Tumor markers were normal; CT abdomen/pelvis and bone scan were negative for metastatic disease. CT chest noted Mildly enlarged AP window lymph nodes measuring 11 x 7.5 mm.    Pulmonary team consult appreciated (In view of the difficulty in approaching that lymph node by EBUS, Dr. Leonardo Kilgore will follow this LN with CT chest in 3 months). PET/CT scan negative for mediastinal LN uptake. Focal uptake in the proximal stomach, recommend correlate with EGD. (Hx of PUD with many EGDs in the past by Dr. Dayna Aguirre). GI team (Dr. Dayna Aguirre) consulted for repeat EGD (performed on 12/21/2016 and noted hiatal hernia and mild gastritis). We recommended systemic chemotherapy consisting of Dose-Dense AC-T followed by RT followed lastly by hormonal therapy (Arimidex 1 mg po daily for at least 5 years). Side effects of AC-T reviewed with patient. She agreed to proceed. Mediport was placed by Dr. Preet Tabares. 2DEcho noted EF 52%. Cycle # 7 weekly Taxol is scheduled for today 12/30/2016. Bone pain after chemo; cannot take Ibuprofen due to gastritis. Tylenol alone doesn't help. Palliative care team consulted for sx management. RTC next week for Cycle # 8 weekly Taxol. 01/03/17:  [facility]RS report reviewed today revealing Robitussin-AC prescribed in November 2016, Percocet 5-325 milligram tablets ×28 prescribed Dr. Jesse Lama filled on 10/07/16, Norco 5-325 milligram tablets ×30 prescribed 3 times by Dr. Preet Tabares filled on August 24, July 23 and July 6 2016 and diazepam 10 mg tablet ×1 prescribed by the same physician filled on 06/13/16. Chemotherapeutic agent review:  Anastrozole/Arimidex:  - Is a nonsteroidal aromatase inhibitor  - Indicated for metastatic breast cancer  Drug interactions include:  - None well-characterized  Toxicities include:  - Asthenia is most common occurring in 20% of patients  - Mild nausea, vomiting, constipation, diarrhea  - Hot flashes occur in 10% of patients  - Dry, scaling skin rash  - Arthralgias occur in up to 15% of patients involving hands, knees, hips, lower back and shoulders with early morning stiffness as usual presentation.  - Headache  - Peripheral edema and 7% of patients  - Flulike syndrome in the form of fever, malaise, myalgias.   Paclitaxel/Taxol:  - Isolated from the OTC Advil every \"couple of hours\" which helps approximately 50%. Patient states that she only does this on the days after her Taxol. Patient states that prior she was taking the Percocet 5-325 milligram tabs q.h.s. p.r.n. from Sunday through Tuesday night which helped her sleep but also helped 100% with the symptoms. Upon further questioning she was still taking the Advil during the day at this time. Patient states that [facility] gave her migraine headaches and Tylenol or Aleve do not help. Patient states that she just needs something to get her through the next 5 weeks of Taxol treatment. Upon questioning she does complain of tearfulness and depression at times with all of the above. Patient states that approximately 3 years ago she was prescribed Xanax 0.5 mg tablets that she used only around 4 times yearly but now uses 3 times monthly. Patient was introduced to Palliative Medicine, signed a pain contract and a UDS was prescribed and sent today. Patient denies any street drugs or any other medications. Options were discussed and today we prescribed Percocet 5-325 milligram tablets 1 p.o. q.h.s. p.r.n. ×30 tablets, initiated Pamelor 10 mg q.h.s. ×30 capsules and will see her back in 4 weeks or sooner if she needs us. Patient advised trying to not take the Advil during the day and was introduced to the idea of meloxicam if an anti-inflammatory would be needed down the road. I am hopeful that the above medications will help and she will stop the Advil. 02/02/17:  UDS from January 2017 WNL. Patient presents in no acute distress without sign of sedation and/or confusion. Patient states that she is now taking the Pamelor 10 mg in the afternoon which works well for her as it appears to be activating if she takes it at night. Patient states she is undergoing her last chemotherapy tomorrow and radiation therapy will start in approximately a month.   Patient states that occasionally she takes the Percocet during she is frustrated with persistent pain. Patient states that she finished her chemotherapy 2/3 and subsequently had an increase in leg pain/neuropathy, nausea/vomiting. She stopped her pamelor, neurontin and mobic at that time. She continued to take the percocet taking 2 per day for pain. The pain persisted so she restarted the neurontin 300mg qhs approximately one week ago. She has noticed a slight improvement. C/O pain that is sharp and radiating to b/l hips and legs. She has neuropathies to b/l feet. She continues to use 2 percocet per day stating that she was hesitant to use more often. She also experienced nausea/vomiting and feels this is related to her anxiety/pain. She has had previous problems with gastritis and was told to stay off NSAIDs by her GI physician. She was also prescribed Nexium 20mg qday and has not been taking this. Her appetite is fair and she often has to force herself to eat. She also feels her appetite is affected by persistent pain/anxiety. Occasional problem with constipation for which she uses dulcolax tabs or miralax as needed. Encouraged her to increase fluids and take medication if no BM for 3 days. She notes increasing and profound fatigue. She is spacing her activities and resting as much as she is able to with work. She understands that the fatigue is a result of the treatment and will improve in the future. Options reviewed. Discussed taking the percocet q6h scheduled for several days to see if she is able to get in front of her pain. She will decrease to three times per day if pain controlled. Prescription provided for Percocet 5-325mg #60/no refills. Also to continue the neurontin 300mg qhs, no refill needed. Stop the pamelor as she did not feel this was effective. Added xanax 0.5mg 2x daily prn for anxiety (patient previously used xanax with good results). Prescription provided for xanax 0.5mg #30/no refill. She will take her nexium 20mg daily and stay off all NSAIDs.  She weeks or sooner if she needs us. At this time we will titrate the medication if there is still room for improvement. We also continued unchanged her Percocet as above ×90 tablets in which she still takes 2-3 tablets a day as well as her Neurontin 300 mg q.h.s.  05/11/17:  Medical records reviewed and as per medical oncology assessment on 04/25/17:  RT was started on 03/13/2017 and will be completed on 04/27/2017. DEXA scan on 04/18/2017: Normal study. Repeat 1 year (2018). Biochemical testing on 04/04/2017 confirmed post-menopausal state. Arimidex 1 mg po daily will be started on 04/28/2017. To take Ca+/Vit D while on Arimidex. Side effects of arimidex explained: including but not limited to hot flashes, aches, osteoporosis, edema, vasodilation, rash, GI upset, mood changes, sob/cough, dyslipidemia, thrombophlebitis, anemia, leukopenia, thromboembolic disorder (rare, more so with tamoxifen), hypersensitivity, vaginal bleeding (rare, more so with tamoxifen), pain - pt understands and agrees to proceed. RTC 4 weeks for Arimidex toxicity check. As per radiation oncology assessment on 05/05/17:  Diagnosis: Invasive ductal carcinoma left breast, status post conservative surgery followed by chemotherapy. Stage IIIc, ER/AR positive  Narrative: Patient just completed a course of adjuvant XRT to the left breast/left SC/ax. Radiation therapy was started on 3/13/17 and completed on 4/27/17. The left breast received a dose of 5040 cGy in 28 fractions, ED 38. Treatment was delivered with tangential beams, 6 MV photons. The left SC received a dose of 4500 cGy in 25 fractions. This was treated with 15 MV photons. A PAB boost was applied to take the midplane dose to 4500 cGy. Following this the tumor bed received an additional dose of 1000 cGy in 5 fractions. This was treated with 6/15 MV photons 3-D technique   Response/Tolerance: She tolerated the treatments quite well with mild fatigue and grade 1 reaction.  Towards the end she developed grade 2 reaction in inframammary area with yeast superinfection. This responded to topical measures. She was able to complete the anticipated therapy  Follow-up: 1 month    As per phone discussion with Dian Singh on 05/03/17:   Patient called to notify palliative clinic that she is \"afraid to take the cymbalta because an NP told me that it could possibly make the arimidex less effective. \" She said that the NP had offered to order her effexor instead. Patient unsure if effexor will help with her nerve pain as much as the cymbalta did. I notified Dr. Klaus Malin. He said that the effexor might possibly help her nerve pain and that it is okay if patient wants to follow through with the prescription. I called patient and updated her. She says that she will call NP and talk with her about getting med. Patient to be seen in palliative clinic May 11. Patient presents to the exam room today unaccompanied in no acute distress, talkative and smiling without sign of sedation and/or confusion able to voice her needs. Patient states that she was uncomfortable taking Cymbalta after talking to the nurse practitioner who prescribed Effexor 37.5 mg daily in which she started this 5 days ago. Patient states that this morning she thought she maybe noticed some benefit from it but prior has not yet. Patient states that she is taking her Percocet on the average of 3 times a day and has 3 remaining as per her history. Patient also compliant with Neurontin 3 mg q.h.s. stating that she still not is sleeping well. Patient denies any new and/or uncontrolled symptoms other than an overall not feeling well. Patient feels that this is worse since she started the Arimidex. Options were discussed and I advised her that I feel that Effexor may help with her symptoms but possibly not for another couple of weeks and she voiced understanding.   I prescribed a higher dose of Effexor 75 mg daily that she will start after her 27  milligram tablets 1 p.o. q.6 hours p.r.n. prescribing 60 tablets today as well as increased her Effexor  mg daily. We will monitor her closely seeing her back in 2 weeks and at this time look at her appointment with oncology tomorrow, symptoms, benefits from the above changes and add the Duragesic 12 µg patch if she agrees and still indicated. We will also look at increasing the Neurontin at that time as well. Patient was refractory to any other changes today other than the above.  07/13/17:   Medical records reviewed and as per medical oncology assessment from 06/30/17:  Bilateral Diagnostic Mammogram on 06/23/2017 Negative for malignancy. U/S guided seroma aspiration Left Breast: GS noted no PMN. No organisms seen. Body fluid Cx Growth not present. Very poor tolerance to Arimidex lately. She c/o significant arthralgias, affecting quality of life. She d/c Arimidex on 06/26/2017. We recommended Femara 2.5 mg po daily instead. 30 tabs of Femara sent to [facility]. RTC 4 weeks for Femara toxicity check. Patient presents to the exam room ambulatory without sign of sedation and/or confusion able to voice her needs. Patient states that she stop the Femara Monday which was 3 days ago secondary to worsening arthralgias, myalgias even as compared to the Arimidex. Patient states that she is awakening at 4 AM with the aches and pains. Patient states that she feels slightly better this morning but still has the same symptoms. Patient states she has been taking the medications as approved and with the new/worsening bodyaches cannot notice a difference with the increased Effexor. Patient states increased Percocet lasts approximate 4-1/2 hours which is slightly improved from previous. Patient states that she will be seeing Orma Monks tomorrow as Dr. Tony Powell is out of town. Options were discussed and patient very reluctant to adjust her medications upward as she is trying to get off of them. stopped the Arimidex stating that she cannot sleep secondary to the pain which is also throughout the day. Patient states that she sees Dr. Tommy Prince tomorrow and will discuss everything with him. Patient states \"this is no way to live\". Patient states that with the initiation of the Duragesic 12 µg q.72 hours she now can stretch the Percocet out to approximately every 5-1/2 hours but still needs to take them. Patient denies oversedation from them. Patient did state that with the increase of the Neurontin to 600 mg at night and does help her leg pain. 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 Patient also takes Xanax 0.5 mg one half tablet q.h.s. to help her sleep. Options were discussed and today we're doubling her Duragesic to 25 µg q.72 hours prescribing 10 patches today. We are continuing unchanged her Percocet  milligram tablets 1 p.o. q.6 hours p.r.n. prescribing 120 tablets today, increasing her Neurontin to 600 mg t.i.d. 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 We are also continuing her Xanax 0.5 mg one half tablet q.h.s. p.r.n. as per Epic. We represcribed unchanged her Effexor 150 mg daily as per Epic. We will see her back 4 weeks or sooner if she needs us and I advised her that my goal is to continue titrating upwards the Neurontin and Duragesic to the point where she does not need the Percocet. Patient is interested in weaning off of the Duragesic if possible. 8/24/17:  Medical records reviewed and as per medical oncology assessment from 08/16/17:  Montey Dakin d/neymar'd on 07/10/2017. Re-started Arimidex 1 mg daily 07/14/2017. She stopped the Arimidex on 07/25/2017 stating that she cannot sleep secondary to the pain which is also throughout the day. We recommended Aromasin 25 mg po daily. \"  Started Aromasin 25 mg daily by Dr. Tommy Prince on 07/31/2017 with fair tolerance to date. Presents today, 08/16/2017 for complaints of recurrent seroma. Clinical breast examination reveals significant edema and erythema of the left breast, left nipple inversion, induration of the left breast scar, and left axillary adenopathy. 1.  Check Left diagnostic mammogram, left breast US, and US left axillary adenopathy with possible drainage/biopsy. 2. Clindamycin to pharmacy for possible underlying infectious component. 3. RTC 2 weeks for re-evaluation. 4. Hx tobacco abuse. Quit smoking 1 year ago. Encouraged. 5. Continue follow up with Medical Oncology/Dr. Miriam Horton. Patient presents today Unaccompanied stating that she did not notice any difference or sedation from the doubling of the Duragesic. Patient states that it has been itching over the past month but she also states that she had her left breast drained and an infection with cellulitis on antibiotics. Patient infrequently takes a half of a Xanax at night which helps a little bit. Patient notices significant improvement when she is able to take her Neurontin 600 mg t.i.d. with her feet and leg neuropathy but this does cause sedation therefore when she work she only takes 300, 300, 600. Patient states there's been no change in how she is taking her Percocet took her last one last night. Patient is trying to work 2 jobs. Options were discussed and we are doubling her Duragesic to 50 µg q.72 hours prescribing 10 today, continuing the Percocet  milligram tablets 1 p.o. q.6 hours p.r.n. holding for sedation ×120 today telling her that she should not need as many of the Percocet with the increase of the Duragesic. Patient has enough of the Effexor 150 mg daily as well as Xanax 0.5 mg one half q.h.s. p.r.n. We will see her back in 4 weeks or sooner if she needs us and she was advised that if she experiences any difficulty from the increase in Duragesic to give us a call immediately. to the fatigue which I'm not sure about. We are continuing Duragesic 75 µg one patch q.72 hours prescribing 10 today, Percocet  milligram tablets 1 p.o. q.6 hours p.r.n. holding for sedation prescribing 120 tablets today. Patient also states that she decreased her Neurontin down to 300 mg b.i.d. on her own and I am advising her to go back to 300, 300, 600 especially since her sedation was not any better with her decrease but her arthralgias are worse. We'll see her back in 4 weeks and our  met with her again today. She knows that she can call us at any time. We will monitor what her affect is like on subsequent visits and possibly increase her Cymbalta or even add Pamelor if we have not tried this. I'm hoping that physical therapy will help her affect is well. 04/05/18:  Medical records reviewed in [address] [place] [address] since my last visit. Patient presents today unaccompanied at her baseline without sign of sedation and/or confusion able to voice her needs. Patient states that she now is back on tamoxifen by a [facility] oncologist/trial and was told to begin weaning off of her Cymbalta in which she has been. Patient never increased to Cymbalta 40 mg daily because insurance would not cover them and now is taking 20 mg every other day weaning herself off. She is awaiting a call from [facility] oncologist to make sure she needs to come off of the Cymbalta. Patient did not have good results from Effexor prior to Cymbalta. Patient states is no change in how she is taking her medications otherwise. Patient still complains of bilateral leg neuropathy and is regressed indicating getting her left chest wall drained as per her history. Patient was evaluated by physical therapy but did not go back this week but will try next week. Options were discussed and we are continuing her same medications Duragesic 75 µg 1 patch q.72 hours prescribing 10 patches today, Percocet  milligram tablets 1 p.o. q.6 hours p.r.n. her feel, mobile, stomach problem with a history of PUD. Options were discussed and we increased back to the original strength Pamelor 50 mg q.h.s. and continued  unchanged her Duragesic 50 µg 1 patch q.72 hours prescribing 10 patches today, unchanged Percocet  milligram tablets 1 p.o. q.6 hours p.r.n. prescribing 120 tablets today and she will also continue her Neurontin 300, 300, 600, Cymbalta 20 mg each morning. We will see her back in 4 weeks or sooner if she needs us. We will look at titrating Neurontin upward if she still with unacceptable symptoms at this time. 10/18/18:  Medical records reviewed and as per pulmonary assessment on 10/11/18: Renée Brown was seen today for pulmonary nodule. Diagnoses and all orders for this visit   COPD, moderate ([address]  Pulmonary nodules  Ground glass opacity present on imaging of lung  Follow up CT scan shows complete resolution of GGO. This could have been from an viral infection or based on her Hypersensitivity panel an acute HP which is resolved. Patient is asymptomatic at his time. Cough has resolved and she is complaint with her Breo,   Will obtain follow up CT for pulmonary nodules in 6 months. FOLLOW UP   6 months, ct  Patient presents today ambulatory in no acute distress at her baseline without sign of sedation and/or confusion able to voice her needs. Patient still complaining of significant bilateral leg pain especially when the weather is damp. Patient unable to take anti-inflammatories as previously documented. Patient taking Percocet 4 times day stating that she'll try to take less but then wakes up in the middle the night with pain. Patient still only taken Neurontin 300, 300, 600 and denies significant problems from it. Patient has gained 6 pounds and denies new and/or uncontrolled symptoms otherwise.   Options were discussed and we are decreasing as per her wish fentanyl to 25 µg one patch q.72 hours prescribing 10 patches today and she will place her Duragesic 50 µg patches that she has remaining ×3 in a safe place at home. Patient also will continue unchanged her Percocet  milligram tablets 1 p.o. q.6 hours p.r.n. prescribing 120 tablets as she has 3 remaining, Pamelor 50 mg q.h.s. unchanged. Patient advised to begin increasing her Neurontin from the above amount possibly adding an afternoon capsule and then an evening capsule to help with her nighttime symptoms. Patient will report to me results and we will see her back 4 weeks from now or sooner if she needs us. 11/15/18 [facility] records reviewed, no new visits since her last PM encounter. She was restarted on her 50 mcg fentanyl patch, as she had increased her pain following the decrease to 25 µg from her last visit. She states that she tried to continue with the fentanyl 25 µg patch, states that she was requiring more of her Percocet, and found that she was needing 5 Percocet tablets every day. She has been back on the 50 µg patch for 2 days now, and states that she will be out of the 50 µg patches tomorrow. She states that due to the increase in pain, increased use of her Percocet, but she is now also I'll of her Percocets. She states that she has been using her Neurontin, and has increased as instructed, now taking 300 mg in the morning, 600 mg in the afternoon, and 900 mg at night. She was encouraged to continue to increase her Neurontin over the next few weeks, with a goal of 900 mg 3 times a day, and she is agreeable to this plan. She is continuing to take her Pamelor 50 mg at bedtime unchanged, as well as her Cymbalta 20 mg at bedtime unchanged. Options were discussed, and we will prescribed today fentanyl 50 µg patch every 72 hours, prescribing 10 patches today, and we will re-prescribe Percocet  milligrams every 6 hours as needed for breakthrough pain.   We again did discuss plan for further reduction in her pain medications, and she expresses that this is her ultimate goal.  We see her back, we will further discuss this, and we'll potentially decrease her fentanyl patch from 50 µg to 37 µg, utilizing one 25 µg and one 12 µg patch, with the plan ultimately to to continue to decrease her fentanyl until she is able to stop, and manage her pain with when necessary medication only. We discussed with the increase in her adjuvant therapy, and with her increase in fentanyl, that her when necessary usage should reduce to her prior level. We will continue to follow her closely, and we'll see her back in the clinic in 4 weeks, or sooner if needed. As always she knows that she can call us with any questions, concerns, needs, or changes in symptoms. 12/20/18:  Medical records reviewed and patient presents unaccompanied ambulating well without sign of sedation, pleasant and comfortable. Patient states her pain is better controlled on the higher dose of fentanyl taking Percocet on approximation 4 times daily. Patient has gained 4 pounds stating that she had been on prednisone, Levaquin for her lungs by her pulmonologist with a healthy appetite. Patient denies new and/or uncontrolled symptoms and is comfortable on current regiment. Patient states that she had sedation with the increased dose of Neurontin therefore in the mornings usually taking 300 or 600 mg depending if she is working or not, 600 mg in the afternoon and 900 mg in the evening. Patient does notice a difference in her pain relief with the higher dose. Options were discussed and today we are continuing Duragesic 50 µg q.72 hours and we just refilled her medications recently. We are also continuing unchanged Percocet  milligram tablets 1 p.o. q.6 hours p.r.n., Pamelor 50 mg q.h.s., Cymbalta 20 mg q.h.s. and she will continue her other medications unchanged. We'll see her back in 4 weeks and patient will call her pulmonologist if she feels her URI coming back.   02/14/19:  Medical records reviewed and no documented physician visits since I last saw her. Patient presents ambulatory no acute distress at her baseline without sign of sedation and/or confusion. Patient has an appointment with oncology Dr. Godinez Sites Monday stating that the tamoxifen makes her feel horrible. Patient is supposed to see the spine center at [facility] after she had an MRI done but has not made an appointment stating that she cannot take off work. Patient states that she fell on the ice, took Percocet more frequently sometimes q.4 hours and then had the gastrointestinal flu and did not take any of her medications for several days. Otherwise patient has not changed anything. Patient's weight remains unchanged. Options were discussed and she will discuss with oncology about other options as she states she is having a horrible time physically and emotionally on this medication the way she feels. Patient desires not to change anything therefore we are continuing Duragesic 50 µg q.72 hours refill recently as per Epic, Percocet  milligram tablets 1 p.o. q.6 hours p.r.n. also refilled as per [facility] recently, Pamelor 50 mg q.h.s., Cymbalta 20 mg q.h.s., Neurontin as above as well. We will extend her appointment out to 8 weeks and she knows to call us at any time with any uncontrolled symptoms that she would like to address. 04/04/19:  Medical records reviewed and no documented physician visits in [facility] since I last saw her. Patient presents unaccompanied looking more comfortable without sign of sedation and or confusion able to voice her needs. Patient states that over the past week or so her legs have been feeling better. Patient states she is more active and actually on the treadmill attempting to lose weight. I talked to her about how physical activity concern only improved peripheral neuropathy, arthralgias, myalgias and she voiced understanding.   Patient states that there is no change in how she is taken her medications but would like to again weaning down from the Percocet and eventually get off of all opioids. Options were discussed and patient recently received a prescription from us for her Percocet  milligram tablets 1 p.o. q.6 hours p.r.n. and the next time she calls in for refill we will prescribed 90 tablets instead of 120. I advised her starting now to take 3 tablets in a 24-hour period instead of 4 and she voiced understanding liking this idea. We will then decrease the oxycodone content as well as frequency down the road. I advised patient when she is down to taking only 1 or 2 Percocet daily that we will decrease the fentanyl patch. Today we refilled ×10 her Duragesic 50 µg patch one patch q.72 hours, Cymbalta 20 g q.h.s., Neurontin 600, 600, 900, Pamelor 50 mg h.s. all unchanged. Patient states that she is willing to do this in an attempt to wean herself off the medications. We'll see her back in 8 weeks or sooner if she needs us. 19:  Medical records reviewed and no documented physician visits in [facility] since I last saw her. Patient presents today ambulatory distressed at the recent death of her father otherwise at her baseline without sign of sedation and/or confusion able to voice her needs. Patient's father's  is tomorrow and she is trying to do the best she can and very appropriate. Patient was recently prescribed Percocet  milligram tablets ×90 by myself on May 24 as per the above plan from 19. Patient has actually 3 patches of Duragesic 50 µg and has been using the old prescription of Xanax 0.5 mg over the recent several days to get through the death of her father. Patient states whether of course makes her arthralgias worse. Options were discussed and I prescribed again Xanax 0.5 mg 1 tablet t.i.d. p.r.n. holding for sedation ×30 tablets today.   We are continuing unchanged her fentanyl 50 µg one patch q.72 hours and the Percocet  milligram tablets 1 p.o. q.6 hours p.r.n. but not

## 2019-08-25 LAB
7-AMINOCLONAZEPAM, URINE: <5 NG/ML
ALPHA-HYDROXYALPRAZOLAM, URINE: <5 NG/ML
ALPHA-HYDROXYMIDAZOLAM, URINE: <20 NG/ML
ALPRAZOLAM, URINE: <5 NG/ML
CHLORDIAZEPOXIDE, URINE: <20 NG/ML
CLONAZEPAM, URINE: <5 NG/ML
DIAZEPAM, URINE: <20 NG/ML
LORAZEPAM, URINE: <20 NG/ML
MIDAZOLAM, URINE: <20 NG/ML
NORDIAZEPAM, URINE: <20 NG/ML
OXAZEPAM, URINE: <20 NG/ML
TEMAZEPAM, URINE: <20 NG/ML

## 2019-08-26 LAB
6AM URINE: <10 NG/ML
CODEINE, URINE: <20 NG/ML
HYDROCODONE, URINE: <20 NG/ML
HYDROMORPHONE, URINE: <20 NG/ML
MORPHINE URINE: <20 NG/ML
NORHYDROCODONE, URINE: <20 NG/ML
NOROXYCODONE, URINE: 550 NG/ML
NOROXYMORPHONE, URINE: 69 NG/ML
O-DESMETHYLTRAMADOL,UR: <25 NG/ML
OXYCODONE, URINE CONFIRMATION: 410 NG/ML
OXYMORPHONE, URINE: <20 NG/ML
TRAMADOL, URINE: <25 NG/ML

## 2019-08-27 LAB
FENTANYL URINE: 28.1 NG/ML
NORFENTANYL, URINE: 31.8 NG/ML

## 2019-09-17 DIAGNOSIS — C50.512 MALIGNANT NEOPLASM OF LOWER-OUTER QUADRANT OF LEFT FEMALE BREAST, UNSPECIFIED ESTROGEN RECEPTOR STATUS (HCC): ICD-10-CM

## 2019-09-17 DIAGNOSIS — G62.0 CHEMOTHERAPY-INDUCED NEUROPATHY (HCC): ICD-10-CM

## 2019-09-17 DIAGNOSIS — T45.1X5A PERIPHERAL NEUROPATHY DUE TO CHEMOTHERAPY (HCC): ICD-10-CM

## 2019-09-17 DIAGNOSIS — T45.1X5A CHEMOTHERAPY-INDUCED NEUROPATHY (HCC): ICD-10-CM

## 2019-09-17 DIAGNOSIS — G62.0 PERIPHERAL NEUROPATHY DUE TO CHEMOTHERAPY (HCC): ICD-10-CM

## 2019-09-17 DIAGNOSIS — G89.3 CHRONIC PAIN DUE TO NEOPLASM: ICD-10-CM

## 2019-09-17 RX ORDER — PREGABALIN 75 MG/1
75 CAPSULE ORAL 3 TIMES DAILY
Qty: 90 CAPSULE | Refills: 2 | Status: SHIPPED | OUTPATIENT
Start: 2019-09-17 | End: 2019-09-19

## 2019-09-19 ENCOUNTER — HOSPITAL ENCOUNTER (OUTPATIENT)
Dept: INFUSION THERAPY | Age: 54
Discharge: HOME | End: 2019-09-19
Payer: COMMERCIAL

## 2019-09-19 ENCOUNTER — OFFICE VISIT (OUTPATIENT)
Dept: PALLATIVE CARE | Age: 54
End: 2019-09-19
Payer: COMMERCIAL

## 2019-09-19 VITALS
HEART RATE: 96 BPM | DIASTOLIC BLOOD PRESSURE: 80 MMHG | BODY MASS INDEX: 27.57 KG/M2 | SYSTOLIC BLOOD PRESSURE: 111 MMHG | WEIGHT: 176 LBS

## 2019-09-19 DIAGNOSIS — C50.512 MALIGNANT NEOPLASM OF LOWER-OUTER QUADRANT OF LEFT FEMALE BREAST, UNSPECIFIED ESTROGEN RECEPTOR STATUS (HCC): ICD-10-CM

## 2019-09-19 DIAGNOSIS — Z51.5 PALLIATIVE CARE BY SPECIALIST: ICD-10-CM

## 2019-09-19 DIAGNOSIS — G62.0 PERIPHERAL NEUROPATHY DUE TO CHEMOTHERAPY (HCC): ICD-10-CM

## 2019-09-19 DIAGNOSIS — C50.512 MALIGNANT NEOPLASM OF LOWER-OUTER QUADRANT OF LEFT FEMALE BREAST, UNSPECIFIED ESTROGEN RECEPTOR STATUS (HCC): Primary | ICD-10-CM

## 2019-09-19 DIAGNOSIS — T45.1X5A PERIPHERAL NEUROPATHY DUE TO CHEMOTHERAPY (HCC): ICD-10-CM

## 2019-09-19 DIAGNOSIS — G89.3 CHRONIC PAIN DUE TO NEOPLASM: ICD-10-CM

## 2019-09-19 DIAGNOSIS — T45.1X5A CHEMOTHERAPY-INDUCED NEUROPATHY (HCC): ICD-10-CM

## 2019-09-19 DIAGNOSIS — G62.0 CHEMOTHERAPY-INDUCED NEUROPATHY (HCC): ICD-10-CM

## 2019-09-19 PROCEDURE — 99214 OFFICE O/P EST MOD 30 MIN: CPT | Performed by: NURSE PRACTITIONER

## 2019-09-19 PROCEDURE — 1036F TOBACCO NON-USER: CPT | Performed by: NURSE PRACTITIONER

## 2019-09-19 PROCEDURE — G8417 CALC BMI ABV UP PARAM F/U: HCPCS | Performed by: NURSE PRACTITIONER

## 2019-09-19 PROCEDURE — G8428 CUR MEDS NOT DOCUMENT: HCPCS | Performed by: NURSE PRACTITIONER

## 2019-09-19 PROCEDURE — 2580000003 HC RX 258: Performed by: INTERNAL MEDICINE

## 2019-09-19 PROCEDURE — 3017F COLORECTAL CA SCREEN DOC REV: CPT | Performed by: NURSE PRACTITIONER

## 2019-09-19 PROCEDURE — 96523 IRRIG DRUG DELIVERY DEVICE: CPT

## 2019-09-19 PROCEDURE — 99212 OFFICE O/P EST SF 10 MIN: CPT | Performed by: FAMILY MEDICINE

## 2019-09-19 PROCEDURE — 6360000002 HC RX W HCPCS

## 2019-09-19 RX ORDER — PREGABALIN 100 MG/1
100 CAPSULE ORAL 3 TIMES DAILY
Qty: 90 CAPSULE | Refills: 0 | Status: SHIPPED | OUTPATIENT
Start: 2019-09-19 | End: 2019-10-17

## 2019-09-19 RX ORDER — SODIUM CHLORIDE 0.9 % (FLUSH) 0.9 %
10 SYRINGE (ML) INJECTION PRN
Status: DISCONTINUED | OUTPATIENT
Start: 2019-09-19 | End: 2019-09-20 | Stop reason: HOSPADM

## 2019-09-19 RX ORDER — HEPARIN SODIUM (PORCINE) LOCK FLUSH IV SOLN 100 UNIT/ML 100 UNIT/ML
500 SOLUTION INTRAVENOUS PRN
Status: DISCONTINUED | OUTPATIENT
Start: 2019-09-19 | End: 2019-09-20 | Stop reason: HOSPADM

## 2019-09-19 RX ORDER — SODIUM CHLORIDE 0.9 % (FLUSH) 0.9 %
10 SYRINGE (ML) INJECTION PRN
Status: CANCELLED | OUTPATIENT
Start: 2019-09-19

## 2019-09-19 RX ORDER — HEPARIN SODIUM (PORCINE) LOCK FLUSH IV SOLN 100 UNIT/ML 100 UNIT/ML
500 SOLUTION INTRAVENOUS PRN
Status: CANCELLED | OUTPATIENT
Start: 2019-09-19

## 2019-09-19 RX ORDER — OXYCODONE AND ACETAMINOPHEN 7.5; 325 MG/1; MG/1
1 TABLET ORAL EVERY 4 HOURS PRN
Qty: 180 TABLET | Refills: 0 | Status: SHIPPED | OUTPATIENT
Start: 2019-09-19 | End: 2019-10-17 | Stop reason: SDUPTHER

## 2019-09-19 RX ORDER — HEPARIN SODIUM (PORCINE) LOCK FLUSH IV SOLN 100 UNIT/ML 100 UNIT/ML
SOLUTION INTRAVENOUS
Status: COMPLETED
Start: 2019-09-19 | End: 2019-09-19

## 2019-09-19 RX ADMIN — Medication 500 UNITS: at 10:51

## 2019-09-19 RX ADMIN — Medication 10 ML: at 10:51

## 2019-09-19 RX ADMIN — HEPARIN SODIUM (PORCINE) LOCK FLUSH IV SOLN 100 UNIT/ML 500 UNITS: 100 SOLUTION at 10:51

## 2019-09-19 NOTE — PROGRESS NOTES
needed for Opioid Reversal 1 each 5    DULoxetine (CYMBALTA) 20 MG extended release capsule Take 1 capsule by mouth daily 90 capsule 1    naloxone 4 MG/0.1ML LIQD nasal spray 1 spray by Nasal route as needed for Opioid Reversal 1 each 5    naloxone 4 MG/0.1ML LIQD nasal spray 1 spray by Nasal route as needed for Opioid Reversal 1 each 5    predniSONE (DELTASONE) 10 MG tablet 6 tabs x 4 days, 4 tabs x 4 days, 2 tabs x 4 days, 1 x 4 days 52 tablet 0    tamoxifen (NOLVADEX) 20 MG tablet Take 20 mg by mouth      TURMERIC PO Take 750 mg by mouth      ALPRAZolam (XANAX) 0.5 MG tablet 1/2 to 1 tablet orally before bed as needed for sleep/anxiety. 30 tablet 0    albuterol sulfate HFA (PROVENTIL HFA) 108 (90 Base) MCG/ACT inhaler Inhale 2 puffs into the lungs every 4 hours as needed for Wheezing 1 Inhaler 1     No current facility-administered medications on file prior to visit. Allergies:    Demerol hcl [meperidine]; Azithromycin; and Motrin [ibuprofen]    ROS: UNLESS STATED ABOVE PATIENT DENIES:  CONSTITUTIONAL:  fever, chill, rigors, nausea, vomiting, fatigue. HEENT: blurry vision, double vision, hearing problem, tinnitus, hoarseness, dysphagia,               odynophagia  RESPIRATORY: cough, shortness of breath, sputum expectoration. CARDIOVASCULAR:  Chest pain/pressure, palpitation, syncope, irregular beats  GASTROINTESTINAL:  abdominal or rectal pain, diarrhea, constipation, . GENITOURINARY:  Burning, frequency, urgency, incontinence, discharge  INTEGUMENTARY: rash, wound, pruritis  HEMATOLOGIC/LYMPHATIC:  Swelling, sores, gum bleeding, easy bruising, pica.   MUSCULOSKELETAL:  pain, edema, joint swelling or redness  NEUROLOGICAL:  light headed, dizziness, loss of consciousness, weakness, change                                   in memory, seizures, tremors    Social history:  Social History     Socioeconomic History    Marital status:      Spouse name: Not on file    Number of children: Not on LAD, no JVD, supple, no masses, normal speech, trachea midline,   Lungs: bilaterally clear to auscultation, good aeration, symmetric  Heart: regular rate and rhythm, no murmurs/rubs/gallops/ectopy appreciated, PMI WNL  Abd.: non-distended, soft, nontender, non-distended, normal bowel sounds  Ext.: no clubbing, cyanosis or edema, no joint tenderness  Skin: warm, adequate hydration without acute lesions  Neuro: awake, alert, oriented x 3, conversive,       [place] Symptom Assessment Score   [place] Score 9/19/2019 8/22/2019 7/25/2019 6/27/2019 5/30/2019   Pain Score 7 5 5 7 7   Tiredness Score 5 1 4 3 3   Nausea Score Not nauseated Not nauseated Not nauseated Not nauseated Not nauseated   Depression Score Not depressed Not depressed Not depressed 2 Not depressed   Anxiety Score 3 2 4 2 5   Drowsiness Score 3 Not drowsy Not drowsy Not drowsy Not drowsy   Appetite Score 5 2 5 5 5   Wellbeing Score 5 2 5 6 5   Dyspnea Score No shortness of breath No shortness of breath No shortness of breath No shortness of breath No shortness of breath   Other Problem Score Best possible response Best possible response Best possible response Best possible response Best possible response   Total Assessment Score(calculated) 28 12 23 25 25     Impression:  As per medical oncology's assessment from 12/30/16:  47 y/o post-menopausal  female who underwent Stereotactic Left Breast mass core biopsy on 06/17/2016 revealing Invasive Lobular carcinoma, well differentiated. Associated lobular carcinoma in-situ. ER + (100%); WI + (100%) and HER-2/chay negative (1+)  FNA Right Breast Cyst FNA aspiration on 06/23/2016: NEGATIVE for malignancy. Cytologic findings consistent with cyst contents. 07/06/2016: Left breast excision/SLNB/Left axillary dissection was performed by Dr. María Ramirez. Left sentinel and left breast sentinel node # 1 biopsy: Two lymph nodes with metastatic carcinoma.   Lymph node, left axillary, left breast sentinel node # 2 biopsy: One lymph node with metastatic carcinoma. Breast, left, excision of mass:  Invasive lobular carcinoma. Lymph nodes, left axillary, biopsy with left axillary dissection (10/22): Metastatic carcinoma. · Comment: Sections demonstrate a large amount of residual invasive lobular carcinoma. · Invasive tumor estimated to be at least 6.0 cm in greatest dimension. · Histologic grade:   ¨ Glandular differentiation- score 3: < 10%of tumor area forming glandular/tubular structures. ¨ Nuclear pleomorphism- Score 1: Nuclei small with little increase in size in comparison with normal breast epithelial cells. ¨ Mitotic count: Score 1  ¨ Overall grade: Grade 1   · Carcinoma focally involves the posterolateral inked margin of excision. · Total of 13/23 lymph nodes involved by metastatic carcinoma, largest focus of metastatic carcinoma measures 11 mm in maximum dimension; Extranodal extension: Present. · pT3 pN3a Mx  Re-excision of postero-lateral wall of lumpectomy cavity was performed on 07/22/2016 with negative margins. Tumor markers were normal; CT abdomen/pelvis and bone scan were negative for metastatic disease. CT chest noted Mildly enlarged AP window lymph nodes measuring 11 x 7.5 mm. Pulmonary team consult appreciated (In view of the difficulty in approaching that lymph node by EBUS, Dr. Sofia Holguin will follow this LN with CT chest in 3 months). PET/CT scan negative for mediastinal LN uptake. Focal uptake in the proximal stomach, recommend correlate with EGD. (Hx of PUD with many EGDs in the past by Dr. Rito Ribera). GI team (Dr. Rito Ribera) consulted for repeat EGD (performed on 12/21/2016 and noted hiatal hernia and mild gastritis). We recommended systemic chemotherapy consisting of Dose-Dense AC-T followed by RT followed lastly by hormonal therapy (Arimidex 1 mg po daily for at least 5 years). Side effects of AC-T reviewed with patient. She agreed to proceed. Mediport was placed by Dr. Dara Daniels.  2DEcho noted generalized skin rash, flushing, erythema, hypotension, dyspnea, bronchospasm usually within the first 2-3 minutes and almost always within 10 minutes. - Neurotoxicity in the form of sensory neuropathy with numbness and paresthesias which is dose dependent.  - Transient asymptomatic sinus bradycardia occurring in 30% of patients with sometimes other rhythm disturbances including Mobitz type I and 2, third-degree heart block and ventricular arrhythmias. - Alopecia and nearly all patients. - Mucositis and/or diarrhea and up to 40% of patients, mild to moderate nausea and vomiting.  - Transient elevations in serum transaminases, bilirubin and alkaline phosphatase. - Onycholysis mainly observed after 6 courses on the weekly schedule not seen with q.3 week schedule. Patient presents today to the exam room in no acute distress able to voice her needs without sign of sedation and/or confusion. Patient's 1 complaint is \"arthralgias\". Patient does complain of numbness tingling to her fingers time which is mild. Patient states that she had an EGD performed 2 weeks ago which revealed gastritis and was advised not to take anti-inflammatory/NSAIDs. Patient then stated that the surgeon advised her to always take a PPI if she has to secondary to her pain. Patient states that she is taking during the day 1 OTC Advil every \"couple of hours\" which helps approximately 50%. Patient states that she only does this on the days after her Taxol. Patient states that prior she was taking the Percocet 5-325 milligram tabs q.h.s. p.r.n. from Sunday through Tuesday night which helped her sleep but also helped 100% with the symptoms. Upon further questioning she was still taking the Advil during the day at this time. Patient states that Esteban Martins gave her migraine headaches and Tylenol or Aleve do not help. Patient states that she just needs something to get her through the next 5 weeks of Taxol treatment.   Upon questioning she does complain of tearfulness and depression at times with all of the above. Patient states that approximately 3 years ago she was prescribed Xanax 0.5 mg tablets that she used only around 4 times yearly but now uses 3 times monthly. Patient was introduced to Palliative Medicine, signed a pain contract and a UDS was prescribed and sent today. Patient denies any street drugs or any other medications. Options were discussed and today we prescribed Percocet 5-325 milligram tablets 1 p.o. q.h.s. p.r.n. ×30 tablets, initiated Pamelor 10 mg q.h.s. ×30 capsules and will see her back in 4 weeks or sooner if she needs us. Patient advised trying to not take the Advil during the day and was introduced to the idea of meloxicam if an anti-inflammatory would be needed down the road. I am hopeful that the above medications will help and she will stop the Advil. 02/02/17:  UDS from January 2017 WNL. Patient presents in no acute distress without sign of sedation and/or confusion. Patient states that she is now taking the Pamelor 10 mg in the afternoon which works well for her as it appears to be activating if she takes it at night. Patient states she is undergoing her last chemotherapy tomorrow and radiation therapy will start in approximately a month. Patient states that occasionally she takes the Percocet during the day secondary to her increasing pain as it \"takes the edge off\". Patient states that secondary to the myalgias and arthralgias she still is taking Advil approximately 6 daily and knows that she should not. Patient also complains of fatigue as she is not sleeping well with decreased appetite and occasional nausea. Patient has 4 tablets of Percocet remaining. Options were discussed and I advised her not to take Advil but we did prescribe Mobic 7.5 mg q.12 hours with food p.r.n. time 60 tablets no refill. I advised her that if she does not need to take these and do not.   We are continuing the Pamelor 10 mg Ca+/Vit D while on Arimidex. Side effects of arimidex explained: including but not limited to hot flashes, aches, osteoporosis, edema, vasodilation, rash, GI upset, mood changes, sob/cough, dyslipidemia, thrombophlebitis, anemia, leukopenia, thromboembolic disorder (rare, more so with tamoxifen), hypersensitivity, vaginal bleeding (rare, more so with tamoxifen), pain - pt understands and agrees to proceed. RTC 4 weeks for Arimidex toxicity check. As per radiation oncology assessment on 05/05/17:  Diagnosis: Invasive ductal carcinoma left breast, status post conservative surgery followed by chemotherapy. Stage IIIc, ER/RI positive  Narrative: Patient just completed a course of adjuvant XRT to the left breast/left SC/ax. Radiation therapy was started on 3/13/17 and completed on 4/27/17. The left breast received a dose of 5040 cGy in 28 fractions, ED 38. Treatment was delivered with tangential beams, 6 MV photons. The left SC received a dose of 4500 cGy in 25 fractions. This was treated with 15 MV photons. A PAB boost was applied to take the midplane dose to 4500 cGy. Following this the tumor bed received an additional dose of 1000 cGy in 5 fractions. This was treated with 6/15 MV photons 3-D technique   Response/Tolerance: She tolerated the treatments quite well with mild fatigue and grade 1 reaction. Towards the end she developed grade 2 reaction in inframammary area with yeast superinfection. This responded to topical measures. She was able to complete the anticipated therapy  Follow-up: 1 month    As per phone discussion with Gemma Amanda on 05/03/17:   Patient called to notify palliative clinic that she is \"afraid to take the cymbalta because an NP told me that it could possibly make the arimidex less effective. \" She said that the NP had offered to order her effexor instead. Patient unsure if effexor will help with her nerve pain as much as the cymbalta did. I notified Dr. Ke Michel.  He said that the effexor might Diagnostic Mammogram on 06/23/2017 Negative for malignancy. U/S guided seroma aspiration Left Breast: GS noted no PMN. No organisms seen. Body fluid Cx Growth not present. Very poor tolerance to Arimidex lately. She c/o significant arthralgias, affecting quality of life. She d/c Arimidex on 06/26/2017. We recommended Femara 2.5 mg po daily instead. 30 tabs of Femara sent to [facility]. RTC 4 weeks for Femara toxicity check. Patient presents to the exam room ambulatory without sign of sedation and/or confusion able to voice her needs. Patient states that she stop the Femara Monday which was 3 days ago secondary to worsening arthralgias, myalgias even as compared to the Arimidex. Patient states that she is awakening at 4 AM with the aches and pains. Patient states that she feels slightly better this morning but still has the same symptoms. Patient states she has been taking the medications as approved and with the new/worsening bodyaches cannot notice a difference with the increased Effexor. Patient states increased Percocet lasts approximate 4-1/2 hours which is slightly improved from previous. Patient states that she will be seeing Gilberto Sanchez tomorrow as Dr. Deborah Maddox is out of town. Options were discussed and patient very reluctant to adjust her medications upward as she is trying to get off of them. Patient is aware that she needs help with the symptoms. Patient reluctantly and finally accepted a prescription for Duragesic 12 µg patch q.72 hours as directed and 5 patches were prescribed today. I advised her that this patch is equivalent to 2 of her Percocets over a 24-hour period and she felt slightly more comfortable with this. Patient also prescribed unchanged her Percocet  milligram tablets 1 p.o. q.6 hours p.r.n. holding for sedation prescribing 60 tablets today. Patient states that she took her last tablet this morning of the 60 tablets are prescribed 2 weeks ago.   Patient reluctant sooner if she needs us and she knows that she can call us at any time. At this time we will look at titrating aggressively the Cymbalta as indicated. 11/16/17:  Medical records reviewed and as per Dr. Malagon Screen on 11/14/17:  [address] 08/30/2017 for follow up of mastitis of left breast after 10 days of Clindamycin. Clinical breast examination reveals persistent edema and erythema of the left breast, left nipple inversion, induration of the left breast scar, and left axillary adenopathy. Increased discomfort with palpation. Locally advanced left breast cancer with marked breast edema and left axillary fibrosis leading to discomfort in her breast axilla and arm. She also has markedly limited range of motion of the left shoulder especially when fluid re-accumulates in the lumpectomy site. Aspiration in the office a day for 20 mL of clear fluid which in the past has been cytology negative. Relief of discomfort noted. Long discussion about options of therapy going forward. I reiterated that neither he nor ice would likely alleviate the chronic breast edema. The breast edema is attributable to her extensive surgery, complete axillary dissection, extensive poppy involvement, and regional radiation therapy. We discussed the potential option of a completion left simple mastectomy. 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 Plastic surgery would need to be involved in light of the fact that a flap may need to be utilized to close her wound if ischemia demonstrated using the SPY. Plan:   Patient Will keep track of her left breast discomfort at this point, letting us know how long it takes for her discomfort in the left breast and axilla to recur. Seroma reaccumulation is usually associated with nipple discharge.   We will also check results of the fluid cytology that was sent p.r.n. prescribing 120 tablets today, Duragesic 75 µg patches one patch q.72 hours prescribing 10 patches today and Neurontin unchanged as well. In review of her chart she complained of date times fatigue with methadone in the past.  Patient did not want to retry this today but may want on subsequent visits. We will see her back in 5 weeks or sooner if she needs us. 06/21/18:  Medical records reviewed and no documented physician visits in [facility] since I last saw her. Patient presents today at her baseline ambulatory in no acute distress without sign of sedation and/or confusion able to voice her needs with a significant cough and wheezing over the past multiple days. Patient states she feels like she has pneumonia again that she was treated for in October by her pulmonologist.  Patient states that she is able to put her feet on the ground better in the morning after the increase in Pamelor and states there's no change in how she is taken her medications otherwise and I prescribed voicing complaints. Patient states that she saw the plastic surgeon who wants to fix her left side with flap, cutting her trapezius muscle as he feels there is nerve entrapment before he touches the right side. Patient unsure if she desires to pursue all of this. Options were discussed and today we put her back on Omnicef ×10 days that she had been on in the past.  Today we increased Pamelor to 50 mg q.h.s. and continued her other medications unchanged including Cymbalta 20 mg q.h.s., Percocet  milligram tablets 1 p.o. q.6 hours p.r.n. prescribing 120 tablets today, Duragesic 75 µg patches one patch q.72 hours prescribing 10 patches today and Neurontin unchanged as well. We'll see her back in 4 weeks or sooner if she needs us and look at the benefit of the increased Pamelor and resolution of her URI.  07/19/18:  Medical records reviewed and as per pulmonology assessment from 07/03/18:   Adry Menon is a 45y.o. year old patch q.72 hours prescribing 5 patches today and continuing unchanged her Percocet  milligram tablets 1 p.o. q.6 hours p.r.n. prescribing 120 tablets. Patient will try to take one half of a tablet if she can in the afternoon as per her suggestion. Patient advised to call me if her pain is much worse and of course prior to running out of her new Duragesic patches since I only prescribed prescribing 5. We will see her back in 4 weeks or sooner if she needs us and she will continue her other medications unchanged including Cymbalta 20 mg q.h.s., Pamelor 50 mg q.h.s., Neurontin 300, 300, 600.  08/23/18:  Medical records reviewed and as per phone discussion with James Lomax our nurse on 08/10/18:  Patient called for fentanyl patch refill. Patient states she notices more pain to legs with decreased fentanyl dose, but she can tolerate it is just \"bothersome\". Dr. Dennie Bear notified, advised to refill 50mcq. ДМИТРИЙ Naylor notified, refill e-prescribed. Patient aware. Patient presents today distress without sign of sedation and or confusion able to voice her needs ambulating well. Patient states that she has worsening numbness and tingling to bilateral legs that she can tolerate with a decrease in the fentanyl. Patient states there's no change in how she is taking her Percocet sometimes taking one half of a tablet and afternoon but not routinely. Patient is complaining of activation at night, grogginess in the morning and is wondering if it is the Cymbalta at night or 2 higher dose of Pamelor. Patient tried taking the Neurontin but with extreme sedation. Options were discussed and she will move the Cymbalta 20 mg daily to the morning instead of at night and we're decreasing the Pamelor to 25 mg q.h.s. hoping to help with her morning fatigue. We are reordering physical therapy as she was unsatisfied with [place] physical therapies results stating that \"that estevan didn't want to do anything\".   We are

## 2019-09-19 NOTE — PROGRESS NOTES
Patient presents to clinic for [facility] today. 20  SQ port accessed per policy using 5.98 Olvera needle for good blood return. Site flushed easily with 10 mL NSS followed by 5 mL Heparin solution 100 units/ml rinse prior to de-access. Dry sterile dressing to area. Tolerated procedure well. Encouraged to schedule port flush every 4 weeks.

## 2019-09-30 ENCOUNTER — TELEPHONE (OUTPATIENT)
Dept: FAMILY MEDICINE CLINIC | Age: 54
End: 2019-09-30

## 2019-09-30 NOTE — LETTER
[address] [place] [address] [address]  [address] [id number]  Dept: [phone]  Dept Fax: [phone]    No primary care provider on file. 9/30/2019      Aury Wills [address]  [facility] [address]      Dear Bob Payne records show that you are due or overdue for a colon cancer screening. Colorectal cancer is the second leading cause of cancer-related death in the [place]. The good news is that this disease can be prevented. Colon Cancer screening can detect precancerous polyps that can be removed with easy procedures. The [place] Preventative Services Task Force tells us that appropriately scheduled colon cancer screening reduces death from colorectal cancer, and strongly suggests screening for men and women ages 48 and older. I recommend that you elect one of the following two approved options:         1) A test that finds polyps and cancer - colonoscopy       2) A test that primarily finds cancer - yearly stool testing, with a colonoscopy for                    positive tests showing presence of blood in the bowels (FOBT or FIT)    The colonoscopy is preferable as it is able to identify and treat both pre-cancerous polyps and early forms of colon cancer. Please call our office so we can assist you in scheduling your colonoscopy. We look forward to hearing from you. If you already have had this done, we praise your attention to your health. Please call our office so we can update your records. Please make sure to let us know who performed your test and where it was done. We also welcome you to reach out to us online using [employer]. Ask us how!     Sincerely,    No primary care provider on file.    www.cdc.gov

## 2019-10-03 ENCOUNTER — OFFICE VISIT (OUTPATIENT)
Dept: FAMILY MEDICINE CLINIC | Age: 54
End: 2019-10-03
Payer: COMMERCIAL

## 2019-10-03 ENCOUNTER — HOSPITAL ENCOUNTER (OUTPATIENT)
Age: 54
Discharge: HOME | End: 2019-10-05
Payer: COMMERCIAL

## 2019-10-03 VITALS
BODY MASS INDEX: 27.31 KG/M2 | DIASTOLIC BLOOD PRESSURE: 72 MMHG | SYSTOLIC BLOOD PRESSURE: 126 MMHG | HEIGHT: 67 IN | TEMPERATURE: 95.8 F | HEART RATE: 103 BPM | WEIGHT: 174 LBS | OXYGEN SATURATION: 94 %

## 2019-10-03 DIAGNOSIS — Z76.89 ENCOUNTER TO ESTABLISH CARE: Primary | ICD-10-CM

## 2019-10-03 DIAGNOSIS — G62.0 CHEMOTHERAPY-INDUCED NEUROPATHY (HCC): ICD-10-CM

## 2019-10-03 DIAGNOSIS — Z13.220 SCREENING FOR HYPERLIPIDEMIA: ICD-10-CM

## 2019-10-03 DIAGNOSIS — C50.919 MALIGNANT NEOPLASM OF FEMALE BREAST, UNSPECIFIED ESTROGEN RECEPTOR STATUS, UNSPECIFIED LATERALITY, UNSPECIFIED SITE OF BREAST (HCC): ICD-10-CM

## 2019-10-03 DIAGNOSIS — T45.1X5A CHEMOTHERAPY-INDUCED NEUROPATHY (HCC): ICD-10-CM

## 2019-10-03 DIAGNOSIS — Z87.891 FORMER SMOKER: ICD-10-CM

## 2019-10-03 PROBLEM — C50.912 BREAST CANCER, STAGE 3, LEFT (HCC): Status: ACTIVE | Noted: 2018-02-16

## 2019-10-03 LAB
ALBUMIN SERPL-MCNC: 4.5 G/DL (ref 3.5–5.2)
ALP BLD-CCNC: 69 U/L (ref 35–104)
ALT SERPL-CCNC: 11 U/L (ref 0–32)
ANION GAP SERPL CALCULATED.3IONS-SCNC: 22 MMOL/L (ref 7–16)
AST SERPL-CCNC: 15 U/L (ref 0–31)
BILIRUB SERPL-MCNC: <0.2 MG/DL (ref 0–1.2)
BUN BLDV-MCNC: 8 MG/DL (ref 6–20)
CALCIUM SERPL-MCNC: 9.7 MG/DL (ref 8.6–10.2)
CHLORIDE BLD-SCNC: 92 MMOL/L (ref 98–107)
CHOLESTEROL, TOTAL: 244 MG/DL (ref 0–199)
CO2: 21 MMOL/L (ref 22–29)
CREAT SERPL-MCNC: 0.8 MG/DL (ref 0.5–1)
FOLATE: 6.5 NG/ML (ref 4.8–24.2)
GFR AFRICAN AMERICAN: >60
GFR NON-AFRICAN AMERICAN: >60 ML/MIN/1.73
GLUCOSE BLD-MCNC: 91 MG/DL (ref 74–99)
HCT VFR BLD CALC: 37.4 % (ref 34–48)
HDLC SERPL-MCNC: 54 MG/DL
HEMOGLOBIN: 12.4 G/DL (ref 11.5–15.5)
LDL CHOLESTEROL CALCULATED: 151 MG/DL (ref 0–99)
MCH RBC QN AUTO: 30.2 PG (ref 26–35)
MCHC RBC AUTO-ENTMCNC: 33.2 % (ref 32–34.5)
MCV RBC AUTO: 91.2 FL (ref 80–99.9)
PDW BLD-RTO: 13.3 FL (ref 11.5–15)
PLATELET # BLD: 311 E9/L (ref 130–450)
PMV BLD AUTO: 9.2 FL (ref 7–12)
POTASSIUM SERPL-SCNC: 4.1 MMOL/L (ref 3.5–5)
RBC # BLD: 4.1 E12/L (ref 3.5–5.5)
SODIUM BLD-SCNC: 135 MMOL/L (ref 132–146)
TOTAL PROTEIN: 7.7 G/DL (ref 6.4–8.3)
TRIGL SERPL-MCNC: 195 MG/DL (ref 0–149)
TSH SERPL DL<=0.05 MIU/L-ACNC: 2.4 UIU/ML (ref 0.27–4.2)
VITAMIN B-12: 404 PG/ML (ref 211–946)
VLDLC SERPL CALC-MCNC: 39 MG/DL
WBC # BLD: 8.3 E9/L (ref 4.5–11.5)

## 2019-10-03 PROCEDURE — 82607 VITAMIN B-12: CPT

## 2019-10-03 PROCEDURE — 80061 LIPID PANEL: CPT

## 2019-10-03 PROCEDURE — G8484 FLU IMMUNIZE NO ADMIN: HCPCS | Performed by: FAMILY MEDICINE

## 2019-10-03 PROCEDURE — 85027 COMPLETE CBC AUTOMATED: CPT

## 2019-10-03 PROCEDURE — 1036F TOBACCO NON-USER: CPT | Performed by: FAMILY MEDICINE

## 2019-10-03 PROCEDURE — G8427 DOCREV CUR MEDS BY ELIG CLIN: HCPCS | Performed by: FAMILY MEDICINE

## 2019-10-03 PROCEDURE — 3017F COLORECTAL CA SCREEN DOC REV: CPT | Performed by: FAMILY MEDICINE

## 2019-10-03 PROCEDURE — 80053 COMPREHEN METABOLIC PANEL: CPT

## 2019-10-03 PROCEDURE — 86803 HEPATITIS C AB TEST: CPT

## 2019-10-03 PROCEDURE — 36415 COLL VENOUS BLD VENIPUNCTURE: CPT

## 2019-10-03 PROCEDURE — 82746 ASSAY OF FOLIC ACID SERUM: CPT

## 2019-10-03 PROCEDURE — 99203 OFFICE O/P NEW LOW 30 MIN: CPT | Performed by: FAMILY MEDICINE

## 2019-10-03 PROCEDURE — 84443 ASSAY THYROID STIM HORMONE: CPT

## 2019-10-03 PROCEDURE — G8417 CALC BMI ABV UP PARAM F/U: HCPCS | Performed by: FAMILY MEDICINE

## 2019-10-03 ASSESSMENT — PATIENT HEALTH QUESTIONNAIRE - PHQ9
1. LITTLE INTEREST OR PLEASURE IN DOING THINGS: 1
SUM OF ALL RESPONSES TO PHQ9 QUESTIONS 1 & 2: 2
SUM OF ALL RESPONSES TO PHQ QUESTIONS 1-9: 2
2. FEELING DOWN, DEPRESSED OR HOPELESS: 1
SUM OF ALL RESPONSES TO PHQ QUESTIONS 1-9: 2

## 2019-10-03 ASSESSMENT — ENCOUNTER SYMPTOMS
NAUSEA: 0
COUGH: 0
RHINORRHEA: 0
SORE THROAT: 0
VOMITING: 0
ABDOMINAL PAIN: 0
CONSTIPATION: 0
SINUS PAIN: 0
BACK PAIN: 0
SHORTNESS OF BREATH: 0
DIARRHEA: 0

## 2019-10-04 LAB — HEPATITIS C ANTIBODY INTERPRETATION: NORMAL

## 2019-10-08 ENCOUNTER — TELEPHONE (OUTPATIENT)
Dept: FAMILY MEDICINE CLINIC | Age: 54
End: 2019-10-08

## 2019-10-08 RX ORDER — DULOXETIN HYDROCHLORIDE 20 MG/1
20 CAPSULE, DELAYED RELEASE ORAL DAILY
Qty: 90 CAPSULE | Refills: 3 | Status: SHIPPED
Start: 2019-10-08 | End: 2020-07-09 | Stop reason: ALTCHOICE

## 2019-10-17 ENCOUNTER — OFFICE VISIT (OUTPATIENT)
Dept: PALLATIVE CARE | Age: 54
End: 2019-10-17
Payer: COMMERCIAL

## 2019-10-17 VITALS
WEIGHT: 179 LBS | SYSTOLIC BLOOD PRESSURE: 116 MMHG | DIASTOLIC BLOOD PRESSURE: 74 MMHG | BODY MASS INDEX: 28.04 KG/M2 | HEART RATE: 95 BPM

## 2019-10-17 DIAGNOSIS — Z51.5 PALLIATIVE CARE BY SPECIALIST: Primary | ICD-10-CM

## 2019-10-17 DIAGNOSIS — C50.512 MALIGNANT NEOPLASM OF LOWER-OUTER QUADRANT OF LEFT FEMALE BREAST, UNSPECIFIED ESTROGEN RECEPTOR STATUS (HCC): ICD-10-CM

## 2019-10-17 DIAGNOSIS — T45.1X5A PERIPHERAL NEUROPATHY DUE TO CHEMOTHERAPY (HCC): ICD-10-CM

## 2019-10-17 DIAGNOSIS — G62.0 CHEMOTHERAPY-INDUCED NEUROPATHY (HCC): ICD-10-CM

## 2019-10-17 DIAGNOSIS — G89.3 CHRONIC PAIN DUE TO NEOPLASM: ICD-10-CM

## 2019-10-17 DIAGNOSIS — T45.1X5A CHEMOTHERAPY-INDUCED NEUROPATHY (HCC): ICD-10-CM

## 2019-10-17 DIAGNOSIS — G62.0 PERIPHERAL NEUROPATHY DUE TO CHEMOTHERAPY (HCC): ICD-10-CM

## 2019-10-17 PROCEDURE — 3017F COLORECTAL CA SCREEN DOC REV: CPT | Performed by: NURSE PRACTITIONER

## 2019-10-17 PROCEDURE — 99214 OFFICE O/P EST MOD 30 MIN: CPT | Performed by: NURSE PRACTITIONER

## 2019-10-17 PROCEDURE — 99212 OFFICE O/P EST SF 10 MIN: CPT

## 2019-10-17 PROCEDURE — 1036F TOBACCO NON-USER: CPT | Performed by: NURSE PRACTITIONER

## 2019-10-17 PROCEDURE — G8417 CALC BMI ABV UP PARAM F/U: HCPCS | Performed by: NURSE PRACTITIONER

## 2019-10-17 PROCEDURE — G8428 CUR MEDS NOT DOCUMENT: HCPCS | Performed by: NURSE PRACTITIONER

## 2019-10-17 PROCEDURE — G8484 FLU IMMUNIZE NO ADMIN: HCPCS | Performed by: NURSE PRACTITIONER

## 2019-10-17 RX ORDER — OXYCODONE AND ACETAMINOPHEN 7.5; 325 MG/1; MG/1
1 TABLET ORAL EVERY 4 HOURS PRN
Qty: 180 TABLET | Refills: 0 | Status: SHIPPED | OUTPATIENT
Start: 2019-10-17 | End: 2019-11-14 | Stop reason: SDUPTHER

## 2019-10-17 RX ORDER — PREGABALIN 150 MG/1
150 CAPSULE ORAL 3 TIMES DAILY
Qty: 90 CAPSULE | Refills: 2 | Status: SHIPPED | OUTPATIENT
Start: 2019-10-17 | End: 2020-01-09 | Stop reason: SDUPTHER

## 2019-10-17 RX ORDER — PREGABALIN 150 MG/1
150 CAPSULE ORAL 3 TIMES DAILY
Qty: 90 CAPSULE | Refills: 2 | Status: SHIPPED | OUTPATIENT
Start: 2019-10-17 | End: 2019-10-17 | Stop reason: SDUPTHER

## 2019-10-17 RX ORDER — OXYCODONE AND ACETAMINOPHEN 7.5; 325 MG/1; MG/1
1 TABLET ORAL EVERY 4 HOURS PRN
Qty: 180 TABLET | Refills: 0 | Status: SHIPPED | OUTPATIENT
Start: 2019-10-17 | End: 2019-10-17 | Stop reason: SDUPTHER

## 2019-11-14 ENCOUNTER — OFFICE VISIT (OUTPATIENT)
Dept: PALLATIVE CARE | Age: 54
End: 2019-11-14
Payer: COMMERCIAL

## 2019-11-14 VITALS
HEART RATE: 100 BPM | SYSTOLIC BLOOD PRESSURE: 108 MMHG | DIASTOLIC BLOOD PRESSURE: 70 MMHG | WEIGHT: 177 LBS | BODY MASS INDEX: 27.72 KG/M2

## 2019-11-14 DIAGNOSIS — C50.512 MALIGNANT NEOPLASM OF LOWER-OUTER QUADRANT OF LEFT FEMALE BREAST, UNSPECIFIED ESTROGEN RECEPTOR STATUS (HCC): ICD-10-CM

## 2019-11-14 DIAGNOSIS — T45.1X5A PERIPHERAL NEUROPATHY DUE TO CHEMOTHERAPY (HCC): ICD-10-CM

## 2019-11-14 DIAGNOSIS — G89.3 CHRONIC PAIN DUE TO NEOPLASM: ICD-10-CM

## 2019-11-14 DIAGNOSIS — G62.0 PERIPHERAL NEUROPATHY DUE TO CHEMOTHERAPY (HCC): ICD-10-CM

## 2019-11-14 DIAGNOSIS — G62.0 CHEMOTHERAPY-INDUCED NEUROPATHY (HCC): ICD-10-CM

## 2019-11-14 DIAGNOSIS — T45.1X5A CHEMOTHERAPY-INDUCED NEUROPATHY (HCC): ICD-10-CM

## 2019-11-14 DIAGNOSIS — Z51.5 PALLIATIVE CARE BY SPECIALIST: ICD-10-CM

## 2019-11-14 PROCEDURE — G8484 FLU IMMUNIZE NO ADMIN: HCPCS | Performed by: NURSE PRACTITIONER

## 2019-11-14 PROCEDURE — G8417 CALC BMI ABV UP PARAM F/U: HCPCS | Performed by: NURSE PRACTITIONER

## 2019-11-14 PROCEDURE — 99212 OFFICE O/P EST SF 10 MIN: CPT | Performed by: NURSE PRACTITIONER

## 2019-11-14 PROCEDURE — G8428 CUR MEDS NOT DOCUMENT: HCPCS | Performed by: NURSE PRACTITIONER

## 2019-11-14 PROCEDURE — 1036F TOBACCO NON-USER: CPT | Performed by: NURSE PRACTITIONER

## 2019-11-14 PROCEDURE — 99214 OFFICE O/P EST MOD 30 MIN: CPT | Performed by: NURSE PRACTITIONER

## 2019-11-14 PROCEDURE — 3017F COLORECTAL CA SCREEN DOC REV: CPT | Performed by: NURSE PRACTITIONER

## 2019-11-14 RX ORDER — OXYCODONE AND ACETAMINOPHEN 7.5; 325 MG/1; MG/1
1 TABLET ORAL EVERY 4 HOURS PRN
Qty: 180 TABLET | Refills: 0 | Status: SHIPPED | OUTPATIENT
Start: 2019-11-14 | End: 2019-12-12 | Stop reason: SDUPTHER

## 2019-11-14 RX ORDER — NORTRIPTYLINE HYDROCHLORIDE 50 MG/1
50 CAPSULE ORAL NIGHTLY
Qty: 90 CAPSULE | Refills: 1 | Status: SHIPPED
Start: 2019-11-14 | End: 2020-05-14 | Stop reason: SDUPTHER

## 2019-12-12 DIAGNOSIS — G62.0 CHEMOTHERAPY-INDUCED NEUROPATHY (HCC): ICD-10-CM

## 2019-12-12 DIAGNOSIS — Z51.5 PALLIATIVE CARE BY SPECIALIST: ICD-10-CM

## 2019-12-12 DIAGNOSIS — T45.1X5A CHEMOTHERAPY-INDUCED NEUROPATHY (HCC): ICD-10-CM

## 2019-12-12 DIAGNOSIS — C50.512 MALIGNANT NEOPLASM OF LOWER-OUTER QUADRANT OF LEFT FEMALE BREAST, UNSPECIFIED ESTROGEN RECEPTOR STATUS (HCC): ICD-10-CM

## 2019-12-12 DIAGNOSIS — T45.1X5A PERIPHERAL NEUROPATHY DUE TO CHEMOTHERAPY (HCC): ICD-10-CM

## 2019-12-12 DIAGNOSIS — G62.0 PERIPHERAL NEUROPATHY DUE TO CHEMOTHERAPY (HCC): ICD-10-CM

## 2019-12-12 DIAGNOSIS — G89.3 CHRONIC PAIN DUE TO NEOPLASM: ICD-10-CM

## 2019-12-12 RX ORDER — OXYCODONE AND ACETAMINOPHEN 7.5; 325 MG/1; MG/1
1 TABLET ORAL EVERY 4 HOURS PRN
Qty: 180 TABLET | Refills: 0 | Status: SHIPPED | OUTPATIENT
Start: 2019-12-12 | End: 2020-01-10 | Stop reason: SDUPTHER

## 2020-01-09 ENCOUNTER — OFFICE VISIT (OUTPATIENT)
Dept: PALLATIVE CARE | Age: 55
End: 2020-01-09
Payer: COMMERCIAL

## 2020-01-09 VITALS
OXYGEN SATURATION: 96 % | SYSTOLIC BLOOD PRESSURE: 114 MMHG | WEIGHT: 184 LBS | HEART RATE: 102 BPM | DIASTOLIC BLOOD PRESSURE: 76 MMHG | BODY MASS INDEX: 28.82 KG/M2

## 2020-01-09 PROCEDURE — G8428 CUR MEDS NOT DOCUMENT: HCPCS | Performed by: NURSE PRACTITIONER

## 2020-01-09 PROCEDURE — 3017F COLORECTAL CA SCREEN DOC REV: CPT | Performed by: NURSE PRACTITIONER

## 2020-01-09 PROCEDURE — G8484 FLU IMMUNIZE NO ADMIN: HCPCS | Performed by: NURSE PRACTITIONER

## 2020-01-09 PROCEDURE — G8417 CALC BMI ABV UP PARAM F/U: HCPCS | Performed by: NURSE PRACTITIONER

## 2020-01-09 PROCEDURE — 99212 OFFICE O/P EST SF 10 MIN: CPT | Performed by: NURSE PRACTITIONER

## 2020-01-09 PROCEDURE — 1036F TOBACCO NON-USER: CPT | Performed by: NURSE PRACTITIONER

## 2020-01-09 PROCEDURE — 99215 OFFICE O/P EST HI 40 MIN: CPT | Performed by: NURSE PRACTITIONER

## 2020-01-09 RX ORDER — ALPRAZOLAM 0.5 MG/1
0.5 TABLET ORAL 2 TIMES DAILY PRN
Qty: 30 TABLET | Refills: 2 | Status: SHIPPED
Start: 2020-01-09 | End: 2021-09-22 | Stop reason: SDUPTHER

## 2020-01-09 RX ORDER — TRAMADOL HYDROCHLORIDE 50 MG/1
50 TABLET ORAL EVERY 6 HOURS PRN
Qty: 28 TABLET | Refills: 0 | Status: SHIPPED | OUTPATIENT
Start: 2020-01-09 | End: 2020-01-16

## 2020-01-09 RX ORDER — PREGABALIN 200 MG/1
200 CAPSULE ORAL 3 TIMES DAILY
Qty: 90 CAPSULE | Refills: 2 | Status: SHIPPED
Start: 2020-01-09 | End: 2020-05-14 | Stop reason: SDUPTHER

## 2020-01-09 NOTE — PROGRESS NOTES
insecurity:     Worry: Not on file     Inability: Not on file    Transportation needs:     Medical: Not on file     Non-medical: Not on file   Tobacco Use    Smoking status: Former Smoker     Packs/day: 1.50     Years: 20.00     Pack years: 30.00     Types: Cigarettes     Last attempt to quit: 6/16/2016     Years since quitting: 3.5    Smokeless tobacco: Never Used    Tobacco comment: quit smoking june 2016   Substance and Sexual Activity    Alcohol use: No    Drug use: No    Sexual activity: Not Currently   Lifestyle    Physical activity:     Days per week: Not on file     Minutes per session: Not on file    Stress: Not on file   Relationships    Social connections:     Talks on phone: Not on file     Gets together: Not on file     Attends Anabaptism service: Not on file     Active member of club or organization: Not on file     Attends meetings of clubs or organizations: Not on file     Relationship status: Not on file    Intimate partner violence:     Fear of current or ex partner: Not on file     Emotionally abused: Not on file     Physically abused: Not on file     Forced sexual activity: Not on file   Other Topics Concern    Not on file   Social History Narrative    Lives in [facility] with daughter Oneal Juarez. Works as an RN. Family history:  Family History   Problem Relation Age of Onset    Cancer Father         prostate    Diabetes Mother         HTN / cholesterol    Diabetes Brother            PE:     Vitals:   Vitals:    01/09/20 0851   BP: 114/76   Pulse: 102   SpO2: 96%   Weight: 184 lb (83.5 kg)       Gen: WD, WN, NAD, awake, alert, able to voice their needs/thoughts   HEENT: normocephalic, atraumatic, sclera nonicteric, PERRLA, EOMI, MMM, good speech tone and quality  Neck: no LAD, no JVD, supple, no masses, normal speech, trachea midline,   Lungs: bilaterally clear to auscultation, good aeration, symmetric  Heart: regular rate and rhythm, no murmurs/rubs/gallops/ectopy appreciated, PMI WNL  Abd.: non-distended, soft, nontender, non-distended, normal bowel sounds  Ext.: no clubbing, cyanosis or edema, no joint tenderness  Skin: warm, adequate hydration without acute lesions  Neuro: awake, alert, oriented x 3, conversive,       [place] Symptom Assessment Score   [place] Score 1/9/2020 11/14/2019 10/17/2019 9/19/2019 8/22/2019   Pain Score 7 5 7 7 5   Tiredness Score 5 2 Not tired 5 1   Nausea Score Not nauseated Not nauseated Not nauseated Not nauseated Not nauseated   Depression Score 7 Not depressed 2 Not depressed Not depressed   Anxiety Score 6 Not anxious 2 3 2   Drowsiness Score Not drowsy Not drowsy Not drowsy 3 Not drowsy   Appetite Score 5 5 5 5 2   Wellbeing Score 5 5 5 5 2   Dyspnea Score No shortness of breath No shortness of breath No shortness of breath No shortness of breath No shortness of breath   Other Problem Score Best possible response Best possible response Best possible response Best possible response Best possible response   Total Assessment Score(calculated) 35 17 21 28 12     Impression:  As per medical oncology's assessment from 12/30/16:  45 y/o post-menopausal  female who underwent Stereotactic Left Breast mass core biopsy on 06/17/2016 revealing Invasive Lobular carcinoma, well differentiated. Associated lobular carcinoma in-situ. ER + (100%); MT + (100%) and HER-2/chay negative (1+)  FNA Right Breast Cyst FNA aspiration on 06/23/2016: NEGATIVE for malignancy. Cytologic findings consistent with cyst contents. 07/06/2016: Left breast excision/SLNB/Left axillary dissection was performed by Dr. Betina Serna. Left sentinel and left breast sentinel node # 1 biopsy: Two lymph nodes with metastatic carcinoma. Lymph node, left axillary, left breast sentinel node # 2 biopsy: One lymph node with metastatic carcinoma. Breast, left, excision of mass:  Invasive lobular carcinoma. Lymph nodes, left axillary, biopsy with left axillary dissection (10/22):  Metastatic management. RTC next week for Cycle # 8 weekly Taxol. 01/03/17:  OARRS report reviewed today revealing Robitussin-AC prescribed in November 2016, Percocet 5-325 milligram tablets ×28 prescribed Dr. Gary Rodriges filled on 10/07/16, Norco 5-325 milligram tablets ×30 prescribed 3 times by Dr. Zohreh Barajas filled on August 24, July 23 and July 6 2016 and diazepam 10 mg tablet ×1 prescribed by the same physician filled on 06/13/16. Chemotherapeutic agent review:  Anastrozole/Arimidex:  - Is a nonsteroidal aromatase inhibitor  - Indicated for metastatic breast cancer  Drug interactions include:  - None well-characterized  Toxicities include:  - Asthenia is most common occurring in 20% of patients  - Mild nausea, vomiting, constipation, diarrhea  - Hot flashes occur in 10% of patients  - Dry, scaling skin rash  - Arthralgias occur in up to 15% of patients involving hands, knees, hips, lower back and shoulders with early morning stiffness as usual presentation.  - Headache  - Peripheral edema and 7% of patients  - Flulike syndrome in the form of fever, malaise, myalgias. Paclitaxel/Taxol:  - Isolated from the bark of the [address] [place] [address] [employer] tree  - Active in mitosis  - Indicated for ovarian, breast, lung, head and neck, esophageal, prostate, bladder cancers, AIDS related Kaposi's sarcoma. Drug interactions include:  - Is a radiosensitizing agent. - Phenytoin, phenobarbital accelerate its metabolism. - Cisplatin, carboplatin, cyclophosphamide use increases myelosuppression  - Reduces the plasma clearance of doxorubicin by 30% resulting in increased severity of myelosuppression. Toxicities include: - Myelosuppression with neutropenia and need are days 8-10, recovery by day 15-21. - Infusion reactions up to 40% of patients with generalized skin rash, flushing, erythema, hypotension, dyspnea, bronchospasm usually within the first 2-3 minutes and almost always within 10 minutes.   - Neurotoxicity in the form of sensory neuropathy with numbness and paresthesias which is dose dependent.  - Transient asymptomatic sinus bradycardia occurring in 30% of patients with sometimes other rhythm disturbances including Mobitz type I and 2, third-degree heart block and ventricular arrhythmias. - Alopecia and nearly all patients. - Mucositis and/or diarrhea and up to 40% of patients, mild to moderate nausea and vomiting.  - Transient elevations in serum transaminases, bilirubin and alkaline phosphatase. - Onycholysis mainly observed after 6 courses on the weekly schedule not seen with q.3 week schedule. Patient presents today to the exam room in no acute distress able to voice her needs without sign of sedation and/or confusion. Patient's 1 complaint is \"arthralgias\". Patient does complain of numbness tingling to her fingers time which is mild. Patient states that she had an EGD performed 2 weeks ago which revealed gastritis and was advised not to take anti-inflammatory/NSAIDs. Patient then stated that the surgeon advised her to always take a PPI if she has to secondary to her pain. Patient states that she is taking during the day 1 OTC Advil every \"couple of hours\" which helps approximately 50%. Patient states that she only does this on the days after her Taxol. Patient states that prior she was taking the Percocet 5-325 milligram tabs q.h.s. p.r.n. from Sunday through Tuesday night which helped her sleep but also helped 100% with the symptoms. Upon further questioning she was still taking the Advil during the day at this time. Patient states that Afua Lopez gave her migraine headaches and Tylenol or Aleve do not help. Patient states that she just needs something to get her through the next 5 weeks of Taxol treatment. Upon questioning she does complain of tearfulness and depression at times with all of the above.   Patient states that approximately 3 years ago she was prescribed Xanax 0.5 mg tablets that she used only around 4 times yearly but now 60 tablets. We also initiated Neurontin 300 mg 1 p.o. q.h.s. ×30 capsules with no refills. We will see her back in 4 weeks or sooner if she needs us and she was advised to call if she has any problems. She voiced understanding. 3/2:  Per Dr. Havery Halsted note 2/10:  ASSESSMENT/PLAN: Caleb Flores is doing well overall. Discussede about the role of adjuvant XRT to the left breast, the left supraclavicular fossae and the left axilla, to improve local control and may impact on survival. Mediastinal lymphadenopathy and subcentimeter pulmonary nodules are suspicious. She is under surveillance and also seeing pulmonologist. Left internal mammary lymph nodes are normal on CT, and especially with the findings in the chest, these would not be included as wouldn't impact on prognosis. This was discussed with her. Side effects of XRT were enumerated including statistical results. All her questions were answered. She gave her consent to proceed.   CT simulation date set up. Patient seen in room with no family present. She is alert/oriented and able to voice her concerns. She appears anxious and states that she is frustrated with persistent pain. Patient states that she finished her chemotherapy 2/3 and subsequently had an increase in leg pain/neuropathy, nausea/vomiting. She stopped her pamelor, neurontin and mobic at that time. She continued to take the percocet taking 2 per day for pain. The pain persisted so she restarted the neurontin 300mg qhs approximately one week ago. She has noticed a slight improvement. C/O pain that is sharp and radiating to b/l hips and legs. She has neuropathies to b/l feet. She continues to use 2 percocet per day stating that she was hesitant to use more often. She also experienced nausea/vomiting and feels this is related to her anxiety/pain. She has had previous problems with gastritis and was told to stay off NSAIDs by her GI physician.  She was also prescribed Nexium 20mg qday and has not med. Patient to be seen in palliative clinic May 11. Patient presents to the exam room today unaccompanied in no acute distress, talkative and smiling without sign of sedation and/or confusion able to voice her needs. Patient states that she was uncomfortable taking Cymbalta after talking to the nurse practitioner who prescribed Effexor 37.5 mg daily in which she started this 5 days ago. Patient states that this morning she thought she maybe noticed some benefit from it but prior has not yet. Patient states that she is taking her Percocet on the average of 3 times a day and has 3 remaining as per her history. Patient also compliant with Neurontin 3 mg q.h.s. stating that she still not is sleeping well. Patient denies any new and/or uncontrolled symptoms other than an overall not feeling well. Patient feels that this is worse since she started the Arimidex. Options were discussed and I advised her that I feel that Effexor may help with her symptoms but possibly not for another couple of weeks and she voiced understanding. I prescribed a higher dose of Effexor 75 mg daily that she will start after her 30 days of the lower dose, unchanged Percocet ×90 tablets as above and electronically her Neurontin 300 mg q.h.s. We will see her back in 4 weeks and continue to titrate the medications as indicated. 06/08/17:  Medical records reviewed and as per medical oncology assessment from 05/23/17: As per radiation oncology assessment from 05/25/17:  Invasive ductal carcinoma left breast, status post conservative surgery followed by chemotherapy. Stage IIIc, ER/NC positive  Subjective: On 4/27/17, Luther Garcia completed 5040 cGy in 28 fractions directed to the left breast/left SC/ax for management of left breast cancer. States she is doing well but notes her energy isn't improving as she feels it should be.  She was started on Effexor at a lower dose and was instructed to increase the dose with her next refill. Skin is improving but she notes itching especially to the left supraclav. Has been using Aquaphor. Pt is following with Dr Abi Saldivar for medical oncology. Tolerating Arimidex, but notes increasing joint pain. States that she continues to take Percocet to get through her work day. Mammogram planned prior to next follow up in June 2017. Pain: Joint aches and pains. Patient is doing well post-radiation completion. Skin care and sun care reviewed. Continue to use Aquaphor and start hydrocortisone OTC for itching. Imaging per med onc, planned mammogram prior to next follow up in June 2017. Monthly self breast exams encouraged. Encouraged to come back sooner if redness/heat or increased firmness noted to left breast, or fever noted. Verbalized understanding. Check TSH if continues with decreased energy at next follow up. I discussed follow up plans with Franca Mead. At this time,  [facility] will see the patient back in 3 months for a post-radiation completion follow-up visit. Franca Mead is to follow up with other physicians involved in their care as directed (including but not limited to Medical Oncology, Primary Care, Pulmonary, and Surgery). As per medical oncology assessment from 05/31/17:  RT was started on 03/13/2017 and completed on 04/27/2017. DEXA scan on 04/18/2017: Normal study. Repeat 1 year (2018). Biochemical testing on 04/04/2017 confirmed post-menopausal state. Arimidex 1 mg po daily was started on 04/28/2017 which she is tolerating fairly well. Mild arthralgias and hot flashes, not interfering with quality of life. Continue Arimidex, Ca/VitD. RTC June 2017 with prior mammogram (at [facility])  Patient presents to the exam room in no acute distress unaccompanied without any sign of sedation and/or confusion able to voice her needs ambulating well. Patient states that the arthralgias are significant with the Arimidex that she takes nightly.   Patient was phoned in [employer] by Blayneeunice Izquierdo on June 2 ×24 tablets and states that she has 6 tablets remaining. Patient states that the Percocet helps 50% for approximately 3 hours without sedation. Patient states that she has been taking the Effexor 75 for the past 4 days and is compliant with the Neurontin 300 mg q.h.s. that she does not feel sedation from. Patient states that the arthralgias are constant and worse with activity. 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 Options were discussed and today we are increasing her Percocet by 50% to 7. 5-325 milligram tablets 1 p.o. q.6 hours p.r.n. holding for sedation prescribing 120 tablets today. We are also increasing her Neurontin from 300 mg q.h.s. to t.i.d. electronically prescribing. Patient will continue on her Effexor 75 mg daily and when we see her back in 3 weeks we will look at titrating this medication and the benefits from the above. Patient asking if there is an alternative medicine for her cancer and I advised her to follow-up with her oncologist if she is intolerant to the medication. I advised the patient that with titrations of medication should be able to provide her some relief hopefully allowing her to continue the medication. Patient states that she sees Dr. Edwige Oliveira at the end of June.  06/29/17:  Medical records reviewed and as per phone call by Lance Izquierdo on 06/02/17:  Refilled prescription for percocet 5-325mg q6h prn pain for [facility]. Patient had 8 pills left (#90 prescribed on 5/11/17) and has been taking 4 times per day secondary to increasing pain. Next appointment on 6/8/17 with palliative medicine. Prescription electronically prescribed for #24 pills with no refills. Kristy Harrison RN, MSN, [facility]  Patient presents to the exam office today unaccompanied in no acute distress but stating that her arthralgias have significantly increased over the past month.   Patient states lately. She c/o significant arthralgias, affecting quality of life. She d/c Arimidex on 06/26/2017. We recommended Femara 2.5 mg po daily instead. 30 tabs of Femara sent to [facility]. RTC 4 weeks for Femara toxicity check. Patient presents to the exam room ambulatory without sign of sedation and/or confusion able to voice her needs. Patient states that she stop the Femara Monday which was 3 days ago secondary to worsening arthralgias, myalgias even as compared to the Arimidex. Patient states that she is awakening at 4 AM with the aches and pains. Patient states that she feels slightly better this morning but still has the same symptoms. Patient states she has been taking the medications as approved and with the new/worsening bodyaches cannot notice a difference with the increased Effexor. Patient states increased Percocet lasts approximate 4-1/2 hours which is slightly improved from previous. Patient states that she will be seeing Deneise Angelucci tomorrow as Dr. Betsey Weaver is out of town. Options were discussed and patient very reluctant to adjust her medications upward as she is trying to get off of them. Patient is aware that she needs help with the symptoms. Patient reluctantly and finally accepted a prescription for Duragesic 12 µg patch q.72 hours as directed and 5 patches were prescribed today. I advised her that this patch is equivalent to 2 of her Percocets over a 24-hour period and she felt slightly more comfortable with this. Patient also prescribed unchanged her Percocet  milligram tablets 1 p.o. q.6 hours p.r.n. holding for sedation prescribing 60 tablets today. Patient states that she took her last tablet this morning of the 60 tablets are prescribed 2 weeks ago. Patient reluctant to increase her Neurontin but did agree with me 2 now takes Neurontin 300 mg in the morning, 300 mg in the afternoon and 600 mg in the evening.   We will see her back in 2 weeks or sooner if she needs us, evaluate recommendations from Marky/oncology and the effects of the above. I would hope at that time she would allow me to increase the Duragesic further as well as the Neurontin.  07/27/17:  Medical records reviewed and as per medical oncology assessment from 07/14/17:  She presents today, 07/14/2017 for early toxicity check. C/O significant arthralgias that have significantly impacted her quality of life. She stopped the Femara on 07/10/2017 and presents today to discuss alternatives. Long discussion regarding options. She is not a candidate for Tamoxifen due to her MTHFR mutation first noted in 2001 (Miscarriage requiring 6 months of Heparin therapy). After further discussion, she would like to try Arimidex again, as she feels she tolerated that better than the Femara. Femara d/c'd on 07/10/2017. Will re-start Arimidex 1 mg daily today, 07/14/2017 (she has enough of the prescription at home) and return to see Dr. Yuan Ledesma on 07/28/2017 for toxicity check. Patient presents ambulatory today unaccompanied without sign of confusion and/or sedation able to voice her needs. Patient states that 2 days ago she stopped the Arimidex stating that she cannot sleep secondary to the pain which is also throughout the day. Patient states that she sees Dr. Yuan Ledesma tomorrow and will discuss everything with him. Patient states \"this is no way to live\". Patient states that with the initiation of the Duragesic 12 µg q.72 hours she now can stretch the Percocet out to approximately every 5-1/2 hours but still needs to take them. Patient denies oversedation from them. Patient did state that with the increase of the Neurontin to 600 mg at night and does help her leg pain. 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 Patient also takes Xanax 0.5 mg one half tablet q.h.s. to help her sleep.   Options were discussed and today we're doubling her Duragesic to 25 µg q.72 hours prescribing 10 patches today. We are continuing unchanged her Percocet  milligram tablets 1 p.o. q.6 hours p.r.n. prescribing 120 tablets today, increasing her Neurontin to 600 mg t.i.d. 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 We are also continuing her Xanax 0.5 mg one half tablet q.h.s. p.r.n. as per Epic. We represcribed unchanged her Effexor 150 mg daily as per Epic. We will see her back 4 weeks or sooner if she needs us and I advised her that my goal is to continue titrating upwards the Neurontin and Duragesic to the point where she does not need the Percocet. Patient is interested in weaning off of the Duragesic if possible. 8/24/17:  Medical records reviewed and as per medical oncology assessment from 08/16/17:  Gina Mccarthy d/c'd on 07/10/2017. Re-started Arimidex 1 mg daily 07/14/2017. She stopped the Arimidex on 07/25/2017 stating that she cannot sleep secondary to the pain which is also throughout the day. We recommended Aromasin 25 mg po daily. \"  Started Aromasin 25 mg daily by Dr. Matilda Wilkes on 07/31/2017 with fair tolerance to date. Presents today, 08/16/2017 for complaints of recurrent seroma. Clinical breast examination reveals significant edema and erythema of the left breast, left nipple inversion, induration of the left breast scar, and left axillary adenopathy. 1.  Check Left diagnostic mammogram, left breast US, and US left axillary adenopathy with possible drainage/biopsy. 2. Clindamycin to pharmacy for possible underlying infectious component. 3. RTC 2 weeks for re-evaluation. 4. Hx tobacco abuse. Quit smoking 1 year ago. Encouraged. 5. Continue follow up with Medical Oncology/Dr. Matilda Wilkes. Patient presents today Unaccompanied stating that she did not notice any difference or sedation from the doubling of the Duragesic.   Patient states that it has been itching over the past month but she also states that she had her left breast drained and an infection with cellulitis on antibiotics. Patient infrequently takes a half of a Xanax at night which helps a little bit. Patient notices significant improvement when she is able to take her Neurontin 600 mg t.i.d. with her feet and leg neuropathy but this does cause sedation therefore when she work she only takes 300, 300, 600. Patient states there's been no change in how she is taking her Percocet took her last one last night. Patient is trying to work 2 jobs. Options were discussed and we are doubling her Duragesic to 50 µg q.72 hours prescribing 10 today, continuing the Percocet  milligram tablets 1 p.o. q.6 hours p.r.n. holding for sedation ×120 today telling her that she should not need as many of the Percocet with the increase of the Duragesic. Patient has enough of the Effexor 150 mg daily as well as Xanax 0.5 mg one half q.h.s. p.r.n. We will see her back in 4 weeks or sooner if she needs us and she was advised that if she experiences any difficulty from the increase in Duragesic to give us a call immediately. 09/21/17:  Medical records reviewed and as per Dr. Juan Monson on 09/12/17:  Persistent edema of breast with significant discomfort. On the relief is when she applies a heating pad. Erythema has decreased on antibiotic therapy. However, cultures have never been positive of seroma aspirations. Having nipple discharge with seroma reaccumulation. 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 Will review literature to see if sclerosing seroma cavity has ever been effective in this situation. If not, we discussed the possibility of simple mastectomy. We discussed the possible consideration of delayed tissue-based reconstruction. Patient brought up contralateral prophylactic mastectomy.   In light of her complications and extensive disease upon presentation I deferred this discussion.     Patient will be rescheduled for aspiration of left breast seroma. Hopefully, this will alleviate her discomfort. She will continue to wear a supportive bra and use heat as desired for relief of discomfort. Patient presents today ambulating well in no acute distress at her baseline without sign of sedation and/or confusion able to voice her needs. Patient started her Duragesic 75 µg patch yesterday evening and also picked up a short prescription of the Percocet 10. Patient complaining of pain previous and before, intolerant to an increase in Neurontin throughout the day taking 300 mg, 300 mg, 600 mg q.h.s. Cymbalta helped significantly with her symptoms in the past but medical oncology felt that it was unsafe to use with her previous aromatase inhibitor. Patient is on Aromasin currently and we will question medical oncology about Cymbalta usage with this aromatase inhibitor. Patient states that aspiration of her left breast helps some with the pain for approximately 2 days but then returns. Patient complains of constant seepage around her incision site and nipple throughout the day needing to wear a large pad to give compression over the incision. Patient reviews with me Dr. Carley Dexter. Options were discussed and we are questioning medical oncology concerning the above as she had dramatic improvement of her symptoms on Cymbalta for short period of time and continuing Duragesic 75 µg q.72 hours and Percocet  milligram tablets 1 p.o. q.6 hours p.r.n. prescribing 120 tablets today, Neurontin 300 mg, 300 mg, 600 mg q.h.s., Effexor  mg daily and her other medications unchanged. We will contact the patient about medical oncology's recommendations and let her know she can restart the Cymbalta. We will see her back in 4 weeks or sooner if she needs us. 10/19/17:  Medical records reviewed and as per medical oncology assessment from 09/26/17:  Veronica polanco/neymar'sherri on 07/10/2017. Re-started Arimidex 1 mg daily 07/14/2017. She stopped the Arimidex on 07/25/2017 stating that she cannot sleep secondary to the pain which is also throughout the day. We recommended Aromasin 25 mg po daily; Aromasin was started on 07/28/2017 with better tolerance so far. Left breast discomfort; aspiration x 3 (08/16/2017, 08/30/2017 and 09/12/2017); 2 courses of Clindamycin. Culture no organisms seen. Cytology negative for malignant cells. Breast Surgical Oncology team recommendations: Continue to wear a supportive bra and use heat as desired for relief of discomfort. Literature search to see if sclerosing seroma cavity has ever been effective in this situation. If not,they also discussed the possibility of simple mastectomy. She will contact Dr. Michelle Paulino team this week. Continue Aromasin, Ca/VitD. RTC in 4 months. Patient presents ambulatory in no acute distress at her baseline without sign of sedation and/or confusion able to voice her needs. Patient states that she has noticed increased aches and pains especially the week that she was sick URI as she has been weaning off of the Effexor. Patient states there's been no change in how she is taking her patches or Percocet, Neurontin and denies new and/or uncontrolled symptoms. Patient states that the cough is more improved than previous. Options were discussed and she will continue the wean off the Effexor next week and then start Cymbalta 20 mg daily after that. We prescribed unchanged her Duragesic 75 µg q.72 hours prescribing 10 patches today as well as Percocet  milligram tablets 1 p.o. q.6 hours p.r.n. holding for sedation prescribing 120 tablets today. Patient will continue her Neurontin 300 mg, 300 mg, 600 mg q.h.s. We will see her back in 4 weeks or sooner if she needs us and she knows that she can call us at any time. At this time we will look at titrating aggressively the Cymbalta as indicated.   11/16/17:  Medical records reviewed and as voice her needs. Patient states that she has noticed some decrease in her leg pain since beginning the Cymbalta without other good or bad effects. Patient is compliant with Duragesic patches with 0 remaining and still routinely takes her Percocet having 4 remaining as per her history. Patient states that she is having good days and bad days and called an  to question disability yesterday but also is applying for another job not knowing exactly what she can or wants to do. Patient also states that she had stomach flu but this is resolved since her last saw her. Patient also states that she stop the Neurontin thinking that she did not need it but had severe aches and pains therefore restarted it. Options were discussed and we are increasing the Cymbalta to 20 mg b.i.d., continuing unchanged Duragesic 75 µg q.72 hours prescribing 10 patches today and the Percocet  milligram tablets 1 p.o. q.6 hours p.r.n. prescribing 120 tablets today, Neurontin 300 mg, 300 mg and 600 mg daily unchanged that she will need a prescription for before January. I told her my goal is to continue to titrate the Cymbalta, possibly the Duragesic patch and then begin weaning the Percocet eventually. She reluctantly voiced understanding because of her symptoms. We will see her back in 4 weeks or sooner if she needs us. 12/14/17:  Medical records reviewed and as per Dr. Arias Fails phone call on 12/11/17:  Jennifer Ramírez with pathology results-left message to return call to [phone]. Fine-needle aspiration cytology of her left breast seroma performed on 11/14/17 was inconclusive for malignant cells. Have discussed with radiology potential imaging modalities for the edematous left breast.  Concerned that most modalities would result in false positive imaging due to level of inflammation of the breast.  It is been a year and a half since patient was initially initially diagnosed with her locally advanced left breast cancer.   At this point, prior to any further intervention, restaging including scan CAT scans of the chest abdomen and pelvis and bone scan would be appropriate. Left message for patient to call us back. Discussed all above with Dr. Susanna Delgado. Patient presents today ambulating in no acute distress without sign of sedation and/or confusion able to voice her needs. Patient states that she had a bad month with a UTI, Keflex, nausea, back pain as well as chest pain left-sided requiring more Percocet stating that she is out. Patient also took a per Duragesic patch for 4 days for a drug screen for a new job and had worsening pain therefore took more Percocet. Patient cannot notice a difference from the b.i.d. Cymbalta. Patient does state that if she does take Neurontin 600 mg t.i.d. this helped significantly with her pain but it is too sedating during the day. Patient continues on 300, 300, 600 daily. Patient asking if we can do anything else as she desires to decrease the Percocet as she is afraid of the Tylenol. Options were discussed and I educated her and initiated methadone 5 mg q.12 hours that she will start after we obtain an EKG. Patient denies any palpitations, syncope but does complain of left chest pain expecting to be from her cancer. Patient was advised that I would expect her requirement for Percocet to decrease with the methadone and she voiced understanding. Patient will call if she has any problems and hold for sedation. Today we also prescribed unchanged her Duragesic 75 µg q.72 hours prescribing 10 patches today, Percocet  milligram tablets 1 p.o. q.6 hours p.r.n. prescribing 120 tablets today that she knows my goal is to wean her down from these. We also prescribed her Xanax unchanged as per Epic and she will continue the Neurontin 300, 300, 600. We also increased her Cymbalta to 60 mg nightly and she will continue her other medicines unchanged.   We will see her back in 4 weeks or sooner if she minutes 5 days a week;   [facility] 03/30/2018  Patient presents today frustrated and more tearful, appropriate without sign of sedation and/or confusion able to voice her needs without sign of sedation and/or confusion. Patient felt that the methadone in the morning was too sedating therefore she is only taking 2.5 mg nightly. Patient tried not taking the Percocet with significant increasing arthralgias. Patient feels that decreasing the Cymbalta has not helped but possibly hurt and she is not knowing what to do to help with her persistent symptoms. Patient reviewed with me there desire to do a mastectomy and patient back on Arimidex again. Patient states she has approximately 10 Percocet remaining. Patient has gained 3 pounds. Options were discussed and I reviewed her entire chart especially my notes. I talked her about physical therapy and we are ordering this to help with her arthralgias, neuropathic symptoms. Patient obviously more depressed but because of her daytime sedation we are changing her Cymbalta to 40 mg q.h.s. We are stopping the methadone for now to see if this is attributing to the fatigue which I'm not sure about. We are continuing Duragesic 75 µg one patch q.72 hours prescribing 10 today, Percocet  milligram tablets 1 p.o. q.6 hours p.r.n. holding for sedation prescribing 120 tablets today. Patient also states that she decreased her Neurontin down to 300 mg b.i.d. on her own and I am advising her to go back to 300, 300, 600 especially since her sedation was not any better with her decrease but her arthralgias are worse. We'll see her back in 4 weeks and our  met with her again today. She knows that she can call us at any time. We will monitor what her affect is like on subsequent visits and possibly increase her Cymbalta or even add Pamelor if we have not tried this. I'm hoping that physical therapy will help her affect is well.   04/05/18:  Medical records reviewed in Epic since my last visit. Patient presents today unaccompanied at her baseline without sign of sedation and/or confusion able to voice her needs. Patient states that she now is back on tamoxifen by a [facility] oncologist/trial and was told to begin weaning off of her Cymbalta in which she has been. Patient never increased to Cymbalta 40 mg daily because insurance would not cover them and now is taking 20 mg every other day weaning herself off. She is awaiting a call from [facility] oncologist to make sure she needs to come off of the Cymbalta. Patient did not have good results from Effexor prior to Cymbalta. Patient states is no change in how she is taking her medications otherwise. Patient still complains of bilateral leg neuropathy and is regressed indicating getting her left chest wall drained as per her history. Patient was evaluated by physical therapy but did not go back this week but will try next week. Options were discussed and we are continuing her same medications Duragesic 75 µg 1 patch q.72 hours prescribing 10 patches today, Percocet  milligram tablets 1 p.o. q.6 hours p.r.n. prescribing 120 tablets today and continues her Neurontin and other medications unchanged. In the future if she is staying on Cymbalta and we may increase and if not we'll try Pamelor if we have not or readdress the methadone again to help with her neuropathy. We will see her back in 4 weeks or sooner if she needs us. 05/03/18:  Medical records reviewed and no documented physician visits in [facility] since my last visit with her. Patient presents today ambulatory in no acute distress without sign of sedation and/or confusion able to voice her needs. Patient states that she definitely feels less neuropathy although it is still present on the Cymbalta low-dose 20 mg daily that her oncologist agreed to.   Patient denies side effects but does state that she has had increased stress, hot flashes and occasional headaches attributing with methadone in the past.  Patient did not want to retry this today but may want on subsequent visits. We will see her back in 5 weeks or sooner if she needs us. 06/21/18:  Medical records reviewed and no documented physician visits in [facility] since I last saw her. Patient presents today at her baseline ambulatory in no acute distress without sign of sedation and/or confusion able to voice her needs with a significant cough and wheezing over the past multiple days. Patient states she feels like she has pneumonia again that she was treated for in October by her pulmonologist.  Patient states that she is able to put her feet on the ground better in the morning after the increase in Pamelor and states there's no change in how she is taken her medications otherwise and I prescribed voicing complaints. Patient states that she saw the plastic surgeon who wants to fix her left side with flap, cutting her trapezius muscle as he feels there is nerve entrapment before he touches the right side. Patient unsure if she desires to pursue all of this. Options were discussed and today we put her back on Omnicef ×10 days that she had been on in the past.  Today we increased Pamelor to 50 mg q.h.s. and continued her other medications unchanged including Cymbalta 20 mg q.h.s., Percocet  milligram tablets 1 p.o. q.6 hours p.r.n. prescribing 120 tablets today, Duragesic 75 µg patches one patch q.72 hours prescribing 10 patches today and Neurontin unchanged as well. We'll see her back in 4 weeks or sooner if she needs us and look at the benefit of the increased Pamelor and resolution of her URI.  07/19/18:  Medical records reviewed and as per pulmonology assessment from 07/03/18: Ottoniel Haddad is a 45y.o. year old female here for evaluation of her  pulmonary status. She was follow by Dr. Josseline Quintanilla up to April 2017. In summary:  Alba Braswell is a pleasant 45 y. o. female with a diagnosis of invasive ductal carcinoma left breast (ER+, [place]+), status post conservative surgery followed by chemotherapy.  The patient underwent a course of radiation therapy to the left breast, which was completed on 4/27/17.    During her treatment she was at one pint on taxol and following that developed acute interstitial pneumonitis which improved after changing her taxol and a course of prednisone. Her follow up CT in March 2017 showed resolution of the GGO on her previous CT scans . A follow up Ct in December 2017 showed new EBENEZER nodules. She is for follow up and further evaluation. Chey Ratliff was seen today for consultation, cancer and results. diagnoses and all orders for this visit:  Pulmonary nodules  Smoking history  Cough  Interstitial pneumonitis ([address]  EBENEZER nodular infiltrates on CT 12/2017 were new in compare to the CT scan in 03/2017. PET scan did not show any FDG actiity. Patient continues to have a chronic cough . Will obtain a CT scan now for 6 months follow up. Will give her short course prednisone and mucinex for her bronchitis. Stephan schedule her for PFTs next ofice visit for evaluation her COPD. FOLLOW UP   Follow up in 8 weeks. Patient presents today ambulatory at her baseline without sign of sedation and/or confusion able to voice her needs. Patient states that she notices some improvement of her lower extremities and pain with increasing the Pamelor without side effects. Patient states is no change to how she is taking her medication has tried to get rid of the afternoon Percocet but with worsening pain in the evening. Patient is going for a CT scan soon by pulmonology as above and will stop in at physical therapy to make an appointment at that time. Options were discussed and today we are decreasing Duragesic to 50 µg patch q.72 hours prescribing 5 patches today and continuing unchanged her Percocet  milligram tablets 1 p.o. q.6 hours p.r.n. prescribing 120 tablets.   Patient will try to take one half of a tablet if lung  Follow up CT scan shows complete resolution of GGO. This could have been from an viral infection or based on her Hypersensitivity panel an acute HP which is resolved. Patient is asymptomatic at his time. Cough has resolved and she is complaint with her Breo,   Will obtain follow up CT for pulmonary nodules in 6 months. FOLLOW UP   6 months, ct  Patient presents today ambulatory in no acute distress at her baseline without sign of sedation and/or confusion able to voice her needs. Patient still complaining of significant bilateral leg pain especially when the weather is damp. Patient unable to take anti-inflammatories as previously documented. Patient taking Percocet 4 times day stating that she'll try to take less but then wakes up in the middle the night with pain. Patient still only taken Neurontin 300, 300, 600 and denies significant problems from it. Patient has gained 6 pounds and denies new and/or uncontrolled symptoms otherwise. Options were discussed and we are decreasing as per her wish fentanyl to 25 µg one patch q.72 hours prescribing 10 patches today and she will place her Duragesic 50 µg patches that she has remaining ×3 in a safe place at home. Patient also will continue unchanged her Percocet  milligram tablets 1 p.o. q.6 hours p.r.n. prescribing 120 tablets as she has 3 remaining, Pamelor 50 mg q.h.s. unchanged. Patient advised to begin increasing her Neurontin from the above amount possibly adding an afternoon capsule and then an evening capsule to help with her nighttime symptoms. Patient will report to me results and we will see her back 4 weeks from now or sooner if she needs us. 11/15/18 100 [facility] records reviewed, no new visits since her last PM encounter. She was restarted on her 50 mcg fentanyl patch, as she had increased her pain following the decrease to 25 µg from her last visit.   She states that she tried to continue with the fentanyl 25 µg patch, states that she was requiring more of her Percocet, and found that she was needing 5 Percocet tablets every day. She has been back on the 50 µg patch for 2 days now, and states that she will be out of the 50 µg patches tomorrow. She states that due to the increase in pain, increased use of her Percocet, but she is now also I'll of her Percocets. She states that she has been using her Neurontin, and has increased as instructed, now taking 300 mg in the morning, 600 mg in the afternoon, and 900 mg at night. She was encouraged to continue to increase her Neurontin over the next few weeks, with a goal of 900 mg 3 times a day, and she is agreeable to this plan. She is continuing to take her Pamelor 50 mg at bedtime unchanged, as well as her Cymbalta 20 mg at bedtime unchanged. Options were discussed, and we will prescribed today fentanyl 50 µg patch every 72 hours, prescribing 10 patches today, and we will re-prescribe Percocet  milligrams every 6 hours as needed for breakthrough pain. We again did discuss plan for further reduction in her pain medications, and she expresses that this is her ultimate goal.  We see her back, we will further discuss this, and we'll potentially decrease her fentanyl patch from 50 µg to 37 µg, utilizing one 25 µg and one 12 µg patch, with the plan ultimately to to continue to decrease her fentanyl until she is able to stop, and manage her pain with when necessary medication only. We discussed with the increase in her adjuvant therapy, and with her increase in fentanyl, that her when necessary usage should reduce to her prior level. We will continue to follow her closely, and we'll see her back in the clinic in 4 weeks, or sooner if needed. As always she knows that she can call us with any questions, concerns, needs, or changes in symptoms. 12/20/18:  Medical records reviewed and patient presents unaccompanied ambulating well without sign of sedation, pleasant and comfortable.   Patient medication the way she feels. Patient desires not to change anything therefore we are continuing Duragesic 50 µg q.72 hours refill recently as per Epic, Percocet  milligram tablets 1 p.o. q.6 hours p.r.n. also refilled as per [facility] recently, Pamelor 50 mg q.h.s., Cymbalta 20 mg q.h.s., Neurontin as above as well. We will extend her appointment out to 8 weeks and she knows to call us at any time with any uncontrolled symptoms that she would like to address. 04/04/19:  Medical records reviewed and no documented physician visits in [facility] since I last saw her. Patient presents unaccompanied looking more comfortable without sign of sedation and or confusion able to voice her needs. Patient states that over the past week or so her legs have been feeling better. Patient states she is more active and actually on the treadmill attempting to lose weight. I talked to her about how physical activity concern only improved peripheral neuropathy, arthralgias, myalgias and she voiced understanding. Patient states that there is no change in how she is taken her medications but would like to again weaning down from the Percocet and eventually get off of all opioids. Options were discussed and patient recently received a prescription from us for her Percocet  milligram tablets 1 p.o. q.6 hours p.r.n. and the next time she calls in for refill we will prescribed 90 tablets instead of 120. I advised her starting now to take 3 tablets in a 24-hour period instead of 4 and she voiced understanding liking this idea. We will then decrease the oxycodone content as well as frequency down the road. I advised patient when she is down to taking only 1 or 2 Percocet daily that we will decrease the fentanyl patch. Today we refilled ×10 her Duragesic 50 µg patch one patch q.72 hours, Cymbalta 20 g q.h.s., Neurontin 600, 600, 900, Pamelor 50 mg h.s. all unchanged.   Patient states that she is willing to do this in an attempt to recommended. FOLLOW UP  In 6 months with PFTs  Patient presents today ambulatory no acute distress without sign of sedation and her confusion able to voice her needs. Patient states that she has had \"a rough month\" with her pain being worsened secondary to taking Percocet 3 times daily. Patient would take Percocet 4 times daily running out early over the past week with worsening symptoms. Patient also states that she would remove the fentanyl 50 mcg patch on the weekends to go swimming and leave it off for 48 hours not noticing a difference in her pain control. Patient voices compliance with her gabapentin, Pamelor and Cymbalta. Weight remains unchanged. Options were discussed and today we are decreasing fentanyl patch to 25 mcg 1 patch every 72 hours prescribing 10 patches today, decreasing the dosage of the Percocet to 7.5-325 mg tablets 1 p.o. every 6 hours as needed prescribing 120 tablets and continuing the gabapentin 600, 600, 900, Pamelor 50 mg nightly, Cymbalta 20 mg nightly that we should not increase secondary to her oncology treatment. Patient will continue her medications unchanged otherwise and we will see her back in 4 weeks. In the near future we will continue to decrease the dosage of the Percocet next to 5-325 mg tablets 4 times daily, continue weaning the Duragesic patch and then frequency of the Percocet. 7/25/2019:  Medical records reviewed. Candida Casper presents today, unaccompanied, without signs of sedation or confusion, is able to voice her needs concerns well. She continues to complain of pain, primarily in her feet ankles and hands, and this continues to be primarily neuropathic in nature. She continues to utilize fentanyl patch 25 mcg 1 patch every 72 hours, as well as Percocet 7.2 5-0 25 mg 1 p.o. every 6 hours as needed.   She states she utilizes her Percocet 3-4 times per day, reporting that she is also utilize the fifth tablet on a rare occasion, reports she utilized her last asks if she can discontinue the fentanyl patch, as she feels it is not providing her very much benefit at this time. She did start her Lyrica as previously prescribed, but she was unable to have this filled until approximately 2 weeks ago, and started taking this new medication on Friday of last week, using just 1 tablet/day, and is now utilizing 75 mg 3 times per day. She states that she does not notice any change at this point in time, and we will continue the Lyrica 35 mg 3 times per day unchanged. We will discontinue her fentanyl patch today, and will prescribe Percocet 7.5-325 mg changing the frequency to every 4 hours as needed for severe pain. She also will continue to take Advil on an occasional basis for mild pain. She will also continue her Pamelor and Cymbalta unchanged, does not require refill of these medications today. Will make no further changes to her plan of care, and will plan to see her again in approximate 4 weeks or sooner if needed. We will also obtain a random urine drug screen today, we will review this prior to her next appointment. She is encouraged to call with any questions, concerns or changes in her symptoms. 9/19/2019:  Medical records reviewed, there are no provider encounters in epic since I last saw her last month. Sarah Oliveira presents today, unaccompanied, and she is alert, oriented, able voice needs concerns well, does not show any signs of sedation or confusion. She continues to utilize Percocet 7.5 mg 4 times per day, and has approximately 10 tablets remaining from her previous prescription. She also continues to utilize Cymbalta 20 mg, which she finds very helpful, as well as Pamelor 50 mg at bedtime. She is continued to utilize Lyrica 75 mg 3 times daily, and she states that her neuropathy sensation has improved, but she continues to have a painful numbness in both her hands and feet. Options were discussed, we will increase her Lyrica to 100 mg 3 times daily. She does state that she received her refill of her 75 mg Lyrica yesterday, and she is concerned that they will not fill the 100 mg prescription today. We discussed that if they will not fill the 100 mg 3 times daily prescription, that she can utilize to 75 mg Lyrica twice daily, for a total daily doses of 300 mg. Otherwise we will make no further changes to her current plan of care, will have her return in approximately 4 weeks to reassess. She is encouraged to call us with any questions, concerns, or changes in her symptoms. 10/17/2019:   Felipe Baker presents today, unaccompanied, she is alert, oriented, able voice needs concerns well, does not show any signs sedation and or confusion. She continues to utilize her Percocet 7.5 mg 4-5 times per day, and states that recently she has had some increase in pain with the change of weather, and has had to utilize this every 4 hours, but she states she has 10 to 12 tablets remaining from previous prescription. She also continues to utilize Lyrica 100 mg 3 times daily, Cymbalta 20 mg daily, Pamelor 50 mg daily as previously ordered. She states she continues to see improvement in her neuropathy with the use of Lyrica, but does continue to have some painful numbness and tingling primarily in her feet. Options were discussed, and we will again increase her Lyrica to 150 mg 3 times daily. We will continue her Percocet 7.5-325 mg every 4 hours as needed, and she will continue to utilize her Cymbalta 20 mg daily and Pamelor 50 mg daily as previously ordered. She also continues to utilize Xanax 0.5 mg, utilizing 1/2 tablet only a few times per month, and she has multiple tablets remaining from her prescription filled in May of this year. We will otherwise make no further changes to her current plan of care, will have her return in approximately 4 weeks to reassess her needs.   She is encouraged to call with any questions, concerns, or changes in her

## 2020-01-10 ENCOUNTER — TELEPHONE (OUTPATIENT)
Dept: PALLATIVE CARE | Age: 55
End: 2020-01-10

## 2020-01-10 RX ORDER — OXYCODONE AND ACETAMINOPHEN 7.5; 325 MG/1; MG/1
1 TABLET ORAL EVERY 4 HOURS PRN
Qty: 180 TABLET | Refills: 0 | Status: SHIPPED | OUTPATIENT
Start: 2020-01-10 | End: 2020-02-07 | Stop reason: SDUPTHER

## 2020-01-10 NOTE — TELEPHONE ENCOUNTER
Call from [facility] stating [facility] is unable to fill Tramadol order as it is on back order and they do not know when they will get it in. [facility] also called [employer] and they also are unable to get tramadol. Spoke with Garlon Dandy, CNP and new orders for Percocet 7.5/325 mg sent to [facility] as patient was previously on this medication.

## 2020-02-07 RX ORDER — OXYCODONE AND ACETAMINOPHEN 7.5; 325 MG/1; MG/1
1 TABLET ORAL EVERY 4 HOURS PRN
Qty: 180 TABLET | Refills: 0 | Status: SHIPPED | OUTPATIENT
Start: 2020-02-07 | End: 2020-03-05 | Stop reason: SDUPTHER

## 2020-03-05 RX ORDER — OXYCODONE AND ACETAMINOPHEN 7.5; 325 MG/1; MG/1
1 TABLET ORAL EVERY 4 HOURS PRN
Qty: 180 TABLET | Refills: 0 | Status: SHIPPED
Start: 2020-03-05 | End: 2020-04-02 | Stop reason: ALTCHOICE

## 2020-04-02 ENCOUNTER — TELEMEDICINE (OUTPATIENT)
Dept: PALLATIVE CARE | Age: 55
End: 2020-04-02
Payer: COMMERCIAL

## 2020-04-02 PROCEDURE — 99214 OFFICE O/P EST MOD 30 MIN: CPT | Performed by: NURSE PRACTITIONER

## 2020-04-02 PROCEDURE — 3017F COLORECTAL CA SCREEN DOC REV: CPT | Performed by: NURSE PRACTITIONER

## 2020-04-02 PROCEDURE — G8428 CUR MEDS NOT DOCUMENT: HCPCS | Performed by: NURSE PRACTITIONER

## 2020-04-02 RX ORDER — OXYCODONE AND ACETAMINOPHEN 10; 325 MG/1; MG/1
1 TABLET ORAL EVERY 4 HOURS PRN
Qty: 84 TABLET | Refills: 0 | Status: SHIPPED
Start: 2020-04-02 | End: 2020-04-16 | Stop reason: SDUPTHER

## 2020-04-02 NOTE — PROGRESS NOTES
6.0 cm in greatest dimension. · Histologic grade:   ¨ Glandular differentiation- score 3: < 10%of tumor area forming glandular/tubular structures. ¨ Nuclear pleomorphism- Score 1: Nuclei small with little increase in size in comparison with normal breast epithelial cells. ¨ Mitotic count: Score 1  ¨ Overall grade: Grade 1   · Carcinoma focally involves the posterolateral inked margin of excision. · Total of 13/23 lymph nodes involved by metastatic carcinoma, largest focus of metastatic carcinoma measures 11 mm in maximum dimension; Extranodal extension: Present. · pT3 pN3a Mx  Re-excision of postero-lateral wall of lumpectomy cavity was performed on 07/22/2016 with negative margins. Tumor markers were normal; CT abdomen/pelvis and bone scan were negative for metastatic disease. CT chest noted Mildly enlarged AP window lymph nodes measuring 11 x 7.5 mm. Pulmonary team consult appreciated (In view of the difficulty in approaching that lymph node by EBUS, Dr. Flor Laird will follow this LN with CT chest in 3 months). PET/CT scan negative for mediastinal LN uptake. Focal uptake in the proximal stomach, recommend correlate with EGD. (Hx of PUD with many EGDs in the past by Dr. Uzma Miranda). GI team (Dr. Uzma Miranda) consulted for repeat EGD (performed on 12/21/2016 and noted hiatal hernia and mild gastritis). We recommended systemic chemotherapy consisting of Dose-Dense AC-T followed by RT followed lastly by hormonal therapy (Arimidex 1 mg po daily for at least 5 years). Side effects of AC-T reviewed with patient. She agreed to proceed. Mediport was placed by Dr. Terrence Flores. 2DEcho noted EF 52%. Cycle # 7 weekly Taxol is scheduled for today 12/30/2016. Bone pain after chemo; cannot take Ibuprofen due to gastritis. Tylenol alone doesn't help. Palliative care team consulted for sx management. RTC next week for Cycle # 8 weekly Taxol.   01/03/17:  OARRS report reviewed today revealing Robitussin-AC prescribed in November denies any street drugs or any other medications. Options were discussed and today we prescribed Percocet 5-325 milligram tablets 1 p.o. q.h.s. p.r.n. ×30 tablets, initiated Pamelor 10 mg q.h.s. ×30 capsules and will see her back in 4 weeks or sooner if she needs us. Patient advised trying to not take the Advil during the day and was introduced to the idea of meloxicam if an anti-inflammatory would be needed down the road. I am hopeful that the above medications will help and she will stop the Advil. 02/02/17:  UDS from January 2017 WNL. Patient presents in no acute distress without sign of sedation and/or confusion. Patient states that she is now taking the Pamelor 10 mg in the afternoon which works well for her as it appears to be activating if she takes it at night. Patient states she is undergoing her last chemotherapy tomorrow and radiation therapy will start in approximately a month. Patient states that occasionally she takes the Percocet during the day secondary to her increasing pain as it \"takes the edge off\". Patient states that secondary to the myalgias and arthralgias she still is taking Advil approximately 6 daily and knows that she should not. Patient also complains of fatigue as she is not sleeping well with decreased appetite and occasional nausea. Patient has 4 tablets of Percocet remaining. Options were discussed and I advised her not to take Advil but we did prescribe Mobic 7.5 mg q.12 hours with food p.r.n. time 60 tablets no refill. I advised her that if she does not need to take these and do not. We are continuing the Pamelor 10 mg daily that she takes in the afternoon 30 tablets with no refill. Patient was prescribed unchanged Percocet 5-325 milligram tablets 1 p.o. q.6 hours p.r.n. instead of q.h.s. secondary to her worsening pain time 60 tablets. We also initiated Neurontin 300 mg 1 p.o. q.h.s. ×30 capsules with no refills.   We will see her back in 4 weeks or sooner if she about 2 years ago per patient. RTC in 3 weeks to review DEXA scan and biochemical testing. Patient presents to the exam room today in no acute distress without sign of sedation and/or confusion able to voice her needs. Patient states that beginning approximately 1 week after she began the new Cymbalta she notices significant decrease in her bilateral leg symptoms stating that she was able to stand up in the morning without significant pain. Patient states that her medication was stopped by medical oncology as the above note and she has noticed after 4 days that her symptoms have increased. Patient denies any other new or uncontrolled symptoms. Options were discussed and I reviewed the case with her medical oncologist Dr. Driss Spencer as well as pharmacy here and we do not know of an interaction between Cymbalta and Arimidex or any other medications that she is on therefore today I am restarting Cymbalta at the same dose 20 mg daily and we will see her back in 4 weeks or sooner if she needs us. At this time we will titrate the medication if there is still room for improvement. We also continued unchanged her Percocet as above ×90 tablets in which she still takes 2-3 tablets a day as well as her Neurontin 300 mg q.h.s.  05/11/17:  Medical records reviewed and as per medical oncology assessment on 04/25/17:  RT was started on 03/13/2017 and will be completed on 04/27/2017. DEXA scan on 04/18/2017: Normal study. Repeat 1 year (2018). Biochemical testing on 04/04/2017 confirmed post-menopausal state. Arimidex 1 mg po daily will be started on 04/28/2017. To take Ca+/Vit D while on Arimidex.   Side effects of arimidex explained: including but not limited to hot flashes, aches, osteoporosis, edema, vasodilation, rash, GI upset, mood changes, sob/cough, dyslipidemia, thrombophlebitis, anemia, leukopenia, thromboembolic disorder (rare, more so with tamoxifen), hypersensitivity, vaginal bleeding (rare, more so with tamoxifen), pain - pt understands and agrees to proceed. RTC 4 weeks for Arimidex toxicity check. As per radiation oncology assessment on 05/05/17:  Diagnosis: Invasive ductal carcinoma left breast, status post conservative surgery followed by chemotherapy. Stage IIIc, ER/IL positive  Narrative: Patient just completed a course of adjuvant XRT to the left breast/left SC/ax. Radiation therapy was started on 3/13/17 and completed on 4/27/17. The left breast received a dose of 5040 cGy in 28 fractions, ED 38. Treatment was delivered with tangential beams, 6 MV photons. The left SC received a dose of 4500 cGy in 25 fractions. This was treated with 15 MV photons. A PAB boost was applied to take the midplane dose to 4500 cGy. Following this the tumor bed received an additional dose of 1000 cGy in 5 fractions. This was treated with 6/15 MV photons 3-D technique   Response/Tolerance: She tolerated the treatments quite well with mild fatigue and grade 1 reaction. Towards the end she developed grade 2 reaction in inframammary area with yeast superinfection. This responded to topical measures. She was able to complete the anticipated therapy  Follow-up: 1 month    As per phone discussion with Manish Lester on 05/03/17:   Patient called to notify palliative clinic that she is \"afraid to take the cymbalta because an NP told me that it could possibly make the arimidex less effective. \" She said that the NP had offered to order her effexor instead. Patient unsure if effexor will help with her nerve pain as much as the cymbalta did. I notified Dr. Skyla Dowd. He said that the effexor might possibly help her nerve pain and that it is okay if patient wants to follow through with the prescription. I called patient and updated her. She says that she will call NP and talk with her about getting med. Patient to be seen in palliative clinic May 11.    Patient presents to the exam room today unaccompanied in no acute distress, talkative and smiling can begin something else because she cannot tolerate the Arimidex. Patient very apologetic concerning this but states that she has tried and feels that she is tough but states that it feels like she has the flu 24 7. Patient states that she takes the Percocet 7. 5-325 milligram tablet on the average of 5 times daily receiving 1-1/2-2 hours relief and tries to ochoa it out until it's time to take another. Patient denies oversedation from this. Patient did not notice any difference from increasing the Neurontin to 300 mg t.i.d. for the Effexor 75 mg daily. Patient states that she will be starting a second job throughout the summer but does have some vacation coming up. Options were discussed and I introduced the topic of a long-acting opioid that she is resistant to hoping that she can experience less arthralgias with a different medication therefore she deferred today against my suggestions of a Duragesic 12 µg q.72 hours. Today we increased her Percocet to  milligram tablets 1 p.o. q.6 hours p.r.n. prescribing 60 tablets today as well as increased her Effexor  mg daily. We will monitor her closely seeing her back in 2 weeks and at this time look at her appointment with oncology tomorrow, symptoms, benefits from the above changes and add the Duragesic 12 µg patch if she agrees and still indicated. We will also look at increasing the Neurontin at that time as well. Patient was refractory to any other changes today other than the above.  07/13/17:   Medical records reviewed and as per medical oncology assessment from 06/30/17:  Bilateral Diagnostic Mammogram on 06/23/2017 Negative for malignancy. U/S guided seroma aspiration Left Breast: GS noted no PMN. No organisms seen. Body fluid Cx Growth not present. Very poor tolerance to Arimidex lately. She c/o significant arthralgias, affecting quality of life. She d/c Arimidex on 06/26/2017. We recommended Femara 2.5 mg po daily instead.  30 tabs of Femara sent to [facility]. [facility] 4 weeks for Femara toxicity check. Patient presents to the exam room ambulatory without sign of sedation and/or confusion able to voice her needs. Patient states that she stop the Femara Monday which was 3 days ago secondary to worsening arthralgias, myalgias even as compared to the Arimidex. Patient states that she is awakening at 4 AM with the aches and pains. Patient states that she feels slightly better this morning but still has the same symptoms. Patient states she has been taking the medications as approved and with the new/worsening bodyaches cannot notice a difference with the increased Effexor. Patient states increased Percocet lasts approximate 4-1/2 hours which is slightly improved from previous. Patient states that she will be seeing [facility] Palacios tomorrow as Dr. Nora Shoemaker is out of town. Options were discussed and patient very reluctant to adjust her medications upward as she is trying to get off of them. Patient is aware that she needs help with the symptoms. Patient reluctantly and finally accepted a prescription for Duragesic 12 µg patch q.72 hours as directed and 5 patches were prescribed today. I advised her that this patch is equivalent to 2 of her Percocets over a 24-hour period and she felt slightly more comfortable with this. Patient also prescribed unchanged her Percocet  milligram tablets 1 p.o. q.6 hours p.r.n. holding for sedation prescribing 60 tablets today. Patient states that she took her last tablet this morning of the 60 tablets are prescribed 2 weeks ago. Patient reluctant to increase her Neurontin but did agree with me 2 now takes Neurontin 300 mg in the morning, 300 mg in the afternoon and 600 mg in the evening. We will see her back in 2 weeks or sooner if she needs us, evaluate recommendations from Marky/oncology and the effects of the above.   I would hope at that time she would allow me to increase the Duragesic further as well tablets today, increasing her Neurontin to 600 mg t.i.d. 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 We are also continuing her Xanax 0.5 mg one half tablet q.h.s. p.r.n. as per Epic. We represcribed unchanged her Effexor 150 mg daily as per Epic. We will see her back 4 weeks or sooner if she needs us and I advised her that my goal is to continue titrating upwards the Neurontin and Duragesic to the point where she does not need the Percocet. Patient is interested in weaning off of the Duragesic if possible. 8/24/17:  Medical records reviewed and as per medical oncology assessment from 08/16/17:  Davi Medina d/neymar'd on 07/10/2017. Re-started Arimidex 1 mg daily 07/14/2017. She stopped the Arimidex on 07/25/2017 stating that she cannot sleep secondary to the pain which is also throughout the day. We recommended Aromasin 25 mg po daily. \"  Started Aromasin 25 mg daily by Dr. Ronal Lucero on 07/31/2017 with fair tolerance to date. Presents today, 08/16/2017 for complaints of recurrent seroma. Clinical breast examination reveals significant edema and erythema of the left breast, left nipple inversion, induration of the left breast scar, and left axillary adenopathy. 1.  Check Left diagnostic mammogram, left breast US, and US left axillary adenopathy with possible drainage/biopsy. 2. Clindamycin to pharmacy for possible underlying infectious component. 3. RTC 2 weeks for re-evaluation. 4. Hx tobacco abuse. Quit smoking 1 year ago. Encouraged. 5. Continue follow up with Medical Oncology/Dr. Ronal Lucero. Patient presents today Unaccompanied stating that she did not notice any difference or sedation from the doubling of the Duragesic. Patient states that it has been itching over the past month but she also states that she had her left breast drained and an infection with cellulitis on antibiotics.   Patient infrequently takes a half of a Xanax at night recommended Aromasin 25 mg po daily; Aromasin was started on 07/28/2017 with better tolerance so far. Left breast discomfort; aspiration x 3 (08/16/2017, 08/30/2017 and 09/12/2017); 2 courses of Clindamycin. Culture no organisms seen. Cytology negative for malignant cells. Breast Surgical Oncology team recommendations: Continue to wear a supportive bra and use heat as desired for relief of discomfort. Literature search to see if sclerosing seroma cavity has ever been effective in this situation. If not,they also discussed the possibility of simple mastectomy. She will contact Dr. Hurman Leventhal team this week. Continue Aromasin, Ca/VitD. RTC in 4 months. Patient presents ambulatory in no acute distress at her baseline without sign of sedation and/or confusion able to voice her needs. Patient states that she has noticed increased aches and pains especially the week that she was sick URI as she has been weaning off of the Effexor. Patient states there's been no change in how she is taking her patches or Percocet, Neurontin and denies new and/or uncontrolled symptoms. Patient states that the cough is more improved than previous. Options were discussed and she will continue the wean off the Effexor next week and then start Cymbalta 20 mg daily after that. We prescribed unchanged her Duragesic 75 µg q.72 hours prescribing 10 patches today as well as Percocet  milligram tablets 1 p.o. q.6 hours p.r.n. holding for sedation prescribing 120 tablets today. Patient will continue her Neurontin 300 mg, 300 mg, 600 mg q.h.s. We will see her back in 4 weeks or sooner if she needs us and she knows that she can call us at any time. At this time we will look at titrating aggressively the Cymbalta as indicated. 11/16/17:  Medical records reviewed and as per Dr. Hugh Padilla on 11/14/17:  Lucy Parra 08/30/2017 for follow up of mastitis of left breast after 10 days of Clindamycin.   Clinical breast examination reveals denies new and/or uncontrolled symptoms otherwise. Patient also complaining of right tennis elbow and will be seeing her physical therapist about this. Options were discussed and today we are initiating Pamelor 10 mg q.h.s. and educated her on the medication. We are continuing unchanged her Cymbalta 20 mg q.h.s., Percocet  milligram tablets 1 p.o. q.6 hours p.r.n. prescribing 120 tablets today, Duragesic 75 µg one patch q.72 hours prescribing 10 patches today and her Neurontin unchanged. Patient knows that she can call us at any time with any concerns. We will see her back in 4 weeks or sooner if she needs us and at this time as we talked about today we will consider weaning down the Percocet possibly to 7.5 in an attempt to wean the opioids. If symptoms do not allow we will not do this next time. 05/31/18:  Medical records reviewed and no documented physician visits in [facility] since I last saw her. Patient presents today ambulating at her baseline without sign of sedation and/or confusion able to voice her needs. Patient states that she has not noticed any difference from the Pamelor 10 mg q.h.s. good or bad and is still compliant with her other medications unchanged as outlined above. Patient complaining of left hand and arm lymphedema working outside in her garden and flowers. Patient denies new and/or uncontrolled symptoms but states that morning peripheral neuropathy is the worse. Options were discussed and we are increasing Pamelor to 25 mg q.h.s., continuing unchanged Cymbalta 20 mg q.h.s., Percocet  milligram tablets 1 p.o. q.6 hours p.r.n. prescribing 120 tablets today, Duragesic 75 µg patches one patch q.72 hours prescribing 10 patches today and Neurontin unchanged as well. In review of her chart she complained of date times fatigue with methadone in the past.  Patient did not want to retry this today but may want on subsequent visits.   We will see her back in 5 weeks or sooner if she medications and we will electronically prescribed unchanged. 09/20/18:  Medical records reviewed including Dr. Al Solis on 09/11/18: Ashleigh Jama was seen today for pneumonia, follow-up from hospital and pulmonary nodule. Diagnoses and all orders for this visit:  Infiltrate noted on imaging study  Pulmonary nodules  -     FULL PFT STUDY WITH PRE AND POST  COPD, moderate (HCC)  -     FULL PFT STUDY WITH PRE AND POST  Interstitial pneumonitis (HCC)  -     FULL PFT STUDY WITH PRE AND POST  -     Fluticasone Furoate-Vilanterol (BREO ELLIPTA) 200-25 MCG/INH AEPB; Inhale 1 puff into the lungs daily  -     MISCELLANEOUS SENDOUT 1; Future  -     EOSINOPHIL COUNT; Future  -     Hypersensitivity Pneumonitis Extended Panel; Future  Other orders  -     Cancel: CT CHEST WO CONTRAST; Future  Will follow up ct in 608 weeks. Start Breo 200/5 one puff daily. CHeck hypersensitivity panel and IgE and Eosinophil count. FOLLOW UP  Return in about 4 weeks (around 10/11/2018) for ct scan. As per radiation oncology assessment from 09/18/18:   Patient is doing well post-radiation completion. Encouraged to continue monthly self breast exams. Imaging per med onc. Patient was previously following with Dr Viktoria Barger for medical oncology. She is now being seen by Dr. Olga Vazquez at [facility], last visit on 3/28/2018. Instructed to start Tamoxifen at the time d/t intolerance of multiple other endocrine therapies. Patient is currently on Tamoxifen, tolerating better than the other medications. Next appt with Dr Olga Vazquez in October 2018. Met with surgeons (Dr Gabrielle Gr 3/28/18 for breast surgery and Dr Justyn Lara for plastic surgery) at [facility] for surgical evaluation (mastectomy/reconstruction). Does not know if she wants to have surgery done and may want a second opinion prior to making her final decision. Cont to follow with Dr Jace Montes for palliative medicine. Next appt on 9/20/18.   Cont to follow with Dr Gill Walls d/t pulmonary nodules found on CT (results as above). Following closely with imaging planned at next appt. Next appt 10/11/18. I discussed follow up plans with Jocelyne Shay. At this time, I will see the patient back in 6 months for a post radiation completion follow up visit. Instructed to follow up with other providers involved in their care as directed (including but not limited to Medical Oncology, Primary Care, Pulmonary, and Surgery). Patient presents today unaccompanied at her baseline without sign of sedation and/or confusion able to voice her needs. Patient states that this month she has had worsening neuropathy especially to her feet when she wakes up in the morning. Patient has not noticed any improvement of fatigue since we decreased the Pamelor. Patient states is no change in how she is taking her medications and has not performed physical therapy. Patient and review of her chart stated that she could not tolerate methadone secondary to the way it made her feel, mobile, stomach problem with a history of PUD. Options were discussed and we increased back to the original strength Pamelor 50 mg q.h.s. and continued  unchanged her Duragesic 50 µg 1 patch q.72 hours prescribing 10 patches today, unchanged Percocet  milligram tablets 1 p.o. q.6 hours p.r.n. prescribing 120 tablets today and she will also continue her Neurontin 300, 300, 600, Cymbalta 20 mg each morning. We will see her back in 4 weeks or sooner if she needs us. We will look at titrating Neurontin upward if she still with unacceptable symptoms at this time. 10/18/18:  Medical records reviewed and as per pulmonary assessment on 10/11/18: William Frias was seen today for pulmonary nodule. Diagnoses and all orders for this visit   COPD, moderate ([address]  Pulmonary nodules  Ground glass opacity present on imaging of lung  Follow up CT scan shows complete resolution of GGO.  This could have been from an viral infection or based on her Hypersensitivity panel an acute stating that she had been on prednisone, Levaquin for her lungs by her pulmonologist with a healthy appetite. Patient denies new and/or uncontrolled symptoms and is comfortable on current regiment. Patient states that she had sedation with the increased dose of Neurontin therefore in the mornings usually taking 300 or 600 mg depending if she is working or not, 600 mg in the afternoon and 900 mg in the evening. Patient does notice a difference in her pain relief with the higher dose. Options were discussed and today we are continuing Duragesic 50 µg q.72 hours and we just refilled her medications recently. We are also continuing unchanged Percocet  milligram tablets 1 p.o. q.6 hours p.r.n., Pamelor 50 mg q.h.s., Cymbalta 20 mg q.h.s. and she will continue her other medications unchanged. We'll see her back in 4 weeks and patient will call her pulmonologist if she feels her URI coming back. 02/14/19:  Medical records reviewed and no documented physician visits since I last saw her. Patient presents ambulatory no acute distress at her baseline without sign of sedation and/or confusion. Patient has an appointment with oncology Dr. Doan Carter Monday stating that the tamoxifen makes her feel horrible. Patient is supposed to see the spine center at [facility] after she had an MRI done but has not made an appointment stating that she cannot take off work. Patient states that she fell on the ice, took Percocet more frequently sometimes q.4 hours and then had the gastrointestinal flu and did not take any of her medications for several days. Otherwise patient has not changed anything. Patient's weight remains unchanged. Options were discussed and she will discuss with oncology about other options as she states she is having a horrible time physically and emotionally on this medication the way she feels.   Patient desires not to change anything therefore we are continuing Duragesic 50 µg q.72 hours refill recently as per Epic, Percocet  milligram tablets 1 p.o. q.6 hours p.r.n. also refilled as per [facility] recently, Pamelor 50 mg q.h.s., Cymbalta 20 mg q.h.s., Neurontin as above as well. We will extend her appointment out to 8 weeks and she knows to call us at any time with any uncontrolled symptoms that she would like to address. 04/04/19:  Medical records reviewed and no documented physician visits in [facility] since I last saw her. Patient presents unaccompanied looking more comfortable without sign of sedation and or confusion able to voice her needs. Patient states that over the past week or so her legs have been feeling better. Patient states she is more active and actually on the treadmill attempting to lose weight. I talked to her about how physical activity concern only improved peripheral neuropathy, arthralgias, myalgias and she voiced understanding. Patient states that there is no change in how she is taken her medications but would like to again weaning down from the Percocet and eventually get off of all opioids. Options were discussed and patient recently received a prescription from us for her Percocet  milligram tablets 1 p.o. q.6 hours p.r.n. and the next time she calls in for refill we will prescribed 90 tablets instead of 120. I advised her starting now to take 3 tablets in a 24-hour period instead of 4 and she voiced understanding liking this idea. We will then decrease the oxycodone content as well as frequency down the road. I advised patient when she is down to taking only 1 or 2 Percocet daily that we will decrease the fentanyl patch. Today we refilled ×10 her Duragesic 50 µg patch one patch q.72 hours, Cymbalta 20 g q.h.s., Neurontin 600, 600, 900, Pamelor 50 mg h.s. all unchanged. Patient states that she is willing to do this in an attempt to wean herself off the medications. We'll see her back in 8 weeks or sooner if she needs us.   05/30/19:  Medical records reviewed and no to voice her needs. Patient states that she has had \"a rough month\" with her pain being worsened secondary to taking Percocet 3 times daily. Patient would take Percocet 4 times daily running out early over the past week with worsening symptoms. Patient also states that she would remove the fentanyl 50 mcg patch on the weekends to go swimming and leave it off for 48 hours not noticing a difference in her pain control. Patient voices compliance with her gabapentin, Pamelor and Cymbalta. Weight remains unchanged. Options were discussed and today we are decreasing fentanyl patch to 25 mcg 1 patch every 72 hours prescribing 10 patches today, decreasing the dosage of the Percocet to 7.5-325 mg tablets 1 p.o. every 6 hours as needed prescribing 120 tablets and continuing the gabapentin 600, 600, 900, Pamelor 50 mg nightly, Cymbalta 20 mg nightly that we should not increase secondary to her oncology treatment. Patient will continue her medications unchanged otherwise and we will see her back in 4 weeks. In the near future we will continue to decrease the dosage of the Percocet next to 5-325 mg tablets 4 times daily, continue weaning the Duragesic patch and then frequency of the Percocet. 7/25/2019:  Medical records reviewed. BODØ presents today, unaccompanied, without signs of sedation or confusion, is able to voice her needs concerns well. She continues to complain of pain, primarily in her feet ankles and hands, and this continues to be primarily neuropathic in nature. She continues to utilize fentanyl patch 25 mcg 1 patch every 72 hours, as well as Percocet 7.2 5-0 25 mg 1 p.o. every 6 hours as needed. She states she utilizes her Percocet 3-4 times per day, reporting that she is also utilize the fifth tablet on a rare occasion, reports she utilized her last tablet last evening.   She also continues to use gabapentin, but reports typically utilizing 300 mg in the a.m., 600 mg in the afternoon, 900 mg at previously prescribed, but she was unable to have this filled until approximately 2 weeks ago, and started taking this new medication on Friday of last week, using just 1 tablet/day, and is now utilizing 75 mg 3 times per day. She states that she does not notice any change at this point in time, and we will continue the Lyrica 35 mg 3 times per day unchanged. We will discontinue her fentanyl patch today, and will prescribe Percocet 7.5-325 mg changing the frequency to every 4 hours as needed for severe pain. She also will continue to take Advil on an occasional basis for mild pain. She will also continue her Pamelor and Cymbalta unchanged, does not require refill of these medications today. Will make no further changes to her plan of care, and will plan to see her again in approximate 4 weeks or sooner if needed. We will also obtain a random urine drug screen today, we will review this prior to her next appointment. She is encouraged to call with any questions, concerns or changes in her symptoms. 9/19/2019:  Medical records reviewed, there are no provider encounters in epic since I last saw her last month. Christiano Alonso presents today, unaccompanied, and she is alert, oriented, able voice needs concerns well, does not show any signs of sedation or confusion. She continues to utilize Percocet 7.5 mg 4 times per day, and has approximately 10 tablets remaining from her previous prescription. She also continues to utilize Cymbalta 20 mg, which she finds very helpful, as well as Pamelor 50 mg at bedtime. She is continued to utilize Lyrica 75 mg 3 times daily, and she states that her neuropathy sensation has improved, but she continues to have a painful numbness in both her hands and feet. Options were discussed, we will increase her Lyrica to 100 mg 3 times daily.   She does state that she received her refill of her 75 mg Lyrica yesterday, and she is concerned that they will not fill the 100 mg prescription

## 2020-04-15 ENCOUNTER — TELEPHONE (OUTPATIENT)
Dept: PALLATIVE CARE | Age: 55
End: 2020-04-15

## 2020-04-15 NOTE — TELEPHONE ENCOUNTER
Call from Ericka Ohms stating increase in Percocet dose to 10/325 mg has made a difference. Ericka Birmingham states that she does not need pain pill as frequently, it is lasting longer. Ericka Birmingham also states she takes about 5 times a day. Ericka Birmingham states she is working out on treadmill for 30 minutes 2 times a day. Ericka Birmingham requests a refill needed in next few days as she has 10 pills left. Will notify Gio Oliva CNP.

## 2020-04-16 RX ORDER — OXYCODONE AND ACETAMINOPHEN 10; 325 MG/1; MG/1
1 TABLET ORAL EVERY 4 HOURS PRN
Qty: 180 TABLET | Refills: 0 | Status: SHIPPED
Start: 2020-04-16 | End: 2020-05-14 | Stop reason: SDUPTHER

## 2020-05-14 ENCOUNTER — TELEMEDICINE (OUTPATIENT)
Dept: PALLATIVE CARE | Age: 55
End: 2020-05-14
Payer: COMMERCIAL

## 2020-05-14 PROCEDURE — 3017F COLORECTAL CA SCREEN DOC REV: CPT | Performed by: NURSE PRACTITIONER

## 2020-05-14 PROCEDURE — G8428 CUR MEDS NOT DOCUMENT: HCPCS | Performed by: NURSE PRACTITIONER

## 2020-05-14 PROCEDURE — 99213 OFFICE O/P EST LOW 20 MIN: CPT | Performed by: NURSE PRACTITIONER

## 2020-05-14 RX ORDER — NORTRIPTYLINE HYDROCHLORIDE 50 MG/1
50 CAPSULE ORAL NIGHTLY
Qty: 90 CAPSULE | Refills: 1 | Status: SHIPPED
Start: 2020-05-14 | End: 2020-09-03 | Stop reason: SDUPTHER

## 2020-05-14 RX ORDER — PREGABALIN 200 MG/1
200 CAPSULE ORAL 2 TIMES DAILY
Qty: 180 CAPSULE | Refills: 1 | Status: SHIPPED
Start: 2020-05-14 | End: 2020-09-03 | Stop reason: SDUPTHER

## 2020-05-14 RX ORDER — OXYCODONE AND ACETAMINOPHEN 10; 325 MG/1; MG/1
1 TABLET ORAL EVERY 4 HOURS PRN
Qty: 180 TABLET | Refills: 0 | Status: SHIPPED
Start: 2020-05-14 | End: 2020-06-11 | Stop reason: SDUPTHER

## 2020-05-14 NOTE — PROGRESS NOTES
Palliative Medicine Outpatient Visit  2020  Provider: Dasha NOLASCO      Reason for Consult:  [] Advanced Care Planning  [] Assist with goals of care  [] Transition of care  [x] Symptom Management    See below for recommendations, as indicated, concernin. Advanced Care Planning  2. Anticipatory Guidance  3. Transition of Care  4. Symptom Management      CC: Arthralgias     HPI:   As per medical oncology's assessment from 16:  45 y/o post-menopausal  female who underwent Stereotactic Left Breast mass core biopsy on 2016 revealing Invasive Lobular carcinoma, well differentiated. Associated lobular carcinoma in-situ. ER + (100%); NM + (100%) and HER-2/chay negative (1+)  FNA Right Breast Cyst FNA aspiration on 2016: NEGATIVE for malignancy. Cytologic findings consistent with cyst contents. 2016: Left breast excision/SLNB/Left axillary dissection was performed by Dr. Jessica Ortega. Left sentinel and left breast sentinel node # 1 biopsy: Two lymph nodes with metastatic carcinoma. Lymph node, left axillary, left breast sentinel node # 2 biopsy: One lymph node with metastatic carcinoma. Breast, left, excision of mass:  Invasive lobular carcinoma. Lymph nodes, left axillary, biopsy with left axillary dissection (10/22): Metastatic carcinoma. · Comment: Sections demonstrate a large amount of residual invasive lobular carcinoma. · Invasive tumor estimated to be at least 6.0 cm in greatest dimension. · Histologic grade:   ¨ Glandular differentiation- score 3: < 10%of tumor area forming glandular/tubular structures. ¨ Nuclear pleomorphism- Score 1: Nuclei small with little increase in size in comparison with normal breast epithelial cells. ¨ Mitotic count: Score 1  ¨ Overall grade: Grade 1   · Carcinoma focally involves the posterolateral inked margin of excision.    · Total of 13/23 lymph nodes involved by metastatic carcinoma, largest focus of metastatic carcinoma nauseated Not nauseated   Depression Score 4 4 7 Not depressed 2   Anxiety Score 4 4 6 Not anxious 2   Drowsiness Score Not drowsy Not drowsy Not drowsy Not drowsy Not drowsy   Appetite Score Best appetite Best appetite 5 5 5   Wellbeing Score Best feeling of wellbeing Best feeling of wellbeing 5 5 5   Dyspnea Score No shortness of breath No shortness of breath No shortness of breath No shortness of breath No shortness of breath   Other Problem Score Best possible response Best possible response Best possible response Best possible response Best possible response   Total Assessment Score(calculated) 20 20 35 17 21     Impression:  As per medical oncology's assessment from 12/30/16:  45 y/o post-menopausal  female who underwent Stereotactic Left Breast mass core biopsy on 06/17/2016 revealing Invasive Lobular carcinoma, well differentiated. Associated lobular carcinoma in-situ. ER + (100%); NH + (100%) and HER-2/chay negative (1+)  FNA Right Breast Cyst FNA aspiration on 06/23/2016: NEGATIVE for malignancy. Cytologic findings consistent with cyst contents. 07/06/2016: Left breast excision/SLNB/Left axillary dissection was performed by Dr. Terrence Flores. Left sentinel and left breast sentinel node # 1 biopsy: Two lymph nodes with metastatic carcinoma. Lymph node, left axillary, left breast sentinel node # 2 biopsy: One lymph node with metastatic carcinoma. Breast, left, excision of mass:  Invasive lobular carcinoma. Lymph nodes, left axillary, biopsy with left axillary dissection (10/22): Metastatic carcinoma. · Comment: Sections demonstrate a large amount of residual invasive lobular carcinoma. · Invasive tumor estimated to be at least 6.0 cm in greatest dimension. · Histologic grade:   ¨ Glandular differentiation- score 3: < 10%of tumor area forming glandular/tubular structures.   ¨ Nuclear pleomorphism- Score 1: Nuclei small with little increase in size in comparison with normal breast epithelial transaminases, bilirubin and alkaline phosphatase. - Onycholysis mainly observed after 6 courses on the weekly schedule not seen with q.3 week schedule. Patient presents today to the exam room in no acute distress able to voice her needs without sign of sedation and/or confusion. Patient's 1 complaint is \"arthralgias\". Patient does complain of numbness tingling to her fingers time which is mild. Patient states that she had an EGD performed 2 weeks ago which revealed gastritis and was advised not to take anti-inflammatory/NSAIDs. Patient then stated that the surgeon advised her to always take a PPI if she has to secondary to her pain. Patient states that she is taking during the day 1 OTC Advil every \"couple of hours\" which helps approximately 50%. Patient states that she only does this on the days after her Taxol. Patient states that prior she was taking the Percocet 5-325 milligram tabs q.h.s. p.r.n. from Sunday through Tuesday night which helped her sleep but also helped 100% with the symptoms. Upon further questioning she was still taking the Advil during the day at this time. Patient states that Gisselleandrea Bergman gave her migraine headaches and Tylenol or Aleve do not help. Patient states that she just needs something to get her through the next 5 weeks of Taxol treatment. Upon questioning she does complain of tearfulness and depression at times with all of the above. Patient states that approximately 3 years ago she was prescribed Xanax 0.5 mg tablets that she used only around 4 times yearly but now uses 3 times monthly. Patient was introduced to Palliative Medicine, signed a pain contract and a UDS was prescribed and sent today. Patient denies any street drugs or any other medications. Options were discussed and today we prescribed Percocet 5-325 milligram tablets 1 p.o. q.h.s. p.r.n. ×30 tablets, initiated Pamelor 10 mg q.h.s. ×30 capsules and will see her back in 4 weeks or sooner if she needs us. after she began the new Cymbalta she notices significant decrease in her bilateral leg symptoms stating that she was able to stand up in the morning without significant pain. Patient states that her medication was stopped by medical oncology as the above note and she has noticed after 4 days that her symptoms have increased. Patient denies any other new or uncontrolled symptoms. Options were discussed and I reviewed the case with her medical oncologist Dr. Marcus Nation as well as pharmacy here and we do not know of an interaction between Cymbalta and Arimidex or any other medications that she is on therefore today I am restarting Cymbalta at the same dose 20 mg daily and we will see her back in 4 weeks or sooner if she needs us. At this time we will titrate the medication if there is still room for improvement. We also continued unchanged her Percocet as above ×90 tablets in which she still takes 2-3 tablets a day as well as her Neurontin 300 mg q.h.s.  05/11/17:  Medical records reviewed and as per medical oncology assessment on 04/25/17:  RT was started on 03/13/2017 and will be completed on 04/27/2017. DEXA scan on 04/18/2017: Normal study. Repeat 1 year (2018). Biochemical testing on 04/04/2017 confirmed post-menopausal state. Arimidex 1 mg po daily will be started on 04/28/2017. To take Ca+/Vit D while on Arimidex. Side effects of arimidex explained: including but not limited to hot flashes, aches, osteoporosis, edema, vasodilation, rash, GI upset, mood changes, sob/cough, dyslipidemia, thrombophlebitis, anemia, leukopenia, thromboembolic disorder (rare, more so with tamoxifen), hypersensitivity, vaginal bleeding (rare, more so with tamoxifen), pain - pt understands and agrees to proceed. RTC 4 weeks for Arimidex toxicity check. As per radiation oncology assessment on 05/05/17:  Diagnosis: Invasive ductal carcinoma left breast, status post conservative surgery followed by chemotherapy.  Stage IIIc, ER/MI positive  Narrative: Patient just completed a course of adjuvant XRT to the left breast/left SC/ax. Radiation therapy was started on 3/13/17 and completed on 4/27/17. The left breast received a dose of 5040 cGy in 28 fractions, ED 38. Treatment was delivered with tangential beams, 6 MV photons. The left SC received a dose of 4500 cGy in 25 fractions. This was treated with 15 MV photons. A PAB boost was applied to take the midplane dose to 4500 cGy. Following this the tumor bed received an additional dose of 1000 cGy in 5 fractions. This was treated with 6/15 MV photons 3-D technique   Response/Tolerance: She tolerated the treatments quite well with mild fatigue and grade 1 reaction. Towards the end she developed grade 2 reaction in inframammary area with yeast superinfection. This responded to topical measures. She was able to complete the anticipated therapy  Follow-up: 1 month    As per phone discussion with Kym Gold on 05/03/17:   Patient called to notify palliative clinic that she is \"afraid to take the cymbalta because an NP told me that it could possibly make the arimidex less effective. \" She said that the NP had offered to order her effexor instead. Patient unsure if effexor will help with her nerve pain as much as the cymbalta did. I notified Dr. Curt Eng. He said that the effexor might possibly help her nerve pain and that it is okay if patient wants to follow through with the prescription. I called patient and updated her. She says that she will call NP and talk with her about getting med. Patient to be seen in palliative clinic May 11. Patient presents to the exam room today unaccompanied in no acute distress, talkative and smiling without sign of sedation and/or confusion able to voice her needs. Patient states that she was uncomfortable taking Cymbalta after talking to the nurse practitioner who prescribed Effexor 37.5 mg daily in which she started this 5 days ago.   Patient states that this morning she thought she maybe noticed some benefit from it but prior has not yet. Patient states that she is taking her Percocet on the average of 3 times a day and has 3 remaining as per her history. Patient also compliant with Neurontin 3 mg q.h.s. stating that she still not is sleeping well. Patient denies any new and/or uncontrolled symptoms other than an overall not feeling well. Patient feels that this is worse since she started the Arimidex. Options were discussed and I advised her that I feel that Effexor may help with her symptoms but possibly not for another couple of weeks and she voiced understanding. I prescribed a higher dose of Effexor 75 mg daily that she will start after her 30 days of the lower dose, unchanged Percocet ×90 tablets as above and electronically her Neurontin 300 mg q.h.s. We will see her back in 4 weeks and continue to titrate the medications as indicated. 06/08/17:  Medical records reviewed and as per medical oncology assessment from 05/23/17: As per radiation oncology assessment from 05/25/17:  Invasive ductal carcinoma left breast, status post conservative surgery followed by chemotherapy. Stage IIIc, ER/CA positive  Subjective: On 4/27/17, Lorne Figueroa completed 5040 cGy in 28 fractions directed to the left breast/left SC/ax for management of left breast cancer. States she is doing well but notes her energy isn't improving as she feels it should be. She was started on Effexor at a lower dose and was instructed to increase the dose with her next refill. Skin is improving but she notes itching especially to the left supraclav. Has been using Aquaphor. Pt is following with Dr Rachael Rao for medical oncology. Tolerating Arimidex, but notes increasing joint pain. States that she continues to take Percocet to get through her work day. Mammogram planned prior to next follow up in June 2017. Pain: Joint aches and pains. Patient is doing well post-radiation completion. Skin care and sun care reviewed. Continue to use Aquaphor and start hydrocortisone OTC for itching. Imaging per med onc, planned mammogram prior to next follow up in June 2017. Monthly self breast exams encouraged. Encouraged to come back sooner if redness/heat or increased firmness noted to left breast, or fever noted. Verbalized understanding. Check TSH if continues with decreased energy at next follow up. I discussed follow up plans with Roberto Oates. At this time, Dr Chip Tinsley will see the patient back in 3 months for a post-radiation completion follow-up visit. Roberto Oates is to follow up with other physicians involved in their care as directed (including but not limited to Medical Oncology, Primary Care, Pulmonary, and Surgery). As per medical oncology assessment from 05/31/17:  RT was started on 03/13/2017 and completed on 04/27/2017. DEXA scan on 04/18/2017: Normal study. Repeat 1 year (2018). Biochemical testing on 04/04/2017 confirmed post-menopausal state. Arimidex 1 mg po daily was started on 04/28/2017 which she is tolerating fairly well. Mild arthralgias and hot flashes, not interfering with quality of life. Continue Arimidex, Ca/VitD. RTC June 2017 with prior mammogram (at [facility])  Patient presents to the exam room in no acute distress unaccompanied without any sign of sedation and/or confusion able to voice her needs ambulating well. Patient states that the arthralgias are significant with the Arimidex that she takes nightly. Patient was phoned in [employer] by Liudmila Casas on June 2 ×24 tablets and states that she has 6 tablets remaining. Patient states that the Percocet helps 50% for approximately 3 hours without sedation. Patient states that she has been taking the Effexor 75 for the past 4 days and is compliant with the Neurontin 300 mg q.h.s. that she does not feel sedation from. Patient states that the arthralgias are constant and worse with activity.   Patient states that she no change in how she is taking her Percocet took her last one last night. Patient is trying to work 2 jobs. Options were discussed and we are doubling her Duragesic to 50 µg q.72 hours prescribing 10 today, continuing the Percocet  milligram tablets 1 p.o. q.6 hours p.r.n. holding for sedation ×120 today telling her that she should not need as many of the Percocet with the increase of the Duragesic. Patient has enough of the Effexor 150 mg daily as well as Xanax 0.5 mg one half q.h.s. p.r.n. We will see her back in 4 weeks or sooner if she needs us and she was advised that if she experiences any difficulty from the increase in Duragesic to give us a call immediately. 09/21/17:  Medical records reviewed and as per Dr. Wenceslao Viveros on 09/12/17:  Persistent edema of breast with significant discomfort. On the relief is when she applies a heating pad. Erythema has decreased on antibiotic therapy. However, cultures have never been positive of seroma aspirations. Having nipple discharge with seroma reaccumulation. 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 Will review literature to see if sclerosing seroma cavity has ever been effective in this situation. If not, we discussed the possibility of simple mastectomy. We discussed the possible consideration of delayed tissue-based reconstruction. Patient brought up contralateral prophylactic mastectomy. In light of her complications and extensive disease upon presentation I deferred this discussion.     Patient will be rescheduled for aspiration of left breast seroma. Hopefully, this will alleviate her discomfort. She will continue to wear a supportive bra and use heat as desired for relief of discomfort. Patient presents today ambulating well in no acute distress at her baseline without sign of sedation and/or confusion able to voice her needs.   Patient started her Duragesic 75 µg patch yesterday evening and also picked up a short prescription of the Percocet 10. Patient complaining of pain previous and before, intolerant to an increase in Neurontin throughout the day taking 300 mg, 300 mg, 600 mg q.h.s. Cymbalta helped significantly with her symptoms in the past but medical oncology felt that it was unsafe to use with her previous aromatase inhibitor. Patient is on Aromasin currently and we will question medical oncology about Cymbalta usage with this aromatase inhibitor. Patient states that aspiration of her left breast helps some with the pain for approximately 2 days but then returns. Patient complains of constant seepage around her incision site and nipple throughout the day needing to wear a large pad to give compression over the incision. Patient reviews with me Dr. José Salazar. Options were discussed and we are questioning medical oncology concerning the above as she had dramatic improvement of her symptoms on Cymbalta for short period of time and continuing Duragesic 75 µg q.72 hours and Percocet  milligram tablets 1 p.o. q.6 hours p.r.n. prescribing 120 tablets today, Neurontin 300 mg, 300 mg, 600 mg q.h.s., Effexor  mg daily and her other medications unchanged. We will contact the patient about medical oncology's recommendations and let her know she can restart the Cymbalta. We will see her back in 4 weeks or sooner if she needs us. 10/19/17:  Medical records reviewed and as per medical oncology assessment from 09/26/17:  Car Zamorano d/c'd on 07/10/2017. Re-started Arimidex 1 mg daily 07/14/2017. She stopped the Arimidex on 07/25/2017 stating that she cannot sleep secondary to the pain which is also throughout the day. We recommended Aromasin 25 mg po daily; Aromasin was started on 07/28/2017 with better tolerance so far. Left breast discomfort; aspiration x 3 (08/16/2017, 08/30/2017 and 09/12/2017); 2 courses of Clindamycin. Culture no organisms seen.  Cytology negative for job not knowing exactly what she can or wants to do. Patient also states that she had stomach flu but this is resolved since her last saw her. Patient also states that she stop the Neurontin thinking that she did not need it but had severe aches and pains therefore restarted it. Options were discussed and we are increasing the Cymbalta to 20 mg b.i.d., continuing unchanged Duragesic 75 µg q.72 hours prescribing 10 patches today and the Percocet  milligram tablets 1 p.o. q.6 hours p.r.n. prescribing 120 tablets today, Neurontin 300 mg, 300 mg and 600 mg daily unchanged that she will need a prescription for before January. I told her my goal is to continue to titrate the Cymbalta, possibly the Duragesic patch and then begin weaning the Percocet eventually. She reluctantly voiced understanding because of her symptoms. We will see her back in 4 weeks or sooner if she needs us. 12/14/17:  Medical records reviewed and as per Dr. Zaira Ray phone call on 12/11/17:  Nadia Araujo with pathology results-left message to return call to [phone]. Fine-needle aspiration cytology of her left breast seroma performed on 11/14/17 was inconclusive for malignant cells. Have discussed with radiology potential imaging modalities for the edematous left breast.  Concerned that most modalities would result in false positive imaging due to level of inflammation of the breast.  It is been a year and a half since patient was initially initially diagnosed with her locally advanced left breast cancer. At this point, prior to any further intervention, restaging including scan CAT scans of the chest abdomen and pelvis and bone scan would be appropriate. Left message for patient to call us back. Discussed all above with Dr. Nora Shoemaker. Patient presents today ambulating in no acute distress without sign of sedation and/or confusion able to voice her needs.   Patient states that she had a bad month with a UTI, Keflex, nausea, back pain as cells. Left Breast FNA on 11/14/2017 inconclusive for malignant cells. Breast Surgical Oncology team recommendations: Re-staging scans and left mastectomy if scans negative for metastatic disease. She has poor tolerance to Aromasin (significant arthralgias affecting quality of life). She has exhausted every possible hormonal therapy option; Referred to [facility] for clinical trial evaluation. As per phone discussion with Vista Cogan on 01/12/18:  Phone call from [facility] asking about her PET CT. I reviewed the PET CT impression report verbatim with her. She has not made the apt. With Dr. Mario Young for clinical trial evaluation as of yet. She just \"wants the breasts taken off\". Long conversation with her regarding the importance of the endocrine therapy, which she has been unable to tolerate to date. She also was intermittently compliant when she did take it. I also advised her to make an apt. With Dr. Mario Young as soon as possible, and to take a copy of her imaging on CD with her to that apt. She is reluctant, but verbalizes understanding and agrees. I personally gave her Dr. Kiersten Souza phone number today to follow up with his office for an appointment. Patient presents today unaccompanied in no acute distress at her baseline but frustrated without sign of sedation and/or confusion able to voice her needs. Patient reviewing with me her appointment coming up on Arriola[facility] Day at [facility] with Dr. Mario Young.  Patient states that she could not tolerate methadone 5 mg q.12 hours secondary to sedation therefore is only taking 5 mg q.h.s. that sometime she takes around midnight with some morning sedation. Patient states that instead of taking 4-5 Percocet daily she is now taking 3-4 still complaining of significant arthralgias. Patient has been off of her Arimidex for 3-1/2 weeks as per her history secondary to intolerance with some improvement of her arthralgias.   Patient also feels that she is more depressed after going up on the Cymbalta 60 mg q.h.s. and desires to back off. I did point out that she is going through more stressors with a recent CAT scan PET scan in up coming appointment and she voiced understanding. Options were discussed and as per her wish we are decreasing Cymbalta to 20 mg b.i.d. the dosage that she had been on prior and I advised her that if she notices her symptoms worsening we can always go back up and she voiced understanding. Patient now will take methadone 5 mg tablets one half tablet q.12 hours and again tried to decrease her Percocet as appropriate. Today I prescribed unchanged Duragesic 75 µg patch q.72 hours ×10 patches, Percocet  milligram tablets 1 p.o. q.6 hours p.r.n. holding for sedation ×120 tablets and Neurontin unchanged at 300, 300, 600 mg and she will continue her other medicines unchanged. We will see her back in 4 weeks and I advised her to call me at any time and she voiced understanding. 03/08/18:  Medical records reviewed and as per medical oncology appointment on 02/20/18:  PET scan done on 12/27/17 shows uptake at seroma and overall no significant FDG avidity consistent with disease. Patient reports still having joint pain though significantly improved since stopping Aromasin. Seen by Dr. Wiley Howard on 02/14/2018 who recommended MRI Left breast given her left breast pain impacting her quality of life; To see a surgeon at [facility] for surgical evaluation (mastectomy/reconstruction ?); Review MTHFR mutation; Going back on Arimidex again; Exercise 30 minutes 5 days a week;   RTC 03/30/2018  Patient presents today frustrated and more tearful, appropriate without sign of sedation and/or confusion able to voice her needs without sign of sedation and/or confusion. Patient felt that the methadone in the morning was too sedating therefore she is only taking 2.5 mg nightly. Patient tried not taking the Percocet with significant increasing arthralgias.   Patient feels that decreasing the Cymbalta has not helped but possibly hurt and she is not knowing what to do to help with her persistent symptoms. Patient reviewed with me there desire to do a mastectomy and patient back on Arimidex again. Patient states she has approximately 10 Percocet remaining. Patient has gained 3 pounds. Options were discussed and I reviewed her entire chart especially my notes. I talked her about physical therapy and we are ordering this to help with her arthralgias, neuropathic symptoms. Patient obviously more depressed but because of her daytime sedation we are changing her Cymbalta to 40 mg q.h.s. We are stopping the methadone for now to see if this is attributing to the fatigue which I'm not sure about. We are continuing Duragesic 75 µg one patch q.72 hours prescribing 10 today, Percocet  milligram tablets 1 p.o. q.6 hours p.r.n. holding for sedation prescribing 120 tablets today. Patient also states that she decreased her Neurontin down to 300 mg b.i.d. on her own and I am advising her to go back to 300, 300, 600 especially since her sedation was not any better with her decrease but her arthralgias are worse. We'll see her back in 4 weeks and our  met with her again today. She knows that she can call us at any time. We will monitor what her affect is like on subsequent visits and possibly increase her Cymbalta or even add Pamelor if we have not tried this. I'm hoping that physical therapy will help her affect is well. 04/05/18:  Medical records reviewed in [address] [place] [address] Rd since my last visit. Patient presents today unaccompanied at her baseline without sign of sedation and/or confusion able to voice her needs. Patient states that she now is back on tamoxifen by a [facility] oncologist/trial and was told to begin weaning off of her Cymbalta in which she has been.   Patient never increased to Cymbalta 40 mg daily because insurance would not cover them and now is taking 20 mg every other day weaning herself off. She is awaiting a call from [facility] oncologist to make sure she needs to come off of the Cymbalta. Patient did not have good results from Effexor prior to Cymbalta. Patient states is no change in how she is taking her medications otherwise. Patient still complains of bilateral leg neuropathy and is regressed indicating getting her left chest wall drained as per her history. Patient was evaluated by physical therapy but did not go back this week but will try next week. Options were discussed and we are continuing her same medications Duragesic 75 µg 1 patch q.72 hours prescribing 10 patches today, Percocet  milligram tablets 1 p.o. q.6 hours p.r.n. prescribing 120 tablets today and continues her Neurontin and other medications unchanged. In the future if she is staying on Cymbalta and we may increase and if not we'll try Pamelor if we have not or readdress the methadone again to help with her neuropathy. We will see her back in 4 weeks or sooner if she needs us. 05/03/18:  Medical records reviewed and no documented physician visits in [facility] since my last visit with her. Patient presents today ambulatory in no acute distress without sign of sedation and/or confusion able to voice her needs. Patient states that she definitely feels less neuropathy although it is still present on the Cymbalta low-dose 20 mg daily that her oncologist agreed to. Patient denies side effects but does state that she has had increased stress, hot flashes and occasional headaches attributing the stress and headaches to taking care of her father who has dementia. Patient states there is no change in how she is taking the other medications and denies new and/or uncontrolled symptoms otherwise. Patient also complaining of right tennis elbow and will be seeing her physical therapist about this. Options were discussed and today we are initiating Pamelor 10 mg q.h.s. and educated her on the medication.   We are cough and wheezing over the past multiple days. Patient states she feels like she has pneumonia again that she was treated for in October by her pulmonologist.  Patient states that she is able to put her feet on the ground better in the morning after the increase in Pamelor and states there's no change in how she is taken her medications otherwise and I prescribed voicing complaints. Patient states that she saw the plastic surgeon who wants to fix her left side with flap, cutting her trapezius muscle as he feels there is nerve entrapment before he touches the right side. Patient unsure if she desires to pursue all of this. Options were discussed and today we put her back on Omnicef ×10 days that she had been on in the past.  Today we increased Pamelor to 50 mg q.h.s. and continued her other medications unchanged including Cymbalta 20 mg q.h.s., Percocet  milligram tablets 1 p.o. q.6 hours p.r.n. prescribing 120 tablets today, Duragesic 75 µg patches one patch q.72 hours prescribing 10 patches today and Neurontin unchanged as well. We'll see her back in 4 weeks or sooner if she needs us and look at the benefit of the increased Pamelor and resolution of her URI.  07/19/18:  Medical records reviewed and as per pulmonology assessment from 07/03/18: Anastasia Boone is a 45y.o. year old female here for evaluation of her  pulmonary status. She was follow by Dr. Basil Bain up to April 2017. In summary:  Duane Braswell is a pleasant 45 y. o. female with a diagnosis of invasive ductal carcinoma left breast (ER+, NM+), status post conservative surgery followed by chemotherapy.  The patient underwent a course of radiation therapy to the left breast, which was completed on 4/27/17.    During her treatment she was at one pint on taxol and following that developed acute interstitial pneumonitis which improved after changing her taxol and a course of prednisone.   Her follow up CT in March 2017 showed resolution of the GGO on her previous CT scans . A follow up Ct in December 2017 showed new EBENEZER nodules. She is for follow up and further evaluation. Nae Helton was seen today for consultation, cancer and results. diagnoses and all orders for this visit:  Pulmonary nodules  Smoking history  Cough  Interstitial pneumonitis ([address]  EBENEZER nodular infiltrates on CT 12/2017 were new in compare to the CT scan in 03/2017. PET scan did not show any FDG actiity. Patient continues to have a chronic cough . Will obtain a CT scan now for 6 months follow up. Will give her short course prednisone and mucinex for her bronchitis. Stephan schedule her for PFTs next ofice visit for evaluation her COPD. FOLLOW UP   Follow up in 8 weeks. Patient presents today ambulatory at her baseline without sign of sedation and/or confusion able to voice her needs. Patient states that she notices some improvement of her lower extremities and pain with increasing the Pamelor without side effects. Patient states is no change to how she is taking her medication has tried to get rid of the afternoon Percocet but with worsening pain in the evening. Patient is going for a CT scan soon by pulmonology as above and will stop in at physical therapy to make an appointment at that time. Options were discussed and today we are decreasing Duragesic to 50 µg patch q.72 hours prescribing 5 patches today and continuing unchanged her Percocet  milligram tablets 1 p.o. q.6 hours p.r.n. prescribing 120 tablets. Patient will try to take one half of a tablet if she can in the afternoon as per her suggestion. Patient advised to call me if her pain is much worse and of course prior to running out of her new Duragesic patches since I only prescribed prescribing 5.   We will see her back in 4 weeks or sooner if she needs us and she will continue her other medications unchanged including Cymbalta 20 mg q.h.s., Pamelor 50 mg q.h.s., Neurontin 300, 300, 600.  08/23/18:  Medical records reviewed and as per phone discussion with Nae Virgen our nurse on 08/10/18:  Patient called for fentanyl patch refill. Patient states she notices more pain to legs with decreased fentanyl dose, but she can tolerate it is just \"bothersome\". Dr. Susie Ward notified, advised to refill 50mcq. ДМИТРИЙ Beal notified, refill e-prescribed. Patient aware. Patient presents today distress without sign of sedation and or confusion able to voice her needs ambulating well. Patient states that she has worsening numbness and tingling to bilateral legs that she can tolerate with a decrease in the fentanyl. Patient states there's no change in how she is taking her Percocet sometimes taking one half of a tablet and afternoon but not routinely. Patient is complaining of activation at night, grogginess in the morning and is wondering if it is the Cymbalta at night or 2 higher dose of Pamelor. Patient tried taking the Neurontin but with extreme sedation. Options were discussed and she will move the Cymbalta 20 mg daily to the morning instead of at night and we're decreasing the Pamelor to 25 mg q.h.s. hoping to help with her morning fatigue. We are reordering physical therapy as she was unsatisfied with [place] physical therapies results stating that \"that estevan didn't want to do anything\". We are continuing unchanged her Duragesic 50 µg 1 patch q.72 hours prescribing 10 patches today, unchanged Percocet  milligram tablets 1 p.o. q.6 hours p.r.n. prescribing 120 tablets today and she will also continue her Neurontin 300, 300, 600. We'll see her back extended her appointment up to 8 weeks and she knows to call prior to running out of her medications and we will electronically prescribed unchanged. 09/20/18:  Medical records reviewed including Dr. Jon Alejo on 09/11/18: Lisa Christianson was seen today for pneumonia, follow-up from hospital and pulmonary nodule.   Diagnoses and all orders for this visit:  Infiltrate noted on imaging study  Pulmonary nodules  -     FULL PFT STUDY WITH PRE AND POST  COPD, moderate (HCC)  -     FULL PFT STUDY WITH PRE AND POST  Interstitial pneumonitis (HCC)  -     FULL PFT STUDY WITH PRE AND POST  -     Fluticasone Furoate-Vilanterol (BREO ELLIPTA) 200-25 MCG/INH AEPB; Inhale 1 puff into the lungs daily  -     MISCELLANEOUS SENDOUT 1; Future  -     EOSINOPHIL COUNT; Future  -     Hypersensitivity Pneumonitis Extended Panel; Future  Other orders  -     Cancel: CT CHEST WO CONTRAST; Future  Will follow up ct in 608 weeks. Start Breo 200/5 one puff daily. CHeck hypersensitivity panel and IgE and Eosinophil count. FOLLOW UP  Return in about 4 weeks (around 10/11/2018) for ct scan. As per radiation oncology assessment from 09/18/18:   Patient is doing well post-radiation completion. Encouraged to continue monthly self breast exams. Imaging per med onc. Patient was previously following with Dr Jen Ayoub for medical oncology. She is now being seen by Dr. Solange Butcher at [facility], last visit on 3/28/2018. Instructed to start Tamoxifen at the time d/t intolerance of multiple other endocrine therapies. Patient is currently on Tamoxifen, tolerating better than the other medications. Next appt with Dr Solange Butcher in October 2018. Met with surgeons (Dr Lisa Arce 3/28/18 for breast surgery and Dr Mai Raman for plastic surgery) at [facility] for surgical evaluation (mastectomy/reconstruction). Does not know if she wants to have surgery done and may want a second opinion prior to making her final decision. Cont to follow with Dr Kerry Car for palliative medicine. Next appt on 9/20/18. Cont to follow with Dr Hamzah Felix d/t pulmonary nodules found on CT (results as above). Following closely with imaging planned at next appt. Next appt 10/11/18. I discussed follow up plans with Bruce Stauffer. At this time, I will see the patient back in 6 months for a post radiation completion follow up visit.   Instructed to follow up with taking 300 mg in the morning, 600 mg in the afternoon, and 900 mg at night. She was encouraged to continue to increase her Neurontin over the next few weeks, with a goal of 900 mg 3 times a day, and she is agreeable to this plan. She is continuing to take her Pamelor 50 mg at bedtime unchanged, as well as her Cymbalta 20 mg at bedtime unchanged. Options were discussed, and we will prescribed today fentanyl 50 µg patch every 72 hours, prescribing 10 patches today, and we will re-prescribe Percocet  milligrams every 6 hours as needed for breakthrough pain. We again did discuss plan for further reduction in her pain medications, and she expresses that this is her ultimate goal.  We see her back, we will further discuss this, and we'll potentially decrease her fentanyl patch from 50 µg to 37 µg, utilizing one 25 µg and one 12 µg patch, with the plan ultimately to to continue to decrease her fentanyl until she is able to stop, and manage her pain with when necessary medication only. We discussed with the increase in her adjuvant therapy, and with her increase in fentanyl, that her when necessary usage should reduce to her prior level. We will continue to follow her closely, and we'll see her back in the clinic in 4 weeks, or sooner if needed. As always she knows that she can call us with any questions, concerns, needs, or changes in symptoms. 12/20/18:  Medical records reviewed and patient presents unaccompanied ambulating well without sign of sedation, pleasant and comfortable. Patient states her pain is better controlled on the higher dose of fentanyl taking Percocet on approximation 4 times daily. Patient has gained 4 pounds stating that she had been on prednisone, Levaquin for her lungs by her pulmonologist with a healthy appetite. Patient denies new and/or uncontrolled symptoms and is comfortable on current regiment.   Patient states that she had sedation with the increased dose of Neurontin therefore in the mornings usually taking 300 or 600 mg depending if she is working or not, 600 mg in the afternoon and 900 mg in the evening. Patient does notice a difference in her pain relief with the higher dose. Options were discussed and today we are continuing Duragesic 50 µg q.72 hours and we just refilled her medications recently. We are also continuing unchanged Percocet  milligram tablets 1 p.o. q.6 hours p.r.n., Pamelor 50 mg q.h.s., Cymbalta 20 mg q.h.s. and she will continue her other medications unchanged. We'll see her back in 4 weeks and patient will call her pulmonologist if she feels her URI coming back. 02/14/19:  Medical records reviewed and no documented physician visits since I last saw her. Patient presents ambulatory no acute distress at her baseline without sign of sedation and/or confusion. Patient has an appointment with oncology Dr. Brenda Mcgowan Monday stating that the tamoxifen makes her feel horrible. Patient is supposed to see the spine center at [facility] after she had an MRI done but has not made an appointment stating that she cannot take off work. Patient states that she fell on the ice, took Percocet more frequently sometimes q.4 hours and then had the gastrointestinal flu and did not take any of her medications for several days. Otherwise patient has not changed anything. Patient's weight remains unchanged. Options were discussed and she will discuss with oncology about other options as she states she is having a horrible time physically and emotionally on this medication the way she feels. Patient desires not to change anything therefore we are continuing Duragesic 50 µg q.72 hours refill recently as per Epic, Percocet  milligram tablets 1 p.o. q.6 hours p.r.n. also refilled as per Epic recently, Pamelor 50 mg q.h.s., Cymbalta 20 mg q.h.s., Neurontin as above as well.   We will extend her appointment out to 8 weeks and she knows to call us at any time with any 4 weeks to reassess. She is encouraged to call us with any questions, concerns, or changes in her symptoms. 10/17/2019:   BODØ presents today, unaccompanied, she is alert, oriented, able voice needs concerns well, does not show any signs sedation and or confusion. She continues to utilize her Percocet 7.5 mg 4-5 times per day, and states that recently she has had some increase in pain with the change of weather, and has had to utilize this every 4 hours, but she states she has 10 to 12 tablets remaining from previous prescription. She also continues to utilize Lyrica 100 mg 3 times daily, Cymbalta 20 mg daily, Pamelor 50 mg daily as previously ordered. She states she continues to see improvement in her neuropathy with the use of Lyrica, but does continue to have some painful numbness and tingling primarily in her feet. Options were discussed, and we will again increase her Lyrica to 150 mg 3 times daily. We will continue her Percocet 7.5-325 mg every 4 hours as needed, and she will continue to utilize her Cymbalta 20 mg daily and Pamelor 50 mg daily as previously ordered. She also continues to utilize Xanax 0.5 mg, utilizing 1/2 tablet only a few times per month, and she has multiple tablets remaining from her prescription filled in May of this year. We will otherwise make no further changes to her current plan of care, will have her return in approximately 4 weeks to reassess her needs. She is encouraged to call with any questions, concerns, or changes in her symptoms. 11/14/2019:  BODØ is seen today, unaccompanied, and she is alert, oriented, and she is able to voice her needs and concerns well. She continues to utilize her percocet 7.5/325 mg, using 3 times per day now, and expresses that she is hopefull to continue to decrease her use, and would like to wean off of the percocet all together.   We discussed weaning at great length, including strategies such as increasing her activity to notice any improvement without increase fatigue. She is again encouraged to increase her activity. We otherwise will not make any further changes to her plan of care, and will plan to reassess her needs is 1 month. She is encouraged to call with questions, concerns, or changes in her symptoms. 5/14/2020: This visit was completed utilizing [employer] Video Visit:  [address] seen today, via [employer] video E visit, and she is alert, only, able voice needs concerns well, does not show any signs sedation confusion. States that overall she is doing fairly well, but continues to have neuropathic pain in her bilateral lower extremities and feet. She does state that it is slightly improved utilizing Percocet  mg which she is using 4-5 times per day. She is also continue with Lyrica 200 mg twice daily, and she did not tolerate utilizing 150 mg in the afternoon due to fatigue. She does states she has continued to try to increase her activity, and has been trying to walk on her treadmill daily. She is encouraged to continue to increase his activity, as I feel it would be greatly beneficial for both her fatigue and pain. Otherwise she continues with all of her other medications the same, utilizing Cymbalta 20 mg daily, Pamelor 50 mg daily. She will be provided refills today of Percocet  every 4 PRN, Lyrica 200 mg twice daily, and Pamelor 50 mg daily. She does not require refill of her Cymbalta today. We otherwise would not make any further changes to her plan of care, will plan to follow-up with her in approximately 8 weeks. She is encouraged to call she has any questions, concerns, or change in her symptoms prior to her next encounter.     Controlled Substances Monitoring: [place] reviewed 5/14/20  RX Monitoring 5/14/2020   Attestation -   Acute Pain Prescriptions -   Periodic Controlled Substance Monitoring Possible medication side effects, risk of tolerance/dependence & alternative treatments

## 2020-06-11 RX ORDER — OXYCODONE AND ACETAMINOPHEN 10; 325 MG/1; MG/1
1 TABLET ORAL EVERY 4 HOURS PRN
Qty: 180 TABLET | Refills: 0 | Status: SHIPPED
Start: 2020-06-11 | End: 2020-07-09 | Stop reason: SDUPTHER

## 2020-07-09 ENCOUNTER — OFFICE VISIT (OUTPATIENT)
Dept: PALLATIVE CARE | Age: 55
End: 2020-07-09
Payer: COMMERCIAL

## 2020-07-09 VITALS
BODY MASS INDEX: 29.29 KG/M2 | WEIGHT: 187 LBS | SYSTOLIC BLOOD PRESSURE: 103 MMHG | HEART RATE: 94 BPM | OXYGEN SATURATION: 99 % | DIASTOLIC BLOOD PRESSURE: 71 MMHG

## 2020-07-09 PROCEDURE — 1036F TOBACCO NON-USER: CPT | Performed by: NURSE PRACTITIONER

## 2020-07-09 PROCEDURE — G8417 CALC BMI ABV UP PARAM F/U: HCPCS | Performed by: NURSE PRACTITIONER

## 2020-07-09 PROCEDURE — 99215 OFFICE O/P EST HI 40 MIN: CPT | Performed by: NURSE PRACTITIONER

## 2020-07-09 PROCEDURE — 3017F COLORECTAL CA SCREEN DOC REV: CPT | Performed by: NURSE PRACTITIONER

## 2020-07-09 PROCEDURE — G8428 CUR MEDS NOT DOCUMENT: HCPCS | Performed by: NURSE PRACTITIONER

## 2020-07-09 RX ORDER — OXYCODONE AND ACETAMINOPHEN 10; 325 MG/1; MG/1
1 TABLET ORAL EVERY 4 HOURS PRN
Qty: 180 TABLET | Refills: 0 | Status: SHIPPED
Start: 2020-07-09 | End: 2020-08-06 | Stop reason: SDUPTHER

## 2020-07-09 NOTE — PROGRESS NOTES
Palliative Medicine Outpatient Visit  2020  Provider: Arthur WALTER-CNP      Reason for Consult:  [] Advanced Care Planning  [] Assist with goals of care  [] Transition of care  [x] Symptom Management    See below for recommendations, as indicated, concernin. Advanced Care Planning  2. Anticipatory Guidance  3. Transition of Care  4. Symptom Management      CC: Arthralgias     HPI:   As per medical oncology's assessment from 16:  45 y/o post-menopausal  female who underwent Stereotactic Left Breast mass core biopsy on 2016 revealing Invasive Lobular carcinoma, well differentiated. Associated lobular carcinoma in-situ. ER + (100%); NV + (100%) and HER-2/chay negative (1+)  FNA Right Breast Cyst FNA aspiration on 2016: NEGATIVE for malignancy. Cytologic findings consistent with cyst contents. 2016: Left breast excision/SLNB/Left axillary dissection was performed by Dr. Nirmal Ferrari. Left sentinel and left breast sentinel node # 1 biopsy: Two lymph nodes with metastatic carcinoma. Lymph node, left axillary, left breast sentinel node # 2 biopsy: One lymph node with metastatic carcinoma. Breast, left, excision of mass:  Invasive lobular carcinoma. Lymph nodes, left axillary, biopsy with left axillary dissection (10/22): Metastatic carcinoma. · Comment: Sections demonstrate a large amount of residual invasive lobular carcinoma. · Invasive tumor estimated to be at least 6.0 cm in greatest dimension. · Histologic grade:   ¨ Glandular differentiation- score 3: < 10%of tumor area forming glandular/tubular structures. ¨ Nuclear pleomorphism- Score 1: Nuclei small with little increase in size in comparison with normal breast epithelial cells. ¨ Mitotic count: Score 1  ¨ Overall grade: Grade 1   · Carcinoma focally involves the posterolateral inked margin of excision.    · Total of 13/23 lymph nodes involved by metastatic carcinoma, largest focus of metastatic carcinoma measures 11 mm in maximum dimension; Extranodal extension: Present. · pT3 pN3a Mx  Re-excision of postero-lateral wall of lumpectomy cavity was performed on 2016 with negative margins. Tumor markers were normal; CT abdomen/pelvis and bone scan were negative for metastatic disease. CT chest noted Mildly enlarged AP window lymph nodes measuring 11 x 7.5 mm. Pulmonary team consult appreciated (In view of the difficulty in approaching that lymph node by EBUS, Dr. Solange Colin will follow this LN with CT chest in 3 months). PET/CT scan negative for mediastinal LN uptake. Focal uptake in the proximal stomach, recommend correlate with EGD. (Hx of PUD with many EGDs in the past by Dr. Lazarus Sequin). GI team (Dr. Lazarus Sequin) consulted for repeat EGD (performed on 2016 and noted hiatal hernia and mild gastritis). We recommended systemic chemotherapy consisting of Dose-Dense AC-T followed by RT followed lastly by hormonal therapy (Arimidex 1 mg po daily for at least 5 years). Side effects of AC-T reviewed with patient. She agreed to proceed. Mediport was placed by Dr. Mark Rose. 2DEcho noted EF 52%. Cycle # 7 weekly Taxol is scheduled for today 2016. Bone pain after chemo; cannot take Ibuprofen due to gastritis. Tylenol alone doesn't help. Palliative care team consulted for sx management. RTC next week for Cycle # 8 weekly Taxol. Past Medical History:   Diagnosis Date    [facility] ([facility]     MTHFR mutation ([facility]     Neuropathy        Past Surgical History:   Procedure Laterality Date    BREAST LUMPECTOMY       SECTION         Current Outpatient Medications on File Prior to Visit   Medication Sig Dispense Refill    pregabalin (LYRICA) 200 MG capsule Take 1 capsule by mouth 2 times daily for 180 days.  180 capsule 1    nortriptyline (PAMELOR) 50 MG capsule Take 1 capsule by mouth nightly 90 capsule 1    fluticasone-vilanterol (BREO ELLIPTA) 100-25 MCG/INH AEPB inhaler Inhale 1 puff into the lungs daily 60 each 6    tamoxifen (NOLVADEX) 20 MG tablet Take 20 mg by mouth      albuterol sulfate HFA (PROVENTIL HFA) 108 (90 Base) MCG/ACT inhaler Inhale 2 puffs into the lungs every 4 hours as needed for Wheezing (Patient not taking: Reported on 10/3/2019) 1 Inhaler 1     No current facility-administered medications on file prior to visit. Allergies:    Demerol hcl [meperidine]; Azithromycin; and Motrin [ibuprofen]    ROS: UNLESS STATED ABOVE PATIENT DENIES:  CONSTITUTIONAL:  fever, chill, rigors, nausea, vomiting, fatigue. HEENT: blurry vision, double vision, hearing problem, tinnitus, hoarseness, dysphagia,               odynophagia  RESPIRATORY: cough, shortness of breath, sputum expectoration. CARDIOVASCULAR:  Chest pain/pressure, palpitation, syncope, irregular beats  GASTROINTESTINAL:  abdominal or rectal pain, diarrhea, constipation, . GENITOURINARY:  Burning, frequency, urgency, incontinence, discharge  INTEGUMENTARY: rash, wound, pruritis  HEMATOLOGIC/LYMPHATIC:  Swelling, sores, gum bleeding, easy bruising, pica.   MUSCULOSKELETAL:  pain, edema, joint swelling or redness  NEUROLOGICAL:  light headed, dizziness, loss of consciousness, weakness, change                                   in memory, seizures, tremors    Social history:  Social History     Socioeconomic History    Marital status:      Spouse name: Not on file    Number of children: Not on file    Years of education: Not on file    Highest education level: Not on file   Occupational History    Occupation: nurse- RN     Employer: [employer] Financial resource strain: Not on file    Food insecurity     Worry: Not on file     Inability: Not on file    Transportation needs     Medical: Not on file     Non-medical: Not on file   Tobacco Use    Smoking status: Former Smoker     Packs/day: 1.50     Years: 20.00     Pack years: 30.00     Types: Cigarettes     Last attempt to quit: 6/16/2016     Years since quittin.0    Smokeless tobacco: Never Used    Tobacco comment: quit smoking 2016   Substance and Sexual Activity    Alcohol use: No    Drug use: No    Sexual activity: Not Currently   Lifestyle    Physical activity     Days per week: Not on file     Minutes per session: Not on file    Stress: Not on file   Relationships    Social connections     Talks on phone: Not on file     Gets together: Not on file     Attends Orthodox service: Not on file     Active member of club or organization: Not on file     Attends meetings of clubs or organizations: Not on file     Relationship status: Not on file    Intimate partner violence     Fear of current or ex partner: Not on file     Emotionally abused: Not on file     Physically abused: Not on file     Forced sexual activity: Not on file   Other Topics Concern    Not on file   Social History Narrative    L[place] [facility] with daughter Mando Charles. Works as an RN. Family history:  Family History   Problem Relation Age of Onset    Cancer Father         prostate    Diabetes Mother         HTN / cholesterol    Diabetes Brother            PE: Vitals:   Vitals:    20 0818   BP: 103/71   Pulse: 94   SpO2: 99%   Weight: 187 lb (84.8 kg)       PE:  EXAM LIMITED PER VIRTUAL VISIT  /71   Pulse 94   Wt 187 lb (84.8 kg)   SpO2 99%   BMI 29.29 kg/m²   There were no vitals filed for this visit.     Gen:  Alert, appears stated age, well nourished, in no acute distress  HEENT:  Normocephalic, conjunctiva clear, no drainage, mucosa moist  Neck:  Supple  Lungs:  No increased work of breathing noted  Heart:  Unable to assess over video  Abd:    Unable to assess over video  Ext:  Moving all extremities, no edema  Skin:  No wounds noted  Neuro:  Alert, oriented x 3; following commands      [place] Symptom Assessment Score   [place] Score 2019   Pain Score 6 5 5 7 5   Tiredness Score 3 7 7 5 2   Nausea Score Not nauseated Not nauseated Not nauseated Not nauseated Not nauseated   Depression Score 2 4 4 7 Not depressed   Anxiety Score 2 4 4 6 Not anxious   Drowsiness Score 6 Not drowsy Not drowsy Not drowsy Not drowsy   Appetite Score 5 Best appetite Best appetite 5 5   Wellbeing Score 5 Best feeling of wellbeing Best feeling of wellbeing 5 5   Dyspnea Score No shortness of breath No shortness of breath No shortness of breath No shortness of breath No shortness of breath   Other Problem Score Best possible response Best possible response Best possible response Best possible response Best possible response   Total Assessment Score(calculated) 29 20 20 35 17     Impression:  As per medical oncology's assessment from 12/30/16:  45 y/o post-menopausal  female who underwent Stereotactic Left Breast mass core biopsy on 06/17/2016 revealing Invasive Lobular carcinoma, well differentiated. Associated lobular carcinoma in-situ. ER + (100%); ME + (100%) and HER-2/chay negative (1+)  FNA Right Breast Cyst FNA aspiration on 06/23/2016: NEGATIVE for malignancy. Cytologic findings consistent with cyst contents. 07/06/2016: Left breast excision/SLNB/Left axillary dissection was performed by Dr. Keith Lim. Left sentinel and left breast sentinel node # 1 biopsy: Two lymph nodes with metastatic carcinoma. Lymph node, left axillary, left breast sentinel node # 2 biopsy: One lymph node with metastatic carcinoma. Breast, left, excision of mass:  Invasive lobular carcinoma. Lymph nodes, left axillary, biopsy with left axillary dissection (10/22): Metastatic carcinoma. · Comment: Sections demonstrate a large amount of residual invasive lobular carcinoma. · Invasive tumor estimated to be at least 6.0 cm in greatest dimension. · Histologic grade:   ¨ Glandular differentiation- score 3: < 10%of tumor area forming glandular/tubular structures.   ¨ Nuclear pleomorphism- Score 1: Nuclei small with little increase in size in prescribed by the same physician filled on 06/13/16. Chemotherapeutic agent review:  Anastrozole/Arimidex:  - Is a nonsteroidal aromatase inhibitor  - Indicated for metastatic breast cancer  Drug interactions include:  - None well-characterized  Toxicities include:  - Asthenia is most common occurring in 20% of patients  - Mild nausea, vomiting, constipation, diarrhea  - Hot flashes occur in 10% of patients  - Dry, scaling skin rash  - Arthralgias occur in up to 15% of patients involving hands, knees, hips, lower back and shoulders with early morning stiffness as usual presentation.  - Headache  - Peripheral edema and 7% of patients  - Flulike syndrome in the form of fever, malaise, myalgias. Paclitaxel/Taxol:  - Isolated from the bark of the [address] [place] [address] [employer] tree  - Active in mitosis  - Indicated for ovarian, breast, lung, head and neck, esophageal, prostate, bladder cancers, AIDS related Kaposi's sarcoma. Drug interactions include:  - Is a radiosensitizing agent. - Phenytoin, phenobarbital accelerate its metabolism. - Cisplatin, carboplatin, cyclophosphamide use increases myelosuppression  - Reduces the plasma clearance of doxorubicin by 30% resulting in increased severity of myelosuppression. Toxicities include: - Myelosuppression with neutropenia and need are days 8-10, recovery by day 15-21. - Infusion reactions up to 40% of patients with generalized skin rash, flushing, erythema, hypotension, dyspnea, bronchospasm usually within the first 2-3 minutes and almost always within 10 minutes. - Neurotoxicity in the form of sensory neuropathy with numbness and paresthesias which is dose dependent.  - Transient asymptomatic sinus bradycardia occurring in 30% of patients with sometimes other rhythm disturbances including Mobitz type I and 2, third-degree heart block and ventricular arrhythmias. - Alopecia and nearly all patients.   - Mucositis and/or diarrhea and up to 40% of patients, mild to moderate nausea and vomiting.  - Transient elevations in serum transaminases, bilirubin and alkaline phosphatase. - Onycholysis mainly observed after 6 courses on the weekly schedule not seen with q.3 week schedule. Patient presents today to the exam room in no acute distress able to voice her needs without sign of sedation and/or confusion. Patient's 1 complaint is \"arthralgias\". Patient does complain of numbness tingling to her fingers time which is mild. Patient states that she had an EGD performed 2 weeks ago which revealed gastritis and was advised not to take anti-inflammatory/NSAIDs. Patient then stated that the surgeon advised her to always take a PPI if she has to secondary to her pain. Patient states that she is taking during the day 1 OTC Advil every \"couple of hours\" which helps approximately 50%. Patient states that she only does this on the days after her Taxol. Patient states that prior she was taking the Percocet 5-325 milligram tabs q.h.s. p.r.n. from Sunday through Tuesday night which helped her sleep but also helped 100% with the symptoms. Upon further questioning she was still taking the Advil during the day at this time. Patient states that Betina Morganiman gave her migraine headaches and Tylenol or Aleve do not help. Patient states that she just needs something to get her through the next 5 weeks of Taxol treatment. Upon questioning she does complain of tearfulness and depression at times with all of the above. Patient states that approximately 3 years ago she was prescribed Xanax 0.5 mg tablets that she used only around 4 times yearly but now uses 3 times monthly. Patient was introduced to Palliative Medicine, signed a pain contract and a UDS was prescribed and sent today. Patient denies any street drugs or any other medications. Options were discussed and today we prescribed Percocet 5-325 milligram tablets 1 p.o. q.h.s. p.r.n. ×30 tablets, initiated Pamelor 10 mg q.h.s.  ×30 capsules and will see her back left breast, the left supraclavicular fossae and the left axilla, to improve local control and may impact on survival. Mediastinal lymphadenopathy and subcentimeter pulmonary nodules are suspicious. She is under surveillance and also seeing pulmonologist. Left internal mammary lymph nodes are normal on CT, and especially with the findings in the chest, these would not be included as wouldn't impact on prognosis. This was discussed with her. Side effects of XRT were enumerated including statistical results. All her questions were answered. She gave her consent to proceed.   CT simulation date set up. Patient seen in room with no family present. She is alert/oriented and able to voice her concerns. She appears anxious and states that she is frustrated with persistent pain. Patient states that she finished her chemotherapy 2/3 and subsequently had an increase in leg pain/neuropathy, nausea/vomiting. She stopped her pamelor, neurontin and mobic at that time. She continued to take the percocet taking 2 per day for pain. The pain persisted so she restarted the neurontin 300mg qhs approximately one week ago. She has noticed a slight improvement. C/O pain that is sharp and radiating to b/l hips and legs. She has neuropathies to b/l feet. She continues to use 2 percocet per day stating that she was hesitant to use more often. She also experienced nausea/vomiting and feels this is related to her anxiety/pain. She has had previous problems with gastritis and was told to stay off NSAIDs by her GI physician. She was also prescribed Nexium 20mg qday and has not been taking this. Her appetite is fair and she often has to force herself to eat. She also feels her appetite is affected by persistent pain/anxiety. Occasional problem with constipation for which she uses dulcolax tabs or miralax as needed. Encouraged her to increase fluids and take medication if no BM for 3 days. She notes increasing and profound fatigue.  She is spacing that beginning approximately 1 week after she began the new Cymbalta she notices significant decrease in her bilateral leg symptoms stating that she was able to stand up in the morning without significant pain. Patient states that her medication was stopped by medical oncology as the above note and she has noticed after 4 days that her symptoms have increased. Patient denies any other new or uncontrolled symptoms. Options were discussed and I reviewed the case with her medical oncologist Dr. Efren Peters as well as pharmacy here and we do not know of an interaction between Cymbalta and Arimidex or any other medications that she is on therefore today I am restarting Cymbalta at the same dose 20 mg daily and we will see her back in 4 weeks or sooner if she needs us. At this time we will titrate the medication if there is still room for improvement. We also continued unchanged her Percocet as above ×90 tablets in which she still takes 2-3 tablets a day as well as her Neurontin 300 mg q.h.s.  05/11/17:  Medical records reviewed and as per medical oncology assessment on 04/25/17:  RT was started on 03/13/2017 and will be completed on 04/27/2017. DEXA scan on 04/18/2017: Normal study. Repeat 1 year (2018). Biochemical testing on 04/04/2017 confirmed post-menopausal state. Arimidex 1 mg po daily will be started on 04/28/2017. To take Ca+/Vit D while on Arimidex. Side effects of arimidex explained: including but not limited to hot flashes, aches, osteoporosis, edema, vasodilation, rash, GI upset, mood changes, sob/cough, dyslipidemia, thrombophlebitis, anemia, leukopenia, thromboembolic disorder (rare, more so with tamoxifen), hypersensitivity, vaginal bleeding (rare, more so with tamoxifen), pain - pt understands and agrees to proceed. RTC 4 weeks for Arimidex toxicity check.    As per radiation oncology assessment on 05/05/17:  Diagnosis: Invasive ductal carcinoma left breast, status post conservative surgery followed by chemotherapy. Stage IIIc, ER/GA positive  Narrative: Patient just completed a course of adjuvant XRT to the left breast/left SC/ax. Radiation therapy was started on 3/13/17 and completed on 4/27/17. The left breast received a dose of 5040 cGy in 28 fractions, ED 38. Treatment was delivered with tangential beams, 6 MV photons. The left SC received a dose of 4500 cGy in 25 fractions. This was treated with 15 MV photons. A PAB boost was applied to take the midplane dose to 4500 cGy. Following this the tumor bed received an additional dose of 1000 cGy in 5 fractions. This was treated with 6/15 MV photons 3-D technique   Response/Tolerance: She tolerated the treatments quite well with mild fatigue and grade 1 reaction. Towards the end she developed grade 2 reaction in inframammary area with yeast superinfection. This responded to topical measures. She was able to complete the anticipated therapy  Follow-up: 1 month    As per phone discussion with Daniel Blue on 05/03/17:   Patient called to notify palliative clinic that she is \"afraid to take the cymbalta because an NP told me that it could possibly make the arimidex less effective. \" She said that the NP had offered to order her effexor instead. Patient unsure if effexor will help with her nerve pain as much as the cymbalta did. I notified Dr. Ronny Acevedo. He said that the effexor might possibly help her nerve pain and that it is okay if patient wants to follow through with the prescription. I called patient and updated her. She says that she will call NP and talk with her about getting med. Patient to be seen in palliative clinic May 11. Patient presents to the exam room today unaccompanied in no acute distress, talkative and smiling without sign of sedation and/or confusion able to voice her needs.   Patient states that she was uncomfortable taking Cymbalta after talking to the nurse practitioner who prescribed Effexor 37.5 mg daily in which she started this 5 days ago.  Patient states that this morning she thought she maybe noticed some benefit from it but prior has not yet. Patient states that she is taking her Percocet on the average of 3 times a day and has 3 remaining as per her history. Patient also compliant with Neurontin 3 mg q.h.s. stating that she still not is sleeping well. Patient denies any new and/or uncontrolled symptoms other than an overall not feeling well. Patient feels that this is worse since she started the Arimidex. Options were discussed and I advised her that I feel that Effexor may help with her symptoms but possibly not for another couple of weeks and she voiced understanding. I prescribed a higher dose of Effexor 75 mg daily that she will start after her 30 days of the lower dose, unchanged Percocet ×90 tablets as above and electronically her Neurontin 300 mg q.h.s. We will see her back in 4 weeks and continue to titrate the medications as indicated. 06/08/17:  Medical records reviewed and as per medical oncology assessment from 05/23/17: As per radiation oncology assessment from 05/25/17:  Invasive ductal carcinoma left breast, status post conservative surgery followed by chemotherapy. Stage IIIc, ER/HI positive  Subjective: On 4/27/17, Donne Boeck completed 5040 cGy in 28 fractions directed to the left breast/left SC/ax for management of left breast cancer. States she is doing well but notes her energy isn't improving as she feels it should be. She was started on Effexor at a lower dose and was instructed to increase the dose with her next refill. Skin is improving but she notes itching especially to the left supraclav. Has been using Aquaphor. Pt is following with Dr Suresh Chambers for medical oncology. Tolerating Arimidex, but notes increasing joint pain. States that she continues to take Percocet to get through her work day. Mammogram planned prior to next follow up in June 2017. Pain: Joint aches and pains.   Patient is doing well post-radiation completion. Skin care and sun care reviewed. Continue to use Aquaphor and start hydrocortisone OTC for itching. Imaging per med onc, planned mammogram prior to next follow up in June 2017. Monthly self breast exams encouraged. Encouraged to come back sooner if redness/heat or increased firmness noted to left breast, or fever noted. Verbalized understanding. Check TSH if continues with decreased energy at next follow up. I discussed follow up plans with Kaleigh Alonso. At this time, Dr Juan Perry will see the patient back in 3 months for a post-radiation completion follow-up visit. Kaleigh Alonso is to follow up with other physicians involved in their care as directed (including but not limited to Medical Oncology, Primary Care, Pulmonary, and Surgery). As per medical oncology assessment from 05/31/17:  RT was started on 03/13/2017 and completed on 04/27/2017. DEXA scan on 04/18/2017: Normal study. Repeat 1 year (2018). Biochemical testing on 04/04/2017 confirmed post-menopausal state. Arimidex 1 mg po daily was started on 04/28/2017 which she is tolerating fairly well. Mild arthralgias and hot flashes, not interfering with quality of life. Continue Arimidex, Ca/VitD. RTC June 2017 with prior mammogram (at [facility])  Patient presents to the exam room in no acute distress unaccompanied without any sign of sedation and/or confusion able to voice her needs ambulating well. Patient states that the arthralgias are significant with the Arimidex that she takes nightly. Patient was phoned in [facility] by Maximus Ying on June 2 ×24 tablets and states that she has 6 tablets remaining. Patient states that the Percocet helps 50% for approximately 3 hours without sedation. Patient states that she has been taking the Effexor 75 for the past 4 days and is compliant with the Neurontin 300 mg q.h.s. that she does not feel sedation from.   Patient states that the arthralgias are constant and worse with activity. 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 Options were discussed and today we are increasing her Percocet by 50% to 7. 5-325 milligram tablets 1 p.o. q.6 hours p.r.n. holding for sedation prescribing 120 tablets today. We are also increasing her Neurontin from 300 mg q.h.s. to t.i.d. electronically prescribing. Patient will continue on her Effexor 75 mg daily and when we see her back in 3 weeks we will look at titrating this medication and the benefits from the above. Patient asking if there is an alternative medicine for her cancer and I advised her to follow-up with her oncologist if she is intolerant to the medication. I advised the patient that with titrations of medication should be able to provide her some relief hopefully allowing her to continue the medication. Patient states that she sees Dr. Nunu Aguirre at the end of June.  06/29/17:  Medical records reviewed and as per phone call by Krystian Prince on 06/02/17:  Refilled prescription for percocet 5-325mg q6h prn pain for [facility]. Patient had 8 pills left (#90 prescribed on 5/11/17) and has been taking 4 times per day secondary to increasing pain. Next appointment on 6/8/17 with palliative medicine. Prescription electronically prescribed for #24 pills with no refills. Sabine Ramires RN, MSN, [facility]  Patient presents to the exam office today unaccompanied in no acute distress but stating that her arthralgias have significantly increased over the past month. Patient states that she did not refill the Arimidex and took her last tablet Monday evening. Patient states that she sees Dr. Nunu Aguirre tomorrow morning and will ask if she can begin something else because she cannot tolerate the Arimidex. Patient very apologetic concerning this but states that she has tried and feels that she is tough but states that it feels like she has the flu 24 7.   Patient states that she takes the Percocet 7. 5-325 milligram tablet on the average of 5 times daily receiving 1-1/2-2 hours relief and tries to ochoa it out until it's time to take another. Patient denies oversedation from this. Patient did not notice any difference from increasing the Neurontin to 300 mg t.i.d. for the Effexor 75 mg daily. Patient states that she will be starting a second job throughout the summer but does have some vacation coming up. Options were discussed and I introduced the topic of a long-acting opioid that she is resistant to hoping that she can experience less arthralgias with a different medication therefore she deferred today against my suggestions of a Duragesic 12 µg q.72 hours. Today we increased her Percocet to  milligram tablets 1 p.o. q.6 hours p.r.n. prescribing 60 tablets today as well as increased her Effexor  mg daily. We will monitor her closely seeing her back in 2 weeks and at this time look at her appointment with oncology tomorrow, symptoms, benefits from the above changes and add the Duragesic 12 µg patch if she agrees and still indicated. We will also look at increasing the Neurontin at that time as well. Patient was refractory to any other changes today other than the above.  07/13/17:   Medical records reviewed and as per medical oncology assessment from 06/30/17:  Bilateral Diagnostic Mammogram on 06/23/2017 Negative for malignancy. U/S guided seroma aspiration Left Breast: GS noted no PMN. No organisms seen. Body fluid Cx Growth not present. Very poor tolerance to Arimidex lately. She c/o significant arthralgias, affecting quality of life. She d/c Arimidex on 06/26/2017. We recommended Femara 2.5 mg po daily instead. 30 tabs of Femara sent to [facility]. RTC 4 weeks for Femara toxicity check. Patient presents to the exam room ambulatory without sign of sedation and/or confusion able to voice her needs.   Patient states that she stop the [facility] Monday which was 3 days ago secondary to worsening arthralgias, myalgias even as compared to the Arimidex. Patient states that she is awakening at 4 AM with the aches and pains. Patient states that she feels slightly better this morning but still has the same symptoms. Patient states she has been taking the medications as approved and with the new/worsening bodyaches cannot notice a difference with the increased Effexor. Patient states increased Percocet lasts approximate 4-1/2 hours which is slightly improved from previous. Patient states that she will be seeing Lei Ruiz tomorrow as Dr. Curt Keller is out of town. Options were discussed and patient very reluctant to adjust her medications upward as she is trying to get off of them. Patient is aware that she needs help with the symptoms. Patient reluctantly and finally accepted a prescription for Duragesic 12 µg patch q.72 hours as directed and 5 patches were prescribed today. I advised her that this patch is equivalent to 2 of her Percocets over a 24-hour period and she felt slightly more comfortable with this. Patient also prescribed unchanged her Percocet  milligram tablets 1 p.o. q.6 hours p.r.n. holding for sedation prescribing 60 tablets today. Patient states that she took her last tablet this morning of the 60 tablets are prescribed 2 weeks ago. Patient reluctant to increase her Neurontin but did agree with me 2 now takes Neurontin 300 mg in the morning, 300 mg in the afternoon and 600 mg in the evening. We will see her back in 2 weeks or sooner if she needs us, evaluate recommendations from Marky/oncology and the effects of the above. I would hope at that time she would allow me to increase the Duragesic further as well as the Neurontin.  07/27/17:  Medical records reviewed and as per medical oncology assessment from 07/14/17:  She presents today, 07/14/2017 for early toxicity check.   C/O significant arthralgias that have significantly impacted her quality of life. She stopped the Femara on 07/10/2017 and presents today to discuss alternatives. Long discussion regarding options. She is not a candidate for Tamoxifen due to her MTHFR mutation first noted in 2001 (Miscarriage requiring 6 months of Heparin therapy). After further discussion, she would like to try Arimidex again, as she feels she tolerated that better than the Femara. Femara d/c'd on 07/10/2017. Will re-start Arimidex 1 mg daily today, 07/14/2017 (she has enough of the prescription at home) and return to see Dr. Lucy Hong on 07/28/2017 for toxicity check. Patient presents ambulatory today unaccompanied without sign of confusion and/or sedation able to voice her needs. Patient states that 2 days ago she stopped the Arimidex stating that she cannot sleep secondary to the pain which is also throughout the day. Patient states that she sees Dr. Lucy Hong tomorrow and will discuss everything with him. Patient states \"this is no way to live\". Patient states that with the initiation of the Duragesic 12 µg q.72 hours she now can stretch the Percocet out to approximately every 5-1/2 hours but still needs to take them. Patient denies oversedation from them. Patient did state that with the increase of the Neurontin to 600 mg at night and does help her leg pain. 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 Patient also takes Xanax 0.5 mg one half tablet q.h.s. to help her sleep. Options were discussed and today we're doubling her Duragesic to 25 µg q.72 hours prescribing 10 patches today. We are continuing unchanged her Percocet  milligram tablets 1 p.o. q.6 hours p.r.n. prescribing 120 tablets today, increasing her Neurontin to 600 mg t.i.d. 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 We are also continuing her Xanax 0.5 mg one half tablet q.h.s. p.r.n. as per Epic. We represcribed unchanged her Effexor 150 mg daily as per Epic. We will see her back 4 weeks or sooner if she needs us and I advised her that my goal is to continue titrating upwards the Neurontin and Duragesic to the point where she does not need the Percocet. Patient is interested in weaning off of the Duragesic if possible. 8/24/17:  Medical records reviewed and as per medical oncology assessment from 08/16/17:  Jesus Schuster d/c'd on 07/10/2017. Re-started Arimidex 1 mg daily 07/14/2017. She stopped the Arimidex on 07/25/2017 stating that she cannot sleep secondary to the pain which is also throughout the day. We recommended Aromasin 25 mg po daily. \"  Started Aromasin 25 mg daily by Dr. Rai Lopes on 07/31/2017 with fair tolerance to date. Presents today, 08/16/2017 for complaints of recurrent seroma. Clinical breast examination reveals significant edema and erythema of the left breast, left nipple inversion, induration of the left breast scar, and left axillary adenopathy. 1.  Check Left diagnostic mammogram, left breast US, and US left axillary adenopathy with possible drainage/biopsy. 2. Clindamycin to pharmacy for possible underlying infectious component. 3. RTC 2 weeks for re-evaluation. 4. Hx tobacco abuse. Quit smoking 1 year ago. Encouraged. 5. Continue follow up with Medical Oncology/Dr. Rai Lopes. Patient presents today Unaccompanied stating that she did not notice any difference or sedation from the doubling of the Duragesic. Patient states that it has been itching over the past month but she also states that she had her left breast drained and an infection with cellulitis on antibiotics. Patient infrequently takes a half of a Xanax at night which helps a little bit. Patient notices significant improvement when she is able to take her Neurontin 600 mg t.i.d. with her feet and leg neuropathy but this does cause sedation therefore when she work she only takes 300, 300, 600. Patient states there's been no change in how she is taking her Percocet took her last one last night. Patient is trying to work 2 jobs. Options were discussed and we are doubling her Duragesic to 50 µg q.72 hours prescribing 10 today, continuing the Percocet  milligram tablets 1 p.o. q.6 hours p.r.n. holding for sedation ×120 today telling her that she should not need as many of the Percocet with the increase of the Duragesic. Patient has enough of the Effexor 150 mg daily as well as Xanax 0.5 mg one half q.h.s. p.r.n. We will see her back in 4 weeks or sooner if she needs us and she was advised that if she experiences any difficulty from the increase in Duragesic to give us a call immediately. 09/21/17:  Medical records reviewed and as per Dr. Nellie Gould on 09/12/17:  Persistent edema of breast with significant discomfort. On the relief is when she applies a heating pad. Erythema has decreased on antibiotic therapy. However, cultures have never been positive of seroma aspirations. Having nipple discharge with seroma reaccumulation. 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 Will review literature to see if sclerosing seroma cavity has ever been effective in this situation. If not, we discussed the possibility of simple mastectomy. We discussed the possible consideration of delayed tissue-based reconstruction. Patient brought up contralateral prophylactic mastectomy. In light of her complications and extensive disease upon presentation I deferred this discussion.     Patient will be rescheduled for aspiration of left breast seroma. Hopefully, this will alleviate her discomfort. She will continue to wear a supportive bra and use heat as desired for relief of discomfort. Patient presents today ambulating well in no acute distress at her baseline without sign of sedation and/or confusion able to voice her needs.   Patient started her Duragesic 75 µg patch yesterday evening and also picked up a short prescription of the Percocet 10. Patient complaining of pain previous and before, intolerant to an increase in Neurontin throughout the day taking 300 mg, 300 mg, 600 mg q.h.s. Cymbalta helped significantly with her symptoms in the past but medical oncology felt that it was unsafe to use with her previous aromatase inhibitor. Patient is on Aromasin currently and we will question medical oncology about Cymbalta usage with this aromatase inhibitor. Patient states that aspiration of her left breast helps some with the pain for approximately 2 days but then returns. Patient complains of constant seepage around her incision site and nipple throughout the day needing to wear a large pad to give compression over the incision. Patient reviews with me Dr. Marvel Velez. Options were discussed and we are questioning medical oncology concerning the above as she had dramatic improvement of her symptoms on Cymbalta for short period of time and continuing Duragesic 75 µg q.72 hours and Percocet  milligram tablets 1 p.o. q.6 hours p.r.n. prescribing 120 tablets today, Neurontin 300 mg, 300 mg, 600 mg q.h.s., Effexor  mg daily and her other medications unchanged. We will contact the patient about medical oncology's recommendations and let her know she can restart the Cymbalta. We will see her back in 4 weeks or sooner if she needs us. 10/19/17:  Medical records reviewed and as per medical oncology assessment from 09/26/17:  Ria Boudreaux d/neymar'd on 07/10/2017. Re-started Arimidex 1 mg daily 07/14/2017. She stopped the Arimidex on 07/25/2017 stating that she cannot sleep secondary to the pain which is also throughout the day. We recommended Aromasin 25 mg po daily; Aromasin was started on 07/28/2017 with better tolerance so far. Left breast discomfort; aspiration x 3 (08/16/2017, 08/30/2017 and 09/12/2017); 2 courses of Clindamycin. Culture no organisms seen. Cytology negative for malignant cells. Breast Surgical Oncology team recommendations: Continue to wear a supportive bra and use heat as desired for relief of discomfort. Literature search to see if sclerosing seroma cavity has ever been effective in this situation. If not,they also discussed the possibility of simple mastectomy. She will contact Dr. River Vu team this week. Continue Aromasin, Ca/VitD. RTC in 4 months. Patient presents ambulatory in no acute distress at her baseline without sign of sedation and/or confusion able to voice her needs. Patient states that she has noticed increased aches and pains especially the week that she was sick URI as she has been weaning off of the Effexor. Patient states there's been no change in how she is taking her patches or Percocet, Neurontin and denies new and/or uncontrolled symptoms. Patient states that the cough is more improved than previous. Options were discussed and she will continue the wean off the Effexor next week and then start Cymbalta 20 mg daily after that. We prescribed unchanged her Duragesic 75 µg q.72 hours prescribing 10 patches today as well as Percocet  milligram tablets 1 p.o. q.6 hours p.r.n. holding for sedation prescribing 120 tablets today. Patient will continue her Neurontin 300 mg, 300 mg, 600 mg q.h.s. We will see her back in 4 weeks or sooner if she needs us and she knows that she can call us at any time. At this time we will look at titrating aggressively the Cymbalta as indicated. 11/16/17:  Medical records reviewed and as per Dr. Micha Smith on 11/14/17:  [place] 08/30/2017 for follow up of mastitis of left breast after 10 days of Clindamycin. Clinical breast examination reveals persistent edema and erythema of the left breast, left nipple inversion, induration of the left breast scar, and left axillary adenopathy. Increased discomfort with palpation.     Locally advanced left breast cancer with marked breast edema and left axillary fibrosis leading to discomfort in her breast axilla and arm. She also has markedly limited range of motion of the left shoulder especially when fluid re-accumulates in the lumpectomy site. Aspiration in the office a day for 20 mL of clear fluid which in the past has been cytology negative. Relief of discomfort noted. Long discussion about options of therapy going forward. I reiterated that neither he nor ice would likely alleviate the chronic breast edema. The breast edema is attributable to her extensive surgery, complete axillary dissection, extensive poppy involvement, and regional radiation therapy. We discussed the potential option of a completion left simple mastectomy. 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 Plastic surgery would need to be involved in light of the fact that a flap may need to be utilized to close her wound if ischemia demonstrated using the SPY. Plan:   Patient Will keep track of her left breast discomfort at this point, letting us know how long it takes for her discomfort in the left breast and axilla to recur. Seroma reaccumulation is usually associated with nipple discharge. We will also check results of the fluid cytology that was sent today. Office visit on a p.r.n. basis. Patient will call us with any questions. Patient presents ambulatory in no acute distress at her baseline without sign of sedation and/or confusion able to voice her needs. Patient states that she has noticed some decrease in her leg pain since beginning the Cymbalta without other good or bad effects. Patient is compliant with Duragesic patches with 0 remaining and still routinely takes her Percocet having 4 remaining as per her history.   Patient states that she is having good days and bad days and called an  to question disability yesterday but also is applying for another job not knowing exactly what she can or wants to do. Patient also states that she had stomach flu but this is resolved since her last saw her. Patient also states that she stop the Neurontin thinking that she did not need it but had severe aches and pains therefore restarted it. Options were discussed and we are increasing the Cymbalta to 20 mg b.i.d., continuing unchanged Duragesic 75 µg q.72 hours prescribing 10 patches today and the Percocet  milligram tablets 1 p.o. q.6 hours p.r.n. prescribing 120 tablets today, Neurontin 300 mg, 300 mg and 600 mg daily unchanged that she will need a prescription for before January. I told her my goal is to continue to titrate the Cymbalta, possibly the Duragesic patch and then begin weaning the Percocet eventually. She reluctantly voiced understanding because of her symptoms. We will see her back in 4 weeks or sooner if she needs us. 12/14/17:  Medical records reviewed and as per Dr. Álvarez Heart phone call on 12/11/17:  Carmen Crews with pathology results-left message to return call to [phone]. Fine-needle aspiration cytology of her left breast seroma performed on 11/14/17 was inconclusive for malignant cells. Have discussed with radiology potential imaging modalities for the edematous left breast.  Concerned that most modalities would result in false positive imaging due to level of inflammation of the breast.  It is been a year and a half since patient was initially initially diagnosed with her locally advanced left breast cancer. At this point, prior to any further intervention, restaging including scan CAT scans of the chest abdomen and pelvis and bone scan would be appropriate. Left message for patient to call us back. Discussed all above with Dr. Thi Lopez. Patient presents today ambulating in no acute distress without sign of sedation and/or confusion able to voice her needs.   Patient states that she had a bad month with a UTI, Keflex, nausea, back pain as well as chest pain left-sided requiring more Percocet stating that she is out. Patient also took a per Duragesic patch for 4 days for a drug screen for a new job and had worsening pain therefore took more Percocet. Patient cannot notice a difference from the b.i.d. Cymbalta. Patient does state that if she does take Neurontin 600 mg t.i.d. this helped significantly with her pain but it is too sedating during the day. Patient continues on 300, 300, 600 daily. Patient asking if we can do anything else as she desires to decrease the Percocet as she is afraid of the Tylenol. Options were discussed and I educated her and initiated methadone 5 mg q.12 hours that she will start after we obtain an EKG. Patient denies any palpitations, syncope but does complain of left chest pain expecting to be from her cancer. Patient was advised that I would expect her requirement for Percocet to decrease with the methadone and she voiced understanding. Patient will call if she has any problems and hold for sedation. Today we also prescribed unchanged her Duragesic 75 µg q.72 hours prescribing 10 patches today, Percocet  milligram tablets 1 p.o. q.6 hours p.r.n. prescribing 120 tablets today that she knows my goal is to wean her down from these. We also prescribed her Xanax unchanged as per Epic and she will continue the Neurontin 300, 300, 600. We also increased her Cymbalta to 60 mg nightly and she will continue her other medicines unchanged. We will see her back in 4 weeks or sooner if she needs us and we will call her after reviewing the EKG telling her to start the methadone. She will also call me next week for an update or sooner if she has any problems. 02/08/18:  Medical records reviewed and as per medical oncology assessment from 12/19/17:  Left breast discomfort; aspiration x 3 (08/16/2017, 08/30/2017 and 09/12/2017); 2 courses of Clindamycin. Culture no organisms seen.  Cytology negative for malignant cells. Left Breast FNA on 11/14/2017 inconclusive for malignant cells. Breast Surgical Oncology team recommendations: Re-staging scans and left mastectomy if scans negative for metastatic disease. She has poor tolerance to Aromasin (significant arthralgias affecting quality of life). She has exhausted every possible hormonal therapy option; Referred to [facility] for clinical trial evaluation. As per phone discussion with Corina Soto on 01/12/18:  Phone call from Grace Munroe asking about her PET CT. I reviewed the PET CT impression report verbatim with her. She has not made the apt. With Dr. Thierno Whitaker for clinical trial evaluation as of yet. She just \"wants the breasts taken off\". Long conversation with her regarding the importance of the endocrine therapy, which she has been unable to tolerate to date. She also was intermittently compliant when she did take it. I also advised her to make an apt. With Dr. Thierno Whitaker as soon as possible, and to take a copy of her imaging on CD with her to that apt. She is reluctant, but verbalizes understanding and agrees. I personally gave her Dr. Ferreira Check phone number today to follow up with his office for an appointment. Patient presents today unaccompanied in no acute distress at her baseline but frustrated without sign of sedation and/or confusion able to voice her needs. Patient reviewing with me her appointment coming up on Valentine's Day at [facility] with Dr. Thierno Whitaker.  Patient states that she could not tolerate methadone 5 mg q.12 hours secondary to sedation therefore is only taking 5 mg q.h.s. that sometime she takes around midnight with some morning sedation. Patient states that instead of taking 4-5 Percocet daily she is now taking 3-4 still complaining of significant arthralgias. Patient has been off of her Arimidex for 3-1/2 weeks as per her history secondary to intolerance with some improvement of her arthralgias.   Patient also feels that she is more depressed after going up on the Cymbalta 60 mg q.h.s. and desires to back off. I did point out that she is going through more stressors with a recent CAT scan PET scan in up coming appointment and she voiced understanding. Options were discussed and as per her wish we are decreasing Cymbalta to 20 mg b.i.d. the dosage that she had been on prior and I advised her that if she notices her symptoms worsening we can always go back up and she voiced understanding. Patient now will take methadone 5 mg tablets one half tablet q.12 hours and again tried to decrease her Percocet as appropriate. Today I prescribed unchanged Duragesic 75 µg patch q.72 hours ×10 patches, Percocet  milligram tablets 1 p.o. q.6 hours p.r.n. holding for sedation ×120 tablets and Neurontin unchanged at 300, 300, 600 mg and she will continue her other medicines unchanged. We will see her back in 4 weeks and I advised her to call me at any time and she voiced understanding. 03/08/18:  Medical records reviewed and as per medical oncology appointment on 02/20/18:  PET scan done on 12/27/17 shows uptake at seroma and overall no significant FDG avidity consistent with disease. Patient reports still having joint pain though significantly improved since stopping Aromasin. Seen by Dr. Ursula Narvaez on 02/14/2018 who recommended MRI Left breast given her left breast pain impacting her quality of life; To see a surgeon at [facility] for surgical evaluation (mastectomy/reconstruction ?); Review MTHFR mutation; Going back on Arimidex again; Exercise 30 minutes 5 days a week;   RTC 03/30/2018  Patient presents today frustrated and more tearful, appropriate without sign of sedation and/or confusion able to voice her needs without sign of sedation and/or confusion. Patient felt that the methadone in the morning was too sedating therefore she is only taking 2.5 mg nightly. Patient tried not taking the Percocet with significant increasing arthralgias. Patient feels that decreasing the Cymbalta has not helped but possibly hurt and she is not knowing what to do to help with her persistent symptoms. Patient reviewed with me there desire to do a mastectomy and patient back on Arimidex again. Patient states she has approximately 10 Percocet remaining. Patient has gained 3 pounds. Options were discussed and I reviewed her entire chart especially my notes. I talked her about physical therapy and we are ordering this to help with her arthralgias, neuropathic symptoms. Patient obviously more depressed but because of her daytime sedation we are changing her Cymbalta to 40 mg q.h.s. We are stopping the methadone for now to see if this is attributing to the fatigue which I'm not sure about. We are continuing Duragesic 75 µg one patch q.72 hours prescribing 10 today, Percocet  milligram tablets 1 p.o. q.6 hours p.r.n. holding for sedation prescribing 120 tablets today. Patient also states that she decreased her Neurontin down to 300 mg b.i.d. on her own and I am advising her to go back to 300, 300, 600 especially since her sedation was not any better with her decrease but her arthralgias are worse. We'll see her back in 4 weeks and our  met with her again today. She knows that she can call us at any time. We will monitor what her affect is like on subsequent visits and possibly increase her Cymbalta or even add Pamelor if we have not tried this. I'm hoping that physical therapy will help her affect is well. 04/05/18:  Medical records reviewed in [address] [place] [address] since my last visit. Patient presents today unaccompanied at her baseline without sign of sedation and/or confusion able to voice her needs. Patient states that she now is back on tamoxifen by a F oncologist/trial and was told to begin weaning off of her Cymbalta in which she has been.   Patient never increased to Cymbalta 40 mg daily because insurance would not cover them and now is taking 20 mg every other day weaning herself off. She is awaiting a call from [facility] oncologist to make sure she needs to come off of the Cymbalta. Patient did not have good results from Effexor prior to Cymbalta. Patient states is no change in how she is taking her medications otherwise. Patient still complains of bilateral leg neuropathy and is regressed indicating getting her left chest wall drained as per her history. Patient was evaluated by physical therapy but did not go back this week but will try next week. Options were discussed and we are continuing her same medications Duragesic 75 µg 1 patch q.72 hours prescribing 10 patches today, Percocet  milligram tablets 1 p.o. q.6 hours p.r.n. prescribing 120 tablets today and continues her Neurontin and other medications unchanged. In the future if she is staying on Cymbalta and we may increase and if not we'll try Pamelor if we have not or readdress the methadone again to help with her neuropathy. We will see her back in 4 weeks or sooner if she needs us. 05/03/18:  Medical records reviewed and no documented physician visits in [facility] since my last visit with her. Patient presents today ambulatory in no acute distress without sign of sedation and/or confusion able to voice her needs. Patient states that she definitely feels less neuropathy although it is still present on the Cymbalta low-dose 20 mg daily that her oncologist agreed to. Patient denies side effects but does state that she has had increased stress, hot flashes and occasional headaches attributing the stress and headaches to taking care of her father who has dementia. Patient states there is no change in how she is taking the other medications and denies new and/or uncontrolled symptoms otherwise. Patient also complaining of right tennis elbow and will be seeing her physical therapist about this.   Options were discussed and today we are initiating Pamelor 10 mg q.h.s. and educated her on the medication. We are continuing unchanged her Cymbalta 20 mg q.h.s., Percocet  milligram tablets 1 p.o. q.6 hours p.r.n. prescribing 120 tablets today, Duragesic 75 µg one patch q.72 hours prescribing 10 patches today and her Neurontin unchanged. Patient knows that she can call us at any time with any concerns. We will see her back in 4 weeks or sooner if she needs us and at this time as we talked about today we will consider weaning down the Percocet possibly to 7.5 in an attempt to wean the opioids. If symptoms do not allow we will not do this next time. 05/31/18:  Medical records reviewed and no documented physician visits in [facility] since I last saw her. Patient presents today ambulating at her baseline without sign of sedation and/or confusion able to voice her needs. Patient states that she has not noticed any difference from the Pamelor 10 mg q.h.s. good or bad and is still compliant with her other medications unchanged as outlined above. Patient complaining of left hand and arm lymphedema working outside in her garden and flowers. Patient denies new and/or uncontrolled symptoms but states that morning peripheral neuropathy is the worse. Options were discussed and we are increasing Pamelor to 25 mg q.h.s., continuing unchanged Cymbalta 20 mg q.h.s., Percocet  milligram tablets 1 p.o. q.6 hours p.r.n. prescribing 120 tablets today, Duragesic 75 µg patches one patch q.72 hours prescribing 10 patches today and Neurontin unchanged as well. In review of her chart she complained of date times fatigue with methadone in the past.  Patient did not want to retry this today but may want on subsequent visits. We will see her back in 5 weeks or sooner if she needs us. 06/21/18:  Medical records reviewed and no documented physician visits in [facility] since I last saw her.   Patient presents today at her baseline ambulatory in no acute distress without sign of sedation and/or confusion able to voice her needs with a significant cough and wheezing over the past multiple days. Patient states she feels like she has pneumonia again that she was treated for in October by her pulmonologist.  Patient states that she is able to put her feet on the ground better in the morning after the increase in Pamelor and states there's no change in how she is taken her medications otherwise and I prescribed voicing complaints. Patient states that she saw the plastic surgeon who wants to fix her left side with flap, cutting her trapezius muscle as he feels there is nerve entrapment before he touches the right side. Patient unsure if she desires to pursue all of this. Options were discussed and today we put her back on Omnicef ×10 days that she had been on in the past.  Today we increased Pamelor to 50 mg q.h.s. and continued her other medications unchanged including Cymbalta 20 mg q.h.s., Percocet  milligram tablets 1 p.o. q.6 hours p.r.n. prescribing 120 tablets today, Duragesic 75 µg patches one patch q.72 hours prescribing 10 patches today and Neurontin unchanged as well. We'll see her back in 4 weeks or sooner if she needs us and look at the benefit of the increased Pamelor and resolution of her URI.  07/19/18:  Medical records reviewed and as per pulmonology assessment from 07/03/18: Keisha Marshall is a 45y.o. year old female here for evaluation of her  pulmonary status. She was follow by Dr. Piper Wheatley up to April 2017. In summary:  Aleena Braswell is a pleasant 45 y. o. female with a diagnosis of invasive ductal carcinoma left breast (ER+, [place]+), status post conservative surgery followed by chemotherapy.  The patient underwent a course of radiation therapy to the left breast, which was completed on 4/27/17.    During her treatment she was at one pint on taxol and following that developed acute interstitial pneumonitis which improved after changing her taxol and a course of prednisone.   Her follow up CT in March 2017 showed resolution of the GGO on her previous CT scans . A follow up Ct in December 2017 showed new EBENEZER nodules. She is for follow up and further evaluation. Jessenia Kowalski was seen today for consultation, cancer and results. diagnoses and all orders for this visit:  Pulmonary nodules  Smoking history  Cough  Interstitial pneumonitis ([address]  EBENEZER nodular infiltrates on CT 12/2017 were new in compare to the CT scan in 03/2017. PET scan did not show any FDG actiity. Patient continues to have a chronic cough . Will obtain a CT scan now for 6 months follow up. Will give her short course prednisone and mucinex for her bronchitis. Stephan schedule her for PFTs next ofice visit for evaluation her COPD. FOLLOW UP   Follow up in 8 weeks. Patient presents today ambulatory at her baseline without sign of sedation and/or confusion able to voice her needs. Patient states that she notices some improvement of her lower extremities and pain with increasing the Pamelor without side effects. Patient states is no change to how she is taking her medication has tried to get rid of the afternoon Percocet but with worsening pain in the evening. Patient is going for a CT scan soon by pulmonology as above and will stop in at physical therapy to make an appointment at that time. Options were discussed and today we are decreasing Duragesic to 50 µg patch q.72 hours prescribing 5 patches today and continuing unchanged her Percocet  milligram tablets 1 p.o. q.6 hours p.r.n. prescribing 120 tablets. Patient will try to take one half of a tablet if she can in the afternoon as per her suggestion. Patient advised to call me if her pain is much worse and of course prior to running out of her new Duragesic patches since I only prescribed prescribing 5.   We will see her back in 4 weeks or sooner if she needs us and she will continue her other medications unchanged including Cymbalta 20 mg q.h.s., Pamelor 50 mg q.h.s., Neurontin 300, 300, 600.  08/23/18:  Medical records reviewed and as per phone discussion with Alban Covarrubias our nurse on 08/10/18:  Patient called for fentanyl patch refill. Patient states she notices more pain to legs with decreased fentanyl dose, but she can tolerate it is just \"bothersome\". Dr. Olivier Gaxiola notified, advised to refill 50mcq. ДМИТРИЙ Kruse notified, refill e-prescribed. Patient aware. Patient presents today distress without sign of sedation and or confusion able to voice her needs ambulating well. Patient states that she has worsening numbness and tingling to bilateral legs that she can tolerate with a decrease in the fentanyl. Patient states there's no change in how she is taking her Percocet sometimes taking one half of a tablet and afternoon but not routinely. Patient is complaining of activation at night, grogginess in the morning and is wondering if it is the Cymbalta at night or 2 higher dose of Pamelor. Patient tried taking the Neurontin but with extreme sedation. Options were discussed and she will move the Cymbalta 20 mg daily to the morning instead of at night and we're decreasing the Pamelor to 25 mg q.h.s. hoping to help with her morning fatigue. We are reordering physical therapy as she was unsatisfied with [facility] therapies results stating that \"that estevan didn't want to do anything\". We are continuing unchanged her Duragesic 50 µg 1 patch q.72 hours prescribing 10 patches today, unchanged Percocet  milligram tablets 1 p.o. q.6 hours p.r.n. prescribing 120 tablets today and she will also continue her Neurontin 300, 300, 600. We'll see her back extended her appointment up to 8 weeks and she knows to call prior to running out of her medications and we will electronically prescribed unchanged. 09/20/18:  Medical records reviewed including Dr. Keith Chowdary on 09/11/18: Sabine Chacon was seen today for pneumonia, follow-up from hospital and pulmonary nodule.   Diagnoses and all orders for this visit:  Infiltrate noted on imaging study  Pulmonary nodules  -     FULL PFT STUDY WITH PRE AND POST  COPD, moderate (HCC)  -     FULL PFT STUDY WITH PRE AND POST  Interstitial pneumonitis (HCC)  -     FULL PFT STUDY WITH PRE AND POST  -     Fluticasone Furoate-Vilanterol (BREO ELLIPTA) 200-25 MCG/INH AEPB; Inhale 1 puff into the lungs daily  -     MISCELLANEOUS SENDOUT 1; Future  -     EOSINOPHIL COUNT; Future  -     Hypersensitivity Pneumonitis Extended Panel; Future  Other orders  -     Cancel: CT CHEST WO CONTRAST; Future  Will follow up ct in 608 weeks. Start Breo 200/5 one puff daily. CHeck hypersensitivity panel and IgE and Eosinophil count. FOLLOW UP  Return in about 4 weeks (around 10/11/2018) for ct scan. As per radiation oncology assessment from 09/18/18:   Patient is doing well post-radiation completion. Encouraged to continue monthly self breast exams. Imaging per med onc. Patient was previously following with Dr Mayra Bustamante for medical oncology. She is now being seen by Dr. Vikram Zamorano at [facility], last visit on 3/28/2018. Instructed to start Tamoxifen at the time d/t intolerance of multiple other endocrine therapies. Patient is currently on Tamoxifen, tolerating better than the other medications. Next appt with Dr Vikram Zamorano in October 2018. Met with surgeons (Dr Arlene Lyles 3/28/18 for breast surgery and Dr Karyna May for plastic surgery) at [facility] for surgical evaluation (mastectomy/reconstruction). Does not know if she wants to have surgery done and may want a second opinion prior to making her final decision. Cont to follow with Dr Zbigniew Jackson for palliative medicine. Next appt on 9/20/18. Cont to follow with Dr Ibis Mejia d/t pulmonary nodules found on CT (results as above). Following closely with imaging planned at next appt. Next appt 10/11/18. I discussed follow up plans with Nikole Ling.   At this time, I will see the patient back in 6 months for a post radiation completion follow up acute distress at her baseline without sign of sedation and/or confusion able to voice her needs. Patient still complaining of significant bilateral leg pain especially when the weather is damp. Patient unable to take anti-inflammatories as previously documented. Patient taking Percocet 4 times day stating that she'll try to take less but then wakes up in the middle the night with pain. Patient still only taken Neurontin 300, 300, 600 and denies significant problems from it. Patient has gained 6 pounds and denies new and/or uncontrolled symptoms otherwise. Options were discussed and we are decreasing as per her wish fentanyl to 25 µg one patch q.72 hours prescribing 10 patches today and she will place her Duragesic 50 µg patches that she has remaining ×3 in a safe place at home. Patient also will continue unchanged her Percocet  milligram tablets 1 p.o. q.6 hours p.r.n. prescribing 120 tablets as she has 3 remaining, Pamelor 50 mg q.h.s. unchanged. Patient advised to begin increasing her Neurontin from the above amount possibly adding an afternoon capsule and then an evening capsule to help with her nighttime symptoms. Patient will report to me results and we will see her back 4 weeks from now or sooner if she needs us. 11/15/18 [facility] records reviewed, no new visits since her last PM encounter. She was restarted on her 50 mcg fentanyl patch, as she had increased her pain following the decrease to 25 µg from her last visit. She states that she tried to continue with the fentanyl 25 µg patch, states that she was requiring more of her Percocet, and found that she was needing 5 Percocet tablets every day. She has been back on the 50 µg patch for 2 days now, and states that she will be out of the 50 µg patches tomorrow. She states that due to the increase in pain, increased use of her Percocet, but she is now also I'll of her Percocets.   She states that she has been using her Neurontin, and has increased as instructed, now taking 300 mg in the morning, 600 mg in the afternoon, and 900 mg at night. She was encouraged to continue to increase her Neurontin over the next few weeks, with a goal of 900 mg 3 times a day, and she is agreeable to this plan. She is continuing to take her Pamelor 50 mg at bedtime unchanged, as well as her Cymbalta 20 mg at bedtime unchanged. Options were discussed, and we will prescribed today fentanyl 50 µg patch every 72 hours, prescribing 10 patches today, and we will re-prescribe Percocet  milligrams every 6 hours as needed for breakthrough pain. We again did discuss plan for further reduction in her pain medications, and she expresses that this is her ultimate goal.  We see her back, we will further discuss this, and we'll potentially decrease her fentanyl patch from 50 µg to 37 µg, utilizing one 25 µg and one 12 µg patch, with the plan ultimately to to continue to decrease her fentanyl until she is able to stop, and manage her pain with when necessary medication only. We discussed with the increase in her adjuvant therapy, and with her increase in fentanyl, that her when necessary usage should reduce to her prior level. We will continue to follow her closely, and we'll see her back in the clinic in 4 weeks, or sooner if needed. As always she knows that she can call us with any questions, concerns, needs, or changes in symptoms. 12/20/18:  Medical records reviewed and patient presents unaccompanied ambulating well without sign of sedation, pleasant and comfortable. Patient states her pain is better controlled on the higher dose of fentanyl taking Percocet on approximation 4 times daily. Patient has gained 4 pounds stating that she had been on prednisone, Levaquin for her lungs by her pulmonologist with a healthy appetite. Patient denies new and/or uncontrolled symptoms and is comfortable on current regiment.   Patient states that she had sedation with the increased dose of Neurontin therefore in the mornings usually taking 300 or 600 mg depending if she is working or not, 600 mg in the afternoon and 900 mg in the evening. Patient does notice a difference in her pain relief with the higher dose. Options were discussed and today we are continuing Duragesic 50 µg q.72 hours and we just refilled her medications recently. We are also continuing unchanged Percocet  milligram tablets 1 p.o. q.6 hours p.r.n., Pamelor 50 mg q.h.s., Cymbalta 20 mg q.h.s. and she will continue her other medications unchanged. We'll see her back in 4 weeks and patient will call her pulmonologist if she feels her URI coming back. 02/14/19:  Medical records reviewed and no documented physician visits since I last saw her. Patient presents ambulatory no acute distress at her baseline without sign of sedation and/or confusion. Patient has an appointment with oncology Dr. Britt Araujo Monday stating that the tamoxifen makes her feel horrible. Patient is supposed to see the spine center at [facility] after she had an MRI done but has not made an appointment stating that she cannot take off work. Patient states that she fell on the ice, took Percocet more frequently sometimes q.4 hours and then had the gastrointestinal flu and did not take any of her medications for several days. Otherwise patient has not changed anything. Patient's weight remains unchanged. Options were discussed and she will discuss with oncology about other options as she states she is having a horrible time physically and emotionally on this medication the way she feels. Patient desires not to change anything therefore we are continuing Duragesic 50 µg q.72 hours refill recently as per Epic, Percocet  milligram tablets 1 p.o. q.6 hours p.r.n. also refilled as per Epic recently, Pamelor 50 mg q.h.s., Cymbalta 20 mg q.h.s., Neurontin as above as well.   We will extend her appointment out to 8 weeks and she knows to call us at any time with any uncontrolled symptoms that she would like to address. 04/04/19:  Medical records reviewed and no documented physician visits in [facility] since I last saw her. Patient presents unaccompanied looking more comfortable without sign of sedation and or confusion able to voice her needs. Patient states that over the past week or so her legs have been feeling better. Patient states she is more active and actually on the treadmill attempting to lose weight. I talked to her about how physical activity concern only improved peripheral neuropathy, arthralgias, myalgias and she voiced understanding. Patient states that there is no change in how she is taken her medications but would like to again weaning down from the Percocet and eventually get off of all opioids. Options were discussed and patient recently received a prescription from us for her Percocet  milligram tablets 1 p.o. q.6 hours p.r.n. and the next time she calls in for refill we will prescribed 90 tablets instead of 120. I advised her starting now to take 3 tablets in a 24-hour period instead of 4 and she voiced understanding liking this idea. We will then decrease the oxycodone content as well as frequency down the road. I advised patient when she is down to taking only 1 or 2 Percocet daily that we will decrease the fentanyl patch. Today we refilled ×10 her Duragesic 50 µg patch one patch q.72 hours, Cymbalta 20 g q.h.s., Neurontin 600, 600, 900, Pamelor 50 mg h.s. all unchanged. Patient states that she is willing to do this in an attempt to wean herself off the medications. We'll see her back in 8 weeks or sooner if she needs us. 05/30/19:  Medical records reviewed and no documented physician visits in [facility] since I last saw her. Patient presents today ambulatory distressed at the recent death of her father otherwise at her baseline without sign of sedation and/or confusion able to voice her needs.   Patient's father's  is tomorrow and she is trying to do the best she can and very appropriate. Patient was recently prescribed Percocet  milligram tablets ×90 by myself on May 24 as per the above plan from 19. Patient has actually 3 patches of Duragesic 50 µg and has been using the old prescription of Xanax 0.5 mg over the recent several days to get through the death of her father. Patient states whether of course makes her arthralgias worse. Options were discussed and I prescribed again Xanax 0.5 mg 1 tablet t.i.d. p.r.n. holding for sedation ×30 tablets today. We are continuing unchanged her fentanyl 50 µg one patch q.72 hours and the Percocet  milligram tablets 1 p.o. q.6 hours p.r.n. but not to exceed 3 in a 24-hour period prescribing as above. Next time she calls for refill we will decrease the dosage to 7.5-325 milligram tablets prescribing 90 and continue to wean accordingly. She will also continue the Cymbalta 20 g q.h.s., Neurontin 600, 600, 900, Pamelor 50 mg q.h.s. and the remainder of her medications. We will see her back in 4 weeks. Patient was given a number for [facility] bere[facility]ment and she states she will certainly contact them. 2019:  Medical records reviewed and as per pulmonology assessment on 2019:  PLAN/IMPRESSION  Nahum Turk was seen today for follow-up. Diagnoses and all orders for this visit:  COPD, moderate ([address]  Pulmonary nodules  Continue Breo 100/5 one puff daily. Will obatin PFTs next office visit. Her pulmonary nodules are stable no further follow up recommended. FOLLOW UP  In 6 months with PFTs  Patient presents today ambulatory no acute distress without sign of sedation and her confusion able to voice her needs. Patient states that she has had \"a rough month\" with her pain being worsened secondary to taking Percocet 3 times daily. Patient would take Percocet 4 times daily running out early over the past week with worsening symptoms. Patient also states that she would remove the fentanyl 50 mcg patch on the weekends to go swimming and leave it off for 48 hours not noticing a difference in her pain control. Patient voices compliance with her gabapentin, Pamelor and Cymbalta. Weight remains unchanged. Options were discussed and today we are decreasing fentanyl patch to 25 mcg 1 patch every 72 hours prescribing 10 patches today, decreasing the dosage of the Percocet to 7.5-325 mg tablets 1 p.o. every 6 hours as needed prescribing 120 tablets and continuing the gabapentin 600, 600, 900, Pamelor 50 mg nightly, Cymbalta 20 mg nightly that we should not increase secondary to her oncology treatment. Patient will continue her medications unchanged otherwise and we will see her back in 4 weeks. In the near future we will continue to decrease the dosage of the Percocet next to 5-325 mg tablets 4 times daily, continue weaning the Duragesic patch and then frequency of the Percocet. 7/25/2019:  Medical records reviewed. Macie Villatoro presents today, unaccompanied, without signs of sedation or confusion, is able to voice her needs concerns well. She continues to complain of pain, primarily in her feet ankles and hands, and this continues to be primarily neuropathic in nature. She continues to utilize fentanyl patch 25 mcg 1 patch every 72 hours, as well as Percocet 7.2 5-0 25 mg 1 p.o. every 6 hours as needed. She states she utilizes her Percocet 3-4 times per day, reporting that she is also utilize the fifth tablet on a rare occasion, reports she utilized her last tablet last evening. She also continues to use gabapentin, but reports typically utilizing 300 mg in the a.m., 600 mg in the afternoon, 900 mg at night, and she does not notice any improvement with her neuropathy. She also continues use Pamelor 50 mg at bedtime, as well as Cymbalta 20 mg nightly.   She states that she typically utilizes only 300 mg of gabapentin in the a.m. due to drowsiness, and her need to be able to drive and function well for her work. Options were discussed, and we will continue her fentanyl unchanged, 25 mcg 1 patch every 72 hours, already 10 patches today. We will also continue Percocet 7.5-325 mg 1 tablet p.o. every 6 hours as needed, and she is instructed to only use for severe pain, and to not utilize any more than 4 tablets/day. She will continue with Pamelor 50 mg at bedtime which we will refill today, as well as Cymbalta 20 mg at bedtime, which she does not need a refill for at this time. Today we will discontinue her gabapentin, and start Lyrica 75 mg 3 times daily, and she will start this this weekend. She is encouraged to call with any signs of oversedation, or any other on tolerated adverse effects. She is encouraged to decrease her Percocet usage as her pain improves, and she states that she is hopeful she can reduce her usage to 1 to 2 tablets/day. We will plan to see her back in 4 weeks, and will further discuss continued de-escalation of her opioid use. She is encouraged to call with any questions, concerns, or changes in her symptoms. 8/22/2019:  Orestes Caballero presents today, unaccompanied, and she is alert, oriented, able to voice and his concerns well, without signs of sedation and or confusion. She continues to have pain, rating it today of 5 on a scale 10. She continues to utilize her Percocet 7.5-325 mg stating that she typically utilizes 2-4 times per day, but also states on a bad day she may use a 5th tablet. She also continues with fentanyl 25 mcg patch. She asks if she can discontinue the fentanyl patch, as she feels it is not providing her very much benefit at this time. She did start her Lyrica as previously prescribed, but she was unable to have this filled until approximately 2 weeks ago, and started taking this new medication on Friday of last week, using just 1 tablet/day, and is now utilizing 75 mg 3 times per day.   She states that she does not notice any change at this point in time, and we will continue the Lyrica 35 mg 3 times per day unchanged. We will discontinue her fentanyl patch today, and will prescribe Percocet 7.5-325 mg changing the frequency to every 4 hours as needed for severe pain. She also will continue to take Advil on an occasional basis for mild pain. She will also continue her Pamelor and Cymbalta unchanged, does not require refill of these medications today. Will make no further changes to her plan of care, and will plan to see her again in approximate 4 weeks or sooner if needed. We will also obtain a random urine drug screen today, we will review this prior to her next appointment. She is encouraged to call with any questions, concerns or changes in her symptoms. 9/19/2019:  Medical records reviewed, there are no provider encounters in epic since I last saw her last month. Miroslava Merritt presents today, unaccompanied, and she is alert, oriented, able voice needs concerns well, does not show any signs of sedation or confusion. She continues to utilize Percocet 7.5 mg 4 times per day, and has approximately 10 tablets remaining from her previous prescription. She also continues to utilize Cymbalta 20 mg, which she finds very helpful, as well as Pamelor 50 mg at bedtime. She is continued to utilize Lyrica 75 mg 3 times daily, and she states that her neuropathy sensation has improved, but she continues to have a painful numbness in both her hands and feet. Options were discussed, we will increase her Lyrica to 100 mg 3 times daily. She does state that she received her refill of her 75 mg Lyrica yesterday, and she is concerned that they will not fill the 100 mg prescription today. We discussed that if they will not fill the 100 mg 3 times daily prescription, that she can utilize to 75 mg Lyrica twice daily, for a total daily doses of 300 mg.   Otherwise we will make no further changes to her current plan of care, will have her return in approximately 4 weeks to reassess. She is encouraged to call us with any questions, concerns, or changes in her symptoms. 10/17/2019:   Ishan William presents today, unaccompanied, she is alert, oriented, able voice needs concerns well, does not show any signs sedation and or confusion. She continues to utilize her Percocet 7.5 mg 4-5 times per day, and states that recently she has had some increase in pain with the change of weather, and has had to utilize this every 4 hours, but she states she has 10 to 12 tablets remaining from previous prescription. She also continues to utilize Lyrica 100 mg 3 times daily, Cymbalta 20 mg daily, Pamelor 50 mg daily as previously ordered. She states she continues to see improvement in her neuropathy with the use of Lyrica, but does continue to have some painful numbness and tingling primarily in her feet. Options were discussed, and we will again increase her Lyrica to 150 mg 3 times daily. We will continue her Percocet 7.5-325 mg every 4 hours as needed, and she will continue to utilize her Cymbalta 20 mg daily and Pamelor 50 mg daily as previously ordered. She also continues to utilize Xanax 0.5 mg, utilizing 1/2 tablet only a few times per month, and she has multiple tablets remaining from her prescription filled in May of this year. We will otherwise make no further changes to her current plan of care, will have her return in approximately 4 weeks to reassess her needs. She is encouraged to call with any questions, concerns, or changes in her symptoms. 11/14/2019:  Ishan William is seen today, unaccompanied, and she is alert, oriented, and she is able to voice her needs and concerns well. She continues to utilize her percocet 7.5/325 mg, using 3 times per day now, and expresses that she is hopefull to continue to decrease her use, and would like to wean off of the percocet all together.   We discussed weaning at great length, including call me at the beginning of next week to inform how she is doing with this. Additionally we will increase her Lyrica to 200 mg 3 times daily. But otherwise would not make any further changes to her current plan of care, will plan to see her again in approximately 8 weeks to reassess her needs. As always she is encouraged to call next week regarding effectiveness of the Ultram, but also to call if she has any questions, concerns, change in her symptoms, or if she requires any refills prior to her next appointment. 3/2/2020: This visit was completed utilizing [employer] Video Visit:  Quintin Kenny is seen today, via [employer] Evisit, and she is alert, oriented, and she states that overall she is doing well. She does report continued pain, primarily neuropathic in nature, and mostly in her bilateral lower extremities and feet. She describes it as a constant burning tingling pain, which radiates up from her feet through her lower legs and her low back. She rates it as a 5 on a scale of 10 today. She is continued with Percocet 7.5-325 mg, typically utilizing 5 times per day which she finds helpful, but she does not like the frequency she is needing to take this. She does not wish to be place back on a long acting opioid. We discussed increasing her dose to 10/325, and she is agreeable to this and she is hopeful this will last longer for her with her only needing 3-4 doses per day. We will prescribe Percocet 10/325 mg Q 4 PRN, 84 tabs for 14 days. She also continues with Lyrica 200 mg, but reports using this BID, as 3 times daily caused her a lot of daytime fatigue. She continues with Cymbalta 20 mg daily, and Pamelor 50 mg daily as well. She has not noted any significant improvement in the symptoms over the past 2 months. She does admit that she has not increased her activity as she has been encouraged to do, and we discussed this again.   At this time She will continue Lyrica 200 mg BID and will trial Lyrica 150 mg in the after noon to see if she notice any improvement without increase fatigue. She is again encouraged to increase her activity. We otherwise will not make any further changes to her plan of care, and will plan to reassess her needs is 1 month. She is encouraged to call with questions, concerns, or changes in her symptoms. 5/14/2020: This visit was completed utilizing [employer] Video Visit:  Elvis Clarke seen today, via [employer] video E visit, and she is alert, only, able voice needs concerns well, does not show any signs sedation confusion. States that overall she is doing fairly well, but continues to have neuropathic pain in her bilateral lower extremities and feet. She does state that it is slightly improved utilizing Percocet  mg which she is using 4-5 times per day. She is also continue with Lyrica 200 mg twice daily, and she did not tolerate utilizing 150 mg in the afternoon due to fatigue. She does states she has continued to try to increase her activity, and has been trying to walk on her treadmill daily. She is encouraged to continue to increase his activity, as I feel it would be greatly beneficial for both her fatigue and pain. Otherwise she continues with all of her other medications the same, utilizing Cymbalta 20 mg daily, Pamelor 50 mg daily. She will be provided refills today of Percocet  every 4 PRN, Lyrica 200 mg twice daily, and Pamelor 50 mg daily. She does not require refill of her Cymbalta today. We otherwise would not make any further changes to her plan of care, will plan to follow-up with her in approximately 8 weeks. She is encouraged to call she has any questions, concerns, or change in her symptoms prior to her next encounter. 7/9/2020:   Elvis Clarke is seen today, no family present. She is alert, oriented, able to voice needs concerns well, does not show any signs sedation confusion.   Overall she states there is no significant improvement or change in her symptoms. She continues to have body wide joint achiness, as well as significant neuropathic pain in her bilateral lower extremities. She rates this as a 6 on a scale of 10 today. She is continued utilize Percocet  mg, utilizing this 5-6 times per day, states he continues to provide her a modest benefit, but she wishes she was able to use less. She is also continue with Lyrica 200 mg twice daily, Pamelor 50 mg at bedtime, and Cymbalta 20 mg daily. Discussed options at great length, she states she is uncertain what if any benefit she is getting from her to antidepressant therapies. She states that previously Cymbalta had been very helpful at a dose of 60 mg daily, however she is uncertain there is any benefit with the 20 mg dose. She states her concern that her symptoms have continued to persist, and she is continued to gain weight slowly, in spite of making some dietary changes and increasing her physical activity. She feels as though her weight gain is attributed to the antidepressant therapies, we discussed options of stopping her Cymbalta, and she is agreeable. She is encouraged to wean this medication off over the next several weeks, primarily by taking a dose every other day and then stopping. She will continue with Pamelor unchanged at this time. I also instructed her to add either vitamin B12 or a vitamin B complex to her regimen. She is additionally instructed to continue to increase her physical activity. She states that she has been walking on the treadmill, set at a speed of 2 to 4 mph, for approximately 45 minutes every day, as well as taking some walks outside. I encouraged her to include some stretching as well as some light weight or resistant exercises to her regimen, as I feel she would benefit from also strengthening her core muscles.   She does states she has been looking into a local fitness club, she states she plans to follow-up with this and see if increasing her physical activity utilizing weights and resistance will improve her chances at weight loss as well as improve her neuropathy. She additionally stated that she may pursue acupuncture and I encouraged this. We otherwise at this time we will not make any further changes to her plan of care, will provide a refill of her Percocet, will plan to see her again in approximately 4 weeks to reassess her needs. As always she is encouraged to call if she has any questions, concerns, or change in her symptoms prior to her next encounter. Controlled Substances Monitoring: [place]AR[place] reviewed 7/9/20  RX Monitoring 7/9/2020   Attestation -   Acute Pain Prescriptions -   Periodic Controlled Substance Monitoring Possible medication side effects, risk of tolerance/dependence & alternative treatments discussed. ;No signs of potential drug abuse or diversion identified. ;Assessed functional status. ;Obtaining appropriate analgesic effect of treatment. Chronic Pain > 50 MEDD Re-evaluated the status of the patient's underlying condition causing pain. Chronic Pain > 80 MEDD -     Time/Communication:  Greater than 51% of time spent, total 45 minutes in face-to-face counseling and coordination of care regarding goals of care, symptom management, diagnosis and prognosis and see above      Nilam WALTER-CNP  7/9/2020    Note: This report was completed using computerFunderbeam voice recognition software. Every effort has been made to ensure accuracy; however, inadvertent computerized transcription errors may be present.

## 2020-08-06 ENCOUNTER — TELEPHONE (OUTPATIENT)
Dept: PALLATIVE CARE | Age: 55
End: 2020-08-06

## 2020-08-06 RX ORDER — OXYCODONE AND ACETAMINOPHEN 10; 325 MG/1; MG/1
1 TABLET ORAL EVERY 4 HOURS PRN
Qty: 180 TABLET | Refills: 0 | Status: SHIPPED | OUTPATIENT
Start: 2020-08-06 | End: 2020-08-13

## 2020-08-06 NOTE — TELEPHONE ENCOUNTER
Call from [place] stating she had stopped th Cymbalta but was unable to sty off as after 4 days her leg pain became very bad. So she went back on to Cymbalta 20 mg daily. She also asks for Percocet refill.  Refills completed by [facility].

## 2020-09-03 ENCOUNTER — OFFICE VISIT (OUTPATIENT)
Dept: PALLATIVE CARE | Age: 55
End: 2020-09-03
Payer: COMMERCIAL

## 2020-09-03 VITALS
SYSTOLIC BLOOD PRESSURE: 110 MMHG | WEIGHT: 183 LBS | HEART RATE: 104 BPM | BODY MASS INDEX: 28.66 KG/M2 | OXYGEN SATURATION: 93 % | DIASTOLIC BLOOD PRESSURE: 72 MMHG

## 2020-09-03 PROCEDURE — 99213 OFFICE O/P EST LOW 20 MIN: CPT | Performed by: STUDENT IN AN ORGANIZED HEALTH CARE EDUCATION/TRAINING PROGRAM

## 2020-09-03 PROCEDURE — 99211 OFF/OP EST MAY X REQ PHY/QHP: CPT | Performed by: STUDENT IN AN ORGANIZED HEALTH CARE EDUCATION/TRAINING PROGRAM

## 2020-09-03 RX ORDER — NORTRIPTYLINE HYDROCHLORIDE 50 MG/1
50 CAPSULE ORAL NIGHTLY
Qty: 60 CAPSULE | Refills: 0 | Status: SHIPPED
Start: 2020-09-03 | End: 2020-10-29

## 2020-09-03 RX ORDER — DULOXETIN HYDROCHLORIDE 20 MG/1
CAPSULE, DELAYED RELEASE ORAL
COMMUNITY
Start: 2020-08-05 | End: 2020-09-03 | Stop reason: SDUPTHER

## 2020-09-03 RX ORDER — DULOXETIN HYDROCHLORIDE 20 MG/1
CAPSULE, DELAYED RELEASE ORAL
Qty: 30 CAPSULE | Refills: 0 | Status: SHIPPED
Start: 2020-09-03 | End: 2020-11-09 | Stop reason: SDUPTHER

## 2020-09-03 RX ORDER — PREGABALIN 200 MG/1
200 CAPSULE ORAL NIGHTLY
Qty: 30 CAPSULE | Refills: 1 | Status: SHIPPED
Start: 2020-09-03 | End: 2020-12-22 | Stop reason: SDUPTHER

## 2020-09-03 RX ORDER — OXYCODONE AND ACETAMINOPHEN 10; 325 MG/1; MG/1
1 TABLET ORAL EVERY 4 HOURS PRN
Qty: 180 TABLET | Refills: 0 | Status: SHIPPED
Start: 2020-09-03 | End: 2020-10-01 | Stop reason: SDUPTHER

## 2020-09-03 NOTE — PROGRESS NOTES
Department of Palliative Medicine  Ambulatory Note  Provider: Valery Mckinney MD        Chief Complaint: Javier Lawler is a 60 y.o. female with chief complaint of Pain    HPI:  61 y/o post-menopausal  female who underwent Stereotactic Left Breast mass core biopsy on 06/17/2016 revealing Invasive Lobular carcinoma, she has developed severe neuropathy after her last treatment. Assessment/Plan      Pain due to neoplasm   - Continue Percocet 10/325mg    - Continue Cymbalta 20mg hs, low dose due to interaction with Tamoxifen    - Continue Lyrica 200mg hs: makes her tired, takes at bedtime   - Will attempt to decrease Pamelor 50mg, see if this medication is helping her nerve pain or not. Follow Up:  8 weeks. Encouraged to call with any questions, concerns, needs, or changes in symptoms. Subjective:       Javier Lawler is a 60 y.o. female with significant past medical history of Breast cancer. She stated that her pain is more severe after a days of work. She will have increased neuropathy, fatigue and generalized pain. She stopped her Cymbalta due to interactions between tamoxifen and Cymbalta. Her nerve pain became more prominent, and she started using it again. She says the bottom of her feet become very painful, like someone stabbing her. She was tried on [employer], she no longer find relief on it. She will start to taper the medication, and watch for any increase in pain. If there is increase in pain, I recommended her to continue using it. She has used percocet 4 times daily, she says this helps as well.         Objective:     Physical Exam  /72   Pulse 104   Wt 183 lb (83 kg)   SpO2 93% Comment: RA  BMI 28.66 kg/m²     Gen:  Alert, appears stated age, well nourished, in no acute distress  HEENT:  Normocephalic, conjunctiva pink, no drainage, mucosa moist  Lungs:  CTA bilaterally, no audible rhonchi or wheezes noted  Heart[de-identified]  RRR, no murmur, rub,  Abd:  Soft, non tender, non distended,   M/S/Ext:  Moving all extremities, no edema,   Skin:  Warm and dry  Neuro:  PERRL, Alert, oriented x 3; following commands    [place] Symptom Assessment Score   [place] Score 9/3/2020 7/9/2020 5/14/2020 4/2/2020 1/9/2020   Pain Score 6 6 5 5 7   Tiredness Score 5 3 7 7 5   Nausea Score Not nauseated Not nauseated Not nauseated Not nauseated Not nauseated   Depression Score 1 2 4 4 7   Anxiety Score 1 2 4 4 6   Drowsiness Score 3 6 Not drowsy Not drowsy Not drowsy   Appetite Score 3 5 Best appetite Best appetite 5   Wellbeing Score 8 5 Best feeling of wellbeing Best feeling of wellbeing 5   Dyspnea Score No shortness of breath No shortness of breath No shortness of breath No shortness of breath No shortness of breath   Other Problem Score Best possible response Best possible response Best possible response Best possible response Best possible response   Total Assessment Score(calculated) 27 29 20 20 35     Assessed by: patient. Current Medications:  Medications reviewed: yes    Controlled Substances Monitoring: [place]AR[place] reviewed 9/3/20. RX Monitoring 7/9/2020   Attestation -   Acute Pain Prescriptions -   Periodic Controlled Substance Monitoring Possible medication side effects, risk of tolerance/dependence & alternative treatments discussed. ;No signs of potential drug abuse or diversion identified. ;Assessed functional status. ;Obtaining appropriate analgesic effect of treatment. Chronic Pain > 50 MEDD Re-evaluated the status of the patient's underlying condition causing pain. Chronic Pain > [de-identified] MEDD -         Melodie Everett MD  Palliative Care Department     Time/Communication  Greater than 50% of time spent, total 30 minutes in face-to-face counseling and coordination of care regarding symptom management.

## 2020-10-01 RX ORDER — OXYCODONE AND ACETAMINOPHEN 10; 325 MG/1; MG/1
1 TABLET ORAL EVERY 4 HOURS PRN
Qty: 180 TABLET | Refills: 0 | Status: SHIPPED
Start: 2020-10-01 | End: 2020-10-29 | Stop reason: SDUPTHER

## 2020-10-01 NOTE — TELEPHONE ENCOUNTER
Bernice Muñoz called with request for refill of pain medication send to [facility] on [address]. Next wily 10/29/20.

## 2020-10-06 RX ORDER — DULOXETIN HYDROCHLORIDE 20 MG/1
CAPSULE, DELAYED RELEASE ORAL
Qty: 30 CAPSULE | Refills: 0 | Status: SHIPPED
Start: 2020-10-06 | End: 2020-10-19

## 2020-10-06 NOTE — TELEPHONE ENCOUNTER
Rey Weaver sent message through [facility] that she forgot to request a refill for Duloxetine. Prescription refill sent today to [facility] in [facility]. Next wily 10/29.

## 2020-10-19 ENCOUNTER — OFFICE VISIT (OUTPATIENT)
Dept: FAMILY MEDICINE CLINIC | Age: 55
End: 2020-10-19
Payer: COMMERCIAL

## 2020-10-19 VITALS
OXYGEN SATURATION: 98 % | DIASTOLIC BLOOD PRESSURE: 70 MMHG | WEIGHT: 181 LBS | TEMPERATURE: 96.9 F | HEART RATE: 95 BPM | SYSTOLIC BLOOD PRESSURE: 120 MMHG | HEIGHT: 67 IN | RESPIRATION RATE: 16 BRPM | BODY MASS INDEX: 28.41 KG/M2

## 2020-10-19 PROCEDURE — G8484 FLU IMMUNIZE NO ADMIN: HCPCS | Performed by: FAMILY MEDICINE

## 2020-10-19 PROCEDURE — 99396 PREV VISIT EST AGE 40-64: CPT | Performed by: FAMILY MEDICINE

## 2020-10-19 PROCEDURE — G0296 VISIT TO DETERM LDCT ELIG: HCPCS | Performed by: FAMILY MEDICINE

## 2020-10-19 ASSESSMENT — PATIENT HEALTH QUESTIONNAIRE - PHQ9
2. FEELING DOWN, DEPRESSED OR HOPELESS: 0
SUM OF ALL RESPONSES TO PHQ QUESTIONS 1-9: 0
SUM OF ALL RESPONSES TO PHQ9 QUESTIONS 1 & 2: 0
SUM OF ALL RESPONSES TO PHQ QUESTIONS 1-9: 0
1. LITTLE INTEREST OR PLEASURE IN DOING THINGS: 0
SUM OF ALL RESPONSES TO PHQ QUESTIONS 1-9: 0

## 2020-10-19 NOTE — PROGRESS NOTES
10/19/2020     Karen Mcgarry (:  1965) is a 60 y.o. female, with a:  Past Medical History:   Diagnosis Date    [facility] ([facility]     MTHFR mutation ([facility]     Neuropathy        Here for evaluation of the following medical concerns:  Chief Complaint   Patient presents with    Annual Exam     no complaints      She also follows with Pulmonology, Heme/Onc, and Palliative    Annual exam:  Patient is here for routine yearly physical/preventative visit. Patient has no complaints or concerns today. Patient is  generally healthy. Chronic medical problems include breast CA and COPD. These are generally controlled. /70 today. Most recent labs reviewed with patient and  are not remarkable. Health maintenance reviewed with patient and is  up to date. Patient does not smoke. Patient does not drink alcohol. Patient  does not use drugs. Overall doing well. Patient's pastmedical, surgical, social and/or family history reviewed, updated in chart, and are non-contributory (unless otherwise stated). Medications and allergies also reviewed and updated in chart. The 10-year ASCVD risk score (Tania Kraft, et al., 2013) is: 2.6%    Values used to calculate the score:      Age: 60 years      Sex: Female      Is Non- : No      Diabetic: No      Tobacco smoker: No      Systolic Blood Pressure: 890 mmHg      Is BP treated: No      HDL Cholesterol: 54 mg/dL      Total Cholesterol: 244 mg/dL      Review of Systems   Constitutional: Negative for chills and fever. Respiratory: Negative for cough and shortness of breath. Cardiovascular: Negative for chest pain and leg swelling. Gastrointestinal: Negative for abdominal pain, constipation, diarrhea, nausea and vomiting. Genitourinary: Negative for dysuria. Musculoskeletal: Positive for arthralgias. Neurological: Negative for headaches. Prior to Visit Medications    Medication Sig Taking?  Authorizing Provider oxyCODONE-acetaminophen (PERCOCET)  MG per tablet Take 1 tablet by mouth every 4 hours as needed for Pain for up to 30 days. Intended supply: 30 days Yes Vahid Quijano MD   nortriptyline (PAMELOR) 50 MG capsule Take 1 capsule by mouth nightly Yes Vahid Quijano MD   pregabalin (LYRICA) 200 MG capsule Take 1 capsule by mouth nightly for 60 days. Yes Vahid Quijano MD   DULoxetine (CYMBALTA) 20 MG extended release capsule TAKE 1 CAPSULE BY MOUTH ONCE DAILY Yes Vahid Quijano MD   fluticasone-vilanterol (BREO ELLIPTA) 100-25 MCG/INH AEPB inhaler Inhale 1 puff into the lungs daily Yes Tayler Rodriges MD   tamoxifen (NOLVADEX) 20 MG tablet Take 20 mg by mouth Yes Virginia Kim MD   albuterol sulfate HFA (PROVENTIL HFA) 108 (90 Base) MCG/ACT inhaler Inhale 2 puffs into the lungs every 4 hours as needed for Wheezing Yes WELLINGTON García        Social History     Tobacco Use    Smoking status: Former Smoker     Packs/day: 1.50     Years: 20.00     Pack years: 30.00     Types: Cigarettes     Last attempt to quit: 2016     Years since quittin.3    Smokeless tobacco: Never Used    Tobacco comment: quit smoking 2016   Substance Use Topics    Alcohol use: No        Past Surgical History:   Procedure Laterality Date    BREAST LUMPECTOMY       SECTION         Vitals:    10/19/20 1407   BP: 120/70   Pulse: 95   Resp: 16   Temp: 96.9 °F (36.1 °C)   TempSrc: Temporal   SpO2: 98%   Weight: 181 lb (82.1 kg)   Height: 5' 7\" (1.702 m)     Estimated body mass index is 28.35 kg/m² as calculated from the following:    Height as of this encounter: 5' 7\" (1.702 m). Weight as of this encounter: 181 lb (82.1 kg). Physical Exam  Constitutional:       General: She is not in acute distress. Appearance: She is well-developed. HENT:      Head: Normocephalic and atraumatic.       Right Ear: External ear normal.      Left Ear: External ear normal.      Nose: Nose normal. No congestion or rhinorrhea. Eyes:      Extraocular Movements: Extraocular movements intact. Pupils: Pupils are equal, round, and reactive to light. Neck:      Musculoskeletal: Normal range of motion. Thyroid: No thyromegaly. Cardiovascular:      Rate and Rhythm: Normal rate and regular rhythm. Pulmonary:      Effort: Pulmonary effort is normal. No respiratory distress. Breath sounds: Normal breath sounds. No wheezing or rales. Abdominal:      General: There is no distension. Palpations: Abdomen is soft. Tenderness: There is no abdominal tenderness. Musculoskeletal: Normal range of motion. General: No swelling or deformity. Skin:     General: Skin is warm. Findings: No rash. Neurological:      General: No focal deficit present. Mental Status: She is alert. Mental status is at baseline. Psychiatric:         Mood and Affect: Mood normal.         Behavior: Behavior normal.         ASSESSMENT/PLAN:  Renetta Neely was seen today for annual exam.    Diagnoses and all orders for this visit:    Annual physical exam    Dyslipidemia  -     LIPID PANEL; Future  -     CBC Auto Differential; Future  -     Comprehensive Metabolic Panel; Future    Personal history of tobacco use  -     GA VISIT TO DISCUSS LUNG CA SCREEN W LDCT  -     CT Lung Screen (Annual); Future    Encounter for screening for malignant neoplasm of colon  -     Cologuard (For External Results Only); Future    Additional plan and future considerations:   As above. Will call with results. Continue follow-up with specialist as directed.   Return to office in 1 year for annual physical or sooner if needed    Future Appointments   Date Time Provider Kaitlin Cobos   10/28/2020  8:00 AM [address] [facility] CT [facility]   10/29/2020  8:00 AM AVA PALLIATIVE PROVIDER AVA MORALES [facility]   10/19/2021  8:00 AM Becki Foote DO [facility]         --Becki Foote DO on 10/19/2020 at 2:46 PM    Low Dose CT (LDCT) Lung Screening criteria met   Age 50-69   Pack year smoking >30   Still smoking or less than 15 year since quit   No sign or symptoms of lung cancer   > 11 months since last LDCT     Risks and benefits of lung cancer screening with LDCT scans discussed:    Significance of positive screen - False-positive LDCT results often occur. 95% of all positive results do not lead to a diagnosis of cancer. Usually further imaging can resolve most false-positive results; however, some patients may require invasive procedures. Over diagnosis risk - 10% to 12% of screen-detected lung cancer cases are over diagnosed--that is, the cancer would not have been detected in the patient's lifetime without the screening. Need for follow up screens annually to continue lung cancer screening effectiveness     Risks associated with radiation from annual LDCT- Radiation exposure is about the same as for a mammogram, which is about 1/3 of the annual background radiation exposure from everyday life. Starting screening at age 54 is not likely to increase cancer risk from radiation exposure. Patients with comorbidities resulting in life expectancy of < 10 years, or that would preclude treatment of an abnormality identified on CT, should not be screened due to lack of benefit.     To obtain maximal benefit from this screening, smoking cessation and long-term abstinence from smoking is critical

## 2020-10-23 ASSESSMENT — ENCOUNTER SYMPTOMS
ABDOMINAL PAIN: 0
NAUSEA: 0
VOMITING: 0
COUGH: 0
DIARRHEA: 0
CONSTIPATION: 0
SHORTNESS OF BREATH: 0

## 2020-10-27 ENCOUNTER — TELEPHONE (OUTPATIENT)
Dept: CASE MANAGEMENT | Age: 55
End: 2020-10-27

## 2020-10-27 NOTE — TELEPHONE ENCOUNTER
I called the patient and she confirmed her CT lung screening at [facility] on 10/28/2020 at 8:00 am.  I reminded the patient to arrive at 7:30 am, enter through the main entrance, and register. Patient confirmed.           Electronically signed by Dalene Bumpers on 10/27/20 at 4:09 PM EDT

## 2020-10-28 ENCOUNTER — HOSPITAL ENCOUNTER (OUTPATIENT)
Age: 55
Discharge: HOME | End: 2020-10-30
Payer: COMMERCIAL

## 2020-10-28 LAB
ALBUMIN SERPL-MCNC: 4.1 G/DL (ref 3.5–5.2)
ALP BLD-CCNC: 48 U/L (ref 35–104)
ALT SERPL-CCNC: 10 U/L (ref 0–32)
ANION GAP SERPL CALCULATED.3IONS-SCNC: 17 MMOL/L (ref 7–16)
AST SERPL-CCNC: 17 U/L (ref 0–31)
BASOPHILS ABSOLUTE: 0.08 E9/L (ref 0–0.2)
BASOPHILS RELATIVE PERCENT: 1.1 % (ref 0–2)
BILIRUB SERPL-MCNC: 0.3 MG/DL (ref 0–1.2)
BUN BLDV-MCNC: 7 MG/DL (ref 6–20)
CALCIUM SERPL-MCNC: 9 MG/DL (ref 8.6–10.2)
CHLORIDE BLD-SCNC: 102 MMOL/L (ref 98–107)
CHOLESTEROL, TOTAL: 223 MG/DL (ref 0–199)
CO2: 19 MMOL/L (ref 22–29)
CREAT SERPL-MCNC: 0.8 MG/DL (ref 0.5–1)
EOSINOPHILS ABSOLUTE: 0.23 E9/L (ref 0.05–0.5)
EOSINOPHILS RELATIVE PERCENT: 3.1 % (ref 0–6)
GFR AFRICAN AMERICAN: >60
GFR NON-AFRICAN AMERICAN: >60 ML/MIN/1.73
GLUCOSE BLD-MCNC: 107 MG/DL (ref 74–99)
HCT VFR BLD CALC: 37.6 % (ref 34–48)
HDLC SERPL-MCNC: 48 MG/DL
HEMOGLOBIN: 12.5 G/DL (ref 11.5–15.5)
IMMATURE GRANULOCYTES #: 0.02 E9/L
IMMATURE GRANULOCYTES %: 0.3 % (ref 0–5)
LDL CHOLESTEROL CALCULATED: 138 MG/DL (ref 0–99)
LYMPHOCYTES ABSOLUTE: 2.08 E9/L (ref 1.5–4)
LYMPHOCYTES RELATIVE PERCENT: 27.7 % (ref 20–42)
MCH RBC QN AUTO: 30.3 PG (ref 26–35)
MCHC RBC AUTO-ENTMCNC: 33.2 % (ref 32–34.5)
MCV RBC AUTO: 91.3 FL (ref 80–99.9)
MONOCYTES ABSOLUTE: 0.69 E9/L (ref 0.1–0.95)
MONOCYTES RELATIVE PERCENT: 9.2 % (ref 2–12)
NEUTROPHILS ABSOLUTE: 4.4 E9/L (ref 1.8–7.3)
NEUTROPHILS RELATIVE PERCENT: 58.6 % (ref 43–80)
PDW BLD-RTO: 13.7 FL (ref 11.5–15)
PLATELET # BLD: 310 E9/L (ref 130–450)
PMV BLD AUTO: 9.8 FL (ref 7–12)
POTASSIUM SERPL-SCNC: 3.9 MMOL/L (ref 3.5–5)
RBC # BLD: 4.12 E12/L (ref 3.5–5.5)
SODIUM BLD-SCNC: 138 MMOL/L (ref 132–146)
TOTAL PROTEIN: 6.5 G/DL (ref 6.4–8.3)
TRIGL SERPL-MCNC: 187 MG/DL (ref 0–149)
VLDLC SERPL CALC-MCNC: 37 MG/DL
WBC # BLD: 7.5 E9/L (ref 4.5–11.5)

## 2020-10-28 PROCEDURE — 80053 COMPREHEN METABOLIC PANEL: CPT

## 2020-10-28 PROCEDURE — 36415 COLL VENOUS BLD VENIPUNCTURE: CPT

## 2020-10-28 PROCEDURE — 80061 LIPID PANEL: CPT

## 2020-10-28 PROCEDURE — 85025 COMPLETE CBC W/AUTO DIFF WBC: CPT

## 2020-10-29 ENCOUNTER — OFFICE VISIT (OUTPATIENT)
Dept: PALLATIVE CARE | Age: 55
End: 2020-10-29
Payer: COMMERCIAL

## 2020-10-29 ENCOUNTER — TELEPHONE (OUTPATIENT)
Dept: CASE MANAGEMENT | Age: 55
End: 2020-10-29

## 2020-10-29 VITALS
OXYGEN SATURATION: 97 % | DIASTOLIC BLOOD PRESSURE: 76 MMHG | WEIGHT: 180 LBS | HEART RATE: 98 BPM | SYSTOLIC BLOOD PRESSURE: 115 MMHG | BODY MASS INDEX: 28.19 KG/M2

## 2020-10-29 PROCEDURE — 99213 OFFICE O/P EST LOW 20 MIN: CPT | Performed by: STUDENT IN AN ORGANIZED HEALTH CARE EDUCATION/TRAINING PROGRAM

## 2020-10-29 PROCEDURE — 99211 OFF/OP EST MAY X REQ PHY/QHP: CPT | Performed by: STUDENT IN AN ORGANIZED HEALTH CARE EDUCATION/TRAINING PROGRAM

## 2020-10-29 RX ORDER — PREGABALIN 100 MG/1
100 CAPSULE ORAL DAILY
Qty: 30 CAPSULE | Refills: 0 | Status: SHIPPED
Start: 2020-10-29 | End: 2021-02-18 | Stop reason: SDUPTHER

## 2020-10-29 RX ORDER — OXYCODONE AND ACETAMINOPHEN 10; 325 MG/1; MG/1
1 TABLET ORAL EVERY 4 HOURS PRN
Qty: 180 TABLET | Refills: 0 | Status: SHIPPED
Start: 2020-10-29 | End: 2020-11-24 | Stop reason: SDUPTHER

## 2020-10-29 NOTE — TELEPHONE ENCOUNTER
No call, encounter opened to process CT Lung Screening. CT Lung Screen: 10/28/2020    Impression    1. There is no pulmonary infiltrate, mass or nodule. 2. Chronic pleuroparenchymal scarring is seen within the left upper lobe    anteriorly unchanged when compared with the patient's prior study. 3. Stable 3 mm nodule seen within the right lower lobe on axial image 79.         LUNG RADS:    Per ACR Lung-RADS Version 1.1         Category 2, Benign Appearance or Behavior.         Management:  Continue annual lung screening with LDCT in 12 months.         RECOMMENDATIONS:    If you would like to register your patient with the [facility], please contact the Nurse Navigator at    [phone]. Pack years: 27    Social History     Tobacco Use  Smoking Status: Former Smoker    Start Date:    Quit Date: 6/16/2016   Types: Cigarettes   Packs/Day: 1.50   Years: 20.00   Pack Years: 30   Smokeless Tobacco: Never Used         Results letter sent to patient via my chart or mailed.      [facility]

## 2020-10-29 NOTE — PROGRESS NOTES
Department of Palliative Medicine  Ambulatory Note  Provider: Jose Kelley MD        Chief Complaint: Tee Alejandra is a 60 y.o. female with chief complaint of Pain    HPI:  59 y/o post-menopausal  female who underwent Stereotactic Left Breast mass core biopsy on 06/17/2016 revealing Invasive Lobular carcinoma, she has developed severe neuropathy after her last treatment. Assessment/Plan      Breast cancer   - Currently on tamoxifen    - Follows at [facility]    Pain due to neoplasm, (neuropathic type pain)    - Continue Percocet 10/325mg q4hrs prn, uses 5-6 times daily. - Continue Cymbalta 20mg hs, low dose due to interaction with Tamoxifen    - Increase Lyrica to 300mg, she will use 100mg at 3pm and 200mg at bedtime.    - Stopped Pamelor 50mg, no longer taking this medication     Follow Up:  8 weeks. Encouraged to call with any questions, concerns, needs, or changes in symptoms. Subjective:       Tee Alejandra is a 60 y.o. female with significant past medical history of Breast cancer. She has continued tamoxifen from [facility] clinic. She complains about having nerve type pain. It will shoot down her legs from knees, they feel numb burning, pins on the bottom of the feet. She sometimes is walking, and then her lower legs will give out. She uses Percocet, she wants to cut back on this medication. She has already stopped Pamelor, but the Percocet is the only medication that helps her with her nerve pain in her legs. I increased her Lyrica from 200 mg nightly to 300 mg. We will monitor her effects from this medication. No complains of nausea vomiting, constipation. Objective:     Physical Exam  There were no vitals taken for this visit.     Gen:  Alert, appears stated age, well nourished, in no acute distress  HEENT:  Normocephalic, conjunctiva pink, no drainage, mucosa moist  Lungs:  CTA bilaterally, no audible rhonchi or wheezes noted  Heart[de-identified]  RRR, no murmur, rub,  Abd:  Soft, non tender, non distended,   M/S/Ext:  Moving all extremities, no edema,   Skin:  Warm and dry  Neuro:  PERRL, Alert, oriented x 3; following commands    [place] Symptom Assessment Score   [place] Score 10/29/2020 9/3/2020 7/9/2020 5/14/2020 4/2/2020   Pain Score 7 6 6 5 5   Tiredness Score 3 5 3 7 7   Nausea Score Not nauseated Not nauseated Not nauseated Not nauseated Not nauseated   Depression Score Not depressed 1 2 4 4   Anxiety Score 2 1 2 4 4   Drowsiness Score Not drowsy 3 6 Not drowsy Not drowsy   Appetite Score Best appetite 3 5 Best appetite Best appetite   Wellbeing Score Best feeling of wellbeing 8 5 Best feeling of wellbeing Best feeling of wellbeing   Dyspnea Score No shortness of breath No shortness of breath No shortness of breath No shortness of breath No shortness of breath   Other Problem Score Best possible response Best possible response Best possible response Best possible response Best possible response   Total Assessment Score(calculated) 12 27 29 20 20       Assessed by: patient. Current Medications:  Medications reviewed: yes    Controlled Substances Monitoring: [place]AR[place] reviewed 10/29/20. RX Monitoring 7/9/2020   Attestation -   Acute Pain Prescriptions -   Periodic Controlled Substance Monitoring Possible medication side effects, risk of tolerance/dependence & alternative treatments discussed. ;No signs of potential drug abuse or diversion identified. ;Assessed functional status. ;Obtaining appropriate analgesic effect of treatment. Chronic Pain > 50 MEDD Re-evaluated the status of the patient's underlying condition causing pain. Chronic Pain > [de-identified] MEDD -         Leonardo Shine MD  Palliative Care Department     Time/Communication  Greater than 50% of time spent, total 30 minutes in face-to-face counseling and coordination of care regarding symptom management.

## 2020-11-09 RX ORDER — DULOXETIN HYDROCHLORIDE 20 MG/1
CAPSULE, DELAYED RELEASE ORAL
Qty: 30 CAPSULE | Refills: 0 | Status: SHIPPED
Start: 2020-11-09 | End: 2020-11-25

## 2020-11-19 ENCOUNTER — TELEMEDICINE (OUTPATIENT)
Dept: PALLATIVE CARE | Age: 55
End: 2020-11-19
Payer: COMMERCIAL

## 2020-11-19 PROCEDURE — 99443 PR PHYS/QHP TELEPHONE EVALUATION 21-30 MIN: CPT | Performed by: STUDENT IN AN ORGANIZED HEALTH CARE EDUCATION/TRAINING PROGRAM

## 2020-11-19 NOTE — PROGRESS NOTES
Department of Palliative Medicine  Ambulatory Note  Provider: Cristina Sheffield MD        Chief Complaint: Zahida Barber is a 60 y.o. female with chief complaint of Pain    HPI:  59 y/o post-menopausal  female who underwent Stereotactic Left Breast mass core biopsy on 06/17/2016 revealing Invasive Lobular carcinoma, she has developed severe neuropathy after her last treatment. Assessment/Plan      Breast cancer   - Currently on tamoxifen    - Follows at [facility]    Pain due to neoplasm, (neuropathic type pain)    - Start Methadone 2.5mg BID, she had a bottle from when Dr. Krystal Herrera prescribed her with it. She will use it and see how it makes her feel. - Continue Percocet 10/325mg q4hrs prn, uses 5-6 times daily. - Continue Cymbalta 20mg hs, low dose due to interaction with Tamoxifen    - Cont Lyrica to 300mg at bedtime   - Stopped Pamelor 50mg, no longer taking this medication     Follow Up: 2 weeks Encouraged to call with any questions, concerns, needs, or changes in symptoms. Subjective:       Zahida Barber is a 60 y.o. female with significant past medical history of Breast cancer. She has continued tamoxifen from [facility]. She complains about having feet pain numbness tingling. Sometimes it so severe that it makes her fall while she is shopping. She has been trialed on Cymbalta, we can only go up to 20 mg due to her taking tamoxifen. She is currently on Lyrica 300 mg at bedtime. She is using Percocet for her pain as well. I recommended that she start using methadone 2.5 mg twice daily, she did have a prescription from previously being prescribed this medication. She said that it made her feel very tired. We will try it again at a lower dose than she was previously prescribed. Previously she was prescribed 5 mg twice daily. I recommended that she slowly decrease her Percocet as well, as the methadone will start building up in her body.   She does not complain about having any nausea vomiting, or constipation    Objective:     Physical Exam  There were no vitals taken for this visit. [place] Symptom Assessment Score   [place] Score 10/29/2020 9/3/2020 7/9/2020 5/14/2020 4/2/2020   Pain Score 7 6 6 5 5   Tiredness Score 3 5 3 7 7   Nausea Score Not nauseated Not nauseated Not nauseated Not nauseated Not nauseated   Depression Score Not depressed 1 2 4 4   Anxiety Score 2 1 2 4 4   Drowsiness Score Not drowsy 3 6 Not drowsy Not drowsy   Appetite Score Best appetite 3 5 Best appetite Best appetite   Wellbeing Score Best feeling of wellbeing 8 5 Best feeling of wellbeing Best feeling of wellbeing   Dyspnea Score No shortness of breath No shortness of breath No shortness of breath No shortness of breath No shortness of breath   Other Problem Score Best possible response Best possible response Best possible response Best possible response Best possible response   Total Assessment Score(calculated) 12 27 29 20 20       Assessed by: patient. Current Medications:  Medications reviewed: yes    Controlled Substances Monitoring: OARRS reviewed 11/19/20. RX Monitoring 7/9/2020   Attestation -   Acute Pain Prescriptions -   Periodic Controlled Substance Monitoring Possible medication side effects, risk of tolerance/dependence & alternative treatments discussed. ;No signs of potential drug abuse or diversion identified. ;Assessed functional status. ;Obtaining appropriate analgesic effect of treatment. Chronic Pain > 50 MEDD Re-evaluated the status of the patient's underlying condition causing pain.    Chronic Pain > 80 MEDD -         [address] [facility] [place] Department     Time/Communication  Time 21-30 minutes

## 2020-11-24 RX ORDER — DULOXETIN HYDROCHLORIDE 20 MG/1
CAPSULE, DELAYED RELEASE ORAL
Qty: 30 CAPSULE | Refills: 0 | Status: CANCELLED | OUTPATIENT
Start: 2020-11-24

## 2020-11-24 RX ORDER — OXYCODONE AND ACETAMINOPHEN 10; 325 MG/1; MG/1
1 TABLET ORAL EVERY 4 HOURS PRN
Qty: 180 TABLET | Refills: 0 | Status: SHIPPED
Start: 2020-11-24 | End: 2020-12-22 | Stop reason: SDUPTHER

## 2020-11-24 NOTE — TELEPHONE ENCOUNTER
Request through [facility] from [place] for refills on Cymbalta and Percocet be sent to [facility] [address] [facility] in [place]. Next wily 12/3.

## 2020-11-25 RX ORDER — DULOXETIN HYDROCHLORIDE 20 MG/1
CAPSULE, DELAYED RELEASE ORAL
Qty: 30 CAPSULE | Refills: 0 | Status: SHIPPED
Start: 2020-11-25 | End: 2020-12-07

## 2020-12-03 ENCOUNTER — TELEMEDICINE (OUTPATIENT)
Dept: PALLATIVE CARE | Age: 55
End: 2020-12-03
Payer: COMMERCIAL

## 2020-12-03 PROCEDURE — 99442 PR PHYS/QHP TELEPHONE EVALUATION 11-20 MIN: CPT | Performed by: STUDENT IN AN ORGANIZED HEALTH CARE EDUCATION/TRAINING PROGRAM

## 2020-12-03 RX ORDER — METHADONE HYDROCHLORIDE 5 MG/1
5 TABLET ORAL 2 TIMES DAILY
Qty: 28 TABLET | Refills: 0 | Status: SHIPPED | OUTPATIENT
Start: 2020-12-03 | End: 2020-12-17

## 2020-12-03 NOTE — PROGRESS NOTES
Department of Palliative Medicine  Ambulatory Note  Provider: Javier Hall MD        Chief Complaint: Jose J Chaudhary is a 60 y.o. female with chief complaint of Pain    HPI:  61 y/o post-menopausal  female who underwent Stereotactic Left Breast mass core biopsy on 06/17/2016 revealing Invasive Lobular carcinoma, she has developed severe neuropathy after her last treatment. Assessment/Plan      Breast cancer   - Currently on tamoxifen    - Follows at [facility]    Pain due to neoplasm, (neuropathic type pain)    - increase Methadone to 5mg BID, will monitor her effects on this new regimen   - Continue Percocet 10/325mg q4hrs prn, uses 5-6 times daily. - Continue Cymbalta 20mg hs, low dose due to interaction with Tamoxifen    - Cont Lyrica to 300mg. 100mg at 3pm and 200mg at bedtime   - Stopped Pamelor 50mg, no longer taking this medication     Follow Up: 2 weeks Encouraged to call with any questions, concerns, needs, or changes in symptoms. Subjective:       Jose J Chaudhary is a 60 y.o. female with significant past medical history of Breast cancer. She has continued tamoxifen from [facility]. She complains about having feet pain numbness tingling. She mentioned that her pain has stayed relatively the same. She has been feeling more tired and fatigued, due to the change in the Lyrica, we are trying to use all 300 mg at bedtime. She did say she got more done throughout the day. I am not sure, if this is due to Lyrica or the methadone. She also told me that there was no benefit that she saw from the methadone, she has needed to take Percocet 5-6 times daily. I increased her methadone to 5 mg, from 2.5 mg twice daily. We will follow up with her in 2 weeks. She does not complain of nausea vomiting or constipation. Objective:     Physical Exam  There were no vitals taken for this visit.     [place] Symptom Assessment Score   [place] Score 10/29/2020 9/3/2020 7/9/2020 5/14/2020 4/2/2020   Pain Score 7 6 6 5 5   Tiredness Score 3 5 3 7 7   Nausea Score Not nauseated Not nauseated Not nauseated Not nauseated Not nauseated   Depression Score Not depressed 1 2 4 4   Anxiety Score 2 1 2 4 4   Drowsiness Score Not drowsy 3 6 Not drowsy Not drowsy   Appetite Score Best appetite 3 5 Best appetite Best appetite   Wellbeing Score Best feeling of wellbeing 8 5 Best feeling of wellbeing Best feeling of wellbeing   Dyspnea Score No shortness of breath No shortness of breath No shortness of breath No shortness of breath No shortness of breath   Other Problem Score Best possible response Best possible response Best possible response Best possible response Best possible response   Total Assessment Score(calculated) 12 27 29 20 20       Assessed by: patient. Current Medications:  Medications reviewed: yes    Controlled Substances Monitoring: [place]ARRS reviewed 12/3/20. RX Monitoring 7/9/2020   Attestation -   Acute Pain Prescriptions -   Periodic Controlled Substance Monitoring Possible medication side effects, risk of tolerance/dependence & alternative treatments discussed. ;No signs of potential drug abuse or diversion identified. ;Assessed functional status. ;Obtaining appropriate analgesic effect of treatment. Chronic Pain > 50 MEDD Re-evaluated the status of the patient's underlying condition causing pain.    Chronic Pain > 80 MEDD -         [address] [facility] Department     Time/Communication  Time 21-30 minutes

## 2020-12-04 ENCOUNTER — HOSPITAL ENCOUNTER (OUTPATIENT)
Dept: GENERAL RADIOLOGY | Age: 55
Discharge: HOME | End: 2020-12-06
Payer: COMMERCIAL

## 2020-12-04 PROCEDURE — 76642 ULTRASOUND BREAST LIMITED: CPT

## 2020-12-04 PROCEDURE — 77063 BREAST TOMOSYNTHESIS BI: CPT

## 2020-12-07 RX ORDER — DULOXETIN HYDROCHLORIDE 20 MG/1
CAPSULE, DELAYED RELEASE ORAL
Qty: 30 CAPSULE | Refills: 2 | Status: SHIPPED
Start: 2020-12-07 | End: 2021-04-07 | Stop reason: SDUPTHER

## 2020-12-07 RX ORDER — DULOXETIN HYDROCHLORIDE 20 MG/1
CAPSULE, DELAYED RELEASE ORAL
Qty: 30 CAPSULE | Refills: 2 | Status: CANCELLED | OUTPATIENT
Start: 2020-12-07

## 2020-12-22 ENCOUNTER — TELEMEDICINE (OUTPATIENT)
Dept: PALLATIVE CARE | Age: 55
End: 2020-12-22
Payer: COMMERCIAL

## 2020-12-22 PROCEDURE — 99442 PR PHYS/QHP TELEPHONE EVALUATION 11-20 MIN: CPT | Performed by: STUDENT IN AN ORGANIZED HEALTH CARE EDUCATION/TRAINING PROGRAM

## 2020-12-22 RX ORDER — PREGABALIN 200 MG/1
200 CAPSULE ORAL NIGHTLY
Qty: 30 CAPSULE | Refills: 1 | Status: SHIPPED
Start: 2020-12-22 | End: 2021-02-18 | Stop reason: SDUPTHER

## 2020-12-22 RX ORDER — OXYCODONE AND ACETAMINOPHEN 10; 325 MG/1; MG/1
1 TABLET ORAL EVERY 4 HOURS PRN
Qty: 180 TABLET | Refills: 0 | Status: SHIPPED
Start: 2020-12-22 | End: 2021-01-21 | Stop reason: SDUPTHER

## 2020-12-22 NOTE — PROGRESS NOTES
Department of Palliative Medicine  Virtual visit telephone encounter  Provider: Stevo Montesinos MD        Chief Complaint: Francie Narvaez is a 60 y.o. female with chief complaint of Pain    HPI:  59 y/o post-menopausal  female who underwent Stereotactic Left Breast mass core biopsy on 06/17/2016 revealing Invasive Lobular carcinoma, she has developed severe neuropathy after her last treatment. Assessment/Plan      Breast cancer   - Currently on tamoxifen    - Follows at [facility]    Pain due to neoplasm, (neuropathic type pain)    - Continue Percocet 10/325mg q4hrs prn, uses 5-6 times daily. Refill sent   - Continue Cymbalta 20mg hs, low dose due to interaction with Tamoxifen    - Cont Lyrica to 300mg. 100mg at 3pm and 200mg at bedtime   - trialed  lyrica Methadone to 5mg BID, caused headache and dizziness   - Stopped Pamelor 50mg, no longer taking this medication     Follow Up: 8 weeks Encouraged to call with any questions, concerns, needs, or changes in symptoms. Subjective:       Francie Narvaez is a 60 y.o. female with significant past medical history of Breast cancer. She has continued tamoxifen from [facility]. She complains about having feet pain numbness tingling. Trialed methadone for her numbness, this caused her to become more dizzy and have throbbing headaches. She did not tolerate it well, she said the Percocet has been helping with her pain, takes edge off. She is also using Lyrica and Cymbalta for her neuropathic pain. We will continue taking these medications, as these are the only medications that have helped her. Objective:     Physical Exam  There were no vitals taken for this visit.     [place] Symptom Assessment Score   [place] Score 10/29/2020 9/3/2020 7/9/2020 5/14/2020 4/2/2020   Pain Score 7 6 6 5 5   Tiredness Score 3 5 3 7 7   Nausea Score Not nauseated Not nauseated Not nauseated Not nauseated Not nauseated   Depression Score Not depressed 1 2 4 4

## 2021-01-21 DIAGNOSIS — G89.3 CHRONIC PAIN DUE TO NEOPLASM: ICD-10-CM

## 2021-01-21 DIAGNOSIS — G62.0 CHEMOTHERAPY-INDUCED NEUROPATHY (HCC): ICD-10-CM

## 2021-01-21 DIAGNOSIS — T45.1X5A PERIPHERAL NEUROPATHY DUE TO CHEMOTHERAPY (HCC): ICD-10-CM

## 2021-01-21 DIAGNOSIS — C50.512 MALIGNANT NEOPLASM OF LOWER-OUTER QUADRANT OF LEFT FEMALE BREAST, UNSPECIFIED ESTROGEN RECEPTOR STATUS (HCC): ICD-10-CM

## 2021-01-21 DIAGNOSIS — Z51.5 PALLIATIVE CARE BY SPECIALIST: ICD-10-CM

## 2021-01-21 DIAGNOSIS — G62.0 PERIPHERAL NEUROPATHY DUE TO CHEMOTHERAPY (HCC): ICD-10-CM

## 2021-01-21 DIAGNOSIS — T45.1X5A CHEMOTHERAPY-INDUCED NEUROPATHY (HCC): ICD-10-CM

## 2021-01-21 RX ORDER — OXYCODONE AND ACETAMINOPHEN 10; 325 MG/1; MG/1
1 TABLET ORAL EVERY 4 HOURS PRN
Qty: 180 TABLET | Refills: 0 | Status: SHIPPED
Start: 2021-01-21 | End: 2021-02-18 | Stop reason: CLARIF

## 2021-01-21 NOTE — TELEPHONE ENCOUNTER
Message from [place] sent through [address] [place] [address] Rd for refill of percocet. Pharmacy is [facility] in [place]. Next wily 2/18/21.

## 2021-02-18 ENCOUNTER — OFFICE VISIT (OUTPATIENT)
Dept: PALLATIVE CARE | Age: 56
End: 2021-02-18
Payer: COMMERCIAL

## 2021-02-18 ENCOUNTER — TELEPHONE (OUTPATIENT)
Dept: RADIATION ONCOLOGY | Age: 56
End: 2021-02-18

## 2021-02-18 VITALS
BODY MASS INDEX: 27.1 KG/M2 | OXYGEN SATURATION: 98 % | HEART RATE: 92 BPM | WEIGHT: 173 LBS | SYSTOLIC BLOOD PRESSURE: 110 MMHG | DIASTOLIC BLOOD PRESSURE: 77 MMHG

## 2021-02-18 DIAGNOSIS — Z51.5 PALLIATIVE CARE BY SPECIALIST: ICD-10-CM

## 2021-02-18 DIAGNOSIS — T45.1X5A CHEMOTHERAPY-INDUCED NEUROPATHY (HCC): ICD-10-CM

## 2021-02-18 DIAGNOSIS — C50.512 MALIGNANT NEOPLASM OF LOWER-OUTER QUADRANT OF LEFT FEMALE BREAST, UNSPECIFIED ESTROGEN RECEPTOR STATUS (HCC): ICD-10-CM

## 2021-02-18 DIAGNOSIS — G89.3 CHRONIC PAIN DUE TO NEOPLASM: ICD-10-CM

## 2021-02-18 DIAGNOSIS — G62.0 CHEMOTHERAPY-INDUCED NEUROPATHY (HCC): ICD-10-CM

## 2021-02-18 LAB
AMPHETAMINE SCREEN, URINE: NOT DETECTED
BARBITURATE SCREEN URINE: NOT DETECTED
BENZODIAZEPINE SCREEN, URINE: NOT DETECTED
CANNABINOID SCREEN URINE: NOT DETECTED
COCAINE METABOLITE SCREEN URINE: NOT DETECTED
FENTANYL SCREEN, URINE: NOT DETECTED
Lab: ABNORMAL
METHADONE SCREEN, URINE: NOT DETECTED
OPIATE SCREEN URINE: NOT DETECTED
OXYCODONE URINE: POSITIVE
PHENCYCLIDINE SCREEN URINE: NOT DETECTED

## 2021-02-18 PROCEDURE — 1036F TOBACCO NON-USER: CPT | Performed by: STUDENT IN AN ORGANIZED HEALTH CARE EDUCATION/TRAINING PROGRAM

## 2021-02-18 PROCEDURE — 99213 OFFICE O/P EST LOW 20 MIN: CPT | Performed by: STUDENT IN AN ORGANIZED HEALTH CARE EDUCATION/TRAINING PROGRAM

## 2021-02-18 PROCEDURE — G8484 FLU IMMUNIZE NO ADMIN: HCPCS | Performed by: STUDENT IN AN ORGANIZED HEALTH CARE EDUCATION/TRAINING PROGRAM

## 2021-02-18 PROCEDURE — G0480 DRUG TEST DEF 1-7 CLASSES: HCPCS

## 2021-02-18 PROCEDURE — G8428 CUR MEDS NOT DOCUMENT: HCPCS | Performed by: STUDENT IN AN ORGANIZED HEALTH CARE EDUCATION/TRAINING PROGRAM

## 2021-02-18 PROCEDURE — 99212 OFFICE O/P EST SF 10 MIN: CPT | Performed by: STUDENT IN AN ORGANIZED HEALTH CARE EDUCATION/TRAINING PROGRAM

## 2021-02-18 PROCEDURE — 80307 DRUG TEST PRSMV CHEM ANLYZR: CPT

## 2021-02-18 PROCEDURE — 3017F COLORECTAL CA SCREEN DOC REV: CPT | Performed by: STUDENT IN AN ORGANIZED HEALTH CARE EDUCATION/TRAINING PROGRAM

## 2021-02-18 PROCEDURE — G8417 CALC BMI ABV UP PARAM F/U: HCPCS | Performed by: STUDENT IN AN ORGANIZED HEALTH CARE EDUCATION/TRAINING PROGRAM

## 2021-02-18 RX ORDER — PREGABALIN 100 MG/1
100 CAPSULE ORAL DAILY
Qty: 30 CAPSULE | Refills: 0 | Status: SHIPPED
Start: 2021-02-18 | End: 2021-04-15 | Stop reason: SDUPTHER

## 2021-02-18 RX ORDER — PREGABALIN 200 MG/1
200 CAPSULE ORAL NIGHTLY
Qty: 30 CAPSULE | Refills: 1 | Status: SHIPPED
Start: 2021-02-18 | End: 2021-06-03 | Stop reason: SDUPTHER

## 2021-02-18 RX ORDER — OXYCODONE HYDROCHLORIDE 15 MG/1
15 TABLET ORAL EVERY 4 HOURS PRN
Qty: 84 TABLET | Refills: 0 | Status: SHIPPED
Start: 2021-02-18 | End: 2021-03-04 | Stop reason: SDUPTHER

## 2021-02-18 NOTE — PROGRESS NOTES
Department of Palliative Medicine  Ambulatory visit  Provider: Ortiz Ivy MD        Chief Complaint: Imani Enamorado is a 60 y.o. female with chief complaint of Pain    HPI:  59 y/o post-menopausal  female who underwent Stereotactic Left Breast mass core biopsy on 06/17/2016 revealing Invasive Lobular carcinoma, she has developed severe neuropathy after her last treatment. 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 100mg at 3pm and 200mg at bedtime   - trialed  lyrica Methadone to 5mg BID, caused headache and dizziness   - Stopped Pamelor 50mg, no longer taking this medication     Follow Up: 2 weeks Encouraged to call with any questions, concerns, needs, or changes in symptoms. Subjective:     Imani Enamorado is a 60 y.o. female with significant past medical history of Breast cancer. She continues complaining of throbbing aching pain in her legs she has been utilizing Percocet 10 mg. She continues using this medication every 4 hours, total has been 5 to 6 pills daily. She says towards the end of 2 hours after taking the pill, she has increasing pain in her legs described as 8 out of 10, aching like a toothache. We can try her on 15 mg of oxycodone, and monitor her relief. This is a increasing 50%, she should not need as much medications. She denies any nausea or vomiting, or constipation. She has found relief from Lyrica, she will continue utilizing this medication. We will follow up with her in 2 weeks monitor her effects on oxycodone increase. Objective:     Physical Exam  There were no vitals taken for this visit.     Gen:  Alert, appears stated age, well nourished, in no acute distress  HEENT:  Normocephalic, conjunctiva pink, no drainage, mucosa moist  Lungs:  CTA bilaterally, no audible rhonchi or wheezes noted  Heart[de-identified]  RRR, no murmur, rub,  Abd:  Soft, non tender, non distended,   M/S/Ext:  Moving all extremities, no edema,   Skin:  Warm and dry  Neuro:  PERRL, Alert, oriented x 3; following commands      [place] Symptom Assessment Score   [place] Score 2/18/2021 10/29/2020 9/3/2020 7/9/2020 5/14/2020   Pain Score 5 7 6 6 5   Tiredness Score 5 3 5 3 7   Nausea Score Not nauseated Not nauseated Not nauseated Not nauseated Not nauseated   Depression Score 3 Not depressed 1 2 4   Anxiety Score 3 2 1 2 4   Drowsiness Score 3 Not drowsy 3 6 Not drowsy   Appetite Score 5 Best appetite 3 5 Best appetite   Wellbeing Score 5 Best feeling of wellbeing 8 5 Best feeling of wellbeing   Dyspnea Score No shortness of breath No shortness of breath No shortness of breath No shortness of breath No shortness of breath   Other Problem Score Best possible response Best possible response Best possible response Best possible response Best possible response   Total Assessment Score(calculated) 29 12 27 29 20       Assessed by: patient. Current Medications:  Medications reviewed: yes    Controlled Substances Monitoring: [place]AR[place] reviewed 2/18/21. RX Monitoring 7/9/2020   Attestation -   Acute Pain Prescriptions -   Periodic Controlled Substance Monitoring Possible medication side effects, risk of tolerance/dependence & alternative treatments discussed. ;No signs of potential drug abuse or diversion identified. ;Assessed functional status. ;Obtaining appropriate analgesic effect of treatment. Chronic Pain > 50 MEDD Re-evaluated the status of the patient's underlying condition causing pain.    Chronic Pain > [de-identified] MEDD -         Chadwick Castleman MD  Palliative Care Department       Time/Communication  Greater than 50% of time spent, total 30 minutes in face-to-face counseling and coordination of care regarding symptom management.

## 2021-02-21 LAB
6AM URINE: <10 NG/ML
CODEINE, URINE: <20 NG/ML
HYDROCODONE, URINE: <20 NG/ML
HYDROMORPHONE, URINE: <20 NG/ML
MORPHINE URINE: <20 NG/ML
NORHYDROCODONE, URINE: <20 NG/ML
NOROXYCODONE, URINE: 1751 NG/ML
NOROXYMORPHONE, URINE: 217 NG/ML
OXYCODONE, URINE CONFIRMATION: 799 NG/ML
OXYMORPHONE, URINE: <20 NG/ML

## 2021-03-04 ENCOUNTER — VIRTUAL VISIT (OUTPATIENT)
Dept: PALLATIVE CARE | Age: 56
End: 2021-03-04
Payer: COMMERCIAL

## 2021-03-04 DIAGNOSIS — Z51.5 PALLIATIVE CARE BY SPECIALIST: ICD-10-CM

## 2021-03-04 DIAGNOSIS — G89.3 CHRONIC PAIN DUE TO NEOPLASM: ICD-10-CM

## 2021-03-04 PROCEDURE — 99442 PR PHYS/QHP TELEPHONE EVALUATION 11-20 MIN: CPT | Performed by: STUDENT IN AN ORGANIZED HEALTH CARE EDUCATION/TRAINING PROGRAM

## 2021-03-04 RX ORDER — OXYCODONE HYDROCHLORIDE 15 MG/1
15 TABLET ORAL EVERY 4 HOURS PRN
Qty: 180 TABLET | Refills: 0 | Status: SHIPPED
Start: 2021-03-04 | End: 2021-03-05 | Stop reason: SDUPTHER

## 2021-03-04 NOTE — PROGRESS NOTES
Department of Palliative Medicine  Virtual visit telephone encounter  Provider: Alda Pollack MD        Chief Complaint: Zehra Leung is a 60 y.o. female with chief complaint of Pain    HPI:  61 y/o post-menopausal  female who underwent Stereotactic Left Breast mass core biopsy on 06/17/2016 revealing Invasive Lobular carcinoma, she has developed severe neuropathy after her last treatment. Assessment/Plan      Breast cancer   - Currently on tamoxifen    - Follows at [facility]    Pain due to neoplasm, (neuropathic type pain)    -Continue oxycodone 15mg q4hr prn, uses 4-5 daily previously she was using percocet 10mg   - Continue Cymbalta 20mg hs, low dose due to interaction with Tamoxifen    - Cont Lyrica to 300mg. 100mg at 3pm and 200mg at bedtime   - trialed  lyrica Methadone to 5mg BID, caused headache and dizziness   - Stopped Pamelor 50mg, no longer taking this medication     Follow Up: 6 weeks Encouraged to call with any questions, concerns, needs, or changes in symptoms. Subjective:     Zehra Leung is a 60 y.o. female with significant past medical history of Breast cancer. Spoke to her over the phone, she says that the 15 mg of oxycodone have been much better than the 10 mg of Percocet she was previously using. She has more coverage, and it last longer throughout the day. She denies any nausea or vomiting, any appetite change or constipation.   We will follow up with her in 6 weeks    Objective:       [place] Symptom Assessment Score   [place] Score 2/18/2021 10/29/2020 9/3/2020 7/9/2020 5/14/2020   Pain Score 5 7 6 6 5   Tiredness Score 5 3 5 3 7   Nausea Score Not nauseated Not nauseated Not nauseated Not nauseated Not nauseated   Depression Score 3 Not depressed 1 2 4   Anxiety Score 3 2 1 2 4   Drowsiness Score 3 Not drowsy 3 6 Not drowsy   Appetite Score 5 Best appetite 3 5 Best appetite   Wellbeing Score 5 Best feeling of wellbeing 8 5 Best feeling of wellbeing   Dyspnea Score No shortness of breath No shortness of breath No shortness of breath No shortness of breath No shortness of breath   Other Problem Score Best possible response Best possible response Best possible response Best possible response Best possible response   Total Assessment Score(calculated) 29 12 27 29 20       Assessed by: patient. Current Medications:  Medications reviewed: yes    Controlled Substances Monitoring: OARRS reviewed 3/4/21. RX Monitoring 7/9/2020   Attestation -   Acute Pain Prescriptions -   Periodic Controlled Substance Monitoring Possible medication side effects, risk of tolerance/dependence & alternative treatments discussed. ;No signs of potential drug abuse or diversion identified. ;Assessed functional status. ;Obtaining appropriate analgesic effect of treatment. Chronic Pain > 50 MEDD Re-evaluated the status of the patient's underlying condition causing pain.    Chronic Pain > 80 MEDD -         [address] [facility] Department       Time/Communication  Time 11-20 minutes

## 2021-03-05 DIAGNOSIS — G89.3 CHRONIC PAIN DUE TO NEOPLASM: ICD-10-CM

## 2021-03-05 DIAGNOSIS — Z51.5 PALLIATIVE CARE BY SPECIALIST: ICD-10-CM

## 2021-03-05 RX ORDER — OXYCODONE HYDROCHLORIDE 15 MG/1
15 TABLET ORAL EVERY 4 HOURS PRN
Qty: 180 TABLET | Refills: 0 | Status: SHIPPED
Start: 2021-03-05 | End: 2021-04-07 | Stop reason: SDUPTHER

## 2021-03-09 ENCOUNTER — TELEPHONE (OUTPATIENT)
Dept: PALLATIVE CARE | Age: 56
End: 2021-03-09

## 2021-03-09 NOTE — TELEPHONE ENCOUNTER
[facility] pharmacy in [place] was unable to fill Uriel's Oxy IR prescription. Spoke with Katlyn N Adil[facility] Morales on [address] and they have received the medication and will get ready for patient. Called to Conner Corea and to [facility] to notify of above.

## 2021-04-07 DIAGNOSIS — G89.3 CHRONIC PAIN DUE TO NEOPLASM: ICD-10-CM

## 2021-04-07 DIAGNOSIS — Z51.5 PALLIATIVE CARE BY SPECIALIST: ICD-10-CM

## 2021-04-07 RX ORDER — DULOXETIN HYDROCHLORIDE 20 MG/1
CAPSULE, DELAYED RELEASE ORAL
Qty: 30 CAPSULE | Refills: 2 | Status: SHIPPED
Start: 2021-04-07 | End: 2021-04-07 | Stop reason: SDUPTHER

## 2021-04-07 RX ORDER — DULOXETIN HYDROCHLORIDE 20 MG/1
CAPSULE, DELAYED RELEASE ORAL
Qty: 30 CAPSULE | Refills: 2 | Status: SHIPPED | OUTPATIENT
Start: 2021-04-07 | End: 2021-06-30 | Stop reason: SDUPTHER

## 2021-04-07 RX ORDER — OXYCODONE HYDROCHLORIDE 15 MG/1
15 TABLET ORAL EVERY 4 HOURS PRN
Qty: 180 TABLET | Refills: 0 | Status: SHIPPED
Start: 2021-04-07 | End: 2021-05-06 | Stop reason: SDUPTHER

## 2021-04-07 NOTE — TELEPHONE ENCOUNTER
Call from [facility] requesting refill for Oxy IR and for Cymbalta. Pharmacy is [facility] on [address]. [facility] wily 4/15/21.

## 2021-04-15 ENCOUNTER — OFFICE VISIT (OUTPATIENT)
Dept: PALLATIVE CARE | Age: 56
End: 2021-04-15
Payer: COMMERCIAL

## 2021-04-15 VITALS
HEART RATE: 97 BPM | BODY MASS INDEX: 27.25 KG/M2 | WEIGHT: 174 LBS | SYSTOLIC BLOOD PRESSURE: 122 MMHG | OXYGEN SATURATION: 94 % | DIASTOLIC BLOOD PRESSURE: 60 MMHG

## 2021-04-15 DIAGNOSIS — G89.3 CHRONIC PAIN DUE TO NEOPLASM: ICD-10-CM

## 2021-04-15 DIAGNOSIS — Z51.5 PALLIATIVE CARE BY SPECIALIST: ICD-10-CM

## 2021-04-15 PROCEDURE — 99213 OFFICE O/P EST LOW 20 MIN: CPT | Performed by: STUDENT IN AN ORGANIZED HEALTH CARE EDUCATION/TRAINING PROGRAM

## 2021-04-15 PROCEDURE — 1036F TOBACCO NON-USER: CPT | Performed by: STUDENT IN AN ORGANIZED HEALTH CARE EDUCATION/TRAINING PROGRAM

## 2021-04-15 PROCEDURE — 3017F COLORECTAL CA SCREEN DOC REV: CPT | Performed by: STUDENT IN AN ORGANIZED HEALTH CARE EDUCATION/TRAINING PROGRAM

## 2021-04-15 PROCEDURE — G8428 CUR MEDS NOT DOCUMENT: HCPCS | Performed by: STUDENT IN AN ORGANIZED HEALTH CARE EDUCATION/TRAINING PROGRAM

## 2021-04-15 PROCEDURE — G8417 CALC BMI ABV UP PARAM F/U: HCPCS | Performed by: STUDENT IN AN ORGANIZED HEALTH CARE EDUCATION/TRAINING PROGRAM

## 2021-04-15 RX ORDER — PREGABALIN 100 MG/1
100 CAPSULE ORAL DAILY
Qty: 30 CAPSULE | Refills: 0 | Status: SHIPPED
Start: 2021-04-15 | End: 2021-06-03 | Stop reason: SDUPTHER

## 2021-04-15 NOTE — PROGRESS NOTES
Department of Palliative Medicine  Ambulatory visit  Provider: Johana Morgan MD        Chief Complaint: Valentina Alejo is a 60 y.o. female with chief complaint of Pain    HPI:  61 y/o post-menopausal  female who underwent Stereotactic Left Breast mass core biopsy on 06/17/2016 revealing Invasive Lobular carcinoma, she has developed severe neuropathy after her last treatment. Assessment/Plan      Breast cancer   - Currently on tamoxifen    - [facility]s at [facility]    Pain due to neoplasm, (neuropathic type pain)    -Continue oxycodone 15mg q4hr prn, uses 4-5 daily previously she was using percocet 10mg, we can try to decrease this on her next appointment in 8 weeks back to 10 mg.    - Continue Cymbalta 20mg hs, low dose due to interaction with Tamoxifen    - Cont Lyrica to 300mg. 100mg at 3pm and 200mg at bedtime   - trialed  lyrica Methadone to 5mg BID, caused headache and dizziness   - Stopped Pamelor 50mg, no longer taking this medication     Follow Up: 8 weeks Encouraged to call with any questions, concerns, needs, or changes in symptoms. Subjective:     Valentina Alejo is a 60 y.o. female with significant past medical history of Breast cancer. Met patient today, she had no complaints. She mentions about the weather getting better, her joints are getting better and the tooth ache feeling in her legs has gotten better as well. As the weather warms up, we can potentially decrease her oxycodone from 15 mg to 10 mg which she was previously on. She denies any nausea or vomiting, or any constipation. There is no appetite change. She has been utilizing Lyrica 100 mg at 3 PM, and 200 mg at bedtime. We will follow up with her in 8 weeks.        Objective:     Physical Exam  Gen:  Alert, appears stated age, well nourished, in no acute distress  HEENT:  Normocephalic, conjunctiva pink, no drainage, mucosa moist  Lungs:  CTA bilaterally, no audible rhonchi or wheezes noted  Heart[de-identified]  RRR, no murmur, rub,  Abd:  Soft, non tender, non distended,   M/S/Ext:  Moving all extremities, no edema,   Skin:  Warm and dry  Neuro:  PERRL, Alert, oriented x 3; following commands      [place] Symptom Assessment Score   [place] Score 4/15/2021 2/18/2021 10/29/2020 9/3/2020 7/9/2020   Pain Score 5 5 7 6 6   Tiredness Score 3 5 3 5 3   Nausea Score Not nauseated Not nauseated Not nauseated Not nauseated Not nauseated   Depression Score 3 3 Not depressed 1 2   Anxiety Score 3 3 2 1 2   Drowsiness Score 3 3 Not drowsy 3 6   Appetite Score 5 5 Best appetite 3 5   Wellbeing Score 5 5 Best feeling of wellbeing 8 5   Dyspnea Score No shortness of breath No shortness of breath No shortness of breath No shortness of breath No shortness of breath   Other Problem Score Best possible response Best possible response Best possible response Best possible response Best possible response   Total Assessment Score(calculated) 27 29 12 27 29     Assessed by: patient. Current Medications:  Medications reviewed: yes    Controlled Substances Monitoring: OARRS reviewed 4/15/21. RX Monitoring 7/9/2020   Attestation -   Acute Pain Prescriptions -   Periodic Controlled Substance Monitoring Possible medication side effects, risk of tolerance/dependence & alternative treatments discussed. ;No signs of potential drug abuse or diversion identified. ;Assessed functional status. ;Obtaining appropriate analgesic effect of treatment. Chronic Pain > 50 MEDD Re-evaluated the status of the patient's underlying condition causing pain. Chronic Pain > [de-identified] MEDD -         Jess Dixon MD  Palliative Care Department       Time/Communication  Greater than 50% of time spent, total 30 minutes in face-to-face counseling and coordination of care regarding symptom management.

## 2021-05-06 DIAGNOSIS — Z51.5 PALLIATIVE CARE BY SPECIALIST: ICD-10-CM

## 2021-05-06 DIAGNOSIS — G89.3 CHRONIC PAIN DUE TO NEOPLASM: ICD-10-CM

## 2021-05-06 RX ORDER — OXYCODONE HYDROCHLORIDE 15 MG/1
15 TABLET ORAL EVERY 4 HOURS PRN
Qty: 180 TABLET | Refills: 0 | Status: SHIPPED
Start: 2021-05-06 | End: 2021-06-03 | Stop reason: SDUPTHER

## 2021-05-06 NOTE — TELEPHONE ENCOUNTER
Call from [facility] requesting refill for Oxy IR so she can pick it up over the weekend. [facility] is [facility] [address] and next wily is 6/9/21.

## 2021-06-03 DIAGNOSIS — C50.512 MALIGNANT NEOPLASM OF LOWER-OUTER QUADRANT OF LEFT FEMALE BREAST, UNSPECIFIED ESTROGEN RECEPTOR STATUS (HCC): ICD-10-CM

## 2021-06-03 DIAGNOSIS — G89.3 CHRONIC PAIN DUE TO NEOPLASM: ICD-10-CM

## 2021-06-03 DIAGNOSIS — Z51.5 PALLIATIVE CARE BY SPECIALIST: ICD-10-CM

## 2021-06-03 DIAGNOSIS — G62.0 CHEMOTHERAPY-INDUCED NEUROPATHY (HCC): ICD-10-CM

## 2021-06-03 DIAGNOSIS — T45.1X5A CHEMOTHERAPY-INDUCED NEUROPATHY (HCC): ICD-10-CM

## 2021-06-03 RX ORDER — PREGABALIN 100 MG/1
100 CAPSULE ORAL DAILY
Qty: 30 CAPSULE | Refills: 0 | Status: SHIPPED
Start: 2021-06-03 | End: 2021-09-15 | Stop reason: ALTCHOICE

## 2021-06-03 RX ORDER — OXYCODONE HYDROCHLORIDE 15 MG/1
15 TABLET ORAL EVERY 4 HOURS PRN
Qty: 180 TABLET | Refills: 0 | Status: SHIPPED | OUTPATIENT
Start: 2021-06-03 | End: 2021-06-30 | Stop reason: SDUPTHER

## 2021-06-03 RX ORDER — PREGABALIN 200 MG/1
200 CAPSULE ORAL NIGHTLY
Qty: 30 CAPSULE | Refills: 1 | Status: SHIPPED
Start: 2021-06-03 | End: 2021-07-28 | Stop reason: SDUPTHER

## 2021-06-09 ENCOUNTER — OFFICE VISIT (OUTPATIENT)
Dept: PALLATIVE CARE | Age: 56
End: 2021-06-09
Payer: COMMERCIAL

## 2021-06-09 VITALS
BODY MASS INDEX: 27.41 KG/M2 | WEIGHT: 175 LBS | SYSTOLIC BLOOD PRESSURE: 114 MMHG | OXYGEN SATURATION: 95 % | DIASTOLIC BLOOD PRESSURE: 65 MMHG | HEART RATE: 88 BPM

## 2021-06-09 DIAGNOSIS — Z51.5 PALLIATIVE CARE BY SPECIALIST: ICD-10-CM

## 2021-06-09 PROCEDURE — 99213 OFFICE O/P EST LOW 20 MIN: CPT | Performed by: STUDENT IN AN ORGANIZED HEALTH CARE EDUCATION/TRAINING PROGRAM

## 2021-06-09 PROCEDURE — 3017F COLORECTAL CA SCREEN DOC REV: CPT | Performed by: STUDENT IN AN ORGANIZED HEALTH CARE EDUCATION/TRAINING PROGRAM

## 2021-06-09 PROCEDURE — G8417 CALC BMI ABV UP PARAM F/U: HCPCS | Performed by: STUDENT IN AN ORGANIZED HEALTH CARE EDUCATION/TRAINING PROGRAM

## 2021-06-09 PROCEDURE — 1036F TOBACCO NON-USER: CPT | Performed by: STUDENT IN AN ORGANIZED HEALTH CARE EDUCATION/TRAINING PROGRAM

## 2021-06-09 PROCEDURE — G8428 CUR MEDS NOT DOCUMENT: HCPCS | Performed by: STUDENT IN AN ORGANIZED HEALTH CARE EDUCATION/TRAINING PROGRAM

## 2021-06-09 PROCEDURE — 99212 OFFICE O/P EST SF 10 MIN: CPT | Performed by: STUDENT IN AN ORGANIZED HEALTH CARE EDUCATION/TRAINING PROGRAM

## 2021-06-09 RX ORDER — DESIPRAMINE HYDROCHLORIDE 10 MG/1
10 TABLET ORAL NIGHTLY
Qty: 14 TABLET | Refills: 0 | Status: SHIPPED
Start: 2021-06-09 | End: 2021-06-23 | Stop reason: SDUPTHER

## 2021-06-09 NOTE — PROGRESS NOTES
murmur, rub,  Abd:  Soft, non tender, non distended,   M/S/Ext:  Moving all extremities, no edema,   Skin:  Warm and dry  Neuro:  PERRL, Alert, oriented x 3; following commands    [place] Symptom Assessment Score   [place] Score 4/15/2021 2/18/2021 10/29/2020 9/3/2020 7/9/2020   Pain Score 5 5 7 6 6   Tiredness Score 3 5 3 5 3   Nausea Score Not nauseated Not nauseated Not nauseated Not nauseated Not nauseated   Depression Score 3 3 Not depressed 1 2   Anxiety Score 3 3 2 1 2   Drowsiness Score 3 3 Not drowsy 3 6   Appetite Score 5 5 Best appetite 3 5   Wellbeing Score 5 5 Best feeling of wellbeing 8 5   Dyspnea Score No shortness of breath No shortness of breath No shortness of breath No shortness of breath No shortness of breath   Other Problem Score Best possible response Best possible response Best possible response Best possible response Best possible response   Total Assessment Score(calculated) 27 29 12 27 29     Assessed by: patient. Current Medications:  Medications reviewed: yes    Controlled Substances Monitoring: OARRS reviewed 6/9/21. RX Monitoring 7/9/2020   Attestation -   Acute Pain Prescriptions -   Periodic Controlled Substance Monitoring Possible medication side effects, risk of tolerance/dependence & alternative treatments discussed. ;No signs of potential drug abuse or diversion identified. ;Assessed functional status. ;Obtaining appropriate analgesic effect of treatment. Chronic Pain > 50 MEDD Re-evaluated the status of the patient's underlying condition causing pain. Chronic Pain > [de-identified] MEDD -     Edin Cameron MD   Palliative Care Department       Time/Communication  Greater than 50% of time spent, total 30 minutes in face-to-face counseling and coordination of care regarding symptom management.

## 2021-06-23 RX ORDER — DESIPRAMINE HYDROCHLORIDE 10 MG/1
10 TABLET ORAL NIGHTLY
Qty: 30 TABLET | Refills: 0 | Status: SHIPPED
Start: 2021-06-23 | End: 2021-07-21 | Stop reason: SDUPTHER

## 2021-06-30 DIAGNOSIS — G89.3 CHRONIC PAIN DUE TO NEOPLASM: ICD-10-CM

## 2021-06-30 DIAGNOSIS — Z51.5 PALLIATIVE CARE BY SPECIALIST: ICD-10-CM

## 2021-06-30 RX ORDER — OXYCODONE HYDROCHLORIDE 15 MG/1
15 TABLET ORAL EVERY 4 HOURS PRN
Qty: 180 TABLET | Refills: 0 | Status: SHIPPED
Start: 2021-06-30 | End: 2021-07-28 | Stop reason: SDUPTHER

## 2021-06-30 RX ORDER — DULOXETIN HYDROCHLORIDE 20 MG/1
CAPSULE, DELAYED RELEASE ORAL
Qty: 30 CAPSULE | Refills: 2 | Status: SHIPPED
Start: 2021-06-30 | End: 2021-07-12 | Stop reason: SDUPTHER

## 2021-06-30 NOTE — TELEPHONE ENCOUNTER
Sera [facility] called requesting refills for her Cymbalta and Oxy IR. Pharmacy is [facility] on [address]. Next wily 7/28/21.

## 2021-07-12 RX ORDER — DULOXETIN HYDROCHLORIDE 20 MG/1
CAPSULE, DELAYED RELEASE ORAL
Qty: 30 CAPSULE | Refills: 2 | Status: SHIPPED
Start: 2021-07-12 | End: 2021-10-13 | Stop reason: SDUPTHER

## 2021-07-12 NOTE — TELEPHONE ENCOUNTER
Message from [place] that she needs refill for Cymbalta. Pharmacy is [facility] on [address]. Next wily 7/21/21.

## 2021-07-21 ENCOUNTER — OFFICE VISIT (OUTPATIENT)
Dept: PALLATIVE CARE | Age: 56
End: 2021-07-21
Payer: COMMERCIAL

## 2021-07-21 VITALS
SYSTOLIC BLOOD PRESSURE: 123 MMHG | OXYGEN SATURATION: 97 % | WEIGHT: 169 LBS | HEART RATE: 98 BPM | BODY MASS INDEX: 26.47 KG/M2 | DIASTOLIC BLOOD PRESSURE: 61 MMHG

## 2021-07-21 DIAGNOSIS — Z51.5 PALLIATIVE CARE BY SPECIALIST: ICD-10-CM

## 2021-07-21 DIAGNOSIS — G89.3 CHRONIC PAIN DUE TO NEOPLASM: ICD-10-CM

## 2021-07-21 PROCEDURE — 99213 OFFICE O/P EST LOW 20 MIN: CPT | Performed by: NURSE PRACTITIONER

## 2021-07-21 PROCEDURE — 99212 OFFICE O/P EST SF 10 MIN: CPT | Performed by: NURSE PRACTITIONER

## 2021-07-21 RX ORDER — DESIPRAMINE HYDROCHLORIDE 10 MG/1
10 TABLET ORAL 2 TIMES DAILY
Qty: 60 TABLET | Refills: 0 | Status: SHIPPED
Start: 2021-07-21 | End: 2021-07-21 | Stop reason: SDUPTHER

## 2021-07-21 RX ORDER — DESIPRAMINE HYDROCHLORIDE 10 MG/1
10 TABLET ORAL 2 TIMES DAILY
Qty: 60 TABLET | Refills: 0 | Status: SHIPPED
Start: 2021-07-21 | End: 2021-08-25 | Stop reason: SDUPTHER

## 2021-07-21 NOTE — PROGRESS NOTES
Department of Palliative Medicine  Ambulatory visit  Provider: JOSE ANGEL Tyson - CNP         Chief Complaint: David Jacobs is a 60 y.o. female with chief complaint of Pain    HPI:  59 y/o post-menopausal  female who underwent Stereotactic Left Breast mass core biopsy on 06/17/2016 revealing Invasive Lobular carcinoma, she has developed severe neuropathy after her last treatment. 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 100mg 2 times daily   -Increase desipramine 10 mg 2 times daily   - trialed lyrica Methadone to 5mg BID, caused headache and dizziness   - Stopped Pamelor 50mg, no longer taking this medication       Follow Up: 8 weeks Encouraged to call with any questions, concerns, needs, or changes in symptoms. Subjective:     David Jacobs is a 60 y.o. female with significant past medical history of Breast cancer. Roro Ackerman explains that she has had a rough past month. She has been physically more active and has had increased stress. She reports that she has felt more fatigued during the day. Discussed that this increase in her fatigue is likely from overexerting herself. Discussed relaxation therapies. She continues to complain of a throbbing pain of her lower legs. She has noticed a significant improvement from the desipramine. Will increase dose. She also expressed a willingness to try decreasing the dosage of her oxycodone. She is agreeable to cut the 15 mg tablets in half. She will also try decreasing her Lyrica to 100 mg BID. She denies any nausea, depression, shortness of breath, or constipation.      Objective:     Physical Exam  Gen:  Alert, appears stated age, well nourished, in no acute distress  HEENT:  Normocephalic, conjunctiva pink, no drainage, mucosa moist  Lungs:  CTA bilaterally, no audible rhonchi or wheezes noted  Heart[de-identified]  RRR, no murmur, rub,  Abd:  Soft, non tender, non distended,   M/S/Ext:  Moving all extremities, no edema,   Skin:  Warm and dry  Neuro:  Alert, oriented x 3; following commands    [place] Symptom Assessment Score   [place] Score 7/21/2021 6/9/2021 4/15/2021 2/18/2021 10/29/2020   Pain Score 5 4 5 5 7   Tiredness Score 4 4 3 5 3   Nausea Score Not nauseated Not nauseated Not nauseated Not nauseated Not nauseated   Depression Score Not depressed Not depressed 3 3 Not depressed   Anxiety Score 3 2 3 3 2   Drowsiness Score 2 Not drowsy 3 3 Not drowsy   Appetite Score 3 5 5 5 Best appetite   Wellbeing Score 3 5 5 5 Best feeling of wellbeing   Dyspnea Score No shortness of breath No shortness of breath No shortness of breath No shortness of breath No shortness of breath   Other Problem Score Best possible response Best possible response Best possible response Best possible response Best possible response   Total Assessment Score(calculated) 20 20 27 29 12     Assessed by: patient. Current Medications:  Medications reviewed: yes    Controlled Substances Monitoring: OARRS reviewed 7/21/21. RX Monitoring 7/9/2020   Attestation -   Acute Pain Prescriptions -   Periodic Controlled Substance Monitoring Possible medication side effects, risk of tolerance/dependence & alternative treatments discussed. ;No signs of potential drug abuse or diversion identified. ;Assessed functional status. ;Obtaining appropriate analgesic effect of treatment. Chronic Pain > 50 MEDD Re-evaluated the status of the patient's underlying condition causing pain. Chronic Pain > 80 MEDD -     JOSE ANGEL Skaggs - CNP   Palliative Care Department       Time/Communication  Greater than 50% of time spent, total 30 minutes in face-to-face counseling and coordination of care regarding symptom management.

## 2021-07-28 ENCOUNTER — TELEPHONE (OUTPATIENT)
Dept: PALLATIVE CARE | Age: 56
End: 2021-07-28

## 2021-07-28 DIAGNOSIS — C50.512 MALIGNANT NEOPLASM OF LOWER-OUTER QUADRANT OF LEFT FEMALE BREAST, UNSPECIFIED ESTROGEN RECEPTOR STATUS (HCC): ICD-10-CM

## 2021-07-28 DIAGNOSIS — Z51.5 PALLIATIVE CARE BY SPECIALIST: ICD-10-CM

## 2021-07-28 DIAGNOSIS — G62.0 CHEMOTHERAPY-INDUCED NEUROPATHY (HCC): ICD-10-CM

## 2021-07-28 DIAGNOSIS — T45.1X5A CHEMOTHERAPY-INDUCED NEUROPATHY (HCC): ICD-10-CM

## 2021-07-28 DIAGNOSIS — G89.3 CHRONIC PAIN DUE TO NEOPLASM: ICD-10-CM

## 2021-07-28 RX ORDER — PREGABALIN 200 MG/1
200 CAPSULE ORAL NIGHTLY
Qty: 30 CAPSULE | Refills: 1 | Status: SHIPPED | OUTPATIENT
Start: 2021-07-28 | End: 2021-09-15 | Stop reason: ALTCHOICE

## 2021-07-28 RX ORDER — OXYCODONE HYDROCHLORIDE 15 MG/1
15 TABLET ORAL EVERY 4 HOURS PRN
Qty: 180 TABLET | Refills: 0 | Status: SHIPPED
Start: 2021-07-28 | End: 2021-08-25 | Stop reason: SDUPTHER

## 2021-07-28 NOTE — TELEPHONE ENCOUNTER
[facility] called requesting refills for Oxy- IR and Lyrica. She states she tried to decrease the Oxy-IR dose to half pill but pain is not controlled. Lyrica dose currently taking 100mg twice a day.    Pharmacy:  [facility]

## 2021-08-25 DIAGNOSIS — Z51.5 PALLIATIVE CARE BY SPECIALIST: ICD-10-CM

## 2021-08-25 DIAGNOSIS — G89.3 CHRONIC PAIN DUE TO NEOPLASM: ICD-10-CM

## 2021-08-25 RX ORDER — DESIPRAMINE HYDROCHLORIDE 10 MG/1
10 TABLET ORAL 2 TIMES DAILY
Qty: 60 TABLET | Refills: 0 | Status: SHIPPED
Start: 2021-08-25 | End: 2021-09-15 | Stop reason: SDUPTHER

## 2021-08-25 RX ORDER — OXYCODONE HYDROCHLORIDE 15 MG/1
15 TABLET ORAL EVERY 4 HOURS PRN
Qty: 180 TABLET | Refills: 0 | Status: SHIPPED
Start: 2021-08-25 | End: 2021-09-22 | Stop reason: SDUPTHER

## 2021-08-25 NOTE — TELEPHONE ENCOUNTER
Call from [facility] requesting refill for Oxy IR and Desipramine. [facility]acy is [facility] on [address]. Next wily 9/15/21.

## 2021-09-15 ENCOUNTER — OFFICE VISIT (OUTPATIENT)
Dept: PALLATIVE CARE | Age: 56
End: 2021-09-15
Payer: COMMERCIAL

## 2021-09-15 VITALS
DIASTOLIC BLOOD PRESSURE: 76 MMHG | SYSTOLIC BLOOD PRESSURE: 115 MMHG | WEIGHT: 164.2 LBS | HEART RATE: 105 BPM | BODY MASS INDEX: 25.72 KG/M2

## 2021-09-15 DIAGNOSIS — G62.0 CHEMOTHERAPY-INDUCED NEUROPATHY (HCC): ICD-10-CM

## 2021-09-15 DIAGNOSIS — C50.512 MALIGNANT NEOPLASM OF LOWER-OUTER QUADRANT OF LEFT FEMALE BREAST, UNSPECIFIED ESTROGEN RECEPTOR STATUS (HCC): ICD-10-CM

## 2021-09-15 DIAGNOSIS — Z51.5 PALLIATIVE CARE BY SPECIALIST: ICD-10-CM

## 2021-09-15 DIAGNOSIS — G89.3 CHRONIC PAIN DUE TO NEOPLASM: ICD-10-CM

## 2021-09-15 DIAGNOSIS — T45.1X5A CHEMOTHERAPY-INDUCED NEUROPATHY (HCC): ICD-10-CM

## 2021-09-15 PROCEDURE — G8428 CUR MEDS NOT DOCUMENT: HCPCS | Performed by: NURSE PRACTITIONER

## 2021-09-15 PROCEDURE — 3017F COLORECTAL CA SCREEN DOC REV: CPT | Performed by: NURSE PRACTITIONER

## 2021-09-15 PROCEDURE — 99212 OFFICE O/P EST SF 10 MIN: CPT | Performed by: NURSE PRACTITIONER

## 2021-09-15 PROCEDURE — G8417 CALC BMI ABV UP PARAM F/U: HCPCS | Performed by: NURSE PRACTITIONER

## 2021-09-15 PROCEDURE — 1036F TOBACCO NON-USER: CPT | Performed by: NURSE PRACTITIONER

## 2021-09-15 PROCEDURE — 99214 OFFICE O/P EST MOD 30 MIN: CPT | Performed by: NURSE PRACTITIONER

## 2021-09-15 RX ORDER — OXYCODONE HYDROCHLORIDE 15 MG/1
15 TABLET ORAL EVERY 4 HOURS PRN
Qty: 180 TABLET | Refills: 0 | Status: CANCELLED | OUTPATIENT
Start: 2021-09-15 | End: 2021-10-15

## 2021-09-15 RX ORDER — DESIPRAMINE HYDROCHLORIDE 25 MG/1
25 TABLET ORAL NIGHTLY
Qty: 30 TABLET | Refills: 1 | Status: SHIPPED
Start: 2021-09-15 | End: 2021-10-19 | Stop reason: SINTOL

## 2021-09-15 RX ORDER — PREGABALIN 200 MG/1
200 CAPSULE ORAL NIGHTLY
Qty: 30 CAPSULE | Refills: 1 | Status: CANCELLED | OUTPATIENT
Start: 2021-09-15 | End: 2021-11-14

## 2021-09-15 RX ORDER — PREGABALIN 100 MG/1
100 CAPSULE ORAL 2 TIMES DAILY
Qty: 60 CAPSULE | Refills: 2 | Status: SHIPPED
Start: 2021-09-15 | End: 2022-01-05 | Stop reason: SDUPTHER

## 2021-09-15 NOTE — PROGRESS NOTES
Department of Palliative Medicine  Ambulatory visit  Provider: JOSE ANGEL Smith - CNP         Chief Complaint: Francie Narvaez is a 60 y.o. female with chief complaint of Pain    HPI:  61 y/o post-menopausal  female who underwent Stereotactic Left Breast mass core biopsy on 06/17/2016 revealing Invasive Lobular carcinoma, she has developed severe neuropathy after her last treatment. 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 Subjective:     Francie Narvaez is a 60 y.o. female with significant past medical history of Breast cancer. 9/15/21:  Jamel Walker presents today unaccompanied. She is alert and oriented, able voice needs concerns well. Overall she appears to be at her baseline. She continues to have pain, primarily in her legs, overall this pain is unchanged. She rates it as a 6 on a scale of 10 today. She describes it as constant deep aching, at times throbbing. She continues to utilize oxycodone 15 mg typically about 5 times per day, and this does give her good relief for a few hours. She is try dose reduction of her oxycodone several times, she did not tolerate these dose reductions, with increased pain, as well as reports of sweating and agitation. I did discuss with her that it is possible that she has developed some physical dependency to the oxycodone, and she acknowledges this.   I discussed with her that we can approach decreased dosing of her oxycodone in a very gradual manner, at first trying to decrease the frequency of her 15 mg dosage, then considering a dose decrease to 10 mg every 4 hours in the near future, and further dose reduction as she is able to tolerate. She does continue with Cymbalta 20 mg at bedtime, reporting that this was the most helpful for her, but due to the interaction with tamoxifen she does not feel the lower dose is as effective as when she was taking 60 mg. Unfortunately due to the interaction with tamoxifen she is not able to take the higher dose. She has been using Norpramin 10 mg, and she supposed to be taking this twice per day. She states she oftentimes forgets to take the afternoon dose, it is not certain she has seen any benefit from this medication. She does state her appetite has been decreased over the last month, and she has lost 5 pounds since we saw her last.  She is uncertain as to why she has a decrease in appetite, and questions if this is related to the Norpramin. It is not clear that the [place] is causing her decreased appetite, and as she is not getting any positive benefit from the current dosage I discussed increasing the dosage today. She is agreeable to this, and we will increase it to 25 mg at bedtime. She also continues Lyrica 100 mg twice daily. Other than increase in the Norpramin were not making further changes to his plan of care. She is encouraged to continue to decrease the frequency of use of oxycodone as she can tolerate, and we will see her again in approximately 1 month to reassess. Depending on how she is responding with the increased dose of Norpramin, we will then discuss further changes to her regimen.       Objective:     Physical Exam  Gen:  Alert, appears stated age, well nourished, in no acute distress  HEENT:  Normocephalic, conjunctiva pink, no drainage, mucosa moist  Lungs:  CTA bilaterally, no audible rhonchi or wheezes noted  Heart[de-identified]  RRR, no murmur, rub,  Abd:  Soft, non tender, non distended,   M/S/Ext:  Moving all extremities, no edema,   Skin:  Warm and dry  Neuro:  Alert, oriented x 3; following commands    [place] Symptom Assessment Score   [place] Score 9/15/2021 7/21/2021 6/9/2021 4/15/2021 2/18/2021   Pain Score 6 5 4 5 5   Tiredness Score 3 4 4 3 5   Nausea Score Not nauseated Not nauseated Not nauseated Not nauseated Not nauseated   Depression Score Not depressed Not depressed Not depressed 3 3   Anxiety Score 3 3 2 3 3   Drowsiness Score Not drowsy 2 Not drowsy 3 3   Appetite Score 5 3 5 5 5   Wellbeing Score 5 3 5 5 5   Dyspnea Score No shortness of breath No shortness of breath No shortness of breath No shortness of breath No shortness of breath   Other Problem Score Best possible response Best possible response Best possible response Best possible response Best possible response   Total Assessment Score(calculated) 22 20 20 27 29     Assessed by: patient. Current Medications:  Medications reviewed: yes    Controlled Substances Monitoring: OARRS reviewed 9/15/21. RX Monitoring 9/15/2021   Attestation -   Acute Pain Prescriptions -   Periodic Controlled Substance Monitoring Possible medication side effects, risk of tolerance/dependence & alternative treatments discussed. ;No signs of potential drug abuse or diversion identified. ;Assessed functional status. ;Obtaining appropriate analgesic effect of treatment. Chronic Pain > 50 MEDD Re-evaluated the status of the patient's underlying condition causing pain. Chronic Pain > 80 MEDD Obtained or confirmed \"Medication Contract\" on file. JOSE ANGEL Aleman CNP   Palliative Care Department       Time/Communication  Greater than 50% of time spent, total 35 minutes in face-to-face counseling and coordination of care regarding symptom management.

## 2021-09-22 DIAGNOSIS — Z51.5 PALLIATIVE CARE BY SPECIALIST: ICD-10-CM

## 2021-09-22 DIAGNOSIS — C50.512 MALIGNANT NEOPLASM OF LOWER-OUTER QUADRANT OF LEFT FEMALE BREAST, UNSPECIFIED ESTROGEN RECEPTOR STATUS (HCC): ICD-10-CM

## 2021-09-22 DIAGNOSIS — T45.1X5A CHEMOTHERAPY-INDUCED NEUROPATHY (HCC): ICD-10-CM

## 2021-09-22 DIAGNOSIS — T45.1X5A PERIPHERAL NEUROPATHY DUE TO CHEMOTHERAPY (HCC): ICD-10-CM

## 2021-09-22 DIAGNOSIS — G89.3 CHRONIC PAIN DUE TO NEOPLASM: ICD-10-CM

## 2021-09-22 DIAGNOSIS — G62.0 PERIPHERAL NEUROPATHY DUE TO CHEMOTHERAPY (HCC): ICD-10-CM

## 2021-09-22 DIAGNOSIS — G62.0 CHEMOTHERAPY-INDUCED NEUROPATHY (HCC): ICD-10-CM

## 2021-09-22 RX ORDER — ALPRAZOLAM 0.5 MG/1
0.5 TABLET ORAL 2 TIMES DAILY PRN
Qty: 30 TABLET | Refills: 2 | Status: SHIPPED
Start: 2021-09-22 | End: 2022-04-19 | Stop reason: SDUPTHER

## 2021-09-22 RX ORDER — OXYCODONE HYDROCHLORIDE 15 MG/1
15 TABLET ORAL EVERY 4 HOURS PRN
Qty: 180 TABLET | Refills: 0 | Status: SHIPPED
Start: 2021-09-22 | End: 2021-10-13 | Stop reason: SDUPTHER

## 2021-09-22 NOTE — TELEPHONE ENCOUNTER
Call from [facility] requesting refills for Xanax and Oxy IR. Pharmacy is [facility], next wily 10/13/21.

## 2021-10-13 ENCOUNTER — OFFICE VISIT (OUTPATIENT)
Dept: PALLATIVE CARE | Age: 56
End: 2021-10-13
Payer: COMMERCIAL

## 2021-10-13 VITALS
SYSTOLIC BLOOD PRESSURE: 126 MMHG | OXYGEN SATURATION: 97 % | DIASTOLIC BLOOD PRESSURE: 76 MMHG | BODY MASS INDEX: 25.53 KG/M2 | WEIGHT: 163 LBS | HEART RATE: 97 BPM

## 2021-10-13 DIAGNOSIS — Z51.5 PALLIATIVE CARE BY SPECIALIST: ICD-10-CM

## 2021-10-13 DIAGNOSIS — G89.3 CHRONIC PAIN DUE TO NEOPLASM: ICD-10-CM

## 2021-10-13 PROCEDURE — 99213 OFFICE O/P EST LOW 20 MIN: CPT | Performed by: NURSE PRACTITIONER

## 2021-10-13 PROCEDURE — G8427 DOCREV CUR MEDS BY ELIG CLIN: HCPCS | Performed by: NURSE PRACTITIONER

## 2021-10-13 PROCEDURE — 99212 OFFICE O/P EST SF 10 MIN: CPT | Performed by: NURSE PRACTITIONER

## 2021-10-13 PROCEDURE — 3017F COLORECTAL CA SCREEN DOC REV: CPT | Performed by: NURSE PRACTITIONER

## 2021-10-13 PROCEDURE — 1036F TOBACCO NON-USER: CPT | Performed by: NURSE PRACTITIONER

## 2021-10-13 PROCEDURE — G8484 FLU IMMUNIZE NO ADMIN: HCPCS | Performed by: NURSE PRACTITIONER

## 2021-10-13 PROCEDURE — G8417 CALC BMI ABV UP PARAM F/U: HCPCS | Performed by: NURSE PRACTITIONER

## 2021-10-13 RX ORDER — OXYCODONE HYDROCHLORIDE 10 MG/1
10 TABLET ORAL EVERY 4 HOURS PRN
Qty: 180 TABLET | Refills: 0 | Status: SHIPPED
Start: 2021-10-13 | End: 2021-11-17 | Stop reason: SDUPTHER

## 2021-10-13 RX ORDER — DULOXETIN HYDROCHLORIDE 20 MG/1
CAPSULE, DELAYED RELEASE ORAL
Qty: 30 CAPSULE | Refills: 2 | Status: SHIPPED
Start: 2021-10-13 | End: 2022-01-05 | Stop reason: SDUPTHER

## 2021-10-13 NOTE — PROGRESS NOTES
Department of Palliative Medicine  Ambulatory visit  Provider: Hernan Burnett, JOSE ANGEL - CNP         Chief Complaint: Leda Urena is a 60 y.o. female with chief complaint of Pain    HPI:  59 y/o post-menopausal  female who underwent Stereotactic Left Breast mass core biopsy on 06/17/2016 revealing Invasive Lobular carcinoma, she has developed severe neuropathy after her last treatment. Assessment/Plan      Breast cancer   - Currently on tamoxifen    - Follows at [facility]    Pain due to neoplasm, (neuropathic type pain)    -Try decreasing Oxycodone to 10mg q4hr prn   -Cymbalta 20mg hs, low dose due to interaction with Tamoxifen    -Lyrica to 100 mg BID daily   -Desipramine 25 mg HS    Follow Up: 4 weeks Encouraged to call with any questions, concerns, needs, or changes in symptoms. Subjective:     Leda Urena is a 60 y.o. female with significant past medical history of Breast cancer. 10/13/21:  Met with Uzma Marinelli at the outpatient clinic. She has been very stressed recently. She has a blood clotting disorder and has not received the COVID vaccine for this reason. Her job is now mandating that she get vaccinated or she will be terminated. She has been very stressed about this and has been keeping her up at night. Also some stresses at home with her daughter. We discussed that this is likely the cause of her weight loss and fatigue. Overall her pain has been about the same. She describes a toothache feeling in her legs that worsens when her legs are cold and is worse in the mornings. She has tried decreasing the frequency, but has not been successful she is taking the oxycodone every 4-5 hours. She states that after 5 hours she becomes nauseous from the pain. We discussed trying to use a tylenol arthritis or ibuprofen in between the oxycodone and trying to decrease the dose of oxycodone to 10 mg every 4 hours. Terry Cowden is agreeable.      Objective:     Physical Exam  Gen:  Alert, appears stated age, well nourished, in no acute distress  HEENT:  Normocephalic, conjunctiva pink, no drainage, mucosa moist  Lungs:  CTA bilaterally, no audible rhonchi or wheezes noted  Heart[de-identified]  RRR, no murmur, rub,  Abd:  Soft, non tender, non distended,   M/S/Ext:  Moving all extremities, no edema,   Skin:  Warm and dry  Neuro:  Alert, oriented x 3; following commands    [place] Symptom Assessment Score   [place] Score 10/13/2021 9/15/2021 7/21/2021 6/9/2021 4/15/2021   Pain Score 6 6 5 4 5   Tiredness Score 6 3 4 4 3   Nausea Score Not nauseated Not nauseated Not nauseated Not nauseated Not nauseated   Depression Score Not depressed Not depressed Not depressed Not depressed 3   Anxiety Score 6 3 3 2 3   Drowsiness Score 5 Not drowsy 2 Not drowsy 3   Appetite Score 5 5 3 5 5   Wellbeing Score 5 5 3 5 5   Dyspnea Score No shortness of breath No shortness of breath No shortness of breath No shortness of breath No shortness of breath   Other Problem Score Best possible response Best possible response Best possible response Best possible response Best possible response   Total Assessment Score(calculated) 33 22 20 20 27     Assessed by: patient. Current Medications:  Medications reviewed: yes    Controlled Substances Monitoring: OARRS reviewed 10/13/21. RX Monitoring 9/15/2021   Attestation -   Acute Pain Prescriptions -   Periodic Controlled Substance Monitoring Possible medication side effects, risk of tolerance/dependence & alternative treatments discussed. ;No signs of potential drug abuse or diversion identified. ;Assessed functional status. ;Obtaining appropriate analgesic effect of treatment. Chronic Pain > 50 MEDD Re-evaluated the status of the patient's underlying condition causing pain. Chronic Pain > 80 MEDD Obtained or confirmed \"Medication Contract\" on file.      JOSE ANGEL Ford - CNP   Palliative Care Department     Time/Communication  Greater than 50% of time spent, total 35 minutes in face-to-face counseling and coordination of care regarding symptom management.

## 2021-10-19 PROBLEM — Z85.3 HISTORY OF BREAST CANCER: Status: ACTIVE | Noted: 2018-07-18

## 2021-10-19 PROBLEM — Z98.890 HISTORY OF LUMPECTOMY: Status: ACTIVE | Noted: 2018-07-18

## 2021-11-04 ENCOUNTER — HOSPITAL ENCOUNTER (OUTPATIENT)
Age: 56
Discharge: HOME | End: 2021-11-06

## 2021-11-10 ENCOUNTER — OFFICE VISIT (OUTPATIENT)
Dept: PALLATIVE CARE | Age: 56
End: 2021-11-10
Payer: COMMERCIAL

## 2021-11-10 VITALS — OXYGEN SATURATION: 99 % | TEMPERATURE: 98 F | HEART RATE: 98 BPM | BODY MASS INDEX: 25.22 KG/M2 | WEIGHT: 161 LBS

## 2021-11-10 DIAGNOSIS — G62.0 CHEMOTHERAPY-INDUCED NEUROPATHY (HCC): ICD-10-CM

## 2021-11-10 DIAGNOSIS — T45.1X5A CHEMOTHERAPY-INDUCED NEUROPATHY (HCC): ICD-10-CM

## 2021-11-10 DIAGNOSIS — Z51.5 PALLIATIVE CARE BY SPECIALIST: Primary | ICD-10-CM

## 2021-11-10 PROCEDURE — 99213 OFFICE O/P EST LOW 20 MIN: CPT | Performed by: NURSE PRACTITIONER

## 2021-11-10 PROCEDURE — 1036F TOBACCO NON-USER: CPT | Performed by: NURSE PRACTITIONER

## 2021-11-10 PROCEDURE — G8427 DOCREV CUR MEDS BY ELIG CLIN: HCPCS | Performed by: NURSE PRACTITIONER

## 2021-11-10 PROCEDURE — G8484 FLU IMMUNIZE NO ADMIN: HCPCS | Performed by: NURSE PRACTITIONER

## 2021-11-10 PROCEDURE — 3017F COLORECTAL CA SCREEN DOC REV: CPT | Performed by: NURSE PRACTITIONER

## 2021-11-10 PROCEDURE — 99211 OFF/OP EST MAY X REQ PHY/QHP: CPT

## 2021-11-10 PROCEDURE — G8417 CALC BMI ABV UP PARAM F/U: HCPCS | Performed by: NURSE PRACTITIONER

## 2021-11-10 NOTE — PROGRESS NOTES
Department of Palliative Medicine  Ambulatory visit  Provider: JOSE ANGEL Gong - CNP         Chief Complaint: Owen Trejo is a 60 y.o. female with chief complaint of Pain    HPI:  61 y/o post-menopausal  female who underwent Stereotactic Left Breast mass core biopsy on 06/17/2016 revealing Invasive Lobular carcinoma, she has developed severe neuropathy after her last treatment. Assessment/Plan      Breast cancer   - Currently on tamoxifen    - Follows at [facility]    Pain due to neoplasm, (neuropathic type pain)    -Continue Oxycodone to 10mg q4hr prn- decrease frequency as able   -Cymbalta 20mg hs, low dose due to interaction with Tamoxifen    -Lyrica to 100 mg BID daily   - DC Desipramine 25 mg HS- caused nausea and dizziness     Follow Up: 8 weeks Encouraged to call with any questions, concerns, needs, or changes in symptoms. Subjective:     Owen Trejo is a 60 y.o. female with significant past medical history of Breast cancer. 11/10/21:  Met with Kenny Natarajan at the outpatient clinic. She had her breast reconstruction surgery 6 days ago. Before surgery she was tolerating the decrease in pain medication well. Since surgery she has had to increase her pain medication and is taking it every 3 hours. She rates her pain a 7 on a scale of 1-10. She explains with the change in weather and cold temperatures the neuropathy in her legs worsens. She describes the pain as aching. She states that she does feel less drowsy with the oxycodone 10 mg, and is hopefull to continue decreasing dosage. As she continues to recover from surgery she is going to try decreasing the frequency of her oxycodone usage. She denies any nausea, depression, drowsiness, shortness of breath, or constipation.       Objective:     Physical Exam  Gen:  Alert, appears stated age, well nourished, in no acute distress  HEENT:  Normocephalic, conjunctiva pink, no drainage, mucosa moist  Lungs:  CTA bilaterally, no audible rhonchi or wheezes noted  Heart[de-identified]  RRR, no murmur, rub,  Abd:  Soft, non tender, non distended,   M/S/Ext:  Moving all extremities, no edema,   Skin:  Warm and dry  Neuro:  Alert, oriented x 3; following commands    [place] Symptom Assessment Score   [place] Score 11/10/2021 10/13/2021 9/15/2021 7/21/2021 6/9/2021   Pain Score 7 6 6 5 4   Tiredness Score 5 6 3 4 4   Nausea Score Not nauseated Not nauseated Not nauseated Not nauseated Not nauseated   Depression Score Not depressed Not depressed Not depressed Not depressed Not depressed   Anxiety Score 4 6 3 3 2   Drowsiness Score Not drowsy 5 Not drowsy 2 Not drowsy   Appetite Score 5 5 5 3 5   Wellbeing Score 5 5 5 3 5   Dyspnea Score No shortness of breath No shortness of breath No shortness of breath No shortness of breath No shortness of breath   Other Problem Score Best possible response Best possible response Best possible response Best possible response Best possible response   Total Assessment Score(calculated) 26 33 22 20 20     Assessed by: patient. Current Medications:  Medications reviewed: yes    Controlled Substances Monitoring: OARRS reviewed 11/10/21. RX Monitoring 9/15/2021   Attestation -   Acute Pain Prescriptions -   Periodic Controlled Substance Monitoring Possible medication side effects, risk of tolerance/dependence & alternative treatments discussed. ;No signs of potential drug abuse or diversion identified. ;Assessed functional status. ;Obtaining appropriate analgesic effect of treatment. Chronic Pain > 50 MEDD Re-evaluated the status of the patient's underlying condition causing pain. Chronic Pain > 80 MEDD Obtained or confirmed \"Medication Contract\" on file. JOSE ANGEL Zazueta - [facility]   Palliative Care Department     Time/Communication  Greater than 50% of time spent, total 35 minutes in face-to-face counseling and coordination of care regarding symptom management.

## 2021-11-17 DIAGNOSIS — Z51.5 PALLIATIVE CARE BY SPECIALIST: ICD-10-CM

## 2021-11-17 DIAGNOSIS — G89.3 CHRONIC PAIN DUE TO NEOPLASM: ICD-10-CM

## 2021-11-17 RX ORDER — OXYCODONE HYDROCHLORIDE 10 MG/1
10 TABLET ORAL EVERY 4 HOURS PRN
Qty: 180 TABLET | Refills: 0 | Status: SHIPPED
Start: 2021-11-17 | End: 2021-12-14 | Stop reason: SDUPTHER

## 2021-11-17 NOTE — TELEPHONE ENCOUNTER
Call from [facility] requesting a refill for Oxy IR 10 mg. Pharmacy is [address] [facility] [facility] [address]. [facility] wily 1/5/22.

## 2021-12-13 ENCOUNTER — OFFICE VISIT (OUTPATIENT)
Dept: FAMILY MEDICINE CLINIC | Age: 56
End: 2021-12-13
Payer: COMMERCIAL

## 2021-12-13 VITALS
WEIGHT: 159.8 LBS | RESPIRATION RATE: 18 BRPM | OXYGEN SATURATION: 98 % | HEIGHT: 66 IN | TEMPERATURE: 97.5 F | BODY MASS INDEX: 25.68 KG/M2 | HEART RATE: 98 BPM

## 2021-12-13 DIAGNOSIS — J44.9 MODERATE COPD (CHRONIC OBSTRUCTIVE PULMONARY DISEASE) (HCC): ICD-10-CM

## 2021-12-13 DIAGNOSIS — Z00.00 ANNUAL PHYSICAL EXAM: Primary | ICD-10-CM

## 2021-12-13 DIAGNOSIS — Z13.1 SCREENING FOR DIABETES MELLITUS: ICD-10-CM

## 2021-12-13 DIAGNOSIS — E78.5 DYSLIPIDEMIA: ICD-10-CM

## 2021-12-13 DIAGNOSIS — Z87.891 PERSONAL HISTORY OF TOBACCO USE: ICD-10-CM

## 2021-12-13 DIAGNOSIS — Z85.3 HISTORY OF BREAST CANCER: ICD-10-CM

## 2021-12-13 DIAGNOSIS — T45.1X5A CHEMOTHERAPY-INDUCED NEUROPATHY (HCC): ICD-10-CM

## 2021-12-13 DIAGNOSIS — G62.0 CHEMOTHERAPY-INDUCED NEUROPATHY (HCC): ICD-10-CM

## 2021-12-13 PROCEDURE — 99396 PREV VISIT EST AGE 40-64: CPT | Performed by: FAMILY MEDICINE

## 2021-12-13 PROCEDURE — G0296 VISIT TO DETERM LDCT ELIG: HCPCS | Performed by: FAMILY MEDICINE

## 2021-12-13 PROCEDURE — G8484 FLU IMMUNIZE NO ADMIN: HCPCS | Performed by: FAMILY MEDICINE

## 2021-12-13 SDOH — ECONOMIC STABILITY: FOOD INSECURITY: WITHIN THE PAST 12 MONTHS, YOU WORRIED THAT YOUR FOOD WOULD RUN OUT BEFORE YOU GOT MONEY TO BUY MORE.: NEVER TRUE

## 2021-12-13 SDOH — ECONOMIC STABILITY: FOOD INSECURITY: WITHIN THE PAST 12 MONTHS, THE FOOD YOU BOUGHT JUST DIDN'T LAST AND YOU DIDN'T HAVE MONEY TO GET MORE.: NEVER TRUE

## 2021-12-13 ASSESSMENT — ENCOUNTER SYMPTOMS
SHORTNESS OF BREATH: 0
VOMITING: 0
COUGH: 1
CONSTIPATION: 0
DIARRHEA: 0
NAUSEA: 0

## 2021-12-13 ASSESSMENT — PATIENT HEALTH QUESTIONNAIRE - PHQ9
SUM OF ALL RESPONSES TO PHQ QUESTIONS 1-9: 0
SUM OF ALL RESPONSES TO PHQ9 QUESTIONS 1 & 2: 0
SUM OF ALL RESPONSES TO PHQ QUESTIONS 1-9: 0
2. FEELING DOWN, DEPRESSED OR HOPELESS: 0
SUM OF ALL RESPONSES TO PHQ QUESTIONS 1-9: 0
1. LITTLE INTEREST OR PLEASURE IN DOING THINGS: 0

## 2021-12-13 ASSESSMENT — SOCIAL DETERMINANTS OF HEALTH (SDOH): HOW HARD IS IT FOR YOU TO PAY FOR THE VERY BASICS LIKE FOOD, HOUSING, MEDICAL CARE, AND HEATING?: NOT HARD AT ALL

## 2021-12-13 NOTE — PROGRESS NOTES
Leda Urena (:  1965) is a 60 y.o. female,Established patient, here for evaluation of the following chief complaint(s):  Check-Up (Just here for a routine check up, recently had her 2nd Covid vaccine and she has had some side effects)      ASSESSMENT/PLAN:  1. Annual physical exam  -     Comprehensive Metabolic Panel; Future  -     LIPID PANEL; Future  -     VITAMIN B12 & FOLATE; Future  -     TSH; Future  -     HEMOGLOBIN A1C; Future  2. Moderate COPD (chronic obstructive pulmonary disease) ([facility] [address]  3. Chemotherapy-induced neuropathy (HCC)  -     VITAMIN B12 & FOLATE; Future  -     TSH; Future  4. History of breast cancer  5. Dyslipidemia  -     Comprehensive Metabolic Panel; Future  -     LIPID PANEL; Future  -     VITAMIN B12 & FOLATE; Future  6. Screening for diabetes mellitus  -     HEMOGLOBIN A1C; Future  7. Personal history of tobacco use  -     IL VISIT TO DISCUSS LUNG CA SCREEN W LDCT  -     CT Lung Screen (Annual); Future      Return in about 1 year (around 2022), or if symptoms worsen or fail to improve. SUBJECTIVE/OBJECTIVE:  HPI  She also follows with Pulmonology, Heme/Onc, and Palliative medicine    Interval Hx  - underwent breast reduction surgery November    Annual exam:  Patient is here for routine yearly physical/preventative visit. Patient has no complaints or concerns today. Patient is  generally healthy. Chronic medical problems include breast CA, COPD, chemotherapy induced neuropathy. Most recent labs reviewed with patient and  are not remarkable. Health maintenance reviewed with patient and is not up to date. Patient does not smoke. Patient does not drink alcohol. Patient  does not use drugs. Overall doing well. Patient's pastmedical, surgical, social and/or family history reviewed, updated in chart, and are non-contributory (unless otherwise stated). Medications and allergies also reviewed and updated in chart.       Review of Systems   Constitutional: capsule TAKE 1 CAPSULE BY MOUTH ONCE DAILY Yes JOSE ANGEL Ferguson CNP   pregabalin (LYRICA) 100 MG capsule Take 1 capsule by mouth 2 times daily for 90 days. Yes JOSE ANGEL Griffin CNP   fluticasone-vilanterol (BREO ELLIPTA) 100-25 MCG/INH AEPB inhaler Inhale 1 puff into the lungs daily Yes Alex Whatley MD   tamoxifen (NOLVADEX) 20 MG tablet Take 20 mg by mouth Yes Historical Provider, MD            Future Appointments   Date Time Provider Kaitlin Cobos   [facility]       An electronic signature was used to authenticate this note. --Stephanie Cord, DO     Low Dose CT (LDCT) Lung Screening criteria met:     Age 55-77(Medicare) or 50-80 ([facility])   Pack year smoking >30 (Medicare) or >20 ([facility])   Still smoking or less than 15 year since quit   No sign or symptoms of lung cancer   > 11 months since last LDCT     Risks and benefits of lung cancer screening with LDCT scans discussed:    Significance of positive screen - False-positive LDCT results often occur. 95% of all positive results do not lead to a diagnosis of cancer. Usually further imaging can resolve most false-positive results; however, some patients may require invasive procedures. Over diagnosis risk - 10% to 12% of screen-detected lung cancer cases are over diagnosedthat is, the cancer would not have been detected in the patient's lifetime without the screening. Need for follow up screens annually to continue lung cancer screening effectiveness     Risks associated with radiation from annual LDCT- Radiation exposure is about the same as for a mammogram, which is about 1/3 of the annual background radiation exposure from everyday life. Starting screening at age 54 is not likely to increase cancer risk from radiation exposure.     Patients with comorbidities resulting in life expectancy of < 10 years, or that would preclude treatment of an abnormality identified on CT, should not be screened due to lack of benefit.     To obtain maximal benefit from this screening, smoking cessation and long-term abstinence from smoking is critical

## 2021-12-14 DIAGNOSIS — G89.3 CHRONIC PAIN DUE TO NEOPLASM: ICD-10-CM

## 2021-12-14 DIAGNOSIS — Z51.5 PALLIATIVE CARE BY SPECIALIST: ICD-10-CM

## 2021-12-14 RX ORDER — OXYCODONE HYDROCHLORIDE 10 MG/1
10 TABLET ORAL EVERY 4 HOURS PRN
Qty: 180 TABLET | Refills: 0 | Status: SHIPPED
Start: 2021-12-14 | End: 2022-01-12 | Stop reason: SDUPTHER

## 2021-12-23 ENCOUNTER — TELEPHONE (OUTPATIENT)
Dept: CASE MANAGEMENT | Age: 56
End: 2021-12-23

## 2021-12-23 NOTE — TELEPHONE ENCOUNTER
I called the patient and left a message reminding her of the CT lung screening at [facility]  on 12/27/2021 at 8:30 am with an 8:00 am arrival.  If unable to keep this appt call the office at [phone] to get rescheduled.           Electronically signed by Grace Cervantes on 12/23/21 at 12:53 PM EST

## 2021-12-27 DIAGNOSIS — E78.5 DYSLIPIDEMIA: ICD-10-CM

## 2021-12-27 DIAGNOSIS — Z13.1 SCREENING FOR DIABETES MELLITUS: ICD-10-CM

## 2021-12-27 DIAGNOSIS — Z00.00 ANNUAL PHYSICAL EXAM: ICD-10-CM

## 2021-12-27 DIAGNOSIS — G62.0 CHEMOTHERAPY-INDUCED NEUROPATHY (HCC): ICD-10-CM

## 2021-12-27 DIAGNOSIS — T45.1X5A CHEMOTHERAPY-INDUCED NEUROPATHY (HCC): ICD-10-CM

## 2021-12-27 LAB
ALBUMIN SERPL-MCNC: 4.1 G/DL (ref 3.5–5.2)
ALP BLD-CCNC: 48 U/L (ref 35–104)
ALT SERPL-CCNC: 13 U/L (ref 0–32)
ANION GAP SERPL CALCULATED.3IONS-SCNC: 13 MMOL/L (ref 7–16)
AST SERPL-CCNC: 23 U/L (ref 0–31)
BILIRUB SERPL-MCNC: 0.3 MG/DL (ref 0–1.2)
BUN BLDV-MCNC: 5 MG/DL (ref 6–20)
CALCIUM SERPL-MCNC: 9.4 MG/DL (ref 8.6–10.2)
CHLORIDE BLD-SCNC: 103 MMOL/L (ref 98–107)
CHOLESTEROL, TOTAL: 229 MG/DL (ref 0–199)
CO2: 25 MMOL/L (ref 22–29)
CREAT SERPL-MCNC: 0.9 MG/DL (ref 0.5–1)
FOLATE: 8 NG/ML (ref 4.8–24.2)
GFR AFRICAN AMERICAN: >60
GFR NON-AFRICAN AMERICAN: >60 ML/MIN/1.73
GLUCOSE BLD-MCNC: 85 MG/DL (ref 74–99)
HBA1C MFR BLD: 5.2 % (ref 4–5.6)
HDLC SERPL-MCNC: 61 MG/DL
LDL CHOLESTEROL CALCULATED: 133 MG/DL (ref 0–99)
POTASSIUM SERPL-SCNC: 4.2 MMOL/L (ref 3.5–5)
SODIUM BLD-SCNC: 141 MMOL/L (ref 132–146)
TOTAL PROTEIN: 6.6 G/DL (ref 6.4–8.3)
TRIGL SERPL-MCNC: 177 MG/DL (ref 0–149)
TSH SERPL DL<=0.05 MIU/L-ACNC: 5.31 UIU/ML (ref 0.27–4.2)
VITAMIN B-12: 278 PG/ML (ref 211–946)
VLDLC SERPL CALC-MCNC: 35 MG/DL

## 2021-12-28 ENCOUNTER — TELEPHONE (OUTPATIENT)
Dept: CASE MANAGEMENT | Age: 56
End: 2021-12-28

## 2021-12-28 NOTE — TELEPHONE ENCOUNTER
No call, encounter opened to process CT Lung Screening. CT Lung Screen: 12/27/2021    Impression   Stable 3 mm nodule right lower lobe left lower lobes.  These are stable since   May 2019.       LUNG RADS:   Per ACR Lung-RADS Version 1.1       Lung rads 2.  Continue annual low-dose CT screening of the chest.       RECOMMENDATIONS:   If you would like to [facility] your patient with the [facility], please contact the Nurse Navigator at   [phone]. Pack years: 27    Social History     Tobacco Use  Smoking Status: Former Smoker    Start Date: 1996   Quit Date: 06/16/2016   Types: Cigarettes   Packs/Day: 1.5   Years: 20   Pack Years: 27   Smokeless Tobacco: Never Used         Results letter sent to patient via my chart or mailed.      [facility]

## 2022-01-05 ENCOUNTER — VIRTUAL VISIT (OUTPATIENT)
Dept: PALLATIVE CARE | Age: 57
End: 2022-01-05
Payer: COMMERCIAL

## 2022-01-05 DIAGNOSIS — G89.3 CHRONIC PAIN DUE TO NEOPLASM: ICD-10-CM

## 2022-01-05 DIAGNOSIS — Z51.5 PALLIATIVE CARE BY SPECIALIST: ICD-10-CM

## 2022-01-05 PROCEDURE — 99442 PR PHYS/QHP TELEPHONE EVALUATION 11-20 MIN: CPT | Performed by: NURSE PRACTITIONER

## 2022-01-05 RX ORDER — DULOXETIN HYDROCHLORIDE 20 MG/1
CAPSULE, DELAYED RELEASE ORAL
Qty: 30 CAPSULE | Refills: 2 | Status: SHIPPED
Start: 2022-01-05 | End: 2022-03-09 | Stop reason: SDUPTHER

## 2022-01-05 RX ORDER — PREGABALIN 100 MG/1
100 CAPSULE ORAL 2 TIMES DAILY
Qty: 60 CAPSULE | Refills: 2 | Status: SHIPPED
Start: 2022-01-05 | End: 2022-03-09 | Stop reason: SDUPTHER

## 2022-01-05 NOTE — PROGRESS NOTES
Department of Palliative Medicine  Virtual Visit  Provider: JOSE ANGEL Oneil - CNP       Chief Complaint: Shawna Moraes is a 60 y.o. female with chief complaint of Pain    HPI:  61 y/o post-menopausal  female who underwent Stereotactic Left Breast mass core biopsy on 06/17/2016 revealing Invasive Lobular carcinoma, she has developed severe neuropathy after her last treatment. Assessment/Plan      Breast cancer   - Currently on tamoxifen    - Follows at [facility]    Pain due to neoplasm, (neuropathic type pain)    -Continue Oxycodone to 10mg decrease frequency to q6hr prn- taking 3-4 daily   -Cymbalta 20mg hs, low dose due to interaction with Tamoxifen    -Lyrica to 100 mg BID daily   - DC Desipramine 25 mg HS- caused nausea and dizziness     Follow Up: 8 weeks Encouraged to call with any questions, concerns, needs, or changes in symptoms. Subjective:     Shawna Moraes is a 60 y.o. female with significant past medical history of Breast cancer. 1/5/22: Spoke with BODØ on the phone. Overall her symptoms are well controlled. She has been able to decrease the frequency of her oxycodone and is taking it 3-4 times daily. She continues to take the Lyrica twice daily. She denies any constipation or nausea. Her mom is in the hospital currently which has been stressful. She has no other new complaints.      Objective:     [place] Symptom Assessment Score   [place] Score 11/10/2021 10/13/2021 9/15/2021 7/21/2021 6/9/2021   Pain Score 7 6 6 5 4   Tiredness Score 5 6 3 4 4   Nausea Score Not nauseated Not nauseated Not nauseated Not nauseated Not nauseated   Depression Score Not depressed Not depressed Not depressed Not depressed Not depressed   Anxiety Score 4 6 3 3 2   Drowsiness Score Not drowsy 5 Not drowsy 2 Not drowsy   Appetite Score 5 5 5 3 5   Wellbeing Score 5 5 5 3 5   Dyspnea Score No shortness of breath No shortness of breath No shortness of breath No shortness of breath No shortness of breath   Other Problem Score Best possible response Best possible response Best possible response Best possible response Best possible response   Total Assessment Score(calculated) 26 33 22 20 20     Assessed by: patient. Current Medications:  Medications reviewed: yes    Controlled Substances Monitoring: OARRS reviewed 1/5/22. RX Monitoring 9/15/2021   Attestation -   Acute Pain Prescriptions -   Periodic Controlled Substance Monitoring Possible medication side effects, risk of tolerance/dependence & alternative treatments discussed. ;No signs of potential drug abuse or diversion identified. ;Assessed functional status. ;Obtaining appropriate analgesic effect of treatment. Chronic Pain > 50 MEDD Re-evaluated the status of the patient's underlying condition causing pain. Chronic Pain > 80 MEDD Obtained or confirmed \"Medication Contract\" on file. Emi Fuentes, APRN - CNP   Palliative Care Department     Time/Communication  11-20 minutes in coordination of care regarding symptom management.

## 2022-01-12 DIAGNOSIS — Z51.5 PALLIATIVE CARE BY SPECIALIST: ICD-10-CM

## 2022-01-12 DIAGNOSIS — G89.3 CHRONIC PAIN DUE TO NEOPLASM: ICD-10-CM

## 2022-01-12 RX ORDER — OXYCODONE HYDROCHLORIDE 10 MG/1
10 TABLET ORAL EVERY 4 HOURS PRN
Qty: 180 TABLET | Refills: 0 | Status: SHIPPED
Start: 2022-01-12 | End: 2022-02-09 | Stop reason: SDUPTHER

## 2022-01-12 NOTE — TELEPHONE ENCOUNTER
Call from [facility] requesting refill for Oxy IR. Pharmacy is [facility]. Next wily 3/2/22.

## 2022-02-09 DIAGNOSIS — Z51.5 PALLIATIVE CARE BY SPECIALIST: ICD-10-CM

## 2022-02-09 DIAGNOSIS — G89.3 CHRONIC PAIN DUE TO NEOPLASM: ICD-10-CM

## 2022-02-09 RX ORDER — OXYCODONE HYDROCHLORIDE 10 MG/1
10 TABLET ORAL EVERY 4 HOURS PRN
Qty: 180 TABLET | Refills: 0 | Status: SHIPPED
Start: 2022-02-09 | End: 2022-03-09 | Stop reason: SDUPTHER

## 2022-02-09 NOTE — TELEPHONE ENCOUNTER
Message through [facility] from [facility] requesting refill for Oxy IR 10 mg. Pharmacy is [facility] on [address]. Next wily 3/2/22.

## 2022-03-09 ENCOUNTER — OFFICE VISIT (OUTPATIENT)
Dept: PALLATIVE CARE | Age: 57
End: 2022-03-09
Payer: COMMERCIAL

## 2022-03-09 VITALS
DIASTOLIC BLOOD PRESSURE: 66 MMHG | WEIGHT: 159 LBS | HEART RATE: 92 BPM | BODY MASS INDEX: 26.06 KG/M2 | SYSTOLIC BLOOD PRESSURE: 122 MMHG | OXYGEN SATURATION: 96 %

## 2022-03-09 DIAGNOSIS — G89.3 CHRONIC PAIN DUE TO NEOPLASM: ICD-10-CM

## 2022-03-09 DIAGNOSIS — Z51.5 PALLIATIVE CARE BY SPECIALIST: ICD-10-CM

## 2022-03-09 PROCEDURE — G8484 FLU IMMUNIZE NO ADMIN: HCPCS | Performed by: NURSE PRACTITIONER

## 2022-03-09 PROCEDURE — G8417 CALC BMI ABV UP PARAM F/U: HCPCS | Performed by: NURSE PRACTITIONER

## 2022-03-09 PROCEDURE — 1036F TOBACCO NON-USER: CPT | Performed by: NURSE PRACTITIONER

## 2022-03-09 PROCEDURE — 3017F COLORECTAL CA SCREEN DOC REV: CPT | Performed by: NURSE PRACTITIONER

## 2022-03-09 PROCEDURE — 99213 OFFICE O/P EST LOW 20 MIN: CPT | Performed by: NURSE PRACTITIONER

## 2022-03-09 PROCEDURE — G8427 DOCREV CUR MEDS BY ELIG CLIN: HCPCS | Performed by: NURSE PRACTITIONER

## 2022-03-09 RX ORDER — PREGABALIN 100 MG/1
200 CAPSULE ORAL 2 TIMES DAILY
Qty: 120 CAPSULE | Refills: 2 | Status: SHIPPED
Start: 2022-03-09 | End: 2022-06-29 | Stop reason: SDUPTHER

## 2022-03-09 RX ORDER — DULOXETIN HYDROCHLORIDE 20 MG/1
20 CAPSULE, DELAYED RELEASE ORAL DAILY
Qty: 90 CAPSULE | Refills: 1 | Status: SHIPPED
Start: 2022-03-09 | End: 2022-08-03 | Stop reason: SDUPTHER

## 2022-03-09 RX ORDER — OXYCODONE HYDROCHLORIDE 10 MG/1
10 TABLET ORAL EVERY 4 HOURS PRN
Qty: 180 TABLET | Refills: 0 | Status: SHIPPED
Start: 2022-03-09 | End: 2022-04-06 | Stop reason: SDUPTHER

## 2022-03-09 NOTE — PROGRESS NOTES
Department of Palliative Medicine  Ambulatory visit  Provider: JOSE ANGEL Patino - CNP         Chief Complaint: Chester Clifford is a 60 y.o. female with chief complaint of Pain    HPI:  61 y/o post-menopausal  female who underwent Stereotactic Left Breast mass core biopsy on 06/17/2016 revealing Invasive Lobular carcinoma, she has developed severe neuropathy after her last treatment. Assessment/Plan      Breast cancer   - Currently on tamoxifen    - Follows at [facility]    Pain due to neoplasm, (neuropathic type pain)    -Continue Oxycodone to 10mg q4hr prn- decrease frequency as able   -Cymbalta 20mg hs, low dose due to interaction with Tamoxifen    -Lyrica to 100 mg BID daily   - DC Desipramine 25 mg HS- caused nausea and dizziness     Follow Up: 3 months. Encouraged to call with any questions, concerns, needs, or changes in symptoms. Subjective:     Chester Clifford is a 60 y.o. female with significant past medical history of Breast cancer. 3/9/22:  Met with Joy Caballero at the outpatient clinic. She explains that she recently moved in with her mom, and it has been stressful. Due to the weather and inactivity her pain has been worse. She has been using her oxycodone every 4-6 hours. The pain continues to be in her lower legs and describes it as aching. She has been taking the Lyrica 200 mg in the afternoon and 100 mg at nighttime. We discussed increasing the Lyrica to 200 mg twice a day. Otherwise she has no new concerns. She denies any nausea, depression, drowsiness, shortness of breath, or constipation.     Objective:     Physical Exam  Gen:  Alert, appears stated age, well nourished, in no acute distress  HEENT:  Normocephalic, conjunctiva pink, no drainage, mucosa moist  Lungs: Respirations easy and unlabored  Heart[de-identified] Regular rate  Abd:  Soft, non tender, non distended,   M/S/Ext:  Moving all extremities, no edema,   Skin:  Warm and dry  Neuro:  Alert, oriented x 3; following commands    [place] Symptom Assessment Score   [place] Score 3/9/2022 11/10/2021 10/13/2021 9/15/2021 7/21/2021   Pain Score 5 7 6 6 5   Tiredness Score 5 5 6 3 4   Nausea Score Not nauseated Not nauseated Not nauseated Not nauseated Not nauseated   Depression Score Not depressed Not depressed Not depressed Not depressed Not depressed   Anxiety Score 5 4 6 3 3   Drowsiness Score Not drowsy Not drowsy 5 Not drowsy 2   Appetite Score 5 5 5 5 3   Wellbeing Score 5 5 5 5 3   Dyspnea Score No shortness of breath No shortness of breath No shortness of breath No shortness of breath No shortness of breath   Other Problem Score Best possible response Best possible response Best possible response Best possible response Best possible response   Total Assessment Score(calculated) 25 26 33 22 20     Assessed by: patient. Current Medications:  Medications reviewed: yes    Controlled Substances Monitoring: OARRS reviewed 3/9/22. RX Monitoring 9/15/2021   Attestation -   Acute Pain Prescriptions -   Periodic Controlled Substance Monitoring Possible medication side effects, risk of tolerance/dependence & alternative treatments discussed. ;No signs of potential drug abuse or diversion identified. ;Assessed functional status. ;Obtaining appropriate analgesic effect of treatment. Chronic Pain > 50 MEDD Re-evaluated the status of the patient's underlying condition causing pain. Chronic Pain > 80 MEDD Obtained or confirmed \"Medication Contract\" on file. JOSE ANGEL Lloyd - CNP   Palliative Care Department     Time/Communication  Greater than 50% of time spent, total 35 minutes in face-to-face counseling and coordination of care regarding symptom management.

## 2022-04-06 DIAGNOSIS — G89.3 CHRONIC PAIN DUE TO NEOPLASM: ICD-10-CM

## 2022-04-06 DIAGNOSIS — Z51.5 PALLIATIVE CARE BY SPECIALIST: ICD-10-CM

## 2022-04-06 RX ORDER — OXYCODONE HYDROCHLORIDE 10 MG/1
10 TABLET ORAL EVERY 4 HOURS PRN
Qty: 180 TABLET | Refills: 0 | Status: SHIPPED | OUTPATIENT
Start: 2022-04-06 | End: 2022-05-06

## 2022-04-06 NOTE — TELEPHONE ENCOUNTER
Call from [facility] requesting refill for Oxy IR 10 mg. Pharmacy is [facility] on [address] [address]. [facility] wily 6/1/22.

## 2022-04-19 DIAGNOSIS — G62.0 CHEMOTHERAPY-INDUCED NEUROPATHY (HCC): ICD-10-CM

## 2022-04-19 DIAGNOSIS — G89.3 CHRONIC PAIN DUE TO NEOPLASM: ICD-10-CM

## 2022-04-19 DIAGNOSIS — T45.1X5A PERIPHERAL NEUROPATHY DUE TO CHEMOTHERAPY (HCC): ICD-10-CM

## 2022-04-19 DIAGNOSIS — G62.0 PERIPHERAL NEUROPATHY DUE TO CHEMOTHERAPY (HCC): ICD-10-CM

## 2022-04-19 DIAGNOSIS — C50.512 MALIGNANT NEOPLASM OF LOWER-OUTER QUADRANT OF LEFT FEMALE BREAST, UNSPECIFIED ESTROGEN RECEPTOR STATUS (HCC): ICD-10-CM

## 2022-04-19 DIAGNOSIS — T45.1X5A CHEMOTHERAPY-INDUCED NEUROPATHY (HCC): ICD-10-CM

## 2022-04-19 DIAGNOSIS — Z51.5 PALLIATIVE CARE BY SPECIALIST: ICD-10-CM

## 2022-04-19 RX ORDER — ALPRAZOLAM 0.5 MG/1
0.5 TABLET ORAL 2 TIMES DAILY PRN
Qty: 30 TABLET | Refills: 2 | Status: SHIPPED
Start: 2022-04-19 | End: 2022-06-01 | Stop reason: SDUPTHER

## 2022-05-02 ENCOUNTER — PATIENT MESSAGE (OUTPATIENT)
Dept: PALLATIVE CARE | Age: 57
End: 2022-05-02

## 2022-05-02 DIAGNOSIS — G62.0 CHEMOTHERAPY-INDUCED NEUROPATHY (HCC): Primary | ICD-10-CM

## 2022-05-02 DIAGNOSIS — Z51.5 PALLIATIVE CARE BY SPECIALIST: ICD-10-CM

## 2022-05-02 DIAGNOSIS — T45.1X5A CHEMOTHERAPY-INDUCED NEUROPATHY (HCC): Primary | ICD-10-CM

## 2022-05-02 DIAGNOSIS — G89.3 CHRONIC PAIN DUE TO NEOPLASM: ICD-10-CM

## 2022-05-02 RX ORDER — OXYCODONE AND ACETAMINOPHEN 10; 325 MG/1; MG/1
1 TABLET ORAL EVERY 4 HOURS PRN
Qty: 180 TABLET | Refills: 0 | Status: SHIPPED | OUTPATIENT
Start: 2022-05-02 | End: 2022-06-01 | Stop reason: SDUPTHER

## 2022-05-02 NOTE — TELEPHONE ENCOUNTER
From: lupe Rob  To: Giancarlo Pérez  Sent: 5/2/2022 9:55 AM EDT  Subject: Refill    Hi Yunior Blake    Can you please have Flower Pemberton call in a refill for the oxycodone and can you please ask her if that could be changed to what I was taking before oxycodone/acet ? I dont know if that will be better for me or not. Sobeida Lester been struggling all month with both feet being numb and achy and taking care of my mom being on them all day and some/most nights has been a challenge. I can not seem to get any relief with the pain/ache with the plain oxycodone 10 mg. I have taken numerous advil that Im not supposed to be taking but I cant stand the ache. I had my oncology appt last week in [facility] [employer] and will be getting ct scans and a bone scan this week.  was concerned about the loss of 30 pounds but Im sure its alot of stress with taking care of my mom 24/7 and this pain makes me nauseous when it gets intense so my appetite hasnt been the best.   Please ask Flower Pemberton about the oxy/acet  and if she doesnt want to change it then ask her to please call in the oxycodone 10 mg to [facility] in [place].  [phone]    Thanks and let me know  Veronica Segura   1965

## 2022-06-01 ENCOUNTER — OFFICE VISIT (OUTPATIENT)
Dept: PALLATIVE CARE | Age: 57
End: 2022-06-01
Payer: COMMERCIAL

## 2022-06-01 ENCOUNTER — TELEPHONE (OUTPATIENT)
Dept: PALLATIVE CARE | Age: 57
End: 2022-06-01

## 2022-06-01 VITALS
DIASTOLIC BLOOD PRESSURE: 69 MMHG | SYSTOLIC BLOOD PRESSURE: 105 MMHG | WEIGHT: 151 LBS | BODY MASS INDEX: 24.75 KG/M2 | HEART RATE: 80 BPM

## 2022-06-01 DIAGNOSIS — T45.1X5A PERIPHERAL NEUROPATHY DUE TO CHEMOTHERAPY (HCC): ICD-10-CM

## 2022-06-01 DIAGNOSIS — Z51.5 PALLIATIVE CARE BY SPECIALIST: ICD-10-CM

## 2022-06-01 DIAGNOSIS — C50.512 MALIGNANT NEOPLASM OF LOWER-OUTER QUADRANT OF LEFT FEMALE BREAST, UNSPECIFIED ESTROGEN RECEPTOR STATUS (HCC): ICD-10-CM

## 2022-06-01 DIAGNOSIS — G89.3 CHRONIC PAIN DUE TO NEOPLASM: ICD-10-CM

## 2022-06-01 DIAGNOSIS — G62.0 PERIPHERAL NEUROPATHY DUE TO CHEMOTHERAPY (HCC): ICD-10-CM

## 2022-06-01 DIAGNOSIS — G62.0 CHEMOTHERAPY-INDUCED NEUROPATHY (HCC): ICD-10-CM

## 2022-06-01 DIAGNOSIS — T45.1X5A CHEMOTHERAPY-INDUCED NEUROPATHY (HCC): ICD-10-CM

## 2022-06-01 PROCEDURE — 99212 OFFICE O/P EST SF 10 MIN: CPT | Performed by: NURSE PRACTITIONER

## 2022-06-01 PROCEDURE — G8428 CUR MEDS NOT DOCUMENT: HCPCS | Performed by: NURSE PRACTITIONER

## 2022-06-01 PROCEDURE — 3017F COLORECTAL CA SCREEN DOC REV: CPT | Performed by: NURSE PRACTITIONER

## 2022-06-01 PROCEDURE — 99213 OFFICE O/P EST LOW 20 MIN: CPT | Performed by: NURSE PRACTITIONER

## 2022-06-01 PROCEDURE — 1036F TOBACCO NON-USER: CPT | Performed by: NURSE PRACTITIONER

## 2022-06-01 PROCEDURE — 99212 OFFICE O/P EST SF 10 MIN: CPT

## 2022-06-01 PROCEDURE — G8420 CALC BMI NORM PARAMETERS: HCPCS | Performed by: NURSE PRACTITIONER

## 2022-06-01 RX ORDER — HYDROXYZINE PAMOATE 25 MG/1
25 CAPSULE ORAL 4 TIMES DAILY PRN
Qty: 56 CAPSULE | Refills: 0 | Status: SHIPPED | OUTPATIENT
Start: 2022-06-01 | End: 2022-06-15

## 2022-06-01 RX ORDER — ALPRAZOLAM 0.5 MG/1
0.5 TABLET ORAL 2 TIMES DAILY PRN
Qty: 30 TABLET | Refills: 2 | Status: SHIPPED | OUTPATIENT
Start: 2022-06-01 | End: 2022-07-16

## 2022-06-01 RX ORDER — OXYCODONE AND ACETAMINOPHEN 10; 325 MG/1; MG/1
1 TABLET ORAL EVERY 4 HOURS PRN
Qty: 180 TABLET | Refills: 0 | Status: SHIPPED
Start: 2022-06-01 | End: 2022-06-29 | Stop reason: SDUPTHER

## 2022-06-01 NOTE — PROGRESS NOTES
Department of Palliative Medicine  Ambulatory visit  Provider: JOSE ANGEL Morales - CNP         Chief Complaint: Rodrigo Foy is a 60 y.o. female with chief complaint of Pain    HPI:  59 y/o post-menopausal  female who underwent Stereotactic Left Breast mass core biopsy on 06/17/2016 revealing Invasive Lobular carcinoma, she has developed severe neuropathy after her last treatment. 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 Encouraged to call with any questions, concerns, needs, or changes in symptoms. Subjective:     Rodrigo Foy is a 60 y.o. female with significant past medical history of Breast cancer. 6/1/22:  Met with Ayanna Willard at the outpatient clinic. She has continued to have weight loss and increased stress and anxiety. She reports that her major stressors are that her brother has been very sick and she is the main caretaker for her mom. She recently had a work-up done with her oncologist in [facility] [employer], but everything came back good. She states that the Xanax makes her feel drowsy and that she will take a half or quarter at nighttime. She does not like taking them during the day. We discussed nonpharmacological ways to decrease stress and increase weight gain. She also spoke with the  about getting some help in the home. She is limited to antidepressant/anxiety medication due to the tamoxifen. She had worsening pain and swelling of her lower legs about a month ago.   She states that this has improved, but that she is using her Percocet every 4 hours. She denies any nausea, depression, drowsiness, shortness of breath, or constipation. Objective:     Physical Exam  Gen:  Alert, appears stated age, well nourished, in no acute distress  HEENT:  Normocephalic, conjunctiva pink, no drainage, mucosa moist  Lungs: Respirations easy and unlabored  Heart[de-identified] Regular rate  Abd:  Soft, non tender, non distended,   M/S/Ext:  Moving all extremities, no edema,   Skin:  Warm and dry  Neuro:  Alert, oriented x 3; following commands    [place] Symptom Assessment Score   [place] Score 6/1/2022 3/9/2022 11/10/2021 10/13/2021 9/15/2021   Pain Score 5 5 7 6 6   Tiredness Score 5 5 5 6 3   Nausea Score Not nauseated Not nauseated Not nauseated Not nauseated Not nauseated   Depression Score Not depressed Not depressed Not depressed Not depressed Not depressed   Anxiety Score 5 5 4 6 3   Drowsiness Score Not drowsy Not drowsy Not drowsy 5 Not drowsy   Appetite Score 5 5 5 5 5   Wellbeing Score 5 5 5 5 5   Dyspnea Score No shortness of breath No shortness of breath No shortness of breath No shortness of breath No shortness of breath   Other Problem Score Best possible response Best possible response Best possible response Best possible response Best possible response   Total Assessment Score(calculated) 25 25 26 33 22     Assessed by: patient. Current Medications:  Medications reviewed: yes    Controlled Substances Monitoring: OARRS reviewed 6/1/22. RX Monitoring 9/15/2021   Attestation -   Acute Pain Prescriptions -   Periodic Controlled Substance Monitoring Possible medication side effects, risk of tolerance/dependence & alternative treatments discussed. ;No signs of potential drug abuse or diversion identified. ;Assessed functional status. ;Obtaining appropriate analgesic effect of treatment. Chronic Pain > 50 MEDD Re-evaluated the status of the patient's underlying condition causing pain.    Chronic Pain > 80 MEDD Obtained or confirmed \"Medication Contract\" on file. JOSE ANGEL Atkinson - CNP   Palliative Care Department     Time/Communication  Greater than 50% of time spent, total 25 minutes in face-to-face counseling and coordination of care regarding symptom management.

## 2022-06-01 NOTE — TELEPHONE ENCOUNTER
Palliative Medicine  Social Work Progress Note      Patient: Saskia Esteban    LS met with pt in exam room following Palliative medicine appt with CNP. Pt was inquiring about some private duty aide service. Pt discussed that she is feeling some extra stress at home taking care of her mother who is completely dependent on her help as well as having a brother who is currently hospitalized and refusing to have significant medical treatment. Provided support and empathy to pt during conversation and offered support to help pt manage stressors. Expressed importance to pt in taking care of herself as well, pt expressed understanding and identified that she does not always take care of herself and has had a significant weight loss. Discussed utilizing some supplemental nutrition to help improve lifestyle. Provided pt with some coupons for Boost and Ensure due to dietitian not being available today. Pt was appreciative. Will continue to follow.

## 2022-06-29 DIAGNOSIS — G89.3 CHRONIC PAIN DUE TO NEOPLASM: ICD-10-CM

## 2022-06-29 DIAGNOSIS — Z51.5 PALLIATIVE CARE BY SPECIALIST: ICD-10-CM

## 2022-06-29 DIAGNOSIS — T45.1X5A CHEMOTHERAPY-INDUCED NEUROPATHY (HCC): ICD-10-CM

## 2022-06-29 DIAGNOSIS — G62.0 CHEMOTHERAPY-INDUCED NEUROPATHY (HCC): ICD-10-CM

## 2022-06-29 RX ORDER — OXYCODONE AND ACETAMINOPHEN 10; 325 MG/1; MG/1
1 TABLET ORAL EVERY 4 HOURS PRN
Qty: 180 TABLET | Refills: 0 | Status: SHIPPED
Start: 2022-06-29 | End: 2022-07-27 | Stop reason: SDUPTHER

## 2022-06-29 RX ORDER — PREGABALIN 100 MG/1
CAPSULE ORAL
Qty: 90 CAPSULE | Refills: 5 | Status: SHIPPED | OUTPATIENT
Start: 2022-06-29 | End: 2022-12-26

## 2022-06-29 NOTE — TELEPHONE ENCOUNTER
Call from [facility]ese[facility] Reyes requesting refills for Percocet and Lyrica. Meseret Reyes wants us to remember that she is taking 100 mg in am and 200 mg at HS as Violet Nolen recommended. Pharmacy is [facility] on [address] in [place]. Next wily 8/3/22.

## 2022-07-27 DIAGNOSIS — T45.1X5A CHEMOTHERAPY-INDUCED NEUROPATHY (HCC): ICD-10-CM

## 2022-07-27 DIAGNOSIS — Z51.5 PALLIATIVE CARE BY SPECIALIST: ICD-10-CM

## 2022-07-27 DIAGNOSIS — G62.0 CHEMOTHERAPY-INDUCED NEUROPATHY (HCC): ICD-10-CM

## 2022-07-27 RX ORDER — OXYCODONE AND ACETAMINOPHEN 10; 325 MG/1; MG/1
1 TABLET ORAL EVERY 4 HOURS PRN
Qty: 180 TABLET | Refills: 0 | Status: SHIPPED
Start: 2022-07-27 | End: 2022-08-24 | Stop reason: SDUPTHER

## 2022-08-03 ENCOUNTER — OFFICE VISIT (OUTPATIENT)
Dept: PALLATIVE CARE | Age: 57
End: 2022-08-03
Payer: COMMERCIAL

## 2022-08-03 VITALS
WEIGHT: 147.6 LBS | BODY MASS INDEX: 24.19 KG/M2 | DIASTOLIC BLOOD PRESSURE: 72 MMHG | TEMPERATURE: 97.5 F | HEART RATE: 80 BPM | OXYGEN SATURATION: 97 % | SYSTOLIC BLOOD PRESSURE: 115 MMHG

## 2022-08-03 DIAGNOSIS — Z51.5 PALLIATIVE CARE BY SPECIALIST: Primary | ICD-10-CM

## 2022-08-03 PROCEDURE — G8420 CALC BMI NORM PARAMETERS: HCPCS | Performed by: NURSE PRACTITIONER

## 2022-08-03 PROCEDURE — 1036F TOBACCO NON-USER: CPT | Performed by: NURSE PRACTITIONER

## 2022-08-03 PROCEDURE — 3017F COLORECTAL CA SCREEN DOC REV: CPT | Performed by: NURSE PRACTITIONER

## 2022-08-03 PROCEDURE — G8427 DOCREV CUR MEDS BY ELIG CLIN: HCPCS | Performed by: NURSE PRACTITIONER

## 2022-08-03 PROCEDURE — 99213 OFFICE O/P EST LOW 20 MIN: CPT | Performed by: NURSE PRACTITIONER

## 2022-08-03 PROCEDURE — 99212 OFFICE O/P EST SF 10 MIN: CPT | Performed by: NURSE PRACTITIONER

## 2022-08-03 RX ORDER — DULOXETIN HYDROCHLORIDE 20 MG/1
20 CAPSULE, DELAYED RELEASE ORAL DAILY
Qty: 90 CAPSULE | Refills: 1 | Status: SHIPPED
Start: 2022-08-03 | End: 2022-10-23 | Stop reason: SDUPTHER

## 2022-08-03 NOTE — PROGRESS NOTES
Department of Palliative Medicine  Ambulatory visit  Provider: JOSE ANGEL Koenig - CNP         Chief Complaint: Harley Easton is a 60 y.o. female with chief complaint of Pain    HPI:  59 y/o post-menopausal  female who underwent Stereotactic Left Breast mass core biopsy on 06/17/2016 revealing Invasive Lobular carcinoma, she has developed severe neuropathy after her last treatment. Assessment/Plan      Breast cancer   - Currently on tamoxifen    - Follows at [facility]    Pain due to neoplasm, (neuropathic type pain)    -Continue Oxycodone to 10mg q4hr prn- decrease frequency as able   -Cymbalta 20mg hs, low dose due to interaction with Tamoxifen    -Lyrica to 100 mg in the morning or 200 mg nightly    - DC Desipramine 25 mg HS- caused nausea and dizziness     Anxiety   -Continue Xanax 0.25 mg 2 times daily as needed   -Continue Cymbalta 20 mg daily   -Vistaril 25 mg every 6 hours as needed    Follow Up: 3 months. Encouraged to call with any questions, concerns, needs, or changes in symptoms. Subjective:     Harley Easton is a 60 y.o. female with significant past medical history of Breast cancer. 8/3/22:  Met with Chanelle Alcaraz at the outpatient clinic. She explains that she has recently had a lot of stressors in her life. She is recovering from [facility], was bitten by a tick, and is the caregiver to her mom and brother. She explains that she has been on her feet more which is caused increased numbness and pain in her legs. She is using her Percocet 5 times a day. She states that she will occasionally alternate with ibuprofen. She rates her pain a 6 on a scale of 1-10. She has also continued to have weight loss and the loss of appetite. We discussed that this is likely from stress. She wants to start acupuncture, but has very little time to do so. We also discussed counseling and assistance at home. She states that the Vistaril has been helpful for sleep.   She denies any depression, drowsiness, shortness of breath, or constipation. Objective:     Physical Exam  Gen:  Alert, appears stated age, well nourished, in no acute distress  HEENT:  Normocephalic, conjunctiva pink, no drainage, mucosa moist  Lungs: Respirations easy and unlabored  Heart[de-identified] Regular rate  Abd:  Soft, non tender, non distended,   M/S/Ext:  Moving all extremities, no edema,   Skin:  Warm and dry  Neuro:  Alert, oriented x 3; following commands    [place] Symptom Assessment Score   [place] Score 8/3/2022 6/1/2022 3/9/2022 11/10/2021 10/13/2021   Pain Score 6 5 5 7 6   Tiredness Score 3 5 5 5 6   Nausea Score 2 Not nauseated Not nauseated Not nauseated Not nauseated   Depression Score Not depressed Not depressed Not depressed Not depressed Not depressed   Anxiety Score 5 5 5 4 6   Drowsiness Score Not drowsy Not drowsy Not drowsy Not drowsy 5   Appetite Score 5 5 5 5 5   Wellbeing Score 5 5 5 5 5   Dyspnea Score No shortness of breath No shortness of breath No shortness of breath No shortness of breath No shortness of breath   Other Problem Score Best possible response Best possible response Best possible response Best possible response Best possible response   Total Assessment Score(calculated) 26 25 25 26 33     Assessed by: patient. Current Medications:  Medications reviewed: yes    Controlled Substances Monitoring: OARRS reviewed 8/3/22. RX Monitoring 9/15/2021   Attestation -   Acute Pain Prescriptions -   Periodic Controlled Substance Monitoring Possible medication side effects, risk of tolerance/dependence & alternative treatments discussed. ;No signs of potential drug abuse or diversion identified. ;Assessed functional status. ;Obtaining appropriate analgesic effect of treatment. Chronic Pain > 50 MEDD Re-evaluated the status of the patient's underlying condition causing pain. Chronic Pain > 80 MEDD Obtained or confirmed \"Medication Contract\" on file.      JOSE ANGEL Ford - [facility]   Palliative Care Department Time/Communication  Greater than 50% of time spent, total 25 minutes in face-to-face counseling and coordination of care regarding symptom management.

## 2022-08-24 DIAGNOSIS — Z51.5 PALLIATIVE CARE BY SPECIALIST: ICD-10-CM

## 2022-08-24 DIAGNOSIS — G62.0 CHEMOTHERAPY-INDUCED NEUROPATHY (HCC): ICD-10-CM

## 2022-08-24 DIAGNOSIS — T45.1X5A CHEMOTHERAPY-INDUCED NEUROPATHY (HCC): ICD-10-CM

## 2022-08-24 RX ORDER — OXYCODONE AND ACETAMINOPHEN 10; 325 MG/1; MG/1
1 TABLET ORAL EVERY 4 HOURS PRN
Qty: 180 TABLET | Refills: 0 | Status: SHIPPED
Start: 2022-08-24 | End: 2022-09-21 | Stop reason: SDUPTHER

## 2022-08-24 NOTE — TELEPHONE ENCOUNTER
Call from [facility] requesting refill for Percocet. Pharmacy is [facility] in [place]. Next wily 10/26/22.

## 2022-09-21 DIAGNOSIS — Z51.5 PALLIATIVE CARE BY SPECIALIST: ICD-10-CM

## 2022-09-21 DIAGNOSIS — G62.0 CHEMOTHERAPY-INDUCED NEUROPATHY (HCC): ICD-10-CM

## 2022-09-21 DIAGNOSIS — T45.1X5A CHEMOTHERAPY-INDUCED NEUROPATHY (HCC): ICD-10-CM

## 2022-09-21 RX ORDER — OXYCODONE AND ACETAMINOPHEN 10; 325 MG/1; MG/1
1 TABLET ORAL EVERY 4 HOURS PRN
Qty: 180 TABLET | Refills: 0 | Status: SHIPPED
Start: 2022-09-21 | End: 2022-10-18 | Stop reason: SDUPTHER

## 2022-10-18 DIAGNOSIS — Z51.5 PALLIATIVE CARE BY SPECIALIST: ICD-10-CM

## 2022-10-18 DIAGNOSIS — T45.1X5A CHEMOTHERAPY-INDUCED NEUROPATHY (HCC): ICD-10-CM

## 2022-10-18 DIAGNOSIS — G62.0 CHEMOTHERAPY-INDUCED NEUROPATHY (HCC): ICD-10-CM

## 2022-10-18 RX ORDER — OXYCODONE AND ACETAMINOPHEN 10; 325 MG/1; MG/1
1 TABLET ORAL EVERY 4 HOURS PRN
Qty: 180 TABLET | Refills: 0 | Status: SHIPPED | OUTPATIENT
Start: 2022-10-18 | End: 2022-11-17

## 2022-10-24 RX ORDER — ALBUTEROL SULFATE 90 UG/1
AEROSOL, METERED RESPIRATORY (INHALATION)
COMMUNITY
Start: 2022-09-29

## 2022-10-24 RX ORDER — DULOXETIN HYDROCHLORIDE 20 MG/1
20 CAPSULE, DELAYED RELEASE ORAL DAILY
Qty: 90 CAPSULE | Refills: 1 | Status: SHIPPED | OUTPATIENT
Start: 2022-10-24 | End: 2023-01-22

## 2022-10-24 RX ORDER — DULOXETIN HYDROCHLORIDE 20 MG/1
CAPSULE, DELAYED RELEASE ORAL
Qty: 30 CAPSULE | Refills: 0 | OUTPATIENT
Start: 2022-10-24

## 2022-10-26 ENCOUNTER — OFFICE VISIT (OUTPATIENT)
Dept: PALLATIVE CARE | Age: 57
End: 2022-10-26
Payer: COMMERCIAL

## 2022-10-26 VITALS
HEIGHT: 67 IN | OXYGEN SATURATION: 95 % | BODY MASS INDEX: 23.07 KG/M2 | DIASTOLIC BLOOD PRESSURE: 55 MMHG | WEIGHT: 147 LBS | HEART RATE: 83 BPM | SYSTOLIC BLOOD PRESSURE: 118 MMHG

## 2022-10-26 DIAGNOSIS — G89.3 PAIN DUE TO NEOPLASM: Primary | ICD-10-CM

## 2022-10-26 DIAGNOSIS — Z51.5 PALLIATIVE CARE BY SPECIALIST: ICD-10-CM

## 2022-10-26 PROCEDURE — G8484 FLU IMMUNIZE NO ADMIN: HCPCS | Performed by: NURSE PRACTITIONER

## 2022-10-26 PROCEDURE — G8420 CALC BMI NORM PARAMETERS: HCPCS | Performed by: NURSE PRACTITIONER

## 2022-10-26 PROCEDURE — 1036F TOBACCO NON-USER: CPT | Performed by: NURSE PRACTITIONER

## 2022-10-26 PROCEDURE — 3017F COLORECTAL CA SCREEN DOC REV: CPT | Performed by: NURSE PRACTITIONER

## 2022-10-26 PROCEDURE — 99212 OFFICE O/P EST SF 10 MIN: CPT

## 2022-10-26 PROCEDURE — G8427 DOCREV CUR MEDS BY ELIG CLIN: HCPCS | Performed by: NURSE PRACTITIONER

## 2022-10-26 PROCEDURE — 99213 OFFICE O/P EST LOW 20 MIN: CPT | Performed by: NURSE PRACTITIONER

## 2022-10-26 RX ORDER — METHOCARBAMOL 500 MG/1
500 TABLET, FILM COATED ORAL 2 TIMES DAILY PRN
Qty: 20 TABLET | Refills: 0 | Status: SHIPPED
Start: 2022-10-26 | End: 2022-11-15 | Stop reason: SDUPTHER

## 2022-10-26 NOTE — PROGRESS NOTES
Department of Palliative Medicine  Ambulatory visit  Provider: JOSE ANGEL Sanderson - CNP         Chief Complaint: Rey De Leon is a 59 y.o. female with chief complaint of Pain    HPI:  61 y/o post-menopausal  female who underwent Stereotactic Left Breast mass core biopsy on 06/17/2016 revealing Invasive Lobular carcinoma, she has developed severe neuropathy after her last treatment. 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 Encouraged to call with any questions, concerns, needs, or changes in symptoms. Subjective:     Rey De Leon is a 59 y.o. female with significant past medical history of Breast cancer. 10/26/22:  Met with Hilary Bustillo at the outpatient clinic. She continues to express high levels of stress in her life. She is working a full time job along with taking care of her mom and brother. She is also recovering from a foot fracture and COVID-pneumonia. She expresses that this time of year the pain in her leg worsens. She is using her Percocet every 4 hours and is waking up through the night. She also explains that she has been having some lower back pain. We will trial Robaxin 500 mg 2 times daily as needed. Educated to not take with Xanax or Vistaril. She states that appetite has been improving but is still not great, weight has been stable. She denies any nausea, shortness of breath, or constipation.   Objective:     Physical Exam  Gen:  Alert, appears stated age, well nourished, in no acute distress  HEENT:  Normocephalic, conjunctiva pink, no drainage, mucosa moist  Lungs: Respirations easy and unlabored  Heart[de-identified] Regular rate  Abd:  Soft, non tender, non distended,   M/S/Ext:  Moving all extremities, no edema,   Skin:  Warm and dry  Neuro:  Alert, oriented x 3; following commands    [place] Symptom Assessment Score   [place] Score 10/26/2022 8/3/2022 6/1/2022 3/9/2022 11/10/2021   Pain Score 7 6 5 5 7   Tiredness Score 5 3 5 5 5   Nausea Score Not nauseated 2 Not nauseated Not nauseated Not nauseated   Depression Score 2 Not depressed Not depressed Not depressed Not depressed   Anxiety Score 2 5 5 5 4   Drowsiness Score 2 Not drowsy Not drowsy Not drowsy Not drowsy   Appetite Score 5 5 5 5 5   Wellbeing Score 5 5 5 5 5   Dyspnea Score No shortness of breath No shortness of breath No shortness of breath No shortness of breath No shortness of breath   Other Problem Score Best possible response Best possible response Best possible response Best possible response Best possible response   Total Assessment Score(calculated) 28 26 25 25 26     Assessed by: patient. Current Medications:  Medications reviewed: yes    Controlled Substances Monitoring: OARRS reviewed 10/26/22. RX Monitoring 9/15/2021   Attestation -   Acute Pain Prescriptions -   Periodic Controlled Substance Monitoring Possible medication side effects, risk of tolerance/dependence & alternative treatments discussed. ;No signs of potential drug abuse or diversion identified. ;Assessed functional status. ;Obtaining appropriate analgesic effect of treatment. Chronic Pain > 50 MEDD Re-evaluated the status of the patient's underlying condition causing pain. Chronic Pain > 80 MEDD Obtained or confirmed \"Medication Contract\" on file.      JOSE ANGEL Hutton - CNP   Palliative Care Department     Time/Communication  Greater than 50% of time spent, total 25 minutes in face-to-face counseling and coordination of care regarding symptom management.

## 2022-11-15 DIAGNOSIS — Z51.5 PALLIATIVE CARE BY SPECIALIST: ICD-10-CM

## 2022-11-15 DIAGNOSIS — G62.0 CHEMOTHERAPY-INDUCED NEUROPATHY (HCC): ICD-10-CM

## 2022-11-15 DIAGNOSIS — T45.1X5A CHEMOTHERAPY-INDUCED NEUROPATHY (HCC): ICD-10-CM

## 2022-11-15 RX ORDER — METHOCARBAMOL 500 MG/1
500 TABLET, FILM COATED ORAL 2 TIMES DAILY PRN
Qty: 20 TABLET | Refills: 0 | Status: SHIPPED | OUTPATIENT
Start: 2022-11-15 | End: 2022-11-25

## 2022-11-15 RX ORDER — OXYCODONE AND ACETAMINOPHEN 10; 325 MG/1; MG/1
1 TABLET ORAL EVERY 4 HOURS PRN
Qty: 180 TABLET | Refills: 0 | Status: SHIPPED | OUTPATIENT
Start: 2022-11-15 | End: 2022-12-15

## 2022-11-15 NOTE — TELEPHONE ENCOUNTER
Call from [facility] requesting refill for Percocet and Robaxin. [facility]acy is [facility] on [address] [place]. Next wily 1/18/22.

## 2022-12-12 DIAGNOSIS — G62.0 CHEMOTHERAPY-INDUCED NEUROPATHY (HCC): ICD-10-CM

## 2022-12-12 DIAGNOSIS — T45.1X5A CHEMOTHERAPY-INDUCED NEUROPATHY (HCC): ICD-10-CM

## 2022-12-12 DIAGNOSIS — Z51.5 PALLIATIVE CARE BY SPECIALIST: ICD-10-CM

## 2022-12-12 RX ORDER — OXYCODONE AND ACETAMINOPHEN 10; 325 MG/1; MG/1
1 TABLET ORAL EVERY 4 HOURS PRN
Qty: 180 TABLET | Refills: 0 | Status: SHIPPED | OUTPATIENT
Start: 2022-12-12 | End: 2023-01-11

## 2022-12-12 RX ORDER — METHOCARBAMOL 500 MG/1
500 TABLET, FILM COATED ORAL 2 TIMES DAILY PRN
Qty: 20 TABLET | Refills: 0 | OUTPATIENT
Start: 2022-12-12 | End: 2022-12-22

## 2022-12-12 RX ORDER — METHOCARBAMOL 500 MG/1
500 TABLET, FILM COATED ORAL 2 TIMES DAILY PRN
Qty: 20 TABLET | Refills: 0 | Status: SHIPPED | OUTPATIENT
Start: 2022-12-12 | End: 2022-12-22

## 2022-12-12 RX ORDER — OXYCODONE AND ACETAMINOPHEN 10; 325 MG/1; MG/1
1 TABLET ORAL EVERY 4 HOURS PRN
Qty: 180 TABLET | Refills: 0 | OUTPATIENT
Start: 2022-12-12 | End: 2023-01-11

## 2022-12-12 NOTE — TELEPHONE ENCOUNTER
Call from [facility] requesting refill for Oxy IR and Robaxin. [facility]acy is [facility] on [address] [facility]. Next wily 1/18/23.

## 2022-12-22 RX ORDER — METHOCARBAMOL 500 MG/1
500 TABLET, FILM COATED ORAL 2 TIMES DAILY PRN
Qty: 60 TABLET | Refills: 0 | Status: SHIPPED | OUTPATIENT
Start: 2022-12-22 | End: 2023-01-21

## 2022-12-22 RX ORDER — METHOCARBAMOL 500 MG/1
500 TABLET, FILM COATED ORAL 2 TIMES DAILY PRN
Qty: 20 TABLET | Refills: 0 | OUTPATIENT
Start: 2022-12-22 | End: 2023-01-01

## 2022-12-22 NOTE — TELEPHONE ENCOUNTER
Message in Ash from [facility] requesting refill for Robaxin. [facility] states she is taking twice a day and would like 30 day script ( 60 tablets) [facility] on [address]. Next wily 1/18/23.

## 2022-12-30 ENCOUNTER — HOSPITAL ENCOUNTER (OUTPATIENT)
Dept: GENERAL RADIOLOGY | Age: 57
End: 2022-12-30
Payer: COMMERCIAL

## 2022-12-30 DIAGNOSIS — N63.0 BREAST SWELLING: ICD-10-CM

## 2022-12-30 DIAGNOSIS — Z85.3 PERSONAL HISTORY OF MALIGNANT NEOPLASM OF BREAST: ICD-10-CM

## 2022-12-30 DIAGNOSIS — Z12.31 VISIT FOR SCREENING MAMMOGRAM: ICD-10-CM

## 2022-12-30 PROCEDURE — 76642 ULTRASOUND BREAST LIMITED: CPT

## 2022-12-30 PROCEDURE — 77066 DX MAMMO INCL CAD BI: CPT

## 2023-01-10 DIAGNOSIS — T45.1X5A CHEMOTHERAPY-INDUCED NEUROPATHY (HCC): ICD-10-CM

## 2023-01-10 DIAGNOSIS — G62.0 CHEMOTHERAPY-INDUCED NEUROPATHY (HCC): ICD-10-CM

## 2023-01-10 DIAGNOSIS — Z51.5 PALLIATIVE CARE BY SPECIALIST: ICD-10-CM

## 2023-01-10 RX ORDER — OXYCODONE AND ACETAMINOPHEN 10; 325 MG/1; MG/1
1 TABLET ORAL EVERY 4 HOURS PRN
Qty: 180 TABLET | Refills: 0 | Status: SHIPPED | OUTPATIENT
Start: 2023-01-10 | End: 2023-02-09

## 2023-01-18 ENCOUNTER — OFFICE VISIT (OUTPATIENT)
Dept: PALLATIVE CARE | Age: 58
End: 2023-01-18
Payer: COMMERCIAL

## 2023-01-18 VITALS
TEMPERATURE: 97.2 F | BODY MASS INDEX: 23.65 KG/M2 | DIASTOLIC BLOOD PRESSURE: 70 MMHG | HEART RATE: 77 BPM | OXYGEN SATURATION: 97 % | SYSTOLIC BLOOD PRESSURE: 131 MMHG | WEIGHT: 151 LBS

## 2023-01-18 DIAGNOSIS — Z51.5 PALLIATIVE CARE BY SPECIALIST: Primary | ICD-10-CM

## 2023-01-18 PROCEDURE — G8484 FLU IMMUNIZE NO ADMIN: HCPCS | Performed by: NURSE PRACTITIONER

## 2023-01-18 PROCEDURE — G8420 CALC BMI NORM PARAMETERS: HCPCS | Performed by: NURSE PRACTITIONER

## 2023-01-18 PROCEDURE — 99212 OFFICE O/P EST SF 10 MIN: CPT | Performed by: NURSE PRACTITIONER

## 2023-01-18 PROCEDURE — 3017F COLORECTAL CA SCREEN DOC REV: CPT | Performed by: NURSE PRACTITIONER

## 2023-01-18 PROCEDURE — 99213 OFFICE O/P EST LOW 20 MIN: CPT | Performed by: NURSE PRACTITIONER

## 2023-01-18 PROCEDURE — 1036F TOBACCO NON-USER: CPT | Performed by: NURSE PRACTITIONER

## 2023-01-18 PROCEDURE — G8427 DOCREV CUR MEDS BY ELIG CLIN: HCPCS | Performed by: NURSE PRACTITIONER

## 2023-01-18 NOTE — PROGRESS NOTES
Department of Palliative Medicine  Ambulatory visit  Provider: JOSE ANGEL Miller - CNP         Chief Complaint: Uriel Braswell is a 57 y.o. female with chief complaint of Pain    HPI:  54 y/o post-menopausal  female who underwent Stereotactic Left Breast mass core biopsy on 06/17/2016 revealing Invasive Lobular carcinoma, she has developed severe neuropathy after her last treatment.     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 Encouraged to call with any questions, concerns, needs, or changes in symptoms.    Subjective:     Uriel Braswell is a 57 y.o. female with significant past medical history of Breast cancer.    1/18/23:  Met with Sarita at the outpatient clinic.  She explains that she has not been feeling well lately.  She has a lot of stressors in her life, working full-time, caring for her mother and brother.  She acknowledges that her worsening pain is likely due to the stress.  She states that she knows she has a bad back and has had an MRI in the past.  We discussed going to see a pain management doctor about injection, or chiropractor.  She explains that she was referred to someone in the past, but never followed up.  She has not noticed a difference with the Robaxin, we discussed tapering and stopping.  She also explains that the tooth ache in her legs has been bad.  She is using her Percocet every 4 hours and states that she will  occasionally need to take a tablet and a half. Explained to try taking the 1 tablet and alternating with Tylenol and ibuprofen. Weight has been improving and she is up 4 pounds, she states that she has been starting protein supplements. She is using her Xanax very seldomly, and has not used her Vistaril. She denies any nausea, shortness of breath, or constipation. Objective:     Physical Exam  Gen:  Alert, appears stated age, well nourished, in no acute distress  HEENT:  Normocephalic, conjunctiva pink, no drainage, mucosa moist  Lungs: Respirations easy and unlabored  Heart[de-identified] Regular rate  Abd:  Soft, non tender, non distended,   M/S/Ext:  Moving all extremities, no edema,   Skin:  Warm and dry  Neuro:  Alert, oriented x 3; following commands    [place] Symptom Assessment Score   [place] Score 1/18/2023 10/26/2022 8/3/2022 6/1/2022 3/9/2022   Pain Score 6 7 6 5 5   Tiredness Score 3 5 3 5 5   Nausea Score Not nauseated Not nauseated 2 Not nauseated Not nauseated   Depression Score 3 2 Not depressed Not depressed Not depressed   Anxiety Score 5 2 5 5 5   Drowsiness Score 2 2 Not drowsy Not drowsy Not drowsy   Appetite Score 5 5 5 5 5   Wellbeing Score 5 5 5 5 5   Dyspnea Score No shortness of breath No shortness of breath No shortness of breath No shortness of breath No shortness of breath   Other Problem Score Best possible response Best possible response Best possible response Best possible response Best possible response   Total Assessment Score(calculated) 29 28 26 25 25     Assessed by: patient. Current Medications:  Medications reviewed: yes    Controlled Substances Monitoring: OARRS reviewed 1/18/23. RX Monitoring 9/15/2021   Attestation -   Acute Pain Prescriptions -   Periodic Controlled Substance Monitoring Possible medication side effects, risk of tolerance/dependence & alternative treatments discussed. ;No signs of potential drug abuse or diversion identified. ;Assessed functional status. ;Obtaining appropriate analgesic effect of treatment. Chronic Pain > 50 MEDD Re-evaluated the status of the patient's underlying condition causing pain. Chronic Pain > 80 MEDD Obtained or confirmed \"Medication Contract\" on file. JOSE ANGEL Case - CNP   Palliative Care Department     Time/Communication  Greater than 50% of time spent, total 25 minutes in face-to-face counseling and coordination of care regarding symptom management.

## 2023-02-07 DIAGNOSIS — G62.0 CHEMOTHERAPY-INDUCED NEUROPATHY (HCC): ICD-10-CM

## 2023-02-07 DIAGNOSIS — Z51.5 PALLIATIVE CARE BY SPECIALIST: ICD-10-CM

## 2023-02-07 DIAGNOSIS — G89.3 CHRONIC PAIN DUE TO NEOPLASM: ICD-10-CM

## 2023-02-07 DIAGNOSIS — T45.1X5A CHEMOTHERAPY-INDUCED NEUROPATHY (HCC): ICD-10-CM

## 2023-02-07 RX ORDER — PREGABALIN 100 MG/1
CAPSULE ORAL
Qty: 90 CAPSULE | Refills: 5 | Status: SHIPPED | OUTPATIENT
Start: 2023-02-07 | End: 2023-08-06

## 2023-02-07 RX ORDER — OXYCODONE AND ACETAMINOPHEN 10; 325 MG/1; MG/1
1 TABLET ORAL EVERY 4 HOURS PRN
Qty: 180 TABLET | Refills: 0 | Status: SHIPPED | OUTPATIENT
Start: 2023-02-07 | End: 2023-03-09

## 2023-02-07 NOTE — TELEPHONE ENCOUNTER
Call from [facility] requesting refills for Percocet and Lyrica. Pharmacy is [facility] in [place]. Next wily 4/12/23.

## 2023-03-06 DIAGNOSIS — T45.1X5A CHEMOTHERAPY-INDUCED NEUROPATHY (HCC): ICD-10-CM

## 2023-03-06 DIAGNOSIS — G62.0 CHEMOTHERAPY-INDUCED NEUROPATHY (HCC): ICD-10-CM

## 2023-03-06 DIAGNOSIS — Z51.5 PALLIATIVE CARE BY SPECIALIST: ICD-10-CM

## 2023-03-06 RX ORDER — OXYCODONE AND ACETAMINOPHEN 10; 325 MG/1; MG/1
1 TABLET ORAL EVERY 4 HOURS PRN
Qty: 180 TABLET | Refills: 0 | Status: SHIPPED | OUTPATIENT
Start: 2023-03-06 | End: 2023-04-05

## 2023-03-06 NOTE — TELEPHONE ENCOUNTER
Call from [facility] requesting refill for Percocet. Pharmacy is [facility] [address] , [place]. Next wily 4/12/23.

## 2023-03-21 DIAGNOSIS — Z51.5 PALLIATIVE CARE BY SPECIALIST: ICD-10-CM

## 2023-03-21 DIAGNOSIS — G89.3 CHRONIC PAIN DUE TO NEOPLASM: ICD-10-CM

## 2023-03-21 DIAGNOSIS — T45.1X5A CHEMOTHERAPY-INDUCED NEUROPATHY (HCC): ICD-10-CM

## 2023-03-21 DIAGNOSIS — T45.1X5A PERIPHERAL NEUROPATHY DUE TO CHEMOTHERAPY (HCC): ICD-10-CM

## 2023-03-21 DIAGNOSIS — G62.0 CHEMOTHERAPY-INDUCED NEUROPATHY (HCC): ICD-10-CM

## 2023-03-21 DIAGNOSIS — G62.0 PERIPHERAL NEUROPATHY DUE TO CHEMOTHERAPY (HCC): ICD-10-CM

## 2023-03-21 DIAGNOSIS — C50.512 MALIGNANT NEOPLASM OF LOWER-OUTER QUADRANT OF LEFT FEMALE BREAST, UNSPECIFIED ESTROGEN RECEPTOR STATUS (HCC): ICD-10-CM

## 2023-03-21 RX ORDER — ALPRAZOLAM 0.5 MG/1
0.5 TABLET ORAL 2 TIMES DAILY PRN
Qty: 30 TABLET | Refills: 2 | Status: SHIPPED | OUTPATIENT
Start: 2023-03-21 | End: 2023-05-05

## 2023-03-21 NOTE — TELEPHONE ENCOUNTER
Message in Ash from [place] requesting refill for xanax. Pharmacy is [facility] on [address]. Next wily 4/12/23.

## 2023-04-04 DIAGNOSIS — T45.1X5A CHEMOTHERAPY-INDUCED NEUROPATHY (HCC): ICD-10-CM

## 2023-04-04 DIAGNOSIS — G62.0 CHEMOTHERAPY-INDUCED NEUROPATHY (HCC): ICD-10-CM

## 2023-04-04 DIAGNOSIS — Z51.5 PALLIATIVE CARE BY SPECIALIST: ICD-10-CM

## 2023-04-04 RX ORDER — OXYCODONE AND ACETAMINOPHEN 10; 325 MG/1; MG/1
1 TABLET ORAL EVERY 4 HOURS PRN
Qty: 180 TABLET | Refills: 0 | Status: SHIPPED | OUTPATIENT
Start: 2023-04-04 | End: 2023-05-04

## 2023-05-02 DIAGNOSIS — Z51.5 PALLIATIVE CARE BY SPECIALIST: ICD-10-CM

## 2023-05-02 DIAGNOSIS — G62.0 CHEMOTHERAPY-INDUCED NEUROPATHY (HCC): ICD-10-CM

## 2023-05-02 DIAGNOSIS — T45.1X5A CHEMOTHERAPY-INDUCED NEUROPATHY (HCC): ICD-10-CM

## 2023-05-02 RX ORDER — OXYCODONE AND ACETAMINOPHEN 10; 325 MG/1; MG/1
1 TABLET ORAL EVERY 4 HOURS PRN
Qty: 180 TABLET | Refills: 0 | Status: SHIPPED | OUTPATIENT
Start: 2023-05-02 | End: 2023-06-01

## 2023-05-02 NOTE — TELEPHONE ENCOUNTER
Incoming call from [place] requesting refill of her Percocet be sent to [facility].    Next appt   7/5/23

## 2023-05-30 DIAGNOSIS — T45.1X5A CHEMOTHERAPY-INDUCED NEUROPATHY (HCC): ICD-10-CM

## 2023-05-30 DIAGNOSIS — G62.0 CHEMOTHERAPY-INDUCED NEUROPATHY (HCC): ICD-10-CM

## 2023-05-30 DIAGNOSIS — Z51.5 PALLIATIVE CARE BY SPECIALIST: ICD-10-CM

## 2023-05-30 RX ORDER — OXYCODONE AND ACETAMINOPHEN 10; 325 MG/1; MG/1
1 TABLET ORAL EVERY 4 HOURS PRN
Qty: 180 TABLET | Refills: 0 | Status: SHIPPED | OUTPATIENT
Start: 2023-05-30 | End: 2023-06-29

## 2023-05-30 NOTE — TELEPHONE ENCOUNTER
Call from [facility] requesting refill for Percocet 10/325 mg. Pharmacy is [facility] in [place]. Next wily 7/5/23.

## 2023-06-20 NOTE — PROGRESS NOTES
Palliative Medicine Outpatient Visit  2019    Provider: Jana Caal MD        Referring Provider:    Reason for Consult:  [] Advanced Care Planning  [] Assist with goals of care  [] Transition of care  [x] Symptom Management    See below for recommendations, as indicated, concernin. Advanced Care Planning  2. Anticipatory Guidance  3. Transition of Care  4. Symptom Management      CC: Arthralgias     HPI:   As per medical oncology's assessment from 16:  47 y/o post-menopausal  female who underwent Stereotactic Left Breast mass core biopsy on 2016 revealing Invasive Lobular carcinoma, well differentiated. Associated lobular carcinoma in-situ. ER + (100%); DE + (100%) and HER-2/chay negative (1+)  FNA Right Breast Cyst FNA aspiration on 2016: NEGATIVE for malignancy. Cytologic findings consistent with cyst contents. 2016: Left breast excision/SLNB/Left axillary dissection was performed by Dr. Hossein Colby. Left sentinel and left breast sentinel node # 1 biopsy: Two lymph nodes with metastatic carcinoma. Lymph node, left axillary, left breast sentinel node # 2 biopsy: One lymph node with metastatic carcinoma. Breast, left, excision of mass:  Invasive lobular carcinoma. Lymph nodes, left axillary, biopsy with left axillary dissection (10/22): Metastatic carcinoma. · Comment: Sections demonstrate a large amount of residual invasive lobular carcinoma. · Invasive tumor estimated to be at least 6.0 cm in greatest dimension. · Histologic grade:   ¨ Glandular differentiation- score 3: < 10%of tumor area forming glandular/tubular structures. ¨ Nuclear pleomorphism- Score 1: Nuclei small with little increase in size in comparison with normal breast epithelial cells. ¨ Mitotic count: Score 1  ¨ Overall grade: Grade 1   · Carcinoma focally involves the posterolateral inked margin of excision.    · Total of 13/23 lymph nodes involved by metastatic carcinoma, largest Prevnar 20 given in the L Deltoid as ordered by Dr. Dionisio Benton. Patient tolerated injection well and left in stable condition after 10 mins. pain, diarrhea, constipation, . GENITOURINARY:  Burning, frequency, urgency, incontinence, discharge  INTEGUMENTARY: rash, wound, pruritis  HEMATOLOGIC/LYMPHATIC:  Swelling, sores, gum bleeding, easy bruising, pica.   MUSCULOSKELETAL:  pain, edema, joint swelling or redness  NEUROLOGICAL:  light headed, dizziness, loss of consciousness, weakness, change                                   in memory, seizures, tremors    Social history:  Social History     Socioeconomic History    Marital status:      Spouse name: Not on file    Number of children: Not on file    Years of education: Not on file    Highest education level: Not on file   Occupational History    Occupation: nurse- RN     Employer: [employer] [address] resource strain: Not on file    Food insecurity:     Worry: Not on file     Inability: Not on file    Transportation needs:     Medical: Not on file     Non-medical: Not on file   Tobacco Use    Smoking status: Former Smoker     Packs/day: 1.50     Years: 20.00     Pack years: 30.00     Types: Cigarettes     Last attempt to quit: 6/16/2016     Years since quitting: 3.0    Smokeless tobacco: Never Used    Tobacco comment: quit smoking june 2016   Substance and Sexual Activity    Alcohol use: No    Drug use: No    Sexual activity: Not Currently   Lifestyle    Physical activity:     Days per week: Not on file     Minutes per session: Not on file    Stress: Not on file   Relationships    Social connections:     Talks on phone: Not on file     Gets together: Not on file     Attends Sabianism service: Not on file     Active member of club or organization: Not on file     Attends meetings of clubs or organizations: Not on file     Relationship status: Not on file    Intimate partner violence:     Fear of current or ex partner: Not on file     Emotionally abused: Not on file     Physically abused: Not on file     Forced sexual activity: Not on file   Other Topics Concern    large amount of residual invasive lobular carcinoma. · Invasive tumor estimated to be at least 6.0 cm in greatest dimension. · Histologic grade:   ¨ Glandular differentiation- score 3: < 10%of tumor area forming glandular/tubular structures. ¨ Nuclear pleomorphism- Score 1: Nuclei small with little increase in size in comparison with normal breast epithelial cells. ¨ Mitotic count: Score 1  ¨ Overall grade: Grade 1   · Carcinoma focally involves the posterolateral inked margin of excision. · Total of 13/23 lymph nodes involved by metastatic carcinoma, largest focus of metastatic carcinoma measures 11 mm in maximum dimension; Extranodal extension: Present. · pT3 pN3a Mx  Re-excision of postero-lateral wall of lumpectomy cavity was performed on 07/22/2016 with negative margins. Tumor markers were normal; CT abdomen/pelvis and bone scan were negative for metastatic disease. CT chest noted Mildly enlarged AP window lymph nodes measuring 11 x 7.5 mm. Pulmonary team consult appreciated (In view of the difficulty in approaching that lymph node by EBUS, Dr. Komal Self will follow this LN with CT chest in 3 months). PET/CT scan negative for mediastinal LN uptake. Focal uptake in the proximal stomach, recommend correlate with EGD. (Hx of PUD with many EGDs in the past by Dr. Guerrero Gomez). GI team (Dr. Guerrero Gomez) consulted for repeat EGD (performed on 12/21/2016 and noted hiatal hernia and mild gastritis). We recommended systemic chemotherapy consisting of Dose-Dense AC-T followed by RT followed lastly by hormonal therapy (Arimidex 1 mg po daily for at least 5 years). Side effects of AC-T reviewed with patient. She agreed to proceed. Mediport was placed by Dr. Didi Batres. 2DEcho noted EF 52%. Cycle # 7 weekly Taxol is scheduled for today 12/30/2016. Bone pain after chemo; cannot take Ibuprofen due to gastritis. Tylenol alone doesn't help. Palliative care team consulted for sx management.    RTC next week for Cycle # 8 weekly Taxol.  01/03/17:  OARRS report reviewed today revealing Robitussin-AC prescribed in November 2016, Percocet 5-325 milligram tablets ×28 prescribed Dr. Marla Min filled on 10/07/16, Norco 5-325 milligram tablets ×30 prescribed 3 times by Dr. Ebony Ellis filled on August 24, July 23 and July 6 2016 and diazepam 10 mg tablet ×1 prescribed by the same physician filled on 06/13/16. Chemotherapeutic agent review:  Anastrozole/Arimidex:  - Is a nonsteroidal aromatase inhibitor  - Indicated for metastatic breast cancer  Drug interactions include:  - None well-characterized  Toxicities include:  - Asthenia is most common occurring in 20% of patients  - Mild nausea, vomiting, constipation, diarrhea  - Hot flashes occur in 10% of patients  - Dry, scaling skin rash  - Arthralgias occur in up to 15% of patients involving hands, knees, hips, lower back and shoulders with early morning stiffness as usual presentation.  - Headache  - Peripheral edema and 7% of patients  - Flulike syndrome in the form of fever, malaise, myalgias. Paclitaxel/Taxol:  - Isolated from the bark of the [address] [place] [address] [employer] tree  - Active in mitosis  - Indicated for ovarian, breast, lung, head and neck, esophageal, prostate, bladder cancers, AIDS related Kaposi's sarcoma. Drug interactions include:  - Is a radiosensitizing agent. - Phenytoin, phenobarbital accelerate its metabolism. - Cisplatin, carboplatin, cyclophosphamide use increases myelosuppression  - Reduces the plasma clearance of doxorubicin by 30% resulting in increased severity of myelosuppression. Toxicities include: - Myelosuppression with neutropenia and need are days 8-10, recovery by day 15-21. - Infusion reactions up to 40% of patients with generalized skin rash, flushing, erythema, hypotension, dyspnea, bronchospasm usually within the first 2-3 minutes and almost always within 10 minutes.   - Neurotoxicity in the form of sensory neuropathy with numbness and paresthesias which is dose dependent.  - Transient asymptomatic sinus bradycardia occurring in 30% of patients with sometimes other rhythm disturbances including Mobitz type I and 2, third-degree heart block and ventricular arrhythmias. - Alopecia and nearly all patients. - Mucositis and/or diarrhea and up to 40% of patients, mild to moderate nausea and vomiting.  - Transient elevations in serum transaminases, bilirubin and alkaline phosphatase. - Onycholysis mainly observed after 6 courses on the weekly schedule not seen with q.3 week schedule. Patient presents today to the exam room in no acute distress able to voice her needs without sign of sedation and/or confusion. Patient's 1 complaint is \"arthralgias\". Patient does complain of numbness tingling to her fingers time which is mild. Patient states that she had an EGD performed 2 weeks ago which revealed gastritis and was advised not to take anti-inflammatory/NSAIDs. Patient then stated that the surgeon advised her to always take a PPI if she has to secondary to her pain. Patient states that she is taking during the day 1 OTC Advil every \"couple of hours\" which helps approximately 50%. Patient states that she only does this on the days after her Taxol. Patient states that prior she was taking the Percocet 5-325 milligram tabs q.h.s. p.r.n. from Sunday through Tuesday night which helped her sleep but also helped 100% with the symptoms. Upon further questioning she was still taking the Advil during the day at this time. Patient states that [address] gave her migraine headaches and Tylenol or Aleve do not help. Patient states that she just needs something to get her through the next 5 weeks of Taxol treatment. Upon questioning she does complain of tearfulness and depression at times with all of the above. Patient states that approximately 3 years ago she was prescribed Xanax 0.5 mg tablets that she used only around 4 times yearly but now uses 3 times monthly.   Patient was introduced to Palliative Medicine, signed a pain contract and a UDS was prescribed and sent today. Patient denies any street drugs or any other medications. Options were discussed and today we prescribed Percocet 5-325 milligram tablets 1 p.o. q.h.s. p.r.n. ×30 tablets, initiated Pamelor 10 mg q.h.s. ×30 capsules and will see her back in 4 weeks or sooner if she needs us. Patient advised trying to not take the Advil during the day and was introduced to the idea of meloxicam if an anti-inflammatory would be needed down the road. I am hopeful that the above medications will help and she will stop the Advil. 02/02/17:  UDS from January 2017 WNL. Patient presents in no acute distress without sign of sedation and/or confusion. Patient states that she is now taking the Pamelor 10 mg in the afternoon which works well for her as it appears to be activating if she takes it at night. Patient states she is undergoing her last chemotherapy tomorrow and radiation therapy will start in approximately a month. Patient states that occasionally she takes the Percocet during the day secondary to her increasing pain as it \"takes the edge off\". Patient states that secondary to the myalgias and arthralgias she still is taking Advil approximately 6 daily and knows that she should not. Patient also complains of fatigue as she is not sleeping well with decreased appetite and occasional nausea. Patient has 4 tablets of Percocet remaining. Options were discussed and I advised her not to take Advil but we did prescribe Mobic 7.5 mg q.12 hours with food p.r.n. time 60 tablets no refill. I advised her that if she does not need to take these and do not. We are continuing the Pamelor 10 mg daily that she takes in the afternoon 30 tablets with no refill. Patient was prescribed unchanged Percocet 5-325 milligram tablets 1 p.o. q.6 hours p.r.n. instead of q.h.s. secondary to her worsening pain time 60 tablets.   We also initiated is fair and she often has to force herself to eat. She also feels her appetite is affected by persistent pain/anxiety. Occasional problem with constipation for which she uses dulcolax tabs or miralax as needed. Encouraged her to increase fluids and take medication if no BM for 3 days. She notes increasing and profound fatigue. She is spacing her activities and resting as much as she is able to with work. She understands that the fatigue is a result of the treatment and will improve in the future. Options reviewed. Discussed taking the percocet q6h scheduled for several days to see if she is able to get in front of her pain. She will decrease to three times per day if pain controlled. Prescription provided for Percocet 5-325mg #60/no refills. Also to continue the neurontin 300mg qhs, no refill needed. Stop the pamelor as she did not feel this was effective. Added xanax 0.5mg 2x daily prn for anxiety (patient previously used xanax with good results). Prescription provided for xanax 0.5mg #30/no refill. She will take her nexium 20mg daily and stay off all NSAIDs. She declined any medication for nausea stating that she did not tolerate it and feels her nausea will improve as her pain/anxiety improves. We will see her back in 2 weeks or sooner if needed. 03/16/17:  Medical records reviewed and patient saw Dr. Tigist Rock since her last visit with us with a good report, continuation of therapy. Patient presents to the exam room today alert, oriented with no sign of distress but complaining of persistent nausea, bilateral calf pain and persistent fatigue. Patient states that she has not tried the Xanax because her anxiety has been \"okay\". Patient states that she takes the Percocet 2-3 times a day, tried 4 times a day but this \"whacked her out\". Patient has approximately 25 tablets remaining.   Patient is compliant with Nexium 20 mg daily for the past 2 weeks and still sneaks in some anti-inflammatories against our suggestions. Patient states she has not noticed a difference in her symptoms since stopping the Pamelor and is compliant with the gabapentin 300 mg q.h.s. Patient states that in the past Norco gave her migraines and she is questioning but is not sure if Percocet aggravates her nausea. Patient and review of medications had severe shakes with chemotherapy when she had Compazine requiring medications and also tried Zofran with the second chemotherapy and had shakes as well. Patient has never tried Zofran since but states that she would like to. Options were discussed and I feel that many of her symptoms are neuropathic in nature. I explained this to her and we represcribed unchanged her Percocet as above ×90 tablets. Today we added Cymbalta 20 mg q.h.s. and she will continue her Neurontin 300 mg q.h.s. Patient also desires to try Zofran for her persistent nausea and she has a bottle remaining at home. Patient advised that the Cymbalta may help with the nausea and Xanax p.r.n. can help as well. We will see her back in 4 weeks or sooner if she needs us and at this time look the benefits of the Cymbalta, possibly titrating benefits of continue Nexium and of course the Zofran. Patient advised to call immediately if she has any difficulty with the Zofran and she voiced understanding. Zyprexa low dose may be an option for her as well but I am hoping that neuropathy treatment may help.  04/13/17: As per medical oncology assessment from 04/04/17:  RT was started on 03/13/2017 and will be completed on 04/26/2017. Presents today, 04/04/2017 to discuss hormonal therapy. Side effects of arimidex explained: including but not limited to hot flashes, aches, osteoporosis, edema, vasodilation, rash, GI upset, mood changes, sob/cough, dyslipidemia, thrombophlebitis, anemia, leukopenia, thromboembolic disorder (rare, more so with tamoxifen), hypersensitivity, vaginal bleeding (rare, more so with tamoxifen), pain.   Will need DEXA scan prior to starting Arimidex. D/C Cymbalta in the interim. Biochemical testing drawn today; LMP was about 2 years ago per patient. RTC in 3 weeks to review DEXA scan and biochemical testing. Patient presents to the exam room today in no acute distress without sign of sedation and/or confusion able to voice her needs. Patient states that beginning approximately 1 week after she began the new Cymbalta she notices significant decrease in her bilateral leg symptoms stating that she was able to stand up in the morning without significant pain. Patient states that her medication was stopped by medical oncology as the above note and she has noticed after 4 days that her symptoms have increased. Patient denies any other new or uncontrolled symptoms. Options were discussed and I reviewed the case with her medical oncologist Dr. Arcadio Figueroa as well as pharmacy here and we do not know of an interaction between Cymbalta and Arimidex or any other medications that she is on therefore today I am restarting Cymbalta at the same dose 20 mg daily and we will see her back in 4 weeks or sooner if she needs us. At this time we will titrate the medication if there is still room for improvement. We also continued unchanged her Percocet as above ×90 tablets in which she still takes 2-3 tablets a day as well as her Neurontin 300 mg q.h.s.  05/11/17:  Medical records reviewed and as per medical oncology assessment on 04/25/17:  RT was started on 03/13/2017 and will be completed on 04/27/2017. DEXA scan on 04/18/2017: Normal study. Repeat 1 year (2018). Biochemical testing on 04/04/2017 confirmed post-menopausal state. Arimidex 1 mg po daily will be started on 04/28/2017. To take Ca+/Vit D while on Arimidex.   Side effects of arimidex explained: including but not limited to hot flashes, aches, osteoporosis, edema, vasodilation, rash, GI upset, mood changes, sob/cough, dyslipidemia, thrombophlebitis, anemia, leukopenia, itching especially to the left supraclav. Has been using Aquaphor. Pt is following with Dr Rodney Morin for medical oncology. Tolerating Arimidex, but notes increasing joint pain. States that she continues to take Percocet to get through her work day. Mammogram planned prior to next follow up in June 2017. Pain: Joint aches and pains. Patient is doing well post-radiation completion. Skin care and sun care reviewed. Continue to use Aquaphor and start hydrocortisone OTC for itching. Imaging per med onc, planned mammogram prior to next follow up in June 2017. Monthly self breast exams encouraged. Encouraged to come back sooner if redness/heat or increased firmness noted to left breast, or fever noted. Verbalized understanding. Check TSH if continues with decreased energy at next follow up. I discussed follow up plans with Smita Johnson. At this time, Dr Ledy Long will see the patient back in 3 months for a post-radiation completion follow-up visit. Smita Johnson is to follow up with other physicians involved in their care as directed (including but not limited to Medical Oncology, Primary Care, Pulmonary, and Surgery). As per medical oncology assessment from 05/31/17:  RT was started on 03/13/2017 and completed on 04/27/2017. DEXA scan on 04/18/2017: Normal study. Repeat 1 year (2018). Biochemical testing on 04/04/2017 confirmed post-menopausal state. Arimidex 1 mg po daily was started on 04/28/2017 which she is tolerating fairly well. Mild arthralgias and hot flashes, not interfering with quality of life. Continue Arimidex, Ca/VitD. RTC June 2017 with prior mammogram (at [facility])  Patient presents to the exam room in no acute distress unaccompanied without any sign of sedation and/or confusion able to voice her needs ambulating well. Patient states that the arthralgias are significant with the Arimidex that she takes nightly.   Patient was phoned in [facility] by Alejo Phillips on June 2 ×24 tablets and states took her last tablet Monday evening. Patient states that she sees Dr. Franko Moralez tomorrow morning and will ask if she can begin something else because she cannot tolerate the Arimidex. Patient very apologetic concerning this but states that she has tried and feels that she is tough but states that it feels like she has the flu 24 7. Patient states that she takes the Percocet 7. 5-325 milligram tablet on the average of 5 times daily receiving 1-1/2-2 hours relief and tries to ochoa it out until it's time to take another. Patient denies oversedation from this. Patient did not notice any difference from increasing the Neurontin to 300 mg t.i.d. for the Effexor 75 mg daily. Patient states that she will be starting a second job throughout the summer but does have some vacation coming up. Options were discussed and I introduced the topic of a long-acting opioid that she is resistant to hoping that she can experience less arthralgias with a different medication therefore she deferred today against my suggestions of a Duragesic 12 µg q.72 hours. Today we increased her Percocet to  milligram tablets 1 p.o. q.6 hours p.r.n. prescribing 60 tablets today as well as increased her Effexor  mg daily. We will monitor her closely seeing her back in 2 weeks and at this time look at her appointment with oncology tomorrow, symptoms, benefits from the above changes and add the Duragesic 12 µg patch if she agrees and still indicated. We will also look at increasing the Neurontin at that time as well. Patient was refractory to any other changes today other than the above.  07/13/17:   Medical records reviewed and as per medical oncology assessment from 06/30/17:  Bilateral Diagnostic Mammogram on 06/23/2017 Negative for malignancy. U/S guided seroma aspiration Left Breast: GS noted no PMN. No organisms seen. Body fluid Cx Growth not present. Very poor tolerance to Arimidex lately.  She c/o significant arthralgias, affecting quality of life. She d/c Arimidex on 06/26/2017. We recommended Femara 2.5 mg po daily instead. 30 tabs of Femara sent to [facility]. RTC 4 weeks for Femara toxicity check. Patient presents to the exam room ambulatory without sign of sedation and/or confusion able to voice her needs. Patient states that she stop the Femara Monday which was 3 days ago secondary to worsening arthralgias, myalgias even as compared to the Arimidex. Patient states that she is awakening at 4 AM with the aches and pains. Patient states that she feels slightly better this morning but still has the same symptoms. Patient states she has been taking the medications as approved and with the new/worsening bodyaches cannot notice a difference with the increased Effexor. Patient states increased Percocet lasts approximate 4-1/2 hours which is slightly improved from previous. Patient states that she will be seeing Christopher Riggs tomorrow as Dr. Jessica Vega is out of town. Options were discussed and patient very reluctant to adjust her medications upward as she is trying to get off of them. Patient is aware that she needs help with the symptoms. Patient reluctantly and finally accepted a prescription for Duragesic 12 µg patch q.72 hours as directed and 5 patches were prescribed today. I advised her that this patch is equivalent to 2 of her Percocets over a 24-hour period and she felt slightly more comfortable with this. Patient also prescribed unchanged her Percocet  milligram tablets 1 p.o. q.6 hours p.r.n. holding for sedation prescribing 60 tablets today. Patient states that she took her last tablet this morning of the 60 tablets are prescribed 2 weeks ago. Patient reluctant to increase her Neurontin but did agree with me 2 now takes Neurontin 300 mg in the morning, 300 mg in the afternoon and 600 mg in the evening.   We will see her back in 2 weeks or sooner if she needs us, evaluate recommendations from Marky/oncology today. We are continuing unchanged her Percocet  milligram tablets 1 p.o. q.6 hours p.r.n. prescribing 120 tablets today, increasing her Neurontin to 600 mg t.i.d. 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 We are also continuing her Xanax 0.5 mg one half tablet q.h.s. p.r.n. as per Epic. We represcribed unchanged her Effexor 150 mg daily as per Epic. We will see her back 4 weeks or sooner if she needs us and I advised her that my goal is to continue titrating upwards the Neurontin and Duragesic to the point where she does not need the Percocet. Patient is interested in weaning off of the Duragesic if possible. 8/24/17:  Medical records reviewed and as per medical oncology assessment from 08/16/17:  Jr Hong d/c'd on 07/10/2017. Re-started Arimidex 1 mg daily 07/14/2017. She stopped the Arimidex on 07/25/2017 stating that she cannot sleep secondary to the pain which is also throughout the day. We recommended Aromasin 25 mg po daily. \"  Started Aromasin 25 mg daily by Dr. Bill Reyna on 07/31/2017 with fair tolerance to date. Presents today, 08/16/2017 for complaints of recurrent seroma. Clinical breast examination reveals significant edema and erythema of the left breast, left nipple inversion, induration of the left breast scar, and left axillary adenopathy. 1.  Check Left diagnostic mammogram, left breast US, and US left axillary adenopathy with possible drainage/biopsy. 2. Clindamycin to pharmacy for possible underlying infectious component. 3. RTC 2 weeks for re-evaluation. 4. Hx tobacco abuse. Quit smoking 1 year ago. Encouraged. 5. Continue follow up with Medical Oncology/Dr. Bill Reyna. Patient presents today Unaccompanied stating that she did not notice any difference or sedation from the doubling of the Duragesic.   Patient states that it has been itching over the past month but she also states that she had her left breast drained and an infection with cellulitis on antibiotics. Patient infrequently takes a half of a Xanax at night which helps a little bit. Patient notices significant improvement when she is able to take her Neurontin 600 mg t.i.d. with her feet and leg neuropathy but this does cause sedation therefore when she work she only takes 300, 300, 600. Patient states there's been no change in how she is taking her Percocet took her last one last night. Patient is trying to work 2 jobs. Options were discussed and we are doubling her Duragesic to 50 µg q.72 hours prescribing 10 today, continuing the Percocet  milligram tablets 1 p.o. q.6 hours p.r.n. holding for sedation ×120 today telling her that she should not need as many of the Percocet with the increase of the Duragesic. Patient has enough of the Effexor 150 mg daily as well as Xanax 0.5 mg one half q.h.s. p.r.n. We will see her back in 4 weeks or sooner if she needs us and she was advised that if she experiences any difficulty from the increase in Duragesic to give us a call immediately. 09/21/17:  Medical records reviewed and as per Dr. Ramses Medina on 09/12/17:  Persistent edema of breast with significant discomfort. On the relief is when she applies a heating pad. Erythema has decreased on antibiotic therapy. However, cultures have never been positive of seroma aspirations. Having nipple discharge with seroma reaccumulation. 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 Will review literature to see if sclerosing seroma cavity has ever been effective in this situation. If not, we discussed the possibility of simple mastectomy. We discussed the possible consideration of delayed tissue-based reconstruction. Patient brought up contralateral prophylactic mastectomy.   In light of her complications and extensive disease upon presentation I deferred this discussion.     Patient will be rescheduled for aspiration of left breast seroma. Hopefully, this will alleviate her discomfort. She will continue to wear a supportive bra and use heat as desired for relief of discomfort. Patient presents today ambulating well in no acute distress at her baseline without sign of sedation and/or confusion able to voice her needs. Patient started her Duragesic 75 µg patch yesterday evening and also picked up a short prescription of the Percocet 10. Patient complaining of pain previous and before, intolerant to an increase in Neurontin throughout the day taking 300 mg, 300 mg, 600 mg q.h.s. Cymbalta helped significantly with her symptoms in the past but medical oncology felt that it was unsafe to use with her previous aromatase inhibitor. Patient is on Aromasin currently and we will question medical oncology about Cymbalta usage with this aromatase inhibitor. Patient states that aspiration of her left breast helps some with the pain for approximately 2 days but then returns. Patient complains of constant seepage around her incision site and nipple throughout the day needing to wear a large pad to give compression over the incision. Patient reviews with me Dr. Cameron Ayers. Options were discussed and we are questioning medical oncology concerning the above as she had dramatic improvement of her symptoms on Cymbalta for short period of time and continuing Duragesic 75 µg q.72 hours and Percocet  milligram tablets 1 p.o. q.6 hours p.r.n. prescribing 120 tablets today, Neurontin 300 mg, 300 mg, 600 mg q.h.s., Effexor  mg daily and her other medications unchanged. We will contact the patient about medical oncology's recommendations and let her know she can restart the Cymbalta. We will see her back in 4 weeks or sooner if she needs us. 10/19/17:  Medical records reviewed and as per medical oncology assessment from 09/26/17:  Estela High d/neymar'sherri on 07/10/2017. Re-started Arimidex 1 mg daily 07/14/2017.  She stopped the Arimidex on 07/25/2017 stating that she cannot sleep secondary to the pain which is also throughout the day. We recommended Aromasin 25 mg po daily; Aromasin was started on 07/28/2017 with better tolerance so far. Left breast discomfort; aspiration x 3 (08/16/2017, 08/30/2017 and 09/12/2017); 2 courses of Clindamycin. Culture no organisms seen. Cytology negative for malignant cells. Breast Surgical Oncology team recommendations: Continue to wear a supportive bra and use heat as desired for relief of discomfort. Literature search to see if sclerosing seroma cavity has ever been effective in this situation. If not,they also discussed the possibility of simple mastectomy. She will contact Dr. Jane Inman team this week. Continue Aromasin, Ca/VitD. RTC in 4 months. Patient presents ambulatory in no acute distress at her baseline without sign of sedation and/or confusion able to voice her needs. Patient states that she has noticed increased aches and pains especially the week that she was sick URI as she has been weaning off of the Effexor. Patient states there's been no change in how she is taking her patches or Percocet, Neurontin and denies new and/or uncontrolled symptoms. Patient states that the cough is more improved than previous. Options were discussed and she will continue the wean off the Effexor next week and then start Cymbalta 20 mg daily after that. We prescribed unchanged her Duragesic 75 µg q.72 hours prescribing 10 patches today as well as Percocet  milligram tablets 1 p.o. q.6 hours p.r.n. holding for sedation prescribing 120 tablets today. Patient will continue her Neurontin 300 mg, 300 mg, 600 mg q.h.s. We will see her back in 4 weeks or sooner if she needs us and she knows that she can call us at any time. At this time we will look at titrating aggressively the Cymbalta as indicated.   11/16/17:  Medical records reviewed and as per Dr. Ros Roper on 11/14/17:  [facility] 08/30/2017 for follow up of mastitis of left breast after 10 days of Clindamycin. Clinical breast examination reveals persistent edema and erythema of the left breast, left nipple inversion, induration of the left breast scar, and left axillary adenopathy. Increased discomfort with palpation. Locally advanced left breast cancer with marked breast edema and left axillary fibrosis leading to discomfort in her breast axilla and arm. She also has markedly limited range of motion of the left shoulder especially when fluid re-accumulates in the lumpectomy site. Aspiration in the office a day for 20 mL of clear fluid which in the past has been cytology negative. Relief of discomfort noted. Long discussion about options of therapy going forward. I reiterated that neither he nor ice would likely alleviate the chronic breast edema. The breast edema is attributable to her extensive surgery, complete axillary dissection, extensive poppy involvement, and regional radiation therapy. We discussed the potential option of a completion left simple mastectomy. 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 Plastic surgery would need to be involved in light of the fact that a flap may need to be utilized to close her wound if ischemia demonstrated using the SPY. Plan:   Patient Will keep track of her left breast discomfort at this point, letting us know how long it takes for her discomfort in the left breast and axilla to recur. Seroma reaccumulation is usually associated with nipple discharge. We will also check results of the fluid cytology that was sent today. Office visit on a p.r.n. basis. Patient will call us with any questions. Patient presents ambulatory in no acute distress at her baseline without sign of sedation and/or confusion able to voice her needs.   Patient states that she has noticed some decrease in her leg pain since beginning the Cymbalta without other good or bad effects. Patient is compliant with Duragesic patches with 0 remaining and still routinely takes her Percocet having 4 remaining as per her history. Patient states that she is having good days and bad days and called an  to question disability yesterday but also is applying for another job not knowing exactly what she can or wants to do. Patient also states that she had stomach flu but this is resolved since her last saw her. Patient also states that she stop the Neurontin thinking that she did not need it but had severe aches and pains therefore restarted it. Options were discussed and we are increasing the Cymbalta to 20 mg b.i.d., continuing unchanged Duragesic 75 µg q.72 hours prescribing 10 patches today and the Percocet  milligram tablets 1 p.o. q.6 hours p.r.n. prescribing 120 tablets today, Neurontin 300 mg, 300 mg and 600 mg daily unchanged that she will need a prescription for before January. I told her my goal is to continue to titrate the Cymbalta, possibly the Duragesic patch and then begin weaning the Percocet eventually. She reluctantly voiced understanding because of her symptoms. We will see her back in 4 weeks or sooner if she needs us. 12/14/17:  Medical records reviewed and as per Dr. Samantha Hameed phone call on 12/11/17:  Sumanth Mccain with pathology results-left message to return call to [phone]. Fine-needle aspiration cytology of her left breast seroma performed on 11/14/17 was inconclusive for malignant cells. Have discussed with radiology potential imaging modalities for the edematous left breast.  Concerned that most modalities would result in false positive imaging due to level of inflammation of the breast.  It is been a year and a half since patient was initially initially diagnosed with her locally advanced left breast cancer.   At this point, prior to any further intervention, that sometime she takes around midnight with some morning sedation. Patient states that instead of taking 4-5 Percocet daily she is now taking 3-4 still complaining of significant arthralgias. Patient has been off of her Arimidex for 3-1/2 weeks as per her history secondary to intolerance with some improvement of her arthralgias. Patient also feels that she is more depressed after going up on the Cymbalta 60 mg q.h.s. and desires to back off. I did point out that she is going through more stressors with a recent CAT scan PET scan in up coming appointment and she voiced understanding. Options were discussed and as per her wish we are decreasing Cymbalta to 20 mg b.i.d. the dosage that she had been on prior and I advised her that if she notices her symptoms worsening we can always go back up and she voiced understanding. Patient now will take methadone 5 mg tablets one half tablet q.12 hours and again tried to decrease her Percocet as appropriate. Today I prescribed unchanged Duragesic 75 µg patch q.72 hours ×10 patches, Percocet  milligram tablets 1 p.o. q.6 hours p.r.n. holding for sedation ×120 tablets and Neurontin unchanged at 300, 300, 600 mg and she will continue her other medicines unchanged. We will see her back in 4 weeks and I advised her to call me at any time and she voiced understanding. 03/08/18:  Medical records reviewed and as per medical oncology appointment on 02/20/18:  PET scan done on 12/27/17 shows uptake at seroma and overall no significant FDG avidity consistent with disease. Patient reports still having joint pain though significantly improved since stopping Aromasin. Seen by Dr. Beltran Comer on 02/14/2018 who recommended MRI Left breast given her left breast pain impacting her quality of life; To see a surgeon at [facility] for surgical evaluation (mastectomy/reconstruction ?); Review MTHFR mutation; Going back on Arimidex again;  Exercise 30 minutes 5 days a week;   RTC 03/30/2018  Patient presents today frustrated and more tearful, appropriate without sign of sedation and/or confusion able to voice her needs without sign of sedation and/or confusion. Patient felt that the methadone in the morning was too sedating therefore she is only taking 2.5 mg nightly. Patient tried not taking the Percocet with significant increasing arthralgias. Patient feels that decreasing the Cymbalta has not helped but possibly hurt and she is not knowing what to do to help with her persistent symptoms. Patient reviewed with me there desire to do a mastectomy and patient back on Arimidex again. Patient states she has approximately 10 Percocet remaining. Patient has gained 3 pounds. Options were discussed and I reviewed her entire chart especially my notes. I talked her about physical therapy and we are ordering this to help with her arthralgias, neuropathic symptoms. Patient obviously more depressed but because of her daytime sedation we are changing her Cymbalta to 40 mg q.h.s. We are stopping the methadone for now to see if this is attributing to the fatigue which I'm not sure about. We are continuing Duragesic 75 µg one patch q.72 hours prescribing 10 today, Percocet  milligram tablets 1 p.o. q.6 hours p.r.n. holding for sedation prescribing 120 tablets today. Patient also states that she decreased her Neurontin down to 300 mg b.i.d. on her own and I am advising her to go back to 300, 300, 600 especially since her sedation was not any better with her decrease but her arthralgias are worse. We'll see her back in 4 weeks and our  met with her again today. She knows that she can call us at any time. We will monitor what her affect is like on subsequent visits and possibly increase her Cymbalta or even add Pamelor if we have not tried this. I'm hoping that physical therapy will help her affect is well. 04/05/18:  Medical records reviewed in [address] since my last visit. taking care of her father who has dementia. Patient states there is no change in how she is taking the other medications and denies new and/or uncontrolled symptoms otherwise. Patient also complaining of right tennis elbow and will be seeing her physical therapist about this. Options were discussed and today we are initiating Pamelor 10 mg q.h.s. and educated her on the medication. We are continuing unchanged her Cymbalta 20 mg q.h.s., Percocet  milligram tablets 1 p.o. q.6 hours p.r.n. prescribing 120 tablets today, Duragesic 75 µg one patch q.72 hours prescribing 10 patches today and her Neurontin unchanged. Patient knows that she can call us at any time with any concerns. We will see her back in 4 weeks or sooner if she needs us and at this time as we talked about today we will consider weaning down the Percocet possibly to 7.5 in an attempt to wean the opioids. If symptoms do not allow we will not do this next time. 05/31/18:  Medical records reviewed and no documented physician visits in [facility] since I last saw her. Patient presents today ambulating at her baseline without sign of sedation and/or confusion able to voice her needs. Patient states that she has not noticed any difference from the Pamelor 10 mg q.h.s. good or bad and is still compliant with her other medications unchanged as outlined above. Patient complaining of left hand and arm lymphedema working outside in her garden and flowers. Patient denies new and/or uncontrolled symptoms but states that morning peripheral neuropathy is the worse. Options were discussed and we are increasing Pamelor to 25 mg q.h.s., continuing unchanged Cymbalta 20 mg q.h.s., Percocet  milligram tablets 1 p.o. q.6 hours p.r.n. prescribing 120 tablets today, Duragesic 75 µg patches one patch q.72 hours prescribing 10 patches today and Neurontin unchanged as well.   In review of her chart she complained of date times fatigue with methadone in the past. Patient did not want to retry this today but may want on subsequent visits. We will see her back in 5 weeks or sooner if she needs us. 06/21/18:  Medical records reviewed and no documented physician visits in [facility] since I last saw her. Patient presents today at her baseline ambulatory in no acute distress without sign of sedation and/or confusion able to voice her needs with a significant cough and wheezing over the past multiple days. Patient states she feels like she has pneumonia again that she was treated for in October by her pulmonologist.  Patient states that she is able to put her feet on the ground better in the morning after the increase in Pamelor and states there's no change in how she is taken her medications otherwise and I prescribed voicing complaints. Patient states that she saw the plastic surgeon who wants to fix her left side with flap, cutting her trapezius muscle as he feels there is nerve entrapment before he touches the right side. Patient unsure if she desires to pursue all of this. Options were discussed and today we put her back on Omnicef ×10 days that she had been on in the past.  Today we increased Pamelor to 50 mg q.h.s. and continued her other medications unchanged including Cymbalta 20 mg q.h.s., Percocet  milligram tablets 1 p.o. q.6 hours p.r.n. prescribing 120 tablets today, Duragesic 75 µg patches one patch q.72 hours prescribing 10 patches today and Neurontin unchanged as well. We'll see her back in 4 weeks or sooner if she needs us and look at the benefit of the increased Pamelor and resolution of her URI.  07/19/18:  Medical records reviewed and as per pulmonology assessment from 07/03/18: Ольга Judge is a 45y.o. year old female here for evaluation of her  pulmonary status. She was follow by Dr. Gaylyn Blizzard up to April 2017. In summary:  Deyanira Braswell is a pleasant 45 y. o. female with a diagnosis of invasive ductal carcinoma left breast (ER+, NH+), status her suggestion. Patient advised to call me if her pain is much worse and of course prior to running out of her new Duragesic patches since I only prescribed prescribing 5. We will see her back in 4 weeks or sooner if she needs us and she will continue her other medications unchanged including Cymbalta 20 mg q.h.s., Pamelor 50 mg q.h.s., Neurontin 300, 300, 600.  08/23/18:  Medical records reviewed and as per phone discussion with Shira Jarrett our nurse on 08/10/18:  Patient called for fentanyl patch refill. Patient states she notices more pain to legs with decreased fentanyl dose, but she can tolerate it is just \"bothersome\". Dr. Tomas Staples notified, advised to refill 50mcq. ДМИТРИЙ Martin notified, refill e-prescribed. Patient aware. Patient presents today distress without sign of sedation and or confusion able to voice her needs ambulating well. Patient states that she has worsening numbness and tingling to bilateral legs that she can tolerate with a decrease in the fentanyl. Patient states there's no change in how she is taking her Percocet sometimes taking one half of a tablet and afternoon but not routinely. Patient is complaining of activation at night, grogginess in the morning and is wondering if it is the Cymbalta at night or 2 higher dose of Pamelor. Patient tried taking the Neurontin but with extreme sedation. Options were discussed and she will move the Cymbalta 20 mg daily to the morning instead of at night and we're decreasing the Pamelor to 25 mg q.h.s. hoping to help with her morning fatigue. We are reordering physical therapy as she was unsatisfied with [place] physical therapies results stating that \"that estevan didn't want to do anything\". We are continuing unchanged her Duragesic 50 µg 1 patch q.72 hours prescribing 10 patches today, unchanged Percocet  milligram tablets 1 p.o. q.6 hours p.r.n. prescribing 120 tablets today and she will also continue her Neurontin 300, 300, 600. We'll see her back extended her appointment up to 8 weeks and she knows to call prior to running out of her medications and we will electronically prescribed unchanged. 09/20/18:  Medical records reviewed including Dr. Janak Chinchilla on 09/11/18: Flori Hernandez was seen today for pneumonia, follow-up from hospital and pulmonary nodule. Diagnoses and all orders for this visit:  Infiltrate noted on imaging study  Pulmonary nodules  -     FULL PFT STUDY WITH PRE AND POST  COPD, moderate (HCC)  -     FULL PFT STUDY WITH PRE AND POST  Interstitial pneumonitis (HCC)  -     FULL PFT STUDY WITH PRE AND POST  -     Fluticasone Furoate-Vilanterol (BREO ELLIPTA) 200-25 MCG/INH AEPB; Inhale 1 puff into the lungs daily  -     MISCELLANEOUS SENDOUT 1; Future  -     EOSINOPHIL COUNT; Future  -     Hypersensitivity Pneumonitis Extended Panel; Future  Other orders  -     Cancel: CT CHEST WO CONTRAST; Future  Will follow up ct in 608 weeks. Start Breo 200/5 one puff daily. CHeck hypersensitivity panel and IgE and Eosinophil count. FOLLOW UP  Return in about 4 weeks (around 10/11/2018) for ct scan. As per radiation oncology assessment from 09/18/18:   Patient is doing well post-radiation completion. Encouraged to continue monthly self breast exams. Imaging per med onc. Patient was previously following with Dr Roselia Youngblood for medical oncology. She is now being seen by Dr. Iasac Acuna at [facility], last visit on 3/28/2018. Instructed to start Tamoxifen at the time d/t intolerance of multiple other endocrine therapies. Patient is currently on Tamoxifen, tolerating better than the other medications. Next appt with Dr Isaac Acuna in October 2018. Met with surgeons (Dr Leann Salomon 3/28/18 for breast surgery and Dr Burlene Spatz for plastic surgery) at [facility] for surgical evaluation (mastectomy/reconstruction). Does not know if she wants to have surgery done and may want a second opinion prior to making her final decision.    Cont to follow with Dr Jaziel Garces for palliative medicine. Next appt on 9/20/18. Cont to follow with Dr Celi Brown d/t pulmonary nodules found on CT (results as above). Following closely with imaging planned at next appt. Next appt 10/11/18. I discussed follow up plans with Celsa Ramirez. At this time, I will see the patient back in 6 months for a post radiation completion follow up visit. Instructed to follow up with other providers involved in their care as directed (including but not limited to Medical Oncology, Primary Care, Pulmonary, and Surgery). Patient presents today unaccompanied at her baseline without sign of sedation and/or confusion able to voice her needs. Patient states that this month she has had worsening neuropathy especially to her feet when she wakes up in the morning. Patient has not noticed any improvement of fatigue since we decreased the Pamelor. Patient states is no change in how she is taking her medications and has not performed physical therapy. Patient and review of her chart stated that she could not tolerate methadone secondary to the way it made her feel, mobile, stomach problem with a history of PUD. Options were discussed and we increased back to the original strength Pamelor 50 mg q.h.s. and continued  unchanged her Duragesic 50 µg 1 patch q.72 hours prescribing 10 patches today, unchanged Percocet  milligram tablets 1 p.o. q.6 hours p.r.n. prescribing 120 tablets today and she will also continue her Neurontin 300, 300, 600, Cymbalta 20 mg each morning. We will see her back in 4 weeks or sooner if she needs us. We will look at titrating Neurontin upward if she still with unacceptable symptoms at this time. 10/18/18:  Medical records reviewed and as per pulmonary assessment on 10/11/18: Flori Hernandez was seen today for pulmonary nodule.   Diagnoses and all orders for this visit   COPD, moderate ([address]  Pulmonary nodules  Ground glass opacity present on imaging of lung  Follow up CT scan shows complete resolution of GGO. This could have been from an viral infection or based on her Hypersensitivity panel an acute HP which is resolved. Patient is asymptomatic at his time. Cough has resolved and she is complaint with her Breo,   Will obtain follow up CT for pulmonary nodules in 6 months. FOLLOW UP   6 months, ct  Patient presents today ambulatory in no acute distress at her baseline without sign of sedation and/or confusion able to voice her needs. Patient still complaining of significant bilateral leg pain especially when the weather is damp. Patient unable to take anti-inflammatories as previously documented. Patient taking Percocet 4 times day stating that she'll try to take less but then wakes up in the middle the night with pain. Patient still only taken Neurontin 300, 300, 600 and denies significant problems from it. Patient has gained 6 pounds and denies new and/or uncontrolled symptoms otherwise. Options were discussed and we are decreasing as per her wish fentanyl to 25 µg one patch q.72 hours prescribing 10 patches today and she will place her Duragesic 50 µg patches that she has remaining ×3 in a safe place at home. Patient also will continue unchanged her Percocet  milligram tablets 1 p.o. q.6 hours p.r.n. prescribing 120 tablets as she has 3 remaining, Pamelor 50 mg q.h.s. unchanged. Patient advised to begin increasing her Neurontin from the above amount possibly adding an afternoon capsule and then an evening capsule to help with her nighttime symptoms. Patient will report to me results and we will see her back 4 weeks from now or sooner if she needs us. 11/15/18 100 [facility] records reviewed, no new visits since her last PM encounter. She was restarted on her 50 mcg fentanyl patch, as she had increased her pain following the decrease to 25 µg from her last visit.   She states that she tried to continue with the fentanyl 25 µg patch, states that she was requiring more of her Percocet, the higher dose of fentanyl taking Percocet on approximation 4 times daily. Patient has gained 4 pounds stating that she had been on prednisone, Levaquin for her lungs by her pulmonologist with a healthy appetite. Patient denies new and/or uncontrolled symptoms and is comfortable on current regiment. Patient states that she had sedation with the increased dose of Neurontin therefore in the mornings usually taking 300 or 600 mg depending if she is working or not, 600 mg in the afternoon and 900 mg in the evening. Patient does notice a difference in her pain relief with the higher dose. Options were discussed and today we are continuing Duragesic 50 µg q.72 hours and we just refilled her medications recently. We are also continuing unchanged Percocet  milligram tablets 1 p.o. q.6 hours p.r.n., Pamelor 50 mg q.h.s., Cymbalta 20 mg q.h.s. and she will continue her other medications unchanged. We'll see her back in 4 weeks and patient will call her pulmonologist if she feels her URI coming back. 02/14/19:  Medical records reviewed and no documented physician visits since I last saw her. Patient presents ambulatory no acute distress at her baseline without sign of sedation and/or confusion. Patient has an appointment with oncology Dr. Bong Corbin Monday stating that the tamoxifen makes her feel horrible. Patient is supposed to see the spine center at [facility] after she had an MRI done but has not made an appointment stating that she cannot take off work. Patient states that she fell on the ice, took Percocet more frequently sometimes q.4 hours and then had the gastrointestinal flu and did not take any of her medications for several days. Otherwise patient has not changed anything. Patient's weight remains unchanged. Options were discussed and she will discuss with oncology about other options as she states she is having a horrible time physically and emotionally on this medication the way she feels.   Patient desires not to change anything therefore we are continuing Duragesic 50 µg q.72 hours refill recently as per Epic, Percocet  milligram tablets 1 p.o. q.6 hours p.r.n. also refilled as per [facility] recently, Pamelor 50 mg q.h.s., Cymbalta 20 mg q.h.s., Neurontin as above as well. We will extend her appointment out to 8 weeks and she knows to call us at any time with any uncontrolled symptoms that she would like to address. 04/04/19:  Medical records reviewed and no documented physician visits in [facility] since I last saw her. Patient presents unaccompanied looking more comfortable without sign of sedation and or confusion able to voice her needs. Patient states that over the past week or so her legs have been feeling better. Patient states she is more active and actually on the treadmill attempting to lose weight. I talked to her about how physical activity concern only improved peripheral neuropathy, arthralgias, myalgias and she voiced understanding. Patient states that there is no change in how she is taken her medications but would like to again weaning down from the Percocet and eventually get off of all opioids. Options were discussed and patient recently received a prescription from us for her Percocet  milligram tablets 1 p.o. q.6 hours p.r.n. and the next time she calls in for refill we will prescribed 90 tablets instead of 120. I advised her starting now to take 3 tablets in a 24-hour period instead of 4 and she voiced understanding liking this idea. We will then decrease the oxycodone content as well as frequency down the road. I advised patient when she is down to taking only 1 or 2 Percocet daily that we will decrease the fentanyl patch. Today we refilled ×10 her Duragesic 50 µg patch one patch q.72 hours, Cymbalta 20 g q.h.s., Neurontin 600, 600, 900, Pamelor 50 mg h.s. all unchanged. Patient states that she is willing to do this in an attempt to wean herself off the medications.   We'll see her back in 8 weeks or sooner if she needs us. 19:  Medical records reviewed and no documented physician visits in [facility] since I last saw her. Patient presents today ambulatory distressed at the recent death of her father otherwise at her baseline without sign of sedation and/or confusion able to voice her needs. Patient's father's  is tomorrow and she is trying to do the best she can and very appropriate. Patient was recently prescribed Percocet  milligram tablets ×90 by myself on May 24 as per the above plan from 19. Patient has actually 3 patches of Duragesic 50 µg and has been using the old prescription of Xanax 0.5 mg over the recent several days to get through the death of her father. Patient states whether of course makes her arthralgias worse. Options were discussed and I prescribed again Xanax 0.5 mg 1 tablet t.i.d. p.r.n. holding for sedation ×30 tablets today. We are continuing unchanged her fentanyl 50 µg one patch q.72 hours and the Percocet  milligram tablets 1 p.o. q.6 hours p.r.n. but not to exceed 3 in a 24-hour period prescribing as above. Next time she calls for refill we will decrease the dosage to 7.5-325 milligram tablets prescribing 90 and continue to wean accordingly. She will also continue the Cymbalta 20 g q.h.s., Neurontin 600, 600, 900, Pamelor 50 mg q.h.s. and the remainder of her medications. We will see her back in 4 weeks. Patient was given a number for [facility] and she states she will certainly contact them. 2019:  Medical records reviewed and as per pulmonology assessment on 2019:  PLAN/IMPRESSION  Cornel Nation was seen today for follow-up. Diagnoses and all orders for this visit:  COPD, moderate ([address]  Pulmonary nodules  Continue Breo 100/5 one puff daily. Will obatin PFTs next office visit. Her pulmonary nodules are stable no further follow up recommended.   FOLLOW UP  In 6 months with PFTs  Patient presents today ambulatory no acute distress without sign of sedation and her confusion able to voice her needs. Patient states that she has had \"a rough month\" with her pain being worsened secondary to taking Percocet 3 times daily. Patient would take Percocet 4 times daily running out early over the past week with worsening symptoms. Patient also states that she would remove the fentanyl 50 mcg patch on the weekends to go swimming and leave it off for 48 hours not noticing a difference in her pain control. Patient voices compliance with her gabapentin, Pamelor and Cymbalta. Weight remains unchanged. Options were discussed and today we are decreasing fentanyl patch to 25 mcg 1 patch every 72 hours prescribing 10 patches today, decreasing the dosage of the Percocet to 7.5-325 mg tablets 1 p.o. every 6 hours as needed prescribing 120 tablets and continuing the gabapentin 600, 600, 900, Pamelor 50 mg nightly, Cymbalta 20 mg nightly that we should not increase secondary to her oncology treatment. Patient will continue her medications unchanged otherwise and we will see her back in 4 weeks. In the near future we will continue to decrease the dosage of the Percocet next to 5-325 mg tablets 4 times daily, continue weaning the Duragesic patch and then frequency of the Percocet. Controlled Substances Monitoring:   RX Monitoring 6/27/2019   Attestation -   Acute Pain Prescriptions -   Periodic Controlled Substance Monitoring Possible medication side effects, risk of tolerance/dependence & alternative treatments discussed. ;No signs of potential drug abuse or diversion identified. ;Assessed functional status. ;Obtaining appropriate analgesic effect of treatment. Chronic Pain > 50 MEDD Re-evaluated the status of the patient's underlying condition causing pain.;Obtained or confirmed \"Consent for Opioid Use\" on file.    Chronic Pain > 80 MEDD -     Time in minutes:    [] 15   [] 30   [] 45   [] 60   [x] 75 [] 90  Chart review/documentation:  [x] 15   [] 30   [] 45   [] 60   [] 75   [] 90  Assessment:     [x] 15   [] 30   [] 45   [] 60   [] 75   [] 90  Conversation patient/family/staff: [] 15   [] 30   [x] 45   [] 60   [] 75   [] 90    Nikko Neal MD    6/27/2019

## 2023-06-27 DIAGNOSIS — G62.0 CHEMOTHERAPY-INDUCED NEUROPATHY (HCC): ICD-10-CM

## 2023-06-27 DIAGNOSIS — T45.1X5A CHEMOTHERAPY-INDUCED NEUROPATHY (HCC): ICD-10-CM

## 2023-06-27 DIAGNOSIS — Z51.5 PALLIATIVE CARE BY SPECIALIST: ICD-10-CM

## 2023-06-27 RX ORDER — OXYCODONE AND ACETAMINOPHEN 10; 325 MG/1; MG/1
1 TABLET ORAL EVERY 4 HOURS PRN
Qty: 180 TABLET | Refills: 0 | Status: SHIPPED
Start: 2023-06-27 | End: 2023-06-28 | Stop reason: SDUPTHER

## 2023-06-27 RX ORDER — OXYCODONE AND ACETAMINOPHEN 10; 325 MG/1; MG/1
1 TABLET ORAL EVERY 4 HOURS PRN
Qty: 180 TABLET | Refills: 0 | OUTPATIENT
Start: 2023-06-27 | End: 2023-07-27

## 2023-06-28 DIAGNOSIS — T45.1X5A CHEMOTHERAPY-INDUCED NEUROPATHY (HCC): ICD-10-CM

## 2023-06-28 DIAGNOSIS — Z51.5 PALLIATIVE CARE BY SPECIALIST: ICD-10-CM

## 2023-06-28 DIAGNOSIS — G62.0 CHEMOTHERAPY-INDUCED NEUROPATHY (HCC): ICD-10-CM

## 2023-06-28 RX ORDER — OXYCODONE AND ACETAMINOPHEN 10; 325 MG/1; MG/1
1 TABLET ORAL EVERY 4 HOURS PRN
Qty: 180 TABLET | Refills: 0 | Status: SHIPPED | OUTPATIENT
Start: 2023-06-28 | End: 2023-07-28

## 2023-07-05 ENCOUNTER — OFFICE VISIT (OUTPATIENT)
Dept: PALLATIVE CARE | Age: 58
End: 2023-07-05
Payer: COMMERCIAL

## 2023-07-05 VITALS
HEIGHT: 67 IN | DIASTOLIC BLOOD PRESSURE: 78 MMHG | OXYGEN SATURATION: 96 % | SYSTOLIC BLOOD PRESSURE: 137 MMHG | BODY MASS INDEX: 26.37 KG/M2 | HEART RATE: 79 BPM | WEIGHT: 168 LBS

## 2023-07-05 DIAGNOSIS — T45.1X5A CHEMOTHERAPY-INDUCED NEUROPATHY (HCC): ICD-10-CM

## 2023-07-05 DIAGNOSIS — G89.3 CHRONIC PAIN DUE TO NEOPLASM: ICD-10-CM

## 2023-07-05 DIAGNOSIS — C50.912 BREAST CANCER, STAGE 3, LEFT (HCC): ICD-10-CM

## 2023-07-05 DIAGNOSIS — Z51.5 PALLIATIVE CARE BY SPECIALIST: ICD-10-CM

## 2023-07-05 DIAGNOSIS — G62.0 CHEMOTHERAPY-INDUCED NEUROPATHY (HCC): ICD-10-CM

## 2023-07-05 DIAGNOSIS — Z51.5 ENCOUNTER FOR PALLIATIVE CARE: Primary | ICD-10-CM

## 2023-07-05 PROCEDURE — 99213 OFFICE O/P EST LOW 20 MIN: CPT | Performed by: NURSE PRACTITIONER

## 2023-07-05 PROCEDURE — G8427 DOCREV CUR MEDS BY ELIG CLIN: HCPCS | Performed by: NURSE PRACTITIONER

## 2023-07-05 PROCEDURE — 99211 OFF/OP EST MAY X REQ PHY/QHP: CPT | Performed by: NURSE PRACTITIONER

## 2023-07-05 PROCEDURE — 1036F TOBACCO NON-USER: CPT | Performed by: NURSE PRACTITIONER

## 2023-07-05 PROCEDURE — 3017F COLORECTAL CA SCREEN DOC REV: CPT | Performed by: NURSE PRACTITIONER

## 2023-07-05 PROCEDURE — G8419 CALC BMI OUT NRM PARAM NOF/U: HCPCS | Performed by: NURSE PRACTITIONER

## 2023-07-05 RX ORDER — ALPRAZOLAM 0.5 MG/1
TABLET ORAL
COMMUNITY
Start: 2023-06-15

## 2023-07-05 NOTE — PROGRESS NOTES
Department of Palliative Medicine  Ambulatory visit  Provider: Danyell Looch, APRN - CNP         Chief Complaint: Kai Aly is a 59 y.o. female with chief complaint of Pain    HPI:  59 y/o post-menopausal  female who underwent Stereotactic Left Breast mass core biopsy on 06/17/2016 revealing Invasive Lobular carcinoma, she has developed severe neuropathy after her last treatment. Assessment/Plan      Breast cancer   - Currently on tamoxifen    - Follows at [facility]    Pain due to neoplasm, (neuropathic type pain)    -Percocet  mg q4hr prn- decrease frequency as able   -Cymbalta 20mg hs, low dose due to interaction with Tamoxifen    -Lyrica to 100 mg in the morning or 200 mg nightly    -DC Desipramine 25 mg HS- caused nausea and dizziness    -DC robaxin 500 mg BID   -Alternate with Tylenol and ibuprofen    Anxiety   -Continue Xanax 0.25 mg 2 times daily as needed   -Continue Cymbalta 20 mg daily   -Vistaril 25 mg every 6 hours as needed    Follow Up: 3 months. Encouraged to call with any questions, concerns, needs, or changes in symptoms. Subjective:     Kai Aly is a 59 y.o. female with significant past medical history of Breast cancer. 7/5/23:  Chart reviewed and met with Zoltan Engel at the bedside. She continues to have a lot of pain in her legs, with some associated swelling in her feet and ankles the last several days. She continues to take a tablet and a half in the morning or nighttime on bad days. She states she is going to try a topical neuropathy cream soon, is hopeful this will help. A lot of her pain and fatigue is due to her social factors, as she is providing support with her brother and her mother, this significant increased her stress levels. Support provided. I also again encouraged her to increase her physical activity as she can tolerate. She overall continues to cope well with the stressors, and only utilizes her Xanax on occasion.   Options were

## 2023-07-27 DIAGNOSIS — C50.512 MALIGNANT NEOPLASM OF LOWER-OUTER QUADRANT OF LEFT FEMALE BREAST, UNSPECIFIED ESTROGEN RECEPTOR STATUS (HCC): ICD-10-CM

## 2023-07-27 DIAGNOSIS — G62.0 CHEMOTHERAPY-INDUCED NEUROPATHY (HCC): ICD-10-CM

## 2023-07-27 DIAGNOSIS — T45.1X5A PERIPHERAL NEUROPATHY DUE TO CHEMOTHERAPY (HCC): ICD-10-CM

## 2023-07-27 DIAGNOSIS — G62.0 PERIPHERAL NEUROPATHY DUE TO CHEMOTHERAPY (HCC): ICD-10-CM

## 2023-07-27 DIAGNOSIS — Z51.5 PALLIATIVE CARE BY SPECIALIST: ICD-10-CM

## 2023-07-27 DIAGNOSIS — T45.1X5A CHEMOTHERAPY-INDUCED NEUROPATHY (HCC): ICD-10-CM

## 2023-07-27 DIAGNOSIS — G89.3 CHRONIC PAIN DUE TO NEOPLASM: ICD-10-CM

## 2023-07-27 RX ORDER — OXYCODONE AND ACETAMINOPHEN 10; 325 MG/1; MG/1
1 TABLET ORAL EVERY 4 HOURS PRN
Qty: 180 TABLET | Refills: 0 | Status: SHIPPED | OUTPATIENT
Start: 2023-07-27 | End: 2023-08-26

## 2023-07-27 RX ORDER — ALPRAZOLAM 0.5 MG/1
0.5 TABLET ORAL 2 TIMES DAILY PRN
Qty: 30 TABLET | Refills: 2 | Status: SHIPPED | OUTPATIENT
Start: 2023-07-27 | End: 2023-09-10

## 2023-07-27 NOTE — TELEPHONE ENCOUNTER
Call from [facility] requesting refills for Xanax and Percocet. Pharmacy is [facility] in [facility]. Next wily 9/27/23.

## 2023-07-28 ENCOUNTER — TELEPHONE (OUTPATIENT)
Dept: PALLATIVE CARE | Age: 58
End: 2023-07-28

## 2023-07-28 NOTE — TELEPHONE ENCOUNTER
Call from [facility] in [facility] stating patient picked up prescription from them 6/27/23 but also picked up prescription from [facility] in [facility] 6/28/23 for same medication Percocet 10/325 mg. Attempted to contact Silverio Garland , leaving voice messages X 2 to call our team as we were unable to fulfill her request for refill from yesterday. No return call. Spoke with pharmacist at [facility], Myrtice Schaumann, and discontinued order, which Myrtice Schaumann confirms. Myrtice Schaumann states he also left message for patient. Updated Arlene Forbes CNP.

## 2023-07-28 NOTE — TELEPHONE ENCOUNTER
Call back from Diane. Diane realized after call from [facility] that she remembered picking up 2nd prescription when she went to [facility] to get another prescription. The Percocet were in her bag and she put them up in her cupboard. She does still have this script and does not need the refill at this time. Diane was very tearful as her brother is in the hospital and is facing an amputation or Hospice. Diane states her mother whom she is caregiver for is also not doing well. Lilia Dove as she has had a bad month and is a little overwhelmed with caring for her mom and brother. Support provided through active listening.

## 2023-08-24 DIAGNOSIS — T45.1X5A CHEMOTHERAPY-INDUCED NEUROPATHY (HCC): ICD-10-CM

## 2023-08-24 DIAGNOSIS — G62.0 CHEMOTHERAPY-INDUCED NEUROPATHY (HCC): ICD-10-CM

## 2023-08-24 DIAGNOSIS — Z51.5 PALLIATIVE CARE BY SPECIALIST: ICD-10-CM

## 2023-08-24 RX ORDER — OXYCODONE AND ACETAMINOPHEN 10; 325 MG/1; MG/1
1 TABLET ORAL EVERY 4 HOURS PRN
Qty: 180 TABLET | Refills: 0 | Status: SHIPPED | OUTPATIENT
Start: 2023-08-24 | End: 2023-09-23

## 2023-08-24 NOTE — TELEPHONE ENCOUNTER
Call from [facility] requesting refill for Percocet. Pharmacy is [facility] in [facility]. Next wily 9/27/23.

## 2023-09-06 DIAGNOSIS — Z51.5 PALLIATIVE CARE BY SPECIALIST: ICD-10-CM

## 2023-09-06 DIAGNOSIS — G89.3 CHRONIC PAIN DUE TO NEOPLASM: ICD-10-CM

## 2023-09-20 DIAGNOSIS — T45.1X5A CHEMOTHERAPY-INDUCED NEUROPATHY (HCC): ICD-10-CM

## 2023-09-20 DIAGNOSIS — G89.3 CHRONIC PAIN DUE TO NEOPLASM: ICD-10-CM

## 2023-09-20 DIAGNOSIS — G62.0 CHEMOTHERAPY-INDUCED NEUROPATHY (HCC): ICD-10-CM

## 2023-09-20 DIAGNOSIS — Z51.5 PALLIATIVE CARE BY SPECIALIST: ICD-10-CM

## 2023-09-20 RX ORDER — PREGABALIN 100 MG/1
CAPSULE ORAL
Qty: 90 CAPSULE | Refills: 5 | Status: SHIPPED | OUTPATIENT
Start: 2023-09-20 | End: 2024-03-22

## 2023-09-20 RX ORDER — OXYCODONE AND ACETAMINOPHEN 10; 325 MG/1; MG/1
1 TABLET ORAL EVERY 4 HOURS PRN
Qty: 180 TABLET | Refills: 0 | Status: SHIPPED | OUTPATIENT
Start: 2023-09-20 | End: 2023-10-20

## 2023-09-20 NOTE — TELEPHONE ENCOUNTER
Incoming call from Diane requesting refill of her medications. Confirmed pharmacy.  Next appt 9/27/23

## 2023-09-27 ENCOUNTER — OFFICE VISIT (OUTPATIENT)
Dept: PALLATIVE CARE | Age: 58
End: 2023-09-27
Payer: COMMERCIAL

## 2023-09-27 VITALS
HEART RATE: 94 BPM | OXYGEN SATURATION: 94 % | DIASTOLIC BLOOD PRESSURE: 70 MMHG | TEMPERATURE: 97.8 F | SYSTOLIC BLOOD PRESSURE: 117 MMHG | WEIGHT: 169 LBS | BODY MASS INDEX: 26.47 KG/M2

## 2023-09-27 DIAGNOSIS — Z51.5 PALLIATIVE CARE BY SPECIALIST: Primary | ICD-10-CM

## 2023-09-27 PROCEDURE — 99211 OFF/OP EST MAY X REQ PHY/QHP: CPT | Performed by: NURSE PRACTITIONER

## 2023-09-27 RX ORDER — DULOXETIN HYDROCHLORIDE 20 MG/1
20 CAPSULE, DELAYED RELEASE ORAL DAILY
Qty: 90 CAPSULE | Refills: 1 | Status: SHIPPED | OUTPATIENT
Start: 2023-09-27 | End: 2024-03-25

## 2023-09-27 RX ORDER — DULOXETIN HYDROCHLORIDE 20 MG/1
20 CAPSULE, DELAYED RELEASE ORAL DAILY
Qty: 90 CAPSULE | Refills: 1 | Status: SHIPPED
Start: 2023-09-27 | End: 2023-09-27

## 2023-09-27 NOTE — PROGRESS NOTES
possible response Best possible response   Total Assessment Score(calculated) 34 29 [phone] 28     Assessed by: patient. Current Medications:  Medications reviewed: yes    Controlled Substances Monitoring: [place]ARRS reviewed 9/27/23. RX Monitoring Periodic Controlled Substance Monitoring   9/27/2023   4:32 PM Possible medication side effects, risk of tolerance/dependence & alternative treatments discussed. ;No signs of potential drug abuse or diversion identified. ;Assessed functional status. ;Obtaining appropriate analgesic effect of treatment. JOSE ANGEL Conde - CNP   Palliative Care Department     Time/Communication  Greater than 50% of time spent, total 35 minutes in face-to-face counseling and coordination of care regarding symptom management.

## 2023-10-05 RX ORDER — PREGABALIN 100 MG/1
CAPSULE ORAL
Qty: 90 CAPSULE | Refills: 5 | OUTPATIENT
Start: 2023-10-05 | End: 2024-04-02

## 2023-10-09 DIAGNOSIS — G62.0 CHEMOTHERAPY-INDUCED NEUROPATHY (HCC): ICD-10-CM

## 2023-10-09 DIAGNOSIS — C50.512 MALIGNANT NEOPLASM OF LOWER-OUTER QUADRANT OF LEFT FEMALE BREAST, UNSPECIFIED ESTROGEN RECEPTOR STATUS (HCC): ICD-10-CM

## 2023-10-09 DIAGNOSIS — T45.1X5A CHEMOTHERAPY-INDUCED NEUROPATHY (HCC): ICD-10-CM

## 2023-10-09 DIAGNOSIS — T45.1X5A PERIPHERAL NEUROPATHY DUE TO CHEMOTHERAPY (HCC): ICD-10-CM

## 2023-10-09 DIAGNOSIS — Z51.5 PALLIATIVE CARE BY SPECIALIST: ICD-10-CM

## 2023-10-09 DIAGNOSIS — G89.3 CHRONIC PAIN DUE TO NEOPLASM: ICD-10-CM

## 2023-10-09 DIAGNOSIS — G62.0 PERIPHERAL NEUROPATHY DUE TO CHEMOTHERAPY (HCC): ICD-10-CM

## 2023-10-09 RX ORDER — ALPRAZOLAM 0.5 MG/1
0.5 TABLET ORAL 2 TIMES DAILY PRN
Qty: 30 TABLET | Refills: 2 | Status: SHIPPED | OUTPATIENT
Start: 2023-10-09 | End: 2023-11-23

## 2023-10-09 NOTE — TELEPHONE ENCOUNTER
Message through [facility] from [employer] requesting refill for ALPRAZOLAM. [facility]. Next wily 12/20/23.

## 2023-10-19 DIAGNOSIS — Z51.5 PALLIATIVE CARE BY SPECIALIST: ICD-10-CM

## 2023-10-19 DIAGNOSIS — G62.0 CHEMOTHERAPY-INDUCED NEUROPATHY (HCC): ICD-10-CM

## 2023-10-19 DIAGNOSIS — T45.1X5A CHEMOTHERAPY-INDUCED NEUROPATHY (HCC): ICD-10-CM

## 2023-10-19 DIAGNOSIS — G89.3 PAIN DUE TO NEOPLASM: ICD-10-CM

## 2023-10-19 DIAGNOSIS — G89.3 PAIN DUE TO NEOPLASM: Primary | ICD-10-CM

## 2023-10-19 RX ORDER — OXYCODONE HYDROCHLORIDE 10 MG/1
10-15 TABLET ORAL EVERY 4 HOURS PRN
Qty: 240 TABLET | Refills: 0 | Status: SHIPPED
Start: 2023-10-19 | End: 2023-10-19 | Stop reason: SDUPTHER

## 2023-10-19 RX ORDER — OXYCODONE HYDROCHLORIDE 10 MG/1
10-15 TABLET ORAL EVERY 4 HOURS PRN
Qty: 120 TABLET | Refills: 0 | Status: SHIPPED | OUTPATIENT
Start: 2023-10-19 | End: 2023-11-03

## 2023-10-19 RX ORDER — OXYCODONE AND ACETAMINOPHEN 10; 325 MG/1; MG/1
1 TABLET ORAL EVERY 4 HOURS PRN
Qty: 180 TABLET | Refills: 0 | Status: CANCELLED | OUTPATIENT
Start: 2023-10-19 | End: 2023-11-18

## 2023-10-19 NOTE — TELEPHONE ENCOUNTER
Call from [facility] requesting refill for Oxy IR. [facility] shares that she has a slipped disk and has been having increased pain, taking 1 and 1/2 tablets at times. [facility] is requesting a change in number dispensed until slipped disk can be fixed. [facility] is seeing pain management at [facility] Discussed with Dominique Richards CNP and new order for Oxy IR 10 mg , 1-11/2 tablets every 4 hours prn was sent. [facility]. Next wily 12/20/23. [facility] notified and verbalizes understanding.

## 2023-10-19 NOTE — TELEPHONE ENCOUNTER
Call from Diane stating she received a message from [facility] and they do not have enough of the medication to fill her prescription. [facility] , they do not have med in stock. Spoke with [facility] in [facility]. Pharmacist states she can do 15 day script/ 120 tablets. Please resend prescription.

## 2023-11-01 DIAGNOSIS — G62.0 CHEMOTHERAPY-INDUCED NEUROPATHY (HCC): ICD-10-CM

## 2023-11-01 DIAGNOSIS — G89.3 PAIN DUE TO NEOPLASM: ICD-10-CM

## 2023-11-01 DIAGNOSIS — T45.1X5A CHEMOTHERAPY-INDUCED NEUROPATHY (HCC): ICD-10-CM

## 2023-11-01 DIAGNOSIS — Z51.5 PALLIATIVE CARE BY SPECIALIST: ICD-10-CM

## 2023-11-01 RX ORDER — OXYCODONE HYDROCHLORIDE 10 MG/1
10-15 TABLET ORAL EVERY 4 HOURS PRN
Qty: 100 TABLET | Refills: 0 | Status: SHIPPED | OUTPATIENT
Start: 2023-11-01 | End: 2023-11-12

## 2023-11-01 NOTE — TELEPHONE ENCOUNTER
Call [facility] requesting refill forPercocet, Pharmacies have had a back order on Oxy -Acetam 10/325 mg . Called and spoke with [address] [place] [address] , they do not have Percocet 10/325 mg In stock,  they do have Oxy IR 10 mg, 100 tablets. And they have Percocet 7.5/325 mg. Discussed with Nitza Oneill and Mary Jo Wick CNP. Nitza Oneill would like to continue the oxY ir 10 MG TABLET AS PREVIOUS AND SHE STATES SHE USUALLY ONLY TAKES 1 AND 1/2 tablets in the morning. She will be following up with [facility] pain management for further tests and an injection. Next wily 12/20/23.

## 2023-11-10 DIAGNOSIS — T45.1X5A CHEMOTHERAPY-INDUCED NEUROPATHY (HCC): ICD-10-CM

## 2023-11-10 DIAGNOSIS — Z51.5 PALLIATIVE CARE BY SPECIALIST: ICD-10-CM

## 2023-11-10 DIAGNOSIS — G89.4 PAIN SYNDROME, CHRONIC: Primary | ICD-10-CM

## 2023-11-10 DIAGNOSIS — G89.3 PAIN DUE TO NEOPLASM: ICD-10-CM

## 2023-11-10 DIAGNOSIS — G62.0 CHEMOTHERAPY-INDUCED NEUROPATHY (HCC): ICD-10-CM

## 2023-11-10 RX ORDER — OXYCODONE HYDROCHLORIDE 10 MG/1
10-15 TABLET ORAL EVERY 4 HOURS PRN
Qty: 90 TABLET | Refills: 0 | Status: SHIPPED | OUTPATIENT
Start: 2023-11-10 | End: 2023-11-20

## 2023-11-10 NOTE — TELEPHONE ENCOUNTER
Call from [facility] requesting a refill for Oxy IR 10 mg. Kaiden Hankinsadilene states she has been taking with one tylenol and it is helping, but back pain continues. Kaiden Mcqueen also shares that she recently was at [facility]. Pharmacy is [facility] in [facility]. Next wily 12/20/23.

## 2023-11-20 DIAGNOSIS — T45.1X5A CHEMOTHERAPY-INDUCED NEUROPATHY (HCC): ICD-10-CM

## 2023-11-20 DIAGNOSIS — Z51.5 PALLIATIVE CARE BY SPECIALIST: ICD-10-CM

## 2023-11-20 DIAGNOSIS — G62.0 CHEMOTHERAPY-INDUCED NEUROPATHY (HCC): ICD-10-CM

## 2023-11-20 RX ORDER — OXYCODONE AND ACETAMINOPHEN 10; 325 MG/1; MG/1
1 TABLET ORAL EVERY 4 HOURS PRN
Qty: 180 TABLET | Refills: 0 | Status: SHIPPED | OUTPATIENT
Start: 2023-11-20 | End: 2023-12-20

## 2023-11-20 NOTE — TELEPHONE ENCOUNTER
Call from Diane requesting to go back to old prescription for Percocet 10/325 mg . Please send to [facility] on [address] in [id number]  [facility]. Next wily 12/20/23.

## 2023-12-11 ENCOUNTER — TELEPHONE (OUTPATIENT)
Dept: PALLATIVE CARE | Age: 58
End: 2023-12-11

## 2023-12-11 DIAGNOSIS — Z51.5 PALLIATIVE CARE BY SPECIALIST: ICD-10-CM

## 2023-12-11 DIAGNOSIS — G89.3 CHRONIC PAIN DUE TO NEOPLASM: Primary | ICD-10-CM

## 2023-12-11 RX ORDER — OXYCODONE HYDROCHLORIDE 15 MG/1
15 TABLET ORAL EVERY 4 HOURS PRN
Qty: 48 TABLET | Refills: 0 | Status: SHIPPED | OUTPATIENT
Start: 2023-12-11 | End: 2023-12-19

## 2023-12-11 NOTE — TELEPHONE ENCOUNTER
Call from Diane stating she has used her Percocet 10/325 mg more often, taking 15 mg at times. Diane shares that her mother passed away just before Thanksgiving. Diane has an appointment with [facility] 12/20/23. Discussed with Jocy Colón CNP and prescription sent for Oxy IR 15 [facility] to [facility] in [address] [place] [address].

## 2023-12-20 DIAGNOSIS — Z79.891 USE OF OPIATES FOR THERAPEUTIC PURPOSES: ICD-10-CM

## 2023-12-21 LAB
6-MONOACETYLMORPHINE, URINE: NEGATIVE
ABNORMAL SPECIMEN VALIDITY TEST: ABNORMAL
ALCOHOL URINE: NOT DETECTED MG/DL
AMPHETAMINE SCREEN URINE: NEGATIVE
BARBITURATE SCREEN URINE: NEGATIVE
BENZODIAZEPINE SCREEN, URINE: POSITIVE
BUPRENORPHINE URINE: NEGATIVE
CANNABINOID SCREEN URINE: POSITIVE
COCAINE METABOLITE, URINE: NEGATIVE
FENTANYL URINE: NEGATIVE
INTEGRITY CHECK, CREATININE, URINE: 83.7 MG/DL (ref 22–250)
INTEGRITY CHECK, OXIDANT, URINE: 43 MG/L
INTEGRITY CHECK, PH, URINE: 5.5 (ref 4.5–9)
INTEGRITY CHECK, SPECIFIC GRAVITY, URINE: 1.01 (ref 1–1.03)
METHADONE SCREEN, URINE: NEGATIVE
OPIATES, URINE: NEGATIVE
OXYCODONE SCREEN URINE: POSITIVE
PHENCYCLIDINE, URINE: NEGATIVE
TEST INFORMATION: ABNORMAL
TRAMADOL, URINE: NEGATIVE

## 2023-12-22 LAB
6-MAM, QUANTITATIVE, URINE: <10 NG/ML
7-AMINOCLONAZEPAM, QUANTITATIVE, URINE: <50 NG/ML
ALPHA-HYDROXYALPRAZOLAM, QUANTITATIVE, URINE: 309.8 NG/ML
ALPHA-HYDROXYMIDAZOLAM, QUANTITATIVE, URINE: <50 NG/ML
ALPHA-HYDROXYTRIAZOLAM, QUANTITATIVE, URINE: <50 NG/ML
ALPRAZOLAM URINE QUANT: 164.4 NG/ML
CHLORDIAZEPOXIDE, QUANTITATIVE, URINE: <50 NG/ML
CLONAZEPAM, QUANTITATIVE, URINE: <50 NG/ML
CODEINE, QUANTITATIVE, URINE: <50 NG/ML
COMPLIANCE DRUG ANALYSIS, URINE: NORMAL
DIAZEPAM URINE QUANT: <50 NG/ML
FLUNITRAZEPAM, QUANTITATIVE, URINE: <50 NG/ML
FLURAZEPAM, QUANTITATIVE, URINE: <50 NG/ML
HYDROCODONE, QUANTITATIVE, URINE: <50 NG/ML
HYDROMORPHONE, QUANTITATIVE, URINE: <50 NG/ML
LORAZEPAM URINE QUANT: <50 NG/ML
MIDAZOLAM URINE QUANT: <50 NG/ML
MORPHINE, QUANTITATIVE, URINE: <50 NG/ML
NORDIAZEPAM URINE QUANT: <50 NG/ML
NORHYDROCODONE, QUANTITATIVE, URINE: <50 NG/ML
NOROXYCODONE, QUANTITATIVE, URINE: >1000 NG/ML
OXAZEPAM URINE QUANT: <50 NG/ML
OXYCODONE URINE, QUANTITATIVE: >1000 NG/ML
OXYMORPHONE, QUANTITATIVE, URINE: >1000 NG/ML
TEMAZEPAM, QUANTITATIVE, URINE: <50 NG/ML
THC NORMALIZED, QUANTITIATIVE, URINE: 36.2 NG/ML
THC-COOH, QUANTITATIVE, URINE: 30.3 NG/ML

## 2023-12-27 ENCOUNTER — HOSPITAL ENCOUNTER (OUTPATIENT)
Dept: GENERAL RADIOLOGY | Age: 58
Discharge: HOME | End: 2023-12-29
Payer: COMMERCIAL

## 2023-12-27 VITALS — WEIGHT: 150 LBS | BODY MASS INDEX: 23.49 KG/M2

## 2023-12-27 DIAGNOSIS — Z51.5 ENCOUNTER FOR PALLIATIVE CARE: Primary | ICD-10-CM

## 2023-12-27 DIAGNOSIS — Z12.31 VISIT FOR SCREENING MAMMOGRAM: ICD-10-CM

## 2023-12-27 DIAGNOSIS — G62.0 CHEMOTHERAPY-INDUCED NEUROPATHY (HCC): ICD-10-CM

## 2023-12-27 DIAGNOSIS — T45.1X5A CHEMOTHERAPY-INDUCED NEUROPATHY (HCC): ICD-10-CM

## 2023-12-27 DIAGNOSIS — Z51.5 PALLIATIVE CARE BY SPECIALIST: ICD-10-CM

## 2023-12-27 PROCEDURE — 77063 BREAST TOMOSYNTHESIS BI: CPT

## 2023-12-27 RX ORDER — OXYCODONE AND ACETAMINOPHEN 10; 325 MG/1; MG/1
1 TABLET ORAL EVERY 4 HOURS PRN
Qty: 35 TABLET | Refills: 0 | Status: SHIPPED | OUTPATIENT
Start: 2023-12-27 | End: 2024-01-03

## 2023-12-27 RX ORDER — ALPRAZOLAM 0.5 MG/1
TABLET ORAL
Qty: 14 TABLET | Refills: 0 | Status: SHIPPED | OUTPATIENT
Start: 2023-12-27 | End: 2024-01-02

## 2023-12-27 NOTE — TELEPHONE ENCOUNTER
Second call from Diane, she forgot to request a refill for Xanax as she is out. Pharmacy is [facility] in [facility]. Next wily 1/10/24.

## 2023-12-27 NOTE — TELEPHONE ENCOUNTER
Call from Diane stating she has been maintaining 5 pills per day as Jocy Colón CNP asked. Diane states she is experiencing Joint pain. She did follow up with Dr. Harriet Mortensen and they are going to do injections in her knees, he is recommending surgery. Also Diane has contacted [facility] and has an appointment 1/22/24, first available. Diane requests to continue on 5 times a day at this time. [facility]acy is [facility] in [facility], next wily 1/10/24.

## 2024-01-03 DIAGNOSIS — Z51.5 ENCOUNTER FOR PALLIATIVE CARE: ICD-10-CM

## 2024-01-03 DIAGNOSIS — Z51.5 PALLIATIVE CARE BY SPECIALIST: ICD-10-CM

## 2024-01-03 DIAGNOSIS — G62.0 CHEMOTHERAPY-INDUCED NEUROPATHY (HCC): ICD-10-CM

## 2024-01-03 DIAGNOSIS — T45.1X5A CHEMOTHERAPY-INDUCED NEUROPATHY (HCC): ICD-10-CM

## 2024-01-03 RX ORDER — ALPRAZOLAM 0.5 MG/1
TABLET ORAL
Qty: 14 TABLET | Refills: 0 | Status: SHIPPED | OUTPATIENT
Start: 2024-01-03 | End: 2024-01-09

## 2024-01-03 RX ORDER — OXYCODONE AND ACETAMINOPHEN 10; 325 MG/1; MG/1
1 TABLET ORAL EVERY 4 HOURS PRN
Qty: 35 TABLET | Refills: 0 | Status: SHIPPED | OUTPATIENT
Start: 2024-01-03 | End: 2024-01-10

## 2024-01-03 NOTE — TELEPHONE ENCOUNTER
Call from Uriel requesting refill for Percocet that she states she has been maintaining 5 pills per day. She would like to stay with this regiment a little longer as her wily with Dr. Pelletier is not until 1/22/24. Also she would like to get her mental health under better control. She continues to use Xanax 2 tablets a day to control her anxiety. She also need a refill for this. [facility], next wily 1/10/24.

## 2024-01-10 ENCOUNTER — OFFICE VISIT (OUTPATIENT)
Dept: PALLATIVE CARE | Age: 59
End: 2024-01-10
Payer: COMMERCIAL

## 2024-01-10 VITALS
BODY MASS INDEX: 23.59 KG/M2 | DIASTOLIC BLOOD PRESSURE: 88 MMHG | SYSTOLIC BLOOD PRESSURE: 154 MMHG | TEMPERATURE: 97.1 F | OXYGEN SATURATION: 96 % | HEART RATE: 106 BPM | WEIGHT: 150.6 LBS

## 2024-01-10 DIAGNOSIS — G62.0 CHEMOTHERAPY-INDUCED NEUROPATHY (HCC): ICD-10-CM

## 2024-01-10 DIAGNOSIS — Z51.5 PALLIATIVE CARE BY SPECIALIST: ICD-10-CM

## 2024-01-10 DIAGNOSIS — Z51.5 ENCOUNTER FOR PALLIATIVE CARE: ICD-10-CM

## 2024-01-10 DIAGNOSIS — T45.1X5A CHEMOTHERAPY-INDUCED NEUROPATHY (HCC): ICD-10-CM

## 2024-01-10 PROCEDURE — 3017F COLORECTAL CA SCREEN DOC REV: CPT | Performed by: NURSE PRACTITIONER

## 2024-01-10 PROCEDURE — G8484 FLU IMMUNIZE NO ADMIN: HCPCS | Performed by: NURSE PRACTITIONER

## 2024-01-10 PROCEDURE — G8420 CALC BMI NORM PARAMETERS: HCPCS | Performed by: NURSE PRACTITIONER

## 2024-01-10 PROCEDURE — 99211 OFF/OP EST MAY X REQ PHY/QHP: CPT | Performed by: NURSE PRACTITIONER

## 2024-01-10 PROCEDURE — 99214 OFFICE O/P EST MOD 30 MIN: CPT | Performed by: NURSE PRACTITIONER

## 2024-01-10 PROCEDURE — 1036F TOBACCO NON-USER: CPT | Performed by: NURSE PRACTITIONER

## 2024-01-10 PROCEDURE — G8427 DOCREV CUR MEDS BY ELIG CLIN: HCPCS | Performed by: NURSE PRACTITIONER

## 2024-01-10 RX ORDER — ALPRAZOLAM 1 MG/1
1 TABLET ORAL 2 TIMES DAILY PRN
Qty: 30 TABLET | Refills: 0 | Status: SHIPPED | OUTPATIENT
Start: 2024-01-10 | End: 2024-01-25

## 2024-01-10 RX ORDER — OXYCODONE AND ACETAMINOPHEN 10; 325 MG/1; MG/1
1 TABLET ORAL EVERY 4 HOURS PRN
Qty: 75 TABLET | Refills: 0 | Status: SHIPPED | OUTPATIENT
Start: 2024-01-10 | End: 2024-01-25

## 2024-01-10 NOTE — PROGRESS NOTES
02/06/18 0816   Rehab Treatment   Discipline physical therapist   Rehab Evaluation   Evaluation Not Performed patient unavailable for evaluation  (Hgb 6.9 w/ plan to receive blood today. Dicussed attempting PT this afternoon w/ KATARINA Lynn.  )   Recommendation   PT - Next Appointment 02/06/18        Department of Palliative Medicine  Ambulatory visit  Provider: JOSE ANGEL Mercado - CNP       Chief Complaint: Uriel Braswell is a 58 y.o. female with chief complaint of Pain    HPI:  56 y/o post-menopausal  female who underwent Stereotactic Left Breast mass core biopsy on 06/17/2016 revealing Invasive Lobular carcinoma, she has developed severe neuropathy after her last treatment.     Assessment/Plan      Breast cancer   - Currently on tamoxifen    - Follows at [facility]    Chronic pain related to neoplasm/Chronic Low back pain with radiculopathy/Opioid Dependency/Possibly hyperalgesia:   -Percocet 10/325 mg q4hr prn, reducing to a dose of 5 tablets per day   -Known to Dr. Pelletier regarding her degenerative disc disease, as seen on MRI   -Plans on going to Pain management for evaluation of interventions   -regardless she has dependency on opioids and further escalation is not recommended   -I recommend continuing to wean opioids and she agrees   -Cymbalta 20mg HS, low dose due to interaction with Tamoxifen    -Lyrica to 100 mg in the morning or 200 mg nightly    -Alternate with Tylenol and ibuprofen    Anxiety/Depression:   -Xanax 1 mg 2 times daily as needed   -Cymbalta 20 mg daily   -Recommend she go to counseling, and this is planned for later this month   -She is still dealing with significant psychosocial stressors      Follow Up: 3 weeks. Encouraged to call with any questions, concerns, needs, or changes in symptoms.    Subjective:     Uriel Braswell is a 58 y.o. female with significant past medical history of Breast cancer.    1/10/24:  Sarita presents today unaccompanied.    Overall she reports that she has improved  She is having less back pain, pain is still persistent  She is tolerating Percocet 5 times daily, and is to see a provider regarding interventional options  She does not plan to pursue surgery, and wishes to continue with conservative management  We discussed that she likely will

## 2024-01-24 DIAGNOSIS — G62.0 CHEMOTHERAPY-INDUCED NEUROPATHY (HCC): ICD-10-CM

## 2024-01-24 DIAGNOSIS — Z51.5 PALLIATIVE CARE BY SPECIALIST: ICD-10-CM

## 2024-01-24 DIAGNOSIS — Z51.5 ENCOUNTER FOR PALLIATIVE CARE: ICD-10-CM

## 2024-01-24 DIAGNOSIS — T45.1X5A CHEMOTHERAPY-INDUCED NEUROPATHY (HCC): ICD-10-CM

## 2024-01-24 RX ORDER — ALPRAZOLAM 1 MG/1
1 TABLET ORAL 2 TIMES DAILY PRN
Qty: 30 TABLET | Refills: 0 | Status: SHIPPED | OUTPATIENT
Start: 2024-01-24 | End: 2024-02-08

## 2024-01-24 RX ORDER — OXYCODONE AND ACETAMINOPHEN 10; 325 MG/1; MG/1
1 TABLET ORAL EVERY 4 HOURS PRN
Qty: 75 TABLET | Refills: 0 | Status: SHIPPED | OUTPATIENT
Start: 2024-01-24 | End: 2024-02-08

## 2024-01-24 NOTE — TELEPHONE ENCOUNTER
Call from Uriel requesting refill for Percocet and for Xanax. Uriel states the Xanax 1 mg is helping and she takes this twice a day. [facility]acy is [facility] in [place]. Next wily 2/7/24.

## 2024-01-29 DIAGNOSIS — Z51.5 PALLIATIVE CARE BY SPECIALIST: ICD-10-CM

## 2024-01-29 DIAGNOSIS — G89.3 CHRONIC PAIN DUE TO NEOPLASM: ICD-10-CM

## 2024-01-29 RX ORDER — PREGABALIN 100 MG/1
CAPSULE ORAL
Qty: 90 CAPSULE | Refills: 5 | Status: SHIPPED | OUTPATIENT
Start: 2024-01-29 | End: 2024-07-31

## 2024-02-07 ENCOUNTER — OFFICE VISIT (OUTPATIENT)
Dept: PALLATIVE CARE | Age: 59
End: 2024-02-07
Payer: COMMERCIAL

## 2024-02-07 VITALS
WEIGHT: 147 LBS | HEART RATE: 104 BPM | SYSTOLIC BLOOD PRESSURE: 132 MMHG | BODY MASS INDEX: 23.02 KG/M2 | OXYGEN SATURATION: 98 % | DIASTOLIC BLOOD PRESSURE: 68 MMHG

## 2024-02-07 DIAGNOSIS — Z51.5 PALLIATIVE CARE BY SPECIALIST: ICD-10-CM

## 2024-02-07 DIAGNOSIS — G62.0 CHEMOTHERAPY-INDUCED NEUROPATHY (HCC): ICD-10-CM

## 2024-02-07 DIAGNOSIS — T45.1X5A CHEMOTHERAPY-INDUCED NEUROPATHY (HCC): ICD-10-CM

## 2024-02-07 DIAGNOSIS — Z51.5 ENCOUNTER FOR PALLIATIVE CARE: ICD-10-CM

## 2024-02-07 PROCEDURE — G8420 CALC BMI NORM PARAMETERS: HCPCS | Performed by: NURSE PRACTITIONER

## 2024-02-07 PROCEDURE — G8427 DOCREV CUR MEDS BY ELIG CLIN: HCPCS | Performed by: NURSE PRACTITIONER

## 2024-02-07 PROCEDURE — 1036F TOBACCO NON-USER: CPT | Performed by: NURSE PRACTITIONER

## 2024-02-07 PROCEDURE — G8484 FLU IMMUNIZE NO ADMIN: HCPCS | Performed by: NURSE PRACTITIONER

## 2024-02-07 PROCEDURE — 99214 OFFICE O/P EST MOD 30 MIN: CPT | Performed by: NURSE PRACTITIONER

## 2024-02-07 PROCEDURE — 99211 OFF/OP EST MAY X REQ PHY/QHP: CPT | Performed by: NURSE PRACTITIONER

## 2024-02-07 PROCEDURE — 3017F COLORECTAL CA SCREEN DOC REV: CPT | Performed by: NURSE PRACTITIONER

## 2024-02-07 RX ORDER — ALPRAZOLAM 1 MG/1
1 TABLET ORAL 3 TIMES DAILY PRN
Qty: 45 TABLET | Refills: 0 | Status: SHIPPED | OUTPATIENT
Start: 2024-02-07 | End: 2024-02-22

## 2024-02-07 RX ORDER — OXYCODONE AND ACETAMINOPHEN 10; 325 MG/1; MG/1
1 TABLET ORAL EVERY 4 HOURS PRN
Qty: 75 TABLET | Refills: 0 | Status: SHIPPED | OUTPATIENT
Start: 2024-02-07 | End: 2024-02-22

## 2024-02-07 NOTE — PROGRESS NOTES
Department of Palliative Medicine  Ambulatory visit  Provider: JOSE ANGEL Mercado - CNP       Chief Complaint: Uriel Braswell is a 58 y.o. female with chief complaint of Pain    HPI:  56 y/o post-menopausal  female who underwent Stereotactic Left Breast mass core biopsy on 06/17/2016 revealing Invasive Lobular carcinoma, she has developed severe neuropathy after her last treatment.     Assessment/Plan      Breast cancer:   - Currently on tamoxifen    - Follows at [facility]    Chronic pain related to neoplasm/Chronic Low back pain with radiculopathy/Opioid Dependency/Possibly hyperalgesia:   -Percocet 10/325 mg q4hr prn, reducing to a dose of 5 tablets per day   -Known to Dr. Pelletier regarding her degenerative disc disease, as seen on MRI   -Plans on going to Pain management for evaluation of interventions   -regardless she has dependency on opioids and further escalation is not recommended   -I recommend continuing to wean opioids and she agrees   -Cymbalta 20mg HS, low dose due to interaction with Tamoxifen    -Lyrica to 100 mg in the morning or 200 mg nightly    -Alternate with Tylenol and ibuprofen    Anxiety/Depression:   -Xanax 1 mg TID as needed   -Now seeing psychiatry  -Cymbalta stopped by psych  -Started on Buspar and trintelix   -This is all primarily related to significant psychosocial stressors, and likely not directly related to her cancer history   -I recommend that psychiatry take over management of her anxiety and depression, including benzodiazepines      Follow Up: 4 weeks. Encouraged to call with any questions, concerns, needs, or changes in symptoms.    Subjective:     Uriel Braswell is a 58 y.o. female with significant past medical history of Breast cancer.    2/7/24:  Sarita presents today unaccompanied.    Overall there is no significant change in her symptoms  Her back pain is still persistent, but overall controlled with her current dose of Percocet  She reports that she will

## 2024-02-12 PROBLEM — M54.16 LUMBAR RADICULOPATHY: Status: ACTIVE | Noted: 2024-02-12

## 2024-02-19 DIAGNOSIS — Z51.5 PALLIATIVE CARE BY SPECIALIST: ICD-10-CM

## 2024-02-19 DIAGNOSIS — G62.0 CHEMOTHERAPY-INDUCED NEUROPATHY (HCC): ICD-10-CM

## 2024-02-19 DIAGNOSIS — T45.1X5A CHEMOTHERAPY-INDUCED NEUROPATHY (HCC): ICD-10-CM

## 2024-02-19 RX ORDER — OXYCODONE AND ACETAMINOPHEN 10; 325 MG/1; MG/1
1 TABLET ORAL EVERY 4 HOURS PRN
Qty: 90 TABLET | Refills: 0 | Status: SHIPPED | OUTPATIENT
Start: 2024-02-19 | End: 2024-03-05

## 2024-02-19 NOTE — TELEPHONE ENCOUNTER
Call from Uriel stating she was not able to decrease her Percocet frequency this time due to moving and she is having increased leg pain due to therapist stopping Cymbalta. Please send refill for Percocet 10/325 mg to [facility] in [place]. Next wily 3/6/24.

## 2024-02-23 DIAGNOSIS — Z51.5 ENCOUNTER FOR PALLIATIVE CARE: ICD-10-CM

## 2024-02-23 RX ORDER — ALPRAZOLAM 1 MG/1
1 TABLET ORAL 3 TIMES DAILY PRN
Qty: 45 TABLET | Refills: 0 | Status: SHIPPED | OUTPATIENT
Start: 2024-02-23 | End: 2024-03-09

## 2024-03-05 DIAGNOSIS — G62.0 CHEMOTHERAPY-INDUCED NEUROPATHY (HCC): ICD-10-CM

## 2024-03-05 DIAGNOSIS — Z51.5 PALLIATIVE CARE BY SPECIALIST: ICD-10-CM

## 2024-03-05 DIAGNOSIS — T45.1X5A CHEMOTHERAPY-INDUCED NEUROPATHY (HCC): ICD-10-CM

## 2024-03-05 RX ORDER — OXYCODONE AND ACETAMINOPHEN 10; 325 MG/1; MG/1
1 TABLET ORAL EVERY 4 HOURS PRN
Qty: 90 TABLET | Refills: 0 | Status: SHIPPED | OUTPATIENT
Start: 2024-03-05 | End: 2024-03-20

## 2024-03-05 NOTE — TELEPHONE ENCOUNTER
Patient Uriel called for refill oxycodone 10-325mg. Next appointment 3-6-2024. Pharmacy [facility] [place]

## 2024-03-06 ENCOUNTER — OFFICE VISIT (OUTPATIENT)
Dept: PALLATIVE CARE | Age: 59
End: 2024-03-06
Payer: COMMERCIAL

## 2024-03-06 VITALS
OXYGEN SATURATION: 96 % | DIASTOLIC BLOOD PRESSURE: 69 MMHG | HEART RATE: 101 BPM | WEIGHT: 149 LBS | TEMPERATURE: 97.2 F | SYSTOLIC BLOOD PRESSURE: 113 MMHG | BODY MASS INDEX: 23.34 KG/M2

## 2024-03-06 DIAGNOSIS — T45.1X5A CHEMOTHERAPY-INDUCED NEUROPATHY (HCC): ICD-10-CM

## 2024-03-06 DIAGNOSIS — G62.0 CHEMOTHERAPY-INDUCED NEUROPATHY (HCC): ICD-10-CM

## 2024-03-06 DIAGNOSIS — Z51.5 PALLIATIVE CARE BY SPECIALIST: Primary | ICD-10-CM

## 2024-03-06 DIAGNOSIS — G89.4 PAIN SYNDROME, CHRONIC: ICD-10-CM

## 2024-03-06 PROCEDURE — 99214 OFFICE O/P EST MOD 30 MIN: CPT | Performed by: NURSE PRACTITIONER

## 2024-03-06 PROCEDURE — 1036F TOBACCO NON-USER: CPT | Performed by: NURSE PRACTITIONER

## 2024-03-06 PROCEDURE — 99211 OFF/OP EST MAY X REQ PHY/QHP: CPT | Performed by: NURSE PRACTITIONER

## 2024-03-06 PROCEDURE — G8484 FLU IMMUNIZE NO ADMIN: HCPCS | Performed by: NURSE PRACTITIONER

## 2024-03-06 PROCEDURE — 3017F COLORECTAL CA SCREEN DOC REV: CPT | Performed by: NURSE PRACTITIONER

## 2024-03-06 PROCEDURE — G8427 DOCREV CUR MEDS BY ELIG CLIN: HCPCS | Performed by: NURSE PRACTITIONER

## 2024-03-06 PROCEDURE — G8420 CALC BMI NORM PARAMETERS: HCPCS | Performed by: NURSE PRACTITIONER

## 2024-03-06 NOTE — PROGRESS NOTES
Department of Palliative Medicine  Ambulatory visit  Provider: JOSE ANGEL Mercado - CNP       Chief Complaint: Uriel Braswell is a 58 y.o. female with chief complaint of Pain    HPI:  56 y/o post-menopausal  female who underwent Stereotactic Left Breast mass core biopsy on 06/17/2016 revealing Invasive Lobular carcinoma, she has developed severe neuropathy after her last treatment.     Assessment/Plan      Breast cancer:   - Currently on tamoxifen    - Follows at [facility]    Chronic pain related to neoplasm/Chronic Low back pain with radiculopathy/Opioid Dependency/Possibly hyperalgesia:   -Percocet 10/325 mg q4hr prn, encouraged to try and reduce use as able   -Lyrica to 100 mg in the morning or 200 mg nightly   -Known to Dr. Pelletier regarding her degenerative disc disease, as seen on MRI   -Plans on going to Pain management for evaluation of interventions, had epidural injection   -regardless she has dependency on opioids and further escalation is not recommended   -I recommend continuing to wean opioids as able and she agrees   -Cymbalta stopped by psych    -Alternate with Tylenol and ibuprofen    Anxiety/Depression:   -Xanax 1 mg TID as needed   -Now seeing psychiatry  -Cymbalta stopped by psych  -Started on Buspar and trintelix   -This is all primarily related to significant psychosocial stressors, and likely not directly related to her cancer history   -I recommend that psychiatry take over management of her anxiety and depression, including benzodiazepines      Follow Up: 4 weeks. Encouraged to call with any questions, concerns, needs, or changes in symptoms.    Subjective:     Uriel Braswell is a 58 y.o. female with significant past medical history of Breast cancer.    3/6/24:  Sarita presents today unaccompanied.    Overall there is slight improvement in some of her symptoms, though continued pain with some mild worsening  Her back pain is still persistent, but overall controlled with her

## 2024-03-13 DIAGNOSIS — Z51.5 ENCOUNTER FOR PALLIATIVE CARE: ICD-10-CM

## 2024-03-13 RX ORDER — ALPRAZOLAM 1 MG/1
1 TABLET ORAL 3 TIMES DAILY PRN
Qty: 45 TABLET | Refills: 0 | Status: SHIPPED | OUTPATIENT
Start: 2024-03-13 | End: 2024-03-28

## 2024-03-19 DIAGNOSIS — G62.0 CHEMOTHERAPY-INDUCED NEUROPATHY (HCC): ICD-10-CM

## 2024-03-19 DIAGNOSIS — Z51.5 PALLIATIVE CARE BY SPECIALIST: ICD-10-CM

## 2024-03-19 DIAGNOSIS — T45.1X5A CHEMOTHERAPY-INDUCED NEUROPATHY (HCC): ICD-10-CM

## 2024-03-19 RX ORDER — OXYCODONE AND ACETAMINOPHEN 10; 325 MG/1; MG/1
1 TABLET ORAL EVERY 4 HOURS PRN
Qty: 90 TABLET | Refills: 0 | Status: SHIPPED | OUTPATIENT
Start: 2024-03-19 | End: 2024-04-03

## 2024-03-19 NOTE — TELEPHONE ENCOUNTER
Patient Uriel called for refill oxycodone . Next appointment 4-3-2024. Pharmacy [facility] [place]

## 2024-03-21 ENCOUNTER — APPOINTMENT (OUTPATIENT)
Dept: ORTHOPEDIC SURGERY | Facility: CLINIC | Age: 59
End: 2024-03-21
Payer: COMMERCIAL

## 2024-04-01 DIAGNOSIS — G62.0 CHEMOTHERAPY-INDUCED NEUROPATHY (HCC): ICD-10-CM

## 2024-04-01 DIAGNOSIS — T45.1X5A CHEMOTHERAPY-INDUCED NEUROPATHY (HCC): ICD-10-CM

## 2024-04-01 DIAGNOSIS — Z51.5 PALLIATIVE CARE BY SPECIALIST: ICD-10-CM

## 2024-04-01 DIAGNOSIS — Z51.5 ENCOUNTER FOR PALLIATIVE CARE: ICD-10-CM

## 2024-04-01 RX ORDER — ALPRAZOLAM 1 MG/1
1 TABLET ORAL 3 TIMES DAILY PRN
Qty: 45 TABLET | Refills: 0 | Status: SHIPPED | OUTPATIENT
Start: 2024-04-01 | End: 2024-04-16

## 2024-04-01 RX ORDER — OXYCODONE AND ACETAMINOPHEN 10; 325 MG/1; MG/1
1 TABLET ORAL EVERY 4 HOURS PRN
Qty: 90 TABLET | Refills: 0 | Status: SHIPPED | OUTPATIENT
Start: 2024-04-01 | End: 2024-04-16

## 2024-04-15 DIAGNOSIS — G62.0 CHEMOTHERAPY-INDUCED NEUROPATHY (HCC): ICD-10-CM

## 2024-04-15 DIAGNOSIS — Z51.5 PALLIATIVE CARE BY SPECIALIST: ICD-10-CM

## 2024-04-15 DIAGNOSIS — T45.1X5A CHEMOTHERAPY-INDUCED NEUROPATHY (HCC): ICD-10-CM

## 2024-04-15 DIAGNOSIS — Z51.5 ENCOUNTER FOR PALLIATIVE CARE: ICD-10-CM

## 2024-04-15 RX ORDER — OXYCODONE AND ACETAMINOPHEN 10; 325 MG/1; MG/1
1 TABLET ORAL EVERY 4 HOURS PRN
Qty: 90 TABLET | Refills: 0 | Status: SHIPPED | OUTPATIENT
Start: 2024-04-15 | End: 2024-04-30

## 2024-04-15 RX ORDER — ALPRAZOLAM 1 MG/1
1 TABLET ORAL 3 TIMES DAILY PRN
Qty: 45 TABLET | Refills: 0 | Status: SHIPPED | OUTPATIENT
Start: 2024-04-15 | End: 2024-04-30

## 2024-04-15 NOTE — TELEPHONE ENCOUNTER
Patient Uriel called for refill on oxycodone  and xanax 1mg. Next appointment 4-. Pharmacy [facility] [place]

## 2024-04-22 ENCOUNTER — HOSPITAL ENCOUNTER (OUTPATIENT)
Dept: CT IMAGING | Age: 59
Discharge: HOME | End: 2024-04-22
Payer: COMMERCIAL

## 2024-04-22 ENCOUNTER — OFFICE VISIT (OUTPATIENT)
Dept: ORTHOPEDIC SURGERY | Facility: CLINIC | Age: 59
End: 2024-04-22
Payer: COMMERCIAL

## 2024-04-22 DIAGNOSIS — M54.16 LUMBAR RADICULOPATHY: ICD-10-CM
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
## 2024-04-22 DIAGNOSIS — M51.36 DEGENERATIVE DISC DISEASE, LUMBAR: Primary | ICD-10-CM

## 2024-04-22 DIAGNOSIS — R27.0 ATAXIA: ICD-10-CM

## 2024-04-22 PROCEDURE — 99204 OFFICE O/P NEW MOD 45 MIN: CPT | Performed by: ORTHOPAEDIC SURGERY

## 2024-04-22 PROCEDURE — 70450 CT HEAD/BRAIN W/O DYE: CPT

## 2024-04-22 RX ORDER — PREGABALIN 100 MG/1
CAPSULE ORAL
COMMUNITY
Start: 2024-01-29 | End: 2024-07-31

## 2024-04-22 RX ORDER — BUSPIRONE HYDROCHLORIDE 5 MG/1
1 TABLET ORAL 2 TIMES DAILY PRN
COMMUNITY
Start: 2024-01-17

## 2024-04-22 RX ORDER — OXYCODONE AND ACETAMINOPHEN 10; 325 MG/1; MG/1
TABLET ORAL
COMMUNITY
Start: 2024-04-16

## 2024-04-22 RX ORDER — ALPRAZOLAM 1 MG/1
1 TABLET ORAL
COMMUNITY
Start: 2024-03-13

## 2024-04-22 RX ORDER — DULOXETIN HYDROCHLORIDE 20 MG/1
20 CAPSULE, DELAYED RELEASE ORAL DAILY
COMMUNITY

## 2024-04-22 RX ORDER — CYCLOBENZAPRINE HCL 10 MG
10 TABLET ORAL EVERY 8 HOURS
COMMUNITY
Start: 2024-03-19

## 2024-04-22 ASSESSMENT — ENCOUNTER SYMPTOMS
LOSS OF SENSATION IN FEET: 1
DEPRESSION: 0
OCCASIONAL FEELINGS OF UNSTEADINESS: 1

## 2024-04-22 ASSESSMENT — PAIN - FUNCTIONAL ASSESSMENT: PAIN_FUNCTIONAL_ASSESSMENT: 0-10

## 2024-04-22 ASSESSMENT — PAIN SCALES - GENERAL: PAINLEVEL_OUTOF10: 8

## 2024-04-22 NOTE — PROGRESS NOTES
This pleasant 58-year-old woman is apparently self-referred.  She is here today with her daughter who provides the majority of the history.  Patient indicates that she has been very confused and fatigued over the last 2 to 3 weeks.  Her apparent today is that she is confused and a vague historian.    The patient states that she has had longstanding low back pain, perhaps more severe over the last year.    She has been treated at [facility] palliative care [place].  She has a remote history of breast cancer in 2017.  She was treated with chemotherapy and her cancer responded.  However she has been left with peripheral neuropathy in both feet.    She does indicates that she has fallen frequently.  She has had several falls recently including 2 to 3 days ago.    She does describe bilateral leg pain and difficulty standing and walking for prolonged periods of time.    She had epidural steroid injection at [facility] in February of this year and she states that there was no relief and actually her symptoms became more severe.    She denies any bowel or bladder dysfunction.  She denies any constitutional symptoms of weight loss.    She does indicate that she has been on chronic analgesias from her palliative care center since 2017.    Family, social, and medical histories are obtained and reviewed.    30-point, patient-recorded Review of Systems is personally obtained and reviewed. Inclusive is no history of weight loss, change in appetite, recent change in activity level, change in bowel or bladder habits, fevers, chills, malaise, or night pain.    She does not smoke    She is a retired nurse    On exam, she is a pleasant older appearing woman of small thin stature.  As noted, she is a very vague historian.  She has difficulty providing specifics about her recent and remote history.    She has great difficulty standing on her own.  She describes diffuse back pain.  She finds it very difficult to stand  independently.  Her walking is very slow and deliberate.    Painless motion cervical spine.    She has weakness in her upper extremities with wrist extension finger extension and intrinsics 3/5 bilaterally.  She does have intrinsic wasting.  Positive Inga's bilaterally.    Her strength is intact in the lower extremities with the exception of weakness in ankle dorsiflexion bilateral, 4/5, although perhaps some of this is effort limited.    No hyperreflexia.    Painless motion both hips.    Her lumbar MRI shows a spondylolisthesis of L4 on 5 with severe spinal stenosis.  This MRI was performed in October 2023.  There is no evidence of a malignant process.    Impression: This 58-year-old woman appears to be a very limited ambulator.  She also has a very limited and somewhat confused historian.  She does not appear to be acutely ill.    We have discussed with her and in particular with her daughter, that the degree of spinal stenosis that she has is severe and would warrant discussion of surgery.  However, the lumbar stenosis would not explain her global dysfunction and great difficulty standing and walking.    Given her history of malignancy, her history of frequent falls, and her history of profound weakness, imaging of her entire spine with MRIs of the cervical thoracic and lumbar spine are indicated to evaluate for the presence of any metastatic disease for any spinal cord compression.    Equally important, I would like her to touch base with her primary care physician, Dr. Wakefield in a prompt fashion for a thorough medical evaluation to look for potential causes for what appears to be altered mental status and confusion.    Both her and her daughter appear to understand the recommendations.    I will speak with her after her MRIs are done.    ** Dictated with voice recognition software and not immediately reviewed for errors in grammar and/or spelling **

## 2024-04-24 ENCOUNTER — OFFICE VISIT (OUTPATIENT)
Dept: PALLATIVE CARE | Age: 59
End: 2024-04-24
Payer: COMMERCIAL

## 2024-04-24 DIAGNOSIS — Z79.891 USE OF OPIATES FOR THERAPEUTIC PURPOSES: Primary | ICD-10-CM

## 2024-04-24 DIAGNOSIS — Z79.891 USE OF OPIATES FOR THERAPEUTIC PURPOSES: ICD-10-CM

## 2024-04-24 DIAGNOSIS — Z51.5 PALLIATIVE CARE BY SPECIALIST: ICD-10-CM

## 2024-04-24 PROCEDURE — 99212 OFFICE O/P EST SF 10 MIN: CPT | Performed by: NURSE PRACTITIONER

## 2024-04-24 NOTE — PROGRESS NOTES
Department of Palliative Medicine  Ambulatory visit  Provider: JOSE ANGEL Mercado - CNP       Chief Complaint: Uriel Braswell is a 58 y.o. female with chief complaint of Pain    HPI:  56 y/o post-menopausal  female who underwent Stereotactic Left Breast mass core biopsy on 06/17/2016 revealing Invasive Lobular carcinoma, she has developed severe neuropathy after her last treatment.     Assessment/Plan      Breast cancer:   - Currently on tamoxifen    - Follows at [facility]    Chronic pain related to neoplasm/Chronic Low back pain with radiculopathy/Opioid Dependency/Possibly hyperalgesia:   -Percocet 10/325 mg q4hr prn, encouraged to try and reduce use as able   -Lyrica to 100 mg in the morning or 200 mg nightly   -Known to Dr. Pelletier regarding her degenerative disc disease, as seen on MRI   -Plans on going to Pain management for evaluation of interventions, had epidural injection   -regardless she has dependency on opioids and further escalation is not recommended   -I recommend continuing to wean opioids as able and she agrees   -Cymbalta stopped by psych    -Alternate with Tylenol and ibuprofen    Anxiety/Depression:   -Xanax 1 mg TID as needed   -Now seeing psychiatry  -Cymbalta stopped by psych  -She stopped Buspar and trintelix   -This is all primarily related to significant psychosocial stressors, and likely not directly related to her cancer history   -I recommend that psychiatry take over management of her anxiety and depression, including benzodiazepines   -She states she is taking to many pills    Follow Up: 4 weeks. Encouraged to call with any questions, concerns, needs, or changes in symptoms.    Subjective:     Uriel Braswell is a 58 y.o. female with significant past medical history of Breast cancer.    4/24/24:  Sarita presents today unaccompanied.    Overall she does not look as though she is doing well  She continues to state her pain is severe, now wearing a lower back brace  She

## 2024-04-25 ENCOUNTER — HOSPITAL ENCOUNTER (OUTPATIENT)
Dept: RADIOLOGY | Facility: CLINIC | Age: 59
Discharge: HOME | End: 2024-04-25
Payer: COMMERCIAL

## 2024-04-25 DIAGNOSIS — M51.36 DEGENERATIVE DISC DISEASE, LUMBAR: ICD-10-CM

## 2024-04-25 DIAGNOSIS — M54.16 LUMBAR RADICULOPATHY: ICD-10-CM

## 2024-04-25 DIAGNOSIS — R27.0 ATAXIA: ICD-10-CM

## 2024-04-25 LAB
6-MONOACETYLMORPHINE, URINE: NEGATIVE
ABNORMAL SPECIMEN VALIDITY TEST: ABNORMAL
ALCOHOL URINE: NOT DETECTED MG/DL
AMPHETAMINE SCREEN URINE: NEGATIVE
BARBITURATE SCREEN URINE: NEGATIVE
BENZODIAZEPINE SCREEN, URINE: POSITIVE
BUPRENORPHINE URINE: NEGATIVE
CANNABINOID SCREEN URINE: NEGATIVE
COCAINE METABOLITE, URINE: NEGATIVE
FENTANYL URINE: NEGATIVE
INTEGRITY CHECK, CREATININE, URINE: 94.5 MG/DL (ref 22–250)
INTEGRITY CHECK, OXIDANT, URINE: 58 MG/L
INTEGRITY CHECK, PH, URINE: 5.5 (ref 4.5–9)
INTEGRITY CHECK, SPECIFIC GRAVITY, URINE: 1.01 (ref 1–1.03)
METHADONE SCREEN, URINE: NEGATIVE
OPIATES, URINE: NEGATIVE
OXYCODONE SCREEN URINE: POSITIVE
PHENCYCLIDINE, URINE: NEGATIVE
TEST INFORMATION: ABNORMAL
TRAMADOL, URINE: NEGATIVE

## 2024-04-25 PROCEDURE — 72141 MRI NECK SPINE W/O DYE: CPT

## 2024-04-25 PROCEDURE — 72146 MRI CHEST SPINE W/O DYE: CPT

## 2024-04-25 PROCEDURE — 72148 MRI LUMBAR SPINE W/O DYE: CPT | Performed by: RADIOLOGY

## 2024-04-25 PROCEDURE — 72141 MRI NECK SPINE W/O DYE: CPT | Performed by: RADIOLOGY

## 2024-04-25 PROCEDURE — 72148 MRI LUMBAR SPINE W/O DYE: CPT

## 2024-04-25 PROCEDURE — 72146 MRI CHEST SPINE W/O DYE: CPT | Performed by: RADIOLOGY

## 2024-04-26 ENCOUNTER — DOCUMENTATION (OUTPATIENT)
Dept: ORTHOPEDICS | Facility: HOSPITAL | Age: 59
End: 2024-04-26
Payer: COMMERCIAL

## 2024-04-26 LAB
6-MAM, QUANTITATIVE, URINE: <10 NG/ML
7-AMINOCLONAZEPAM, QUANTITATIVE, URINE: <50 NG/ML
ALPHA-HYDROXYALPRAZOLAM, QUANTITATIVE, URINE: 900.7 NG/ML
ALPHA-HYDROXYMIDAZOLAM, QUANTITATIVE, URINE: <50 NG/ML
ALPHA-HYDROXYTRIAZOLAM, QUANTITATIVE, URINE: <50 NG/ML
ALPRAZOLAM URINE QUANT: 301.9 NG/ML
CHLORDIAZEPOXIDE, QUANTITATIVE, URINE: <50 NG/ML
CLONAZEPAM, QUANTITATIVE, URINE: <50 NG/ML
CODEINE, QUANTITATIVE, URINE: <50 NG/ML
COMPLIANCE DRUG ANALYSIS, URINE: NORMAL
DIAZEPAM URINE QUANT: <50 NG/ML
FLUNITRAZEPAM, QUANTITATIVE, URINE: <50 NG/ML
FLURAZEPAM, QUANTITATIVE, URINE: <50 NG/ML
HYDROCODONE, QUANTITATIVE, URINE: <50 NG/ML
HYDROMORPHONE, QUANTITATIVE, URINE: <50 NG/ML
LORAZEPAM URINE QUANT: <50 NG/ML
MIDAZOLAM URINE QUANT: <50 NG/ML
MORPHINE, QUANTITATIVE, URINE: <50 NG/ML
NORDIAZEPAM URINE QUANT: <50 NG/ML
NORHYDROCODONE, QUANTITATIVE, URINE: <50 NG/ML
NOROXYCODONE, QUANTITATIVE, URINE: >1000 NG/ML
OXAZEPAM URINE QUANT: <50 NG/ML
OXYCODONE URINE, QUANTITATIVE: >1000 NG/ML
OXYMORPHONE, QUANTITATIVE, URINE: >1000 NG/ML
TEMAZEPAM, QUANTITATIVE, URINE: <50 NG/ML

## 2024-04-26 NOTE — PROGRESS NOTES
Dear [employer],    I had the opportunity of seeing Abdirahman Hancock in the office earlier this week.  Given her history of breast cancer as well as progressive back pain and difficulty with ambulation, I sent her for updated imaging of her spinal column.    She does have evidence of newly identified metastatic disease through the thoracolumbar spine.  She has mild compression deformities at L1 and L3.  She does not have an obvious pathologic fracture nor any stenosis related to metastatic disease.  She does have severe spinal stenosis that is degenerative at L4-5.    I have discussed these findings with her daughter.  I would not recommend any elective surgery for her spinal stenosis and as well, fortunately, her current metastatic disease has not advanced to a point where surgery would be indicated.    She may well need a consultation with radiation oncology if this is thought to be appropriate.    I believe her daughter will be reaching out to both of you for your thoughts on further treatment and evaluation.    If I can be of further assistance please contact me.    Please don't hesitate to contact me at any time, via e-mail [email or web address] or cell phone [phone].      Sincerely,        Seth Hutchison M.D.  Chief, Spine Section  Professor & Jon Kiser M.D. Endowed Chair   Department of Orthopedic Surgery  [place], [place]

## 2024-04-26 NOTE — PROGRESS NOTES
I spoke with the patient's daughter, Juan Jose.    By review, I saw the patient 3 days ago on self-referral for chronic back pain.  I was concerned by the patient's history of frequent falling and chronic pain.  She has a history of breast cancer and is currently in palliative care.    Her oncologist is Dr. Meng at the [facility].    An MRI of her lumbar spine that we reviewed from October 2023 showed severe spinal stenosis at L4-5 but no evidence of obvious metastatic disease.    I sent her off for imaging of her entire spinal column given her history.    There is evidence of widespread metastatic disease.  There is no obvious severe canal stenosis related to the metastatic disease.  There is no obvious pathologic fracture or instability.    In the lumbar spine, there are new compression deformities at L1 and L3.  There is no retropulsed bone nor any tumor within the canal.    There is quite severe stenosis at L4-5 from her degenerative spondylolisthesis.    There is diffuse metastatic disease in the thoracic spine but no pathologic fracture nor spinal cord compression.    There is a moderate cervical spine spondylitic change with canal stenosis but no high-grade spinal cord compression.    We discussed these findings at length.    She has new evidence of metastatic disease in the spine.  She does not have a pathologic fracture.  She does not have severe stenosis related to metastatic disease.  She does not have a surgical problem related to her metastatic disease.  Certainly the degree of stenosis that she has at L4-5 would warrant discussion of treatment, even potentially surgery.  However in her current status, with evidence of surrounding metastatic disease, I would not recommend elective spine surgery.    I would recommend immediate evaluation by her oncologist.  It may be that radiation therapy is an option for her.    We have discussed these findings at length with her daughter.  She has a good  understanding of this.  She has access to [facility] and she will take copies of the MRI report and provide them to her oncologist.    If she has any difficulty accessing care, she will contact us.    ** Dictated with voice recognition software and not immediately reviewed for errors in grammar and/or spelling **

## 2024-04-29 ENCOUNTER — TELEPHONE (OUTPATIENT)
Dept: PALLATIVE CARE | Age: 59
End: 2024-04-29

## 2024-04-29 RX ORDER — DEXAMETHASONE 4 MG/1
TABLET ORAL
Qty: 17 TABLET | Refills: 0 | Status: SHIPPED | OUTPATIENT
Start: 2024-04-29 | End: 2024-05-09

## 2024-04-29 NOTE — TELEPHONE ENCOUNTER
Call from Atilio Uriel's daughter , stating Uriel had an MRI last week and results are spine mets. Patient would like to get change in pain medication. Discussed with Pedro Stephens CNP and new prescription for Decadron to be [facility] to [facility] in [place]. Jimmy moved up to Wednesday 5/1/24.

## 2024-05-01 ENCOUNTER — OFFICE VISIT (OUTPATIENT)
Dept: PALLATIVE CARE | Age: 59
End: 2024-05-01
Payer: COMMERCIAL

## 2024-05-01 VITALS
DIASTOLIC BLOOD PRESSURE: 86 MMHG | HEART RATE: 94 BPM | TEMPERATURE: 97.2 F | OXYGEN SATURATION: 94 % | SYSTOLIC BLOOD PRESSURE: 152 MMHG

## 2024-05-01 DIAGNOSIS — Z51.5 PALLIATIVE CARE BY SPECIALIST: Primary | ICD-10-CM

## 2024-05-01 DIAGNOSIS — G89.3 PAIN FROM BONE METASTASES (HCC): ICD-10-CM

## 2024-05-01 DIAGNOSIS — C50.912 BREAST CANCER, STAGE 3, LEFT (HCC): ICD-10-CM

## 2024-05-01 DIAGNOSIS — C79.51 PAIN FROM BONE METASTASES (HCC): ICD-10-CM

## 2024-05-01 DIAGNOSIS — G89.3 PAIN DUE TO NEOPLASM: ICD-10-CM

## 2024-05-01 PROCEDURE — 99211 OFF/OP EST MAY X REQ PHY/QHP: CPT | Performed by: NURSE PRACTITIONER

## 2024-05-01 RX ORDER — OXYCODONE HYDROCHLORIDE 20 MG/1
20 TABLET ORAL EVERY 4 HOURS PRN
Qty: 42 TABLET | Refills: 0 | Status: SHIPPED | OUTPATIENT
Start: 2024-05-01 | End: 2024-05-08

## 2024-05-01 RX ORDER — CYCLOBENZAPRINE HCL 10 MG
10 TABLET ORAL EVERY 8 HOURS
COMMUNITY
Start: 2024-03-19

## 2024-05-01 NOTE — PROGRESS NOTES
Department of Palliative Medicine  Ambulatory visit  Provider: JOSE ANGEL Mercado - CNP       Chief Complaint: Uriel Braswell is a 58 y.o. female with chief complaint of Pain    HPI:  56 y/o post-menopausal  female who underwent Stereotactic Left Breast mass core biopsy on 06/17/2016 revealing Invasive Lobular carcinoma, she has developed severe neuropathy after her last treatment.     Assessment/Plan      Breast cancer:   - Currently on tamoxifen    - Follows at [facility]   - New spinal metastasis noted  -To see oncology May 9    Chronic pain related to neoplasm/pain due to spinal metastasis:   -Oxycodone 20 mg every 4 hours.   -Lyrica to 100 mg in the morning, 200 mg nightly    -Cymbalta 20 mg daily   -Alternate with Tylenol and ibuprofen    Anxiety/Depression:   -Xanax 1 mg TID as needed   -Now seeing psychiatry  -She stopped Buspar and trintelix  -She does restart 20 mg Cymbalta    Follow Up: 4 weeks. Encouraged to call with any questions, concerns, needs, or changes in symptoms.    Subjective:     Uriel Braswell is a 58 y.o. female with significant past medical history of Breast cancer.  Managed at [facility], has been on tamoxifen.  Unfortunately she is found to have metastasis to her spine and bones and April 2024.  She has plans to follow-up with oncology on May 9.    4/24/24:  Tiffanie presents today accompanied by her daughter  She is alert and oriented able to voice needs concerns well  Unfortunately has not been very well-managed, she is utilizing Percocet  mg every 5 hours around-the-clock  She was started on Decadron yesterday  She also restarted Cymbalta 20 mg daily  Discussed options at great length, I recommend starting oxycodone 20 mg every 4 hours as needed  She is agreeable to this plan  I offer follow-up next week by phone to reassess her pain needs  Otherwise her primary additional concerns continue to be anxiety, which is worsened due to the new metastatic findings, as well as

## 2024-05-08 ENCOUNTER — SCHEDULED TELEPHONE ENCOUNTER (OUTPATIENT)
Dept: PALLATIVE CARE | Age: 59
End: 2024-05-08
Payer: COMMERCIAL

## 2024-05-08 DIAGNOSIS — C79.51 PAIN FROM BONE METASTASES (HCC): ICD-10-CM

## 2024-05-08 DIAGNOSIS — Z51.5 PALLIATIVE CARE BY SPECIALIST: ICD-10-CM

## 2024-05-08 DIAGNOSIS — G89.3 PAIN FROM BONE METASTASES (HCC): ICD-10-CM

## 2024-05-08 DIAGNOSIS — G89.3 PAIN DUE TO NEOPLASM: ICD-10-CM

## 2024-05-08 DIAGNOSIS — C50.912 BREAST CANCER, STAGE 3, LEFT (HCC): ICD-10-CM

## 2024-05-08 PROCEDURE — 99443 PR PHYS/QHP TELEPHONE EVALUATION 21-30 MIN: CPT | Performed by: NURSE PRACTITIONER

## 2024-05-08 RX ORDER — FENTANYL 75 UG/1
1 PATCH TRANSDERMAL
Qty: 5 PATCH | Refills: 0 | Status: SHIPPED | OUTPATIENT
Start: 2024-05-08 | End: 2024-05-23

## 2024-05-08 RX ORDER — OXYCODONE HYDROCHLORIDE 20 MG/1
20 TABLET ORAL
Qty: 112 TABLET | Refills: 0 | Status: SHIPPED | OUTPATIENT
Start: 2024-05-08 | End: 2024-05-22

## 2024-05-08 NOTE — PROGRESS NOTES
Palliative Care Department  Palliative Care Telephone Encounter  Provider: JOSE ANGEL Mercado - [place]    Uriel Braswell is a 58 y.o. female evaluated via telephone on 5/8/2024.      Consent:  She and/or health care decision maker is aware that that she may receive a bill for this telephone service, depending on her insurance coverage, and has provided verbal consent to proceed: Yes      Documentation:  I communicated with the patient and/or health care decision maker regarding pain.     Assessment/Plan   Breast cancer:              - Currently on tamoxifen               - Follows at [facility]              - New spinal metastasis noted  -To see oncology May 9     Chronic pain related to neoplasm/pain due to spinal metastasis:              -Oxycodone 20 mg every 4 hours   -Fentanyl 75 mcg patch Q 72 hours              -Lyrica to 100 mg in the morning, 200 mg nightly               -Cymbalta 20 mg daily              -Alternate with Tylenol and ibuprofen     Anxiety/Depression:              -Xanax 1 mg TID as needed              -Now seeing psychiatry  -She stopped Buspar and trintelix  -She does restart 20 mg Cymbalta      Follow Up:  2 weeks.  They were encouraged to call with any questions, concerns, needs, or changes in symptoms.    Subjective   Details of this discussion including any medical advice provided:   A telephonic virtual visit was completed via telephone with Uriel Braswell today regarding the issues listed above.     She reports that she continues to have pain which is severe, again limiting her mobility  She is utilizing oxycodone 20 mg about every 3 hours around-the-clock, using her from 6 to 8 tablets/day  She does report better relief with 20 mg dosage, but does need around-the-clock use for consistent management of her pain  We discussed options regarding long-acting opioids, I recommend fentanyl patch, and she is agreeable  I otherwise do not recommend make any other changes to her plan of care

## 2024-05-13 DIAGNOSIS — Z51.5 ENCOUNTER FOR PALLIATIVE CARE: ICD-10-CM

## 2024-05-13 RX ORDER — ALPRAZOLAM 1 MG/1
1 TABLET ORAL 3 TIMES DAILY PRN
Qty: 45 TABLET | Refills: 0 | Status: SHIPPED | OUTPATIENT
Start: 2024-05-13 | End: 2024-05-28

## 2024-05-13 NOTE — TELEPHONE ENCOUNTER
Patient Uriel called for refill on xanax 1mg. [facility] [place]. Next appointment 5-.

## 2024-05-22 ENCOUNTER — HOSPITAL ENCOUNTER (OUTPATIENT)
Dept: RADIATION ONCOLOGY | Age: 59
Discharge: HOME | End: 2024-05-22
Payer: COMMERCIAL

## 2024-05-22 ENCOUNTER — TELEPHONE (OUTPATIENT)
Dept: PALLATIVE CARE | Age: 59
End: 2024-05-22

## 2024-05-22 ENCOUNTER — OFFICE VISIT (OUTPATIENT)
Dept: PALLATIVE CARE | Age: 59
End: 2024-05-22
Payer: COMMERCIAL

## 2024-05-22 VITALS
DIASTOLIC BLOOD PRESSURE: 70 MMHG | HEART RATE: 110 BPM | OXYGEN SATURATION: 94 % | SYSTOLIC BLOOD PRESSURE: 104 MMHG | TEMPERATURE: 97.5 F

## 2024-05-22 DIAGNOSIS — G89.3 PAIN FROM BONE METASTASES (HCC): ICD-10-CM

## 2024-05-22 DIAGNOSIS — G89.3 PAIN DUE TO NEOPLASM: ICD-10-CM

## 2024-05-22 DIAGNOSIS — Z51.5 PALLIATIVE CARE BY SPECIALIST: ICD-10-CM

## 2024-05-22 DIAGNOSIS — C50.912 BREAST CANCER, STAGE 3, LEFT (HCC): ICD-10-CM

## 2024-05-22 DIAGNOSIS — C79.51 PAIN FROM BONE METASTASES (HCC): ICD-10-CM

## 2024-05-22 PROCEDURE — G8427 DOCREV CUR MEDS BY ELIG CLIN: HCPCS | Performed by: NURSE PRACTITIONER

## 2024-05-22 PROCEDURE — 99205 OFFICE O/P NEW HI 60 MIN: CPT | Performed by: RADIOLOGY

## 2024-05-22 PROCEDURE — 99211 OFF/OP EST MAY X REQ PHY/QHP: CPT | Performed by: NURSE PRACTITIONER

## 2024-05-22 PROCEDURE — 99214 OFFICE O/P EST MOD 30 MIN: CPT | Performed by: NURSE PRACTITIONER

## 2024-05-22 PROCEDURE — 99205 OFFICE O/P NEW HI 60 MIN: CPT

## 2024-05-22 PROCEDURE — G8420 CALC BMI NORM PARAMETERS: HCPCS | Performed by: NURSE PRACTITIONER

## 2024-05-22 PROCEDURE — 1036F TOBACCO NON-USER: CPT | Performed by: NURSE PRACTITIONER

## 2024-05-22 PROCEDURE — 3017F COLORECTAL CA SCREEN DOC REV: CPT | Performed by: NURSE PRACTITIONER

## 2024-05-22 RX ORDER — OXYCODONE HYDROCHLORIDE 20 MG/1
20 TABLET ORAL
Qty: 112 TABLET | Refills: 0 | Status: SHIPPED
Start: 2024-05-22 | End: 2024-05-23 | Stop reason: SDUPTHER

## 2024-05-22 RX ORDER — FENTANYL 75 UG/1
1 PATCH TRANSDERMAL
Qty: 10 PATCH | Refills: 0 | Status: SHIPPED
Start: 2024-05-22 | End: 2024-05-22 | Stop reason: SDUPTHER

## 2024-05-22 RX ORDER — SODIUM CHLORIDE 1 G/1
1 TABLET ORAL DAILY
COMMUNITY
Start: 2024-05-20 | End: 2024-06-19

## 2024-05-22 RX ORDER — LETROZOLE 2.5 MG/1
2.5 TABLET, FILM COATED ORAL DAILY
COMMUNITY
Start: 2024-05-14 | End: 2025-05-14

## 2024-05-22 RX ORDER — NALOXONE HYDROCHLORIDE 4 MG/.1ML
1 SPRAY NASAL PRN
Qty: 2 EACH | Refills: 2 | Status: SHIPPED
Start: 2024-05-22 | End: 2024-05-23 | Stop reason: SDUPTHER

## 2024-05-22 RX ORDER — SENNA AND DOCUSATE SODIUM 50; 8.6 MG/1; MG/1
2 TABLET, FILM COATED ORAL DAILY
Qty: 60 TABLET | Refills: 2 | Status: SHIPPED
Start: 2024-05-22 | End: 2024-05-23 | Stop reason: SDUPTHER

## 2024-05-22 RX ORDER — OXYCODONE HYDROCHLORIDE 20 MG/1
20 TABLET ORAL
Qty: 112 TABLET | Refills: 0 | Status: SHIPPED
Start: 2024-05-22 | End: 2024-05-22 | Stop reason: SDUPTHER

## 2024-05-22 RX ORDER — FENTANYL 75 UG/1
1 PATCH TRANSDERMAL
Qty: 10 PATCH | Refills: 0 | Status: SHIPPED
Start: 2024-05-22 | End: 2024-05-23 | Stop reason: SDUPTHER

## 2024-05-22 RX ORDER — DULOXETIN HYDROCHLORIDE 20 MG/1
20 CAPSULE, DELAYED RELEASE ORAL DAILY
COMMUNITY
Start: 2024-04-28

## 2024-05-22 ASSESSMENT — PAIN SCALES - GENERAL: PAINLEVEL_OUTOF10: 8

## 2024-05-22 ASSESSMENT — PAIN DESCRIPTION - PAIN TYPE: TYPE: CHRONIC PAIN

## 2024-05-22 ASSESSMENT — PAIN DESCRIPTION - FREQUENCY: FREQUENCY: CONTINUOUS

## 2024-05-22 ASSESSMENT — PAIN DESCRIPTION - ORIENTATION: ORIENTATION: LOWER

## 2024-05-22 NOTE — TELEPHONE ENCOUNTER
Call from Atilio asking to change her mothers appointment to a phone visit. Atilio shares that Uriel fell this morning, and is not having a \"good day\". She has fallen twice this week. Attempted to call back twice, phone rang busy. Third call to Atilio went through. Atilio states she will get her mom here for 11:30 appointment but won't make the 10:00 radiation wily . She is just on her way to help her mom off the floor. Recommended she take her mom to the ED. Atilio is hoping to get her to our appointment. Support provided through active listening.

## 2024-05-22 NOTE — PROGRESS NOTES
Uriel THADDEUS Braswell  1965 58 y.o.      Referring Physician: Dr. Jackson    PCP: Ebenezer Mak DO     There were no vitals filed for this visit.     Wt Readings from Last 3 Encounters:   24 62.6 kg (138 lb)   24 67.6 kg (149 lb)   24 66.7 kg (147 lb)        There is no height or weight on file to calculate BMI.               Chief Complaint: No chief complaint on file.         Cancer Staging   No matching staging information was found for the patient.    Prior Radiation Therapy? YES: Site Treated: Left Breast, 33fx;6040cGy          Facility: [facility]          Date: 17-17    Concurrent Chemo/radiation? YES: Site Treated: Left Breast          Facility: [facility]          Date:     Prior Chemotherapy? YES: Site Treated: Left Breast          Facility: [facility]          Date:     Prior Hormonal Therapy? YES: Site Treated: Left Breast          Facility: [facility]          Date: -Tamoxifen,on Femara currently to start Kisqali next week    Head and Neck Cancer? No, patient does NOT have HN cancer.      LMP: 48    Age at first Menses: 10    : 3    Para: 2        Current Outpatient Medications   Medication Sig Dispense Refill    DULoxetine (CYMBALTA) 20 MG extended release capsule Take 1 capsule by mouth daily      letrozole (FEMARA) 2.5 MG tablet Take 1 tablet by mouth daily      Ribociclib Succ, 600 MG Dose, 200 MG TBPK Take 600 mg by mouth daily (Patient not taking: Reported on 2024)      sodium chloride 1 g tablet Take 1 tablet by mouth daily      fentaNYL (DURAGESIC) 75 MCG/HR Place 1 patch onto the skin every 72 hours for 30 days. Max Daily Amount: 1 patch 10 patch 0    oxyCODONE (ROXICODONE) 20 MG immediate release tablet Take 1 tablet by mouth every 3 hours as needed for Pain for up to 14 days. Max Daily Amount: 160 mg 112 tablet 0    sennosides-docusate sodium (SENOKOT-S) 8.6-50 MG tablet Take 2 tablets by mouth daily 60 tablet 2    naloxone 4 MG/0.1ML LIQD nasal spray 1 spray by 
sitting in a wheelchair, appears mildly sleepy but is awake alert responsive and oriented to person time place and situation and in no acute distress     EENT: EOMI GENE. No icterus. No conjunctival injections.  External canals are patent and no discharge was appreciated.  .    Neck: Supple without thyromegaly  Respiratory: Chest was symmetrical without dullness to percussion.  Breath sounds bilaterally were clear to auscultation. No wheezes, rhonchi or rales.   Breasts: Deferred    Cardiovascular: Regular without murmur.  Abdomen: Obese, rounded and soft without organomegaly. No rebound, guarding or  rigidity.    Lymphatic: No lymphadenopathy.  Musculoskeletal: Pain and tenderness throughout especially in the right proximal humerus/shoulder, multiple ribs, and lower back    Extremities:  No lower extremity edema, ulcerations, tenderness, varicosities or erythema. Coordination appears adequate.  Sensory function appears intact.    Skin:  Warm and dry.  Examination of color, turgor and pigmentation was relatively normal. No bruises or skin rashes.   Neurological/Psychiatric: General behavior, level of consciousness, thought content is normal. The patient's emotional status is normal.  Cranial nerves II-XII are grossly intact.        RADIATION SAFETY AND ONCOLOGIC TREATMENT SUPPORT:    - Previous radiation history: Yes to the left chest wall and supra clavicle/axilla in 2016  - History of autoimmune or connective tissue disease:  No  - Nutritional support/ PEG:  Not applicable  - Dental evaluation:  Not applicable  Palliative team involved    TUMOR MARKERS:   Lab Results   Component Value Date/Time    CEA 3.1 08/16/2016 05:07 PM       IMAGING REVIEWED:  Imaging studies discussed above reports reviewed, images unavailable currently they have been ordered    PATHOLOGY REVIEWED: Yes see above      IMPRESSION: Stage IV breast cancer, ER positive currently on Femara and about to start CDK 4/6 inhibitor    Multiple painful

## 2024-05-22 NOTE — PROGRESS NOTES
Department of Palliative Medicine  Ambulatory visit  Provider: JOSE ANGEL Mercado - CNP       Chief Complaint: Uriel Braswell is a 58 y.o. female with chief complaint of Pain    HPI:  56 y/o post-menopausal  female who underwent Stereotactic Left Breast mass core biopsy on 06/17/2016 revealing Invasive Lobular carcinoma, she has developed severe neuropathy after her last treatment.     Assessment/Plan      Breast cancer:   - Currently on tamoxifen    - Follows at [facility]   - New spinal metastasis noted  -To see oncology May 9    Chronic pain related to neoplasm/pain due to spinal metastasis:   -Oxycodone 20 mg every 4 hours.   -Lyrica to 100 mg in the morning, 200 mg nightly    -Cymbalta 20 mg daily   -Alternate with Tylenol and ibuprofen    Anxiety/Depression:   -Xanax 1 mg TID as needed   -Now seeing psychiatry  -She stopped Buspar and trintelix  -Cymbalta 20 mg daily    Constipation:   -Senna S    Decreased oral intake/poor appetite:   -Encourage use of oral nutritional supplements, recommend boost very high-calorie    Follow Up: 4 weeks. Encouraged to call with any questions, concerns, needs, or changes in symptoms.    Subjective:     Uriel Braswell is a 58 y.o. female with significant past medical history of Breast cancer.  Managed at [facility], has been on tamoxifen.  Unfortunately she is found to have metastasis to her spine and bones and April 2024.  She has plans to follow-up with oncology on May 9.    5/22/24:  Sarita presents accompanied by her daughter  She is alert, a bit drowsy, and may have some mild confusion currently  Unfortunately she is very weak, her performance status is continue to decline  She has had issues with hyponatremia and elevated calcium, this is being managed by [facility]  Her oral intake has been very poor recently, she is having swelling of her hands and feet  She has had multiple falls, including a fall from a chair last night, she reports that she is laying on

## 2024-05-22 NOTE — TELEPHONE ENCOUNTER
[facility] refuses to fill Uriel's prescription for Fentanyl and Oxy IR. Please send new prescription to [facility] on [address] in [place] . They will fill this one time.

## 2024-05-23 RX ORDER — NALOXONE HYDROCHLORIDE 4 MG/.1ML
1 SPRAY NASAL PRN
Qty: 2 EACH | Refills: 2 | Status: SHIPPED | OUTPATIENT
Start: 2024-05-23

## 2024-05-23 RX ORDER — SENNA AND DOCUSATE SODIUM 50; 8.6 MG/1; MG/1
2 TABLET, FILM COATED ORAL DAILY
Qty: 60 TABLET | Refills: 2 | Status: SHIPPED | OUTPATIENT
Start: 2024-05-23 | End: 2024-08-21

## 2024-05-23 RX ORDER — FENTANYL 75 UG/1
1 PATCH TRANSDERMAL
Qty: 10 PATCH | Refills: 0 | Status: SHIPPED | OUTPATIENT
Start: 2024-05-23 | End: 2024-06-22

## 2024-05-23 RX ORDER — OXYCODONE HYDROCHLORIDE 20 MG/1
20 TABLET ORAL
Qty: 112 TABLET | Refills: 0 | Status: SHIPPED | OUTPATIENT
Start: 2024-05-23 | End: 2024-06-06

## 2024-05-24 ENCOUNTER — HOSPITAL ENCOUNTER (OUTPATIENT)
Dept: RADIATION ONCOLOGY | Age: 59
Discharge: HOME | End: 2024-05-24
Payer: COMMERCIAL

## 2024-05-24 PROCEDURE — 77334 RADIATION TREATMENT AID(S): CPT | Performed by: RADIOLOGY

## 2024-05-24 PROCEDURE — 77290 THER RAD SIMULAJ FIELD CPLX: CPT | Performed by: RADIOLOGY

## 2024-05-29 ENCOUNTER — HOSPITAL ENCOUNTER (OUTPATIENT)
Dept: RADIATION ONCOLOGY | Age: 59
Discharge: HOME | End: 2024-05-29
Payer: COMMERCIAL

## 2024-05-29 PROCEDURE — 77334 RADIATION TREATMENT AID(S): CPT | Performed by: RADIOLOGY

## 2024-05-29 PROCEDURE — 77295 3-D RADIOTHERAPY PLAN: CPT | Performed by: RADIOLOGY

## 2024-05-29 PROCEDURE — 77300 RADIATION THERAPY DOSE PLAN: CPT | Performed by: RADIOLOGY

## 2024-06-03 ENCOUNTER — HOSPITAL ENCOUNTER (OUTPATIENT)
Dept: RADIATION ONCOLOGY | Age: 59
Discharge: HOME | End: 2024-06-03
Payer: COMMERCIAL

## 2024-06-03 PROCEDURE — 77280 THER RAD SIMULAJ FIELD SMPL: CPT | Performed by: RADIOLOGY

## 2024-06-04 ENCOUNTER — CLINICAL DOCUMENTATION (OUTPATIENT)
Dept: RADIATION ONCOLOGY | Age: 59
End: 2024-06-04

## 2024-06-04 ENCOUNTER — HOSPITAL ENCOUNTER (OUTPATIENT)
Dept: RADIATION ONCOLOGY | Age: 59
Discharge: HOME | End: 2024-06-04
Payer: COMMERCIAL

## 2024-06-04 PROCEDURE — 77412 RADIATION TX DELIVERY LVL 3: CPT | Performed by: RADIOLOGY

## 2024-06-04 NOTE — PROGRESS NOTES
Uriel Braswell  6/4/2024  Ht Readings from Last 1 Encounters:   04/22/24 1.702 m (5' 7\")     Wt Readings from Last 10 Encounters:   04/22/24 62.6 kg (138 lb)   03/06/24 67.6 kg (149 lb)   02/12/24 66.7 kg (147 lb)   02/07/24 66.7 kg (147 lb)   01/10/24 68.3 kg (150 lb 9.6 oz)   12/27/23 68 kg (150 lb)   12/20/23 71.2 kg (157 lb)   09/27/23 76.7 kg (169 lb)   08/10/23 74.8 kg (165 lb)   07/05/23 76.2 kg (168 lb)     No recent weight available    Assessment: Met with Uriel while in for radiation therapy today, per nutrition screen completed on 5/22/24. Uriel is receiving palliative radiation therapy at [facility] due to it is closer to home. She follows with radiation oncologist and medical oncologist at the [facility] for metastatic breast cancer.  Uriel reports her appetite is poor, having no desire to eat. Palliative medicine had recommended she try Boost VHC and this writer provided her with samples. Uriel said he daughter had already ordered her Boost VHC and she has been trying to drink 2 per day. Encouraged her to increase to 3 per day due to her food intake has been poor and it is an easy item without effort for preparation. Provided Uriel with list of high calorie/protein snack ideas for items that can be kept close by for her to eat throughout the day. No recent weight available.     Olinda Mccain RD

## 2024-06-05 ENCOUNTER — HOSPITAL ENCOUNTER (OUTPATIENT)
Dept: RADIATION ONCOLOGY | Age: 59
Discharge: HOME | End: 2024-06-05
Payer: COMMERCIAL

## 2024-06-05 PROCEDURE — 77412 RADIATION TX DELIVERY LVL 3: CPT | Performed by: RADIOLOGY

## 2024-06-06 ENCOUNTER — HOSPITAL ENCOUNTER (OUTPATIENT)
Dept: RADIATION ONCOLOGY | Age: 59
Discharge: HOME | End: 2024-06-06
Payer: COMMERCIAL

## 2024-06-06 PROCEDURE — 77412 RADIATION TX DELIVERY LVL 3: CPT | Performed by: RADIOLOGY

## 2024-06-07 ENCOUNTER — HOSPITAL ENCOUNTER (OUTPATIENT)
Dept: RADIATION ONCOLOGY | Age: 59
Discharge: HOME | End: 2024-06-07
Payer: COMMERCIAL

## 2024-06-07 VITALS
DIASTOLIC BLOOD PRESSURE: 62 MMHG | SYSTOLIC BLOOD PRESSURE: 97 MMHG | RESPIRATION RATE: 18 BRPM | TEMPERATURE: 97.3 F | WEIGHT: 141.2 LBS | BODY MASS INDEX: 22.12 KG/M2 | OXYGEN SATURATION: 93 % | HEART RATE: 99 BPM

## 2024-06-07 PROCEDURE — 77412 RADIATION TX DELIVERY LVL 3: CPT | Performed by: RADIOLOGY

## 2024-06-07 PROCEDURE — 77336 RADIATION PHYSICS CONSULT: CPT | Performed by: RADIOLOGY

## 2024-06-07 ASSESSMENT — PAIN DESCRIPTION - PAIN TYPE: TYPE: CHRONIC PAIN

## 2024-06-07 ASSESSMENT — PAIN SCALES - GENERAL: PAINLEVEL_OUTOF10: 9

## 2024-06-07 ASSESSMENT — PAIN DESCRIPTION - FREQUENCY: FREQUENCY: CONTINUOUS

## 2024-06-07 ASSESSMENT — PAIN DESCRIPTION - ORIENTATION: ORIENTATION: MID;LEFT

## 2024-06-07 ASSESSMENT — PAIN DESCRIPTION - LOCATION: LOCATION: BACK;GROIN

## 2024-06-07 ASSESSMENT — PAIN DESCRIPTION - DESCRIPTORS: DESCRIPTORS: ACHING

## 2024-06-07 NOTE — PROGRESS NOTES
Uriel TRAVIS French  6/7/2024  Wt Readings from Last 3 Encounters:   06/07/24 64 kg (141 lb 3.2 oz)   04/22/24 62.6 kg (138 lb)   03/06/24 67.6 kg (149 lb)     Body mass index is 22.12 kg/m².        Treatment Area:Right Shoulder/sacrum    Patient was seen today for weekly visit.     Comfort Alteration  KPS:60%  Fatigue: Severe      Nutritional Alteration  Anorexia: Yes   Nausea: No   Vomiting: No     Elimination Alterations  Constipation: yes  Diarrhea:  no  Bowel Incontinence: No  Urinary Incontinence: No    Skin Alteration   Sensation:Good    Radiation Dermatitis:  no      Emotional  Coping: effective    Sexuality Alteration  na    Injury, potential bleeding or infection: no        Lab Results   Component Value Date    WBC 7.5 02/12/2024    HGB 13.8 02/12/2024    HCT 39.6 02/12/2024     02/12/2024       BP 97/62   Pulse 99   Temp 97.3 °F (36.3 °C) (Temporal)   Resp 18   Wt 64 kg (141 lb 3.2 oz)   SpO2 93%   BMI 22.12 kg/m²   BP within normal range? yes       Assessment/Plan:5fx;2000cGy completed. Discharge instructions given. Follow up appointment given. Patient verbalized understanding of care and no questions at this time.      Kortney Denny RN

## 2024-06-07 NOTE — PROGRESS NOTES
Uriel THADDEUS Braswell  6/7/2024  11:35 AM          Current Outpatient Medications   Medication Sig Dispense Refill    fentaNYL (DURAGESIC) 75 MCG/HR Place 1 patch onto the skin every 72 hours for 30 days. Max Daily Amount: 1 patch 10 patch 0    naloxone 4 MG/0.1ML LIQD nasal spray 1 spray by Nasal route as needed for Opioid Reversal 2 each 2    sennosides-docusate sodium (SENOKOT-S) 8.6-50 MG tablet Take 2 tablets by mouth daily 60 tablet 2    DULoxetine (CYMBALTA) 20 MG extended release capsule Take 1 capsule by mouth daily      letrozole (FEMARA) 2.5 MG tablet Take 1 tablet by mouth daily      Ribociclib Succ, 600 MG Dose, 200 MG TBPK Take 600 mg by mouth daily (Patient not taking: Reported on 5/22/2024)      sodium chloride 1 g tablet Take 1 tablet by mouth daily      cyclobenzaprine (FLEXERIL) 10 MG tablet Take 1 tablet by mouth in the morning and 1 tablet at noon and 1 tablet in the evening. (Patient not taking: Reported on 5/22/2024)      ondansetron (ZOFRAN-ODT) 4 MG disintegrating tablet Take 1 tablet by mouth 3 times daily as needed for Nausea or Vomiting 21 tablet 0    vitamin D (ERGOCALCIFEROL) 1.25 MG (95666 UT) CAPS capsule Take 1 capsule by mouth once a week 12 capsule 1    VORTIoxetine (TRINTELLIX) 10 MG TABS tablet Take 1 tablet by mouth daily (Patient not taking: Reported on 5/22/2024)      pregabalin (LYRICA) 100 MG capsule Take 1 capsule by mouth every morning AND 2 capsules at bedtime. Do all this for 180 days. 90 capsule 5    busPIRone (BUSPAR) 5 MG tablet Take 1 tablet by mouth twice daily as needed for anxiety (Patient not taking: Reported on 5/1/2024) 60 tablet 1    fluticasone furoate-vilanterol (BREO ELLIPTA) 100-25 MCG/ACT inhaler Inhale 1 puff into the lungs daily 28 each 5    albuterol sulfate HFA (PROVENTIL;VENTOLIN;PROAIR) 108 (90 Base) MCG/ACT inhaler INHALE 2 PUFFS BY MOUTH EVERY 6 HOURS AS NEEDED FOR WHEEZING FOR SHORTNESS OF BREATH      pantoprazole (PROTONIX) 40 MG tablet Take 1

## 2024-06-07 NOTE — PROGRESS NOTES
DEPARTMENT OF RADIATION ONCOLOGY   ON TREATMENT VISIT       6/7/2024      NAME:  Uriel Braswell    YOB: 1965    DIAGNOSIS:     SUBJECTIVE:   Uriel Braswell has now received 2000 cGy in 5/5 fractions directed to the R shoulder area including some ribs and lower back/sacrum/L Hip and Left Pubic bone    She is doing better than when I saw her last.  Her right shoulder pain is down to a 2 out of 10 and she has much better mobility    Her lower back pain is down to a 5 out of 10    She still has 9 out of 10 pain localizing to the left pubic bone.  All of these areas were in the radiation portal    She continues to see palliative care and is maintained on fentanyl patch and oxycodone for breakthrough    She is on Femara and should be getting her Ribociclib soon    Her exam is stable with improved mobility in the right shoulder she is sitting in a wheelchair today and walks with a walker    Assessment/plan we will see her back in 2 to 3 weeks to assess for other potential areas of palliative treatment      Past medical, surgical, social and family histories reviewed and updated as indicated.    PAIN: See above    ALLERGIES:  Demerol hcl [meperidine], Azithromycin, and Motrin [ibuprofen]         Current Outpatient Medications   Medication Sig Dispense Refill    fentaNYL (DURAGESIC) 75 MCG/HR Place 1 patch onto the skin every 72 hours for 30 days. Max Daily Amount: 1 patch 10 patch 0    naloxone 4 MG/0.1ML LIQD nasal spray 1 spray by Nasal route as needed for Opioid Reversal 2 each 2    sennosides-docusate sodium (SENOKOT-S) 8.6-50 MG tablet Take 2 tablets by mouth daily 60 tablet 2    DULoxetine (CYMBALTA) 20 MG extended release capsule Take 1 capsule by mouth daily      letrozole (FEMARA) 2.5 MG tablet Take 1 tablet by mouth daily      Ribociclib Succ, 600 MG Dose, 200 MG TBPK Take 600 mg by mouth daily (Patient not taking: Reported on 5/22/2024)      sodium chloride 1 g tablet Take 1 tablet by

## 2024-06-10 DIAGNOSIS — G89.3 PAIN FROM BONE METASTASES (HCC): ICD-10-CM

## 2024-06-10 DIAGNOSIS — C79.51 PAIN FROM BONE METASTASES (HCC): ICD-10-CM

## 2024-06-10 DIAGNOSIS — G89.3 PAIN DUE TO NEOPLASM: ICD-10-CM

## 2024-06-10 DIAGNOSIS — Z51.5 PALLIATIVE CARE BY SPECIALIST: ICD-10-CM

## 2024-06-10 DIAGNOSIS — C50.912 BREAST CANCER, STAGE 3, LEFT (HCC): ICD-10-CM

## 2024-06-10 RX ORDER — OXYCODONE HYDROCHLORIDE 20 MG/1
20 TABLET ORAL
Qty: 112 TABLET | Refills: 0 | Status: SHIPPED | OUTPATIENT
Start: 2024-06-10 | End: 2024-06-24

## 2024-06-10 NOTE — TELEPHONE ENCOUNTER
Patient Uriel's daughter Atilio called for refill for oxycodone 20mg. Needs to use [facility] in [place]. Next appointment 6/19/2024

## 2024-06-19 ENCOUNTER — OFFICE VISIT (OUTPATIENT)
Dept: PALLATIVE CARE | Age: 59
End: 2024-06-19
Payer: COMMERCIAL

## 2024-06-19 VITALS
TEMPERATURE: 97.3 F | BODY MASS INDEX: 19.58 KG/M2 | DIASTOLIC BLOOD PRESSURE: 69 MMHG | WEIGHT: 125 LBS | SYSTOLIC BLOOD PRESSURE: 102 MMHG | HEART RATE: 105 BPM | OXYGEN SATURATION: 90 %

## 2024-06-19 DIAGNOSIS — G89.3 PAIN FROM BONE METASTASES (HCC): ICD-10-CM

## 2024-06-19 DIAGNOSIS — Z51.5 PALLIATIVE CARE BY SPECIALIST: ICD-10-CM

## 2024-06-19 DIAGNOSIS — Z51.5 ENCOUNTER FOR PALLIATIVE CARE: ICD-10-CM

## 2024-06-19 DIAGNOSIS — C79.51 PAIN FROM BONE METASTASES (HCC): ICD-10-CM

## 2024-06-19 DIAGNOSIS — G89.3 PAIN DUE TO NEOPLASM: ICD-10-CM

## 2024-06-19 DIAGNOSIS — C50.912 BREAST CANCER, STAGE 3, LEFT (HCC): ICD-10-CM

## 2024-06-19 PROCEDURE — 99211 OFF/OP EST MAY X REQ PHY/QHP: CPT | Performed by: NURSE PRACTITIONER

## 2024-06-19 RX ORDER — ALPRAZOLAM 1 MG/1
1 TABLET ORAL 3 TIMES DAILY PRN
Qty: 45 TABLET | Refills: 0 | Status: SHIPPED | OUTPATIENT
Start: 2024-06-19 | End: 2024-07-04

## 2024-06-19 RX ORDER — PROMETHAZINE HYDROCHLORIDE 25 MG/1
TABLET ORAL
COMMUNITY
Start: 2024-06-19

## 2024-06-19 RX ORDER — SUCRALFATE 1 G/1
1 TABLET ORAL 4 TIMES DAILY
Qty: 120 TABLET | Refills: 3 | Status: SHIPPED
Start: 2024-06-19 | End: 2024-06-19

## 2024-06-19 RX ORDER — SUCRALFATE 1 G/1
1 TABLET ORAL 4 TIMES DAILY
Qty: 120 TABLET | Refills: 3 | Status: SHIPPED | OUTPATIENT
Start: 2024-06-19

## 2024-06-19 NOTE — PROGRESS NOTES
Department of Palliative Medicine  Ambulatory visit  Provider: JOSE ANGEL Mercado - CNP       Chief Complaint: Uriel Braswell is a 58 y.o. female with chief complaint of Pain    HPI:  56 y/o post-menopausal  female who underwent Stereotactic Left Breast mass core biopsy on 06/17/2016 revealing Invasive Lobular carcinoma, she has developed severe neuropathy after her last treatment.     Assessment/Plan      Breast cancer:   - tamoxifen stopped   - Follows at [facility]   - Progression of disease with bone/spinal mets noted in April 2024  - completed palliative radiation  -On Kisqali    Chronic pain related to neoplasm/pain due to spinal metastasis:   -stop Fentanyl, as it has provided no improvement  -Oxycodone 20 mg every 3 hours.   -Lyrica to 100 mg in the morning, 200 mg nightly    -Cymbalta 20 mg daily   -Alternate with Tylenol and ibuprofen    Anxiety/Depression:   -Xanax 1 mg TID as needed  -Cymbalta 20 mg daily   -started seeing psychiatry, but has stopped going  -She stopped Buspar and trintelix    Constipation:   -Senna S    Decreased oral intake/poor appetite:   -Encourage use of oral nutritional supplements, recommend boost very high-calorie    Dyspepsia;   -Carafate    Follow Up: 4 weeks. Encouraged to call with any questions, concerns, needs, or changes in symptoms.    Subjective:     Uriel Braswell is a 58 y.o. female with significant past medical history of Breast cancer.  Managed at [facility], has been on tamoxifen.  Unfortunately she is found to have metastasis to her spine and bones and April 2024.  She completed palliative radiation therapy.  She is now on kisqali.    6/19/24:  Sarita presents accompanied by her daughter  She overall appears improved since her last encounter  Her pain has improved, though she continues to complain of severe pain  She continues to wear fentanyl 75 mcg patch but also continues utilize oxycodone every 3-4 hours as her pain is continued to be severe  And has

## 2024-06-24 DIAGNOSIS — C79.51 PAIN FROM BONE METASTASES (HCC): ICD-10-CM

## 2024-06-24 DIAGNOSIS — Z51.5 PALLIATIVE CARE BY SPECIALIST: ICD-10-CM

## 2024-06-24 DIAGNOSIS — C50.912 BREAST CANCER, STAGE 3, LEFT (HCC): ICD-10-CM

## 2024-06-24 DIAGNOSIS — G89.3 PAIN FROM BONE METASTASES (HCC): ICD-10-CM

## 2024-06-24 DIAGNOSIS — G89.3 PAIN DUE TO NEOPLASM: ICD-10-CM

## 2024-06-24 RX ORDER — OXYCODONE HYDROCHLORIDE 20 MG/1
20 TABLET ORAL
Qty: 112 TABLET | Refills: 0 | Status: SHIPPED | OUTPATIENT
Start: 2024-06-24 | End: 2024-07-08

## 2024-06-24 NOTE — TELEPHONE ENCOUNTER
Call from Atilio requesting refill for Oxy IR 20 mg from [facility] in [place]. Next wily 7/17/24.   Pt seen, feeling well, waiting to go home    MEDICATIONS  (STANDING):  diltiazem    Tablet 30 milliGRAM(s) Oral three times a day  hemorrhoidal Ointment 1 Application(s) Rectal two times a day  heparin  Infusion. 800 Unit(s)/Hr (8 mL/Hr) IV Continuous <Continuous>  influenza   Vaccine 0.5 milliLiter(s) IntraMuscular once  metoprolol succinate  milliGRAM(s) Oral daily  piperacillin/tazobactam IVPB.. 3.375 Gram(s) IV Intermittent every 8 hours  sodium chloride 0.9%. 1000 milliLiter(s) (75 mL/Hr) IV Continuous <Continuous>    MEDICATIONS  (PRN):  acetaminophen   Tablet .. 650 milliGRAM(s) Oral every 6 hours PRN Temp greater or equal to 38.5C (101.3F), Mild Pain (1 - 3)  heparin   Injectable 2500 Unit(s) IV Push every 6 hours PRN For aPTT between 40 - 57  heparin   Injectable 5500 Unit(s) IV Push every 6 hours PRN For aPTT less than 40  melatonin 3 milliGRAM(s) Oral at bedtime PRN Insomnia      ROS  No fever, sweats, chills  No epistaxis, HA, sore throat  No CP, SOB, cough, sputum  No n/v/d, abd pain, melena, hematochezia  No edema  No rash  No anxiety  No back pain, joint pain  No bleeding, bruising  No dysuria, hematuria    Vital Signs Last 24 Hrs  T(C): 36.4 (16 Sep 2021 13:16), Max: 36.9 (15 Sep 2021 21:53)  T(F): 97.6 (16 Sep 2021 13:16), Max: 98.4 (15 Sep 2021 21:53)  HR: 84 (16 Sep 2021 14:30) (56 - 88)  BP: 110/72 (16 Sep 2021 14:30) (94/57 - 129/73)  BP(mean): --  RR: 18 (16 Sep 2021 13:16) (17 - 18)  SpO2: 100% (16 Sep 2021 13:16) (99% - 100%)    PE  NAD  Awake, alert  Anicteric  drain with thin red liquid  remainder of exam limited 2/2 covid pandemic                          7.1    8.21  )-----------( 261      ( 16 Sep 2021 07:11 )             23.8       09-15    141  |  109<H>  |  18  ----------------------------<  117<H>  3.5   |  21<L>  |  1.14    Ca    8.9      15 Sep 2021 07:37

## 2024-06-28 ENCOUNTER — HOSPITAL ENCOUNTER (OUTPATIENT)
Dept: RADIATION ONCOLOGY | Age: 59
Discharge: HOME | End: 2024-06-28
Payer: COMMERCIAL

## 2024-06-28 VITALS
TEMPERATURE: 97.2 F | DIASTOLIC BLOOD PRESSURE: 65 MMHG | HEART RATE: 98 BPM | OXYGEN SATURATION: 91 % | WEIGHT: 122.4 LBS | RESPIRATION RATE: 18 BRPM | SYSTOLIC BLOOD PRESSURE: 96 MMHG | BODY MASS INDEX: 19.17 KG/M2

## 2024-06-28 ASSESSMENT — PAIN DESCRIPTION - ORIENTATION: ORIENTATION: RIGHT;LEFT

## 2024-06-28 ASSESSMENT — PAIN DESCRIPTION - DESCRIPTORS: DESCRIPTORS: ACHING

## 2024-06-28 ASSESSMENT — PAIN SCALES - GENERAL: PAINLEVEL_OUTOF10: 8

## 2024-06-28 ASSESSMENT — PAIN DESCRIPTION - PAIN TYPE: TYPE: CHRONIC PAIN

## 2024-06-28 ASSESSMENT — PAIN DESCRIPTION - LOCATION: LOCATION: BACK;RIB CAGE;LEG

## 2024-06-28 ASSESSMENT — PAIN DESCRIPTION - FREQUENCY: FREQUENCY: CONTINUOUS

## 2024-06-28 NOTE — PROGRESS NOTES
Uriel Braswell  6/28/2024  1:33 PM      Vitals:    06/28/24 1327   BP: 96/65   Pulse: 98   Resp: 18   Temp: 97.2 °F (36.2 °C)   SpO2: 91%    :    Wt Readings from Last 3 Encounters:   06/28/24 55.5 kg (122 lb 6.4 oz)   06/19/24 56.7 kg (125 lb)   06/07/24 64 kg (141 lb 3.2 oz)                Current Outpatient Medications:     oxyCODONE (ROXICODONE) 20 MG immediate release tablet, Take 1 tablet by mouth every 3 hours as needed for Pain for up to 14 days. Max Daily Amount: 160 mg, Disp: 112 tablet, Rfl: 0    promethazine (PHENERGAN) 25 MG tablet, , Disp: , Rfl:     sucralfate (CARAFATE) 1 GM tablet, Take 1 tablet by mouth 4 times daily, Disp: 120 tablet, Rfl: 3    ALPRAZolam (XANAX) 1 MG tablet, Take 1 tablet by mouth 3 times daily as needed for Anxiety for up to 15 days. Max Daily Amount: 3 mg, Disp: 45 tablet, Rfl: 0    naloxone 4 MG/0.1ML LIQD nasal spray, 1 spray by Nasal route as needed for Opioid Reversal (Patient not taking: Reported on 6/19/2024), Disp: 2 each, Rfl: 2    sennosides-docusate sodium (SENOKOT-S) 8.6-50 MG tablet, Take 2 tablets by mouth daily, Disp: 60 tablet, Rfl: 2    DULoxetine (CYMBALTA) 20 MG extended release capsule, Take 1 capsule by mouth daily, Disp: , Rfl:     letrozole (FEMARA) 2.5 MG tablet, Take 1 tablet by mouth daily, Disp: , Rfl:     Ribociclib Succ, 600 MG Dose, 200 MG TBPK, Take 600 mg by mouth daily (Patient not taking: Reported on 6/19/2024), Disp: , Rfl:     cyclobenzaprine (FLEXERIL) 10 MG tablet, Take 1 tablet by mouth in the morning and 1 tablet at noon and 1 tablet in the evening. (Patient not taking: Reported on 5/22/2024), Disp: , Rfl:     ondansetron (ZOFRAN-ODT) 4 MG disintegrating tablet, Take 1 tablet by mouth 3 times daily as needed for Nausea or Vomiting, Disp: 21 tablet, Rfl: 0    vitamin D (ERGOCALCIFEROL) 1.25 MG (73334 UT) CAPS capsule, Take 1 capsule by mouth once a week (Patient not taking: Reported on 6/19/2024), Disp: 12 capsule, Rfl: 1

## 2024-07-01 ENCOUNTER — OFFICE VISIT (OUTPATIENT)
Dept: ONCOLOGY | Age: 59
End: 2024-07-01
Payer: COMMERCIAL

## 2024-07-01 ENCOUNTER — HOSPITAL ENCOUNTER (OUTPATIENT)
Dept: INFUSION THERAPY | Age: 59
Discharge: HOME | End: 2024-07-01
Payer: COMMERCIAL

## 2024-07-01 ENCOUNTER — TELEPHONE (OUTPATIENT)
Dept: CASE MANAGEMENT | Age: 59
End: 2024-07-01

## 2024-07-01 VITALS
OXYGEN SATURATION: 94 % | HEART RATE: 93 BPM | WEIGHT: 132.6 LBS | SYSTOLIC BLOOD PRESSURE: 121 MMHG | DIASTOLIC BLOOD PRESSURE: 73 MMHG | BODY MASS INDEX: 20.77 KG/M2 | TEMPERATURE: 97.7 F

## 2024-07-01 DIAGNOSIS — Z85.3 PERSONAL HISTORY OF BREAST CANCER: ICD-10-CM

## 2024-07-01 DIAGNOSIS — Z51.81 THERAPEUTIC DRUG MONITORING: ICD-10-CM

## 2024-07-01 DIAGNOSIS — R60.0 LEG EDEMA: ICD-10-CM

## 2024-07-01 DIAGNOSIS — Z51.81 THERAPEUTIC DRUG MONITORING: Primary | ICD-10-CM

## 2024-07-01 LAB
ALBUMIN SERPL-MCNC: 3.2 G/DL (ref 3.5–5.2)
ALP SERPL-CCNC: 296 U/L (ref 35–104)
ALT SERPL-CCNC: 21 U/L (ref 0–32)
ANION GAP SERPL CALCULATED.3IONS-SCNC: 13 MMOL/L (ref 7–16)
AST SERPL-CCNC: 53 U/L (ref 0–31)
BASOPHILS # BLD: 0 K/UL (ref 0–0.2)
BASOPHILS NFR BLD: 0 % (ref 0–2)
BILIRUB SERPL-MCNC: 0.7 MG/DL (ref 0–1.2)
BUN SERPL-MCNC: 10 MG/DL (ref 6–23)
CALCIUM SERPL-MCNC: 8.9 MG/DL (ref 8.6–10.2)
CEA SERPL-MCNC: 8.5 NG/ML (ref 0–5.2)
CHLORIDE SERPL-SCNC: 98 MMOL/L (ref 98–107)
CO2 SERPL-SCNC: 26 MMOL/L (ref 22–29)
CREAT SERPL-MCNC: 1.1 MG/DL (ref 0.5–1)
EOSINOPHIL # BLD: 0 K/UL (ref 0.05–0.5)
EOSINOPHILS RELATIVE PERCENT: 0 % (ref 0–6)
ERYTHROCYTE [DISTWIDTH] IN BLOOD BY AUTOMATED COUNT: 18.6 % (ref 11.5–15)
GFR, ESTIMATED: 55 ML/MIN/1.73M2
GLUCOSE SERPL-MCNC: 97 MG/DL (ref 74–99)
HCT VFR BLD AUTO: 27.1 % (ref 34–48)
HGB BLD-MCNC: 8.8 G/DL (ref 11.5–15.5)
LYMPHOCYTES NFR BLD: 0.21 K/UL (ref 1.5–4)
LYMPHOCYTES RELATIVE PERCENT: 4 % (ref 20–42)
MCH RBC QN AUTO: 29.5 PG (ref 26–35)
MCHC RBC AUTO-ENTMCNC: 32.5 G/DL (ref 32–34.5)
MCV RBC AUTO: 90.9 FL (ref 80–99.9)
MONOCYTES NFR BLD: 0 % (ref 2–12)
MONOCYTES NFR BLD: 0 K/UL (ref 0.1–0.95)
NEUTROPHILS NFR BLD: 97 % (ref 43–80)
NEUTS SEG NFR BLD: 5.79 K/UL (ref 1.8–7.3)
PLATELET # BLD AUTO: 188 K/UL (ref 130–450)
PMV BLD AUTO: 9.1 FL (ref 7–12)
POTASSIUM SERPL-SCNC: 4.3 MMOL/L (ref 3.5–5)
PROT SERPL-MCNC: 6 G/DL (ref 6.4–8.3)
RBC # BLD AUTO: 2.98 M/UL (ref 3.5–5.5)
RBC # BLD: ABNORMAL 10*6/UL
SODIUM SERPL-SCNC: 137 MMOL/L (ref 132–146)
WBC # BLD: ABNORMAL 10*3/UL
WBC # BLD: ABNORMAL 10*3/UL
WBC OTHER # BLD: 6 K/UL (ref 4.5–11.5)

## 2024-07-01 PROCEDURE — 99214 OFFICE O/P EST MOD 30 MIN: CPT

## 2024-07-01 PROCEDURE — 36415 COLL VENOUS BLD VENIPUNCTURE: CPT

## 2024-07-01 PROCEDURE — 1036F TOBACCO NON-USER: CPT | Performed by: INTERNAL MEDICINE

## 2024-07-01 PROCEDURE — 3017F COLORECTAL CA SCREEN DOC REV: CPT | Performed by: INTERNAL MEDICINE

## 2024-07-01 PROCEDURE — 86300 IMMUNOASSAY TUMOR CA 15-3: CPT

## 2024-07-01 PROCEDURE — 99205 OFFICE O/P NEW HI 60 MIN: CPT | Performed by: INTERNAL MEDICINE

## 2024-07-01 PROCEDURE — G8420 CALC BMI NORM PARAMETERS: HCPCS | Performed by: INTERNAL MEDICINE

## 2024-07-01 PROCEDURE — G8427 DOCREV CUR MEDS BY ELIG CLIN: HCPCS | Performed by: INTERNAL MEDICINE

## 2024-07-01 PROCEDURE — 85025 COMPLETE CBC W/AUTO DIFF WBC: CPT

## 2024-07-01 PROCEDURE — 82378 CARCINOEMBRYONIC ANTIGEN: CPT

## 2024-07-01 PROCEDURE — 80053 COMPREHEN METABOLIC PANEL: CPT

## 2024-07-01 NOTE — TELEPHONE ENCOUNTER
Contacted [facility] Molecular Pathology department for patient's Caris results per Dr. Anand Vazquez's request.  Staff states that the patient doesn't have any Caris results available and that it wasn't ordered per his chart review.  Upon inquiring since a bone biopsy, he states that there isn't any microcalcifications present and NGS testing should be able to be completed.  Staff updated.  ORTIZ KaiserN, BSW, RN, OCN  Oncology Nurse Navigator

## 2024-07-01 NOTE — PROGRESS NOTES
[facility]  [address] [address]  Dept: [phone]  Loc: [phone]  Attending Consult Note      Reason for Visit:   Recurrent metastatic breast cancer.    Referring Physician:  Julio Fried MD     PCP:  Ebenezer Mak DO    History of Present Illness:    Mrs. Braswell is a 58-year-old lady, with a past history significant for T3N3 ER positive >90% UT negative (<1%) HER2 Negative (1+) breast cancer who underwent mastectomy with axillary lymph node dissection followed by adjuvant dose dense AC-T and multiple lines of endocrine therapy due to grade 3 arthralgias, then on tamoxifen. Unfortunately, developed back pain in late 2023 and found to have metastatic recurrence with diffuse osseous involvement in 4/2024.      CANCER/TREATMENT HISTORY   November 2015-normal mammogram but skin changes  5/31/2016 bilateral mammogram with a spiculated mass of 14 mm on ultrasound  6/17/2016 core biopsy lobular carcinoma plus LCIS ER +100% UT +100% HER2 negative (1+  7/6/2016 mastectomy of the left breast sentinel lymph node biopsy and left axillary lymph node dissection final diagnosis T3 6 cm and 3 13 out of 23 M0 breast cancer.  She underwent reexcision the posterior wall with negative margins  9/6/2016 until 2/3/2017 adjuvant dose dense ACT  3/13/2017 until 4/27/2017 radiation to the breast  4/28/1917 Arimidex was started but very poor tolerance due to arthralgia treatment was changed to Femara  7/10/2017 Femara was stopped due to poor tolerance  7/14/2017 until 7/25/2017-Arimidex again with arthralgia  7/28/2017 exemestane started however stopped due to arthralgias  Tamoxifen was started 3/2018 (she is a MTHFR heterozygous and is good potentially cause DVTs).  Developed back pain late 2023.  4/25/2024 spine MRI showed diffuse osseous lesions.   5/4/2024 PET/CT showed innumerable intensely FDG avid lesions throughout axial and appendicular

## 2024-07-02 LAB — CANCER AG15-3 SERPL-ACNC: 381 U/ML (ref 0–31)

## 2024-07-03 ENCOUNTER — TELEPHONE (OUTPATIENT)
Dept: PALLATIVE CARE | Age: 59
End: 2024-07-03

## 2024-07-03 RX ORDER — MIRTAZAPINE 15 MG/1
15 TABLET, FILM COATED ORAL NIGHTLY
Qty: 30 TABLET | Refills: 0 | Status: SHIPPED | OUTPATIENT
Start: 2024-07-03 | End: 2024-08-02

## 2024-07-03 NOTE — TELEPHONE ENCOUNTER
Call from Atilio stating her mother continues to loose weight and she saw Dr. Anand Vazquez earlier this week and she recommended they speak with our team. Discussed with Pedro Stephens CNP and new order received. Atilio instructed on new medication of Mirtazapine 15 mg to be taken at bedtime. Atilio verbalizes understanding. Pharmacy is [facility].

## 2024-07-09 ENCOUNTER — TELEPHONE (OUTPATIENT)
Dept: INFUSION THERAPY | Age: 59
End: 2024-07-09

## 2024-07-09 ENCOUNTER — HOSPITAL ENCOUNTER (OUTPATIENT)
Dept: INFUSION THERAPY | Age: 59
Discharge: HOME | End: 2024-07-09
Payer: COMMERCIAL

## 2024-07-09 ENCOUNTER — HOSPITAL ENCOUNTER (OUTPATIENT)
Age: 59
Discharge: HOME | End: 2024-07-09
Payer: COMMERCIAL

## 2024-07-09 DIAGNOSIS — C79.51 SECONDARY MALIGNANT NEOPLASM OF BONE (HCC): Primary | ICD-10-CM

## 2024-07-09 DIAGNOSIS — Z51.81 THERAPEUTIC DRUG MONITORING: ICD-10-CM

## 2024-07-09 LAB
ALBUMIN SERPL-MCNC: 3.3 G/DL (ref 3.5–5.2)
ALP SERPL-CCNC: 246 U/L (ref 35–104)
ALT SERPL-CCNC: 7 U/L (ref 0–32)
ANION GAP SERPL CALCULATED.3IONS-SCNC: 12 MMOL/L (ref 7–16)
AST SERPL-CCNC: 24 U/L (ref 0–31)
BASOPHILS # BLD: 0.01 K/UL (ref 0–0.2)
BASOPHILS NFR BLD: 1 % (ref 0–2)
BILIRUB SERPL-MCNC: 0.4 MG/DL (ref 0–1.2)
BUN SERPL-MCNC: 6 MG/DL (ref 6–23)
CALCIUM SERPL-MCNC: 8.3 MG/DL (ref 8.6–10.2)
CHLORIDE SERPL-SCNC: 96 MMOL/L (ref 98–107)
CO2 SERPL-SCNC: 24 MMOL/L (ref 22–29)
CREAT SERPL-MCNC: 0.9 MG/DL (ref 0.5–1)
EKG ATRIAL RATE: 91 BPM
EKG P AXIS: 71 DEGREES
EKG P-R INTERVAL: 146 MS
EKG Q-T INTERVAL: 392 MS
EKG QRS DURATION: 96 MS
EKG QTC CALCULATION (BAZETT): 482 MS
EKG R AXIS: -23 DEGREES
EKG T AXIS: 51 DEGREES
EKG VENTRICULAR RATE: 91 BPM
EOSINOPHIL # BLD: 0.09 K/UL (ref 0.05–0.5)
EOSINOPHILS RELATIVE PERCENT: 6 % (ref 0–6)
ERYTHROCYTE [DISTWIDTH] IN BLOOD BY AUTOMATED COUNT: 19.2 % (ref 11.5–15)
GFR, ESTIMATED: 76 ML/MIN/1.73M2
GLUCOSE SERPL-MCNC: 100 MG/DL (ref 74–99)
HCT VFR BLD AUTO: 23.9 % (ref 34–48)
HGB BLD-MCNC: 8.1 G/DL (ref 11.5–15.5)
LYMPHOCYTES NFR BLD: 0.18 K/UL (ref 1.5–4)
LYMPHOCYTES RELATIVE PERCENT: 11 % (ref 20–42)
MCH RBC QN AUTO: 31.2 PG (ref 26–35)
MCHC RBC AUTO-ENTMCNC: 33.9 G/DL (ref 32–34.5)
MCV RBC AUTO: 91.9 FL (ref 80–99.9)
MONOCYTES NFR BLD: 0.1 K/UL (ref 0.1–0.95)
MONOCYTES NFR BLD: 6 % (ref 2–12)
NEUTROPHILS NFR BLD: 76 % (ref 43–80)
NEUTS SEG NFR BLD: 1.22 K/UL (ref 1.8–7.3)
PLATELET # BLD AUTO: 64 K/UL (ref 130–450)
PLATELET CONFIRMATION: NORMAL
PMV BLD AUTO: 11.2 FL (ref 7–12)
POTASSIUM SERPL-SCNC: 3.8 MMOL/L (ref 3.5–5)
PROT SERPL-MCNC: 6.1 G/DL (ref 6.4–8.3)
RBC # BLD AUTO: 2.6 M/UL (ref 3.5–5.5)
RBC # BLD: ABNORMAL 10*6/UL
SODIUM SERPL-SCNC: 132 MMOL/L (ref 132–146)
WBC OTHER # BLD: 1.6 K/UL (ref 4.5–11.5)

## 2024-07-09 PROCEDURE — 80053 COMPREHEN METABOLIC PANEL: CPT

## 2024-07-09 PROCEDURE — 36415 COLL VENOUS BLD VENIPUNCTURE: CPT

## 2024-07-09 PROCEDURE — 85025 COMPLETE CBC W/AUTO DIFF WBC: CPT

## 2024-07-09 PROCEDURE — 93005 ELECTROCARDIOGRAM TRACING: CPT

## 2024-07-09 NOTE — TELEPHONE ENCOUNTER
Phone call placed and message left for Uriel to return call to [phone] for nutritional concerns. Awaiting call back.

## 2024-07-12 ENCOUNTER — TELEPHONE (OUTPATIENT)
Dept: PALLATIVE CARE | Age: 59
End: 2024-07-12

## 2024-07-12 DIAGNOSIS — G89.3 PAIN FROM BONE METASTASES (HCC): ICD-10-CM

## 2024-07-12 DIAGNOSIS — Z51.5 PALLIATIVE CARE BY SPECIALIST: ICD-10-CM

## 2024-07-12 DIAGNOSIS — G89.3 PAIN DUE TO NEOPLASM: ICD-10-CM

## 2024-07-12 DIAGNOSIS — C79.51 PAIN FROM BONE METASTASES (HCC): ICD-10-CM

## 2024-07-12 DIAGNOSIS — C50.912 BREAST CANCER, STAGE 3, LEFT (HCC): ICD-10-CM

## 2024-07-12 RX ORDER — OXYCODONE HYDROCHLORIDE 20 MG/1
20 TABLET ORAL
Qty: 112 TABLET | Refills: 0 | Status: SHIPPED | OUTPATIENT
Start: 2024-07-12 | End: 2024-07-26

## 2024-07-12 RX ORDER — DULOXETIN HYDROCHLORIDE 20 MG/1
20 CAPSULE, DELAYED RELEASE ORAL DAILY
Qty: 90 CAPSULE | Refills: 0 | Status: SHIPPED | OUTPATIENT
Start: 2024-07-12 | End: 2024-10-10

## 2024-07-12 NOTE — TELEPHONE ENCOUNTER
Incoming call from pt daughter Atilio requesting medication refill for 2 meds, call forwarded to ORLANDO Stephens for medication refill request. No further needs at this time. Next appt 7/17/24      Mattie LORENZO,[facility]  Palliative Medicine

## 2024-07-17 ENCOUNTER — OFFICE VISIT (OUTPATIENT)
Dept: PALLATIVE CARE | Age: 59
End: 2024-07-17
Payer: COMMERCIAL

## 2024-07-17 VITALS
BODY MASS INDEX: 20.67 KG/M2 | HEART RATE: 99 BPM | DIASTOLIC BLOOD PRESSURE: 73 MMHG | TEMPERATURE: 97 F | WEIGHT: 132 LBS | OXYGEN SATURATION: 92 % | SYSTOLIC BLOOD PRESSURE: 123 MMHG

## 2024-07-17 DIAGNOSIS — Z51.5 ENCOUNTER FOR PALLIATIVE CARE: ICD-10-CM

## 2024-07-17 DIAGNOSIS — R53.81 PHYSICAL DEBILITY: ICD-10-CM

## 2024-07-17 DIAGNOSIS — C50.919 CARCINOMA OF BREAST METASTATIC TO ADRENAL GLAND, UNSPECIFIED LATERALITY (HCC): ICD-10-CM

## 2024-07-17 DIAGNOSIS — C79.70 CARCINOMA OF BREAST METASTATIC TO ADRENAL GLAND, UNSPECIFIED LATERALITY (HCC): ICD-10-CM

## 2024-07-17 DIAGNOSIS — R53.0 NEOPLASTIC MALIGNANT RELATED FATIGUE: ICD-10-CM

## 2024-07-17 DIAGNOSIS — C79.51 SECONDARY MALIGNANT NEOPLASM OF BONE (HCC): Primary | ICD-10-CM

## 2024-07-17 PROCEDURE — 3017F COLORECTAL CA SCREEN DOC REV: CPT | Performed by: NURSE PRACTITIONER

## 2024-07-17 PROCEDURE — 1036F TOBACCO NON-USER: CPT | Performed by: NURSE PRACTITIONER

## 2024-07-17 PROCEDURE — 99211 OFF/OP EST MAY X REQ PHY/QHP: CPT | Performed by: NURSE PRACTITIONER

## 2024-07-17 PROCEDURE — 99214 OFFICE O/P EST MOD 30 MIN: CPT | Performed by: NURSE PRACTITIONER

## 2024-07-17 PROCEDURE — G8427 DOCREV CUR MEDS BY ELIG CLIN: HCPCS | Performed by: NURSE PRACTITIONER

## 2024-07-17 PROCEDURE — G8420 CALC BMI NORM PARAMETERS: HCPCS | Performed by: NURSE PRACTITIONER

## 2024-07-17 RX ORDER — ALPRAZOLAM 1 MG/1
1 TABLET ORAL 3 TIMES DAILY PRN
Qty: 45 TABLET | Refills: 0 | Status: SHIPPED
Start: 2024-07-17 | End: 2024-07-17 | Stop reason: SDUPTHER

## 2024-07-17 RX ORDER — POTASSIUM CHLORIDE 20 MEQ/1
20 TABLET, EXTENDED RELEASE ORAL 2 TIMES DAILY
COMMUNITY
Start: 2024-06-25 | End: 2024-07-23

## 2024-07-17 RX ORDER — ALPRAZOLAM 1 MG/1
1 TABLET ORAL 3 TIMES DAILY PRN
Qty: 45 TABLET | Refills: 0 | Status: SHIPPED | OUTPATIENT
Start: 2024-07-17 | End: 2024-08-01

## 2024-07-17 NOTE — TELEPHONE ENCOUNTER
Call from [facility] stating they will not fill Uriel's Xanax prescription because she has moved her Oxy IR prescription to [facility]. Please send xanax to [facility] in [place] also.

## 2024-07-17 NOTE — PROGRESS NOTES
Nausea Score 7 9 Not nauseated 6 Not nauseated   Depression Score 5 5 5 6 5   Anxiety Score 5 5 5 6 5   Drowsiness Score 8 3 8 6 1   Appetite Score 9 Worst possible appetite 5 Best appetite Best appetite   Wellbeing Score 4 7 Worst possible feeling of wellbeing 5 8   Dyspnea Score No shortness of breath No shortness of breath 1 No shortness of breath No shortness of breath   Other Problem Score Best possible response Best possible response 8 Best possible response Best possible response   Total Assessment Score(calculated) 51 54 55 46 33     Assessed by: patient.    Current Medications:  Medications reviewed: yes    Controlled Substances Monitoring: [place] reviewed 7/17/24.  RX Monitoring Periodic Controlled Substance Monitoring   7/17/2024  12:49 PM Possible medication side effects, risk of tolerance/dependence & alternative treatments discussed.;No signs of potential drug abuse or diversion identified.;Assessed functional status (ability to engage in work or other purposeful activities, the pain intensity and its interference with activities of daily living, quality of family life and social activities, and the physical activity);Obtaining appropriate analgesic effect of treatment.     JOSE ANGEL Mercado - CNP   Palliative Care Department     Time/Communication  Greater than 50% of time spent, total 35 minutes in face-to-face counseling and coordination of care regarding symptom management.

## 2024-07-19 ENCOUNTER — HOSPITAL ENCOUNTER (OUTPATIENT)
Age: 59
Discharge: HOME | End: 2024-07-19
Payer: COMMERCIAL

## 2024-07-19 ENCOUNTER — HOSPITAL ENCOUNTER (OUTPATIENT)
Dept: INFUSION THERAPY | Age: 59
Discharge: HOME | End: 2024-07-19
Payer: COMMERCIAL

## 2024-07-19 DIAGNOSIS — C79.51 SECONDARY MALIGNANT NEOPLASM OF BONE (HCC): Primary | ICD-10-CM

## 2024-07-19 DIAGNOSIS — Z51.81 THERAPEUTIC DRUG MONITORING: ICD-10-CM

## 2024-07-19 DIAGNOSIS — C80.1 CANCER (HCC): ICD-10-CM

## 2024-07-19 LAB
ALBUMIN SERPL-MCNC: 3.1 G/DL (ref 3.5–5.2)
ALP SERPL-CCNC: 188 U/L (ref 35–104)
ALT SERPL-CCNC: 6 U/L (ref 0–32)
ANION GAP SERPL CALCULATED.3IONS-SCNC: 10 MMOL/L (ref 7–16)
AST SERPL-CCNC: 17 U/L (ref 0–31)
BASOPHILS # BLD: 0 K/UL (ref 0–0.2)
BASOPHILS NFR BLD: 0 % (ref 0–2)
BILIRUB SERPL-MCNC: 0.5 MG/DL (ref 0–1.2)
BUN SERPL-MCNC: 6 MG/DL (ref 6–23)
CALCIUM SERPL-MCNC: 8.2 MG/DL (ref 8.6–10.2)
CHLORIDE SERPL-SCNC: 99 MMOL/L (ref 98–107)
CO2 SERPL-SCNC: 27 MMOL/L (ref 22–29)
CREAT SERPL-MCNC: 0.9 MG/DL (ref 0.5–1)
EKG ATRIAL RATE: 82 BPM
EKG P AXIS: 63 DEGREES
EKG P-R INTERVAL: 148 MS
EKG Q-T INTERVAL: 392 MS
EKG QRS DURATION: 92 MS
EKG QTC CALCULATION (BAZETT): 457 MS
EKG R AXIS: -5 DEGREES
EKG T AXIS: 45 DEGREES
EKG VENTRICULAR RATE: 82 BPM
EOSINOPHIL # BLD: 0.04 K/UL (ref 0.05–0.5)
EOSINOPHILS RELATIVE PERCENT: 2 % (ref 0–6)
ERYTHROCYTE [DISTWIDTH] IN BLOOD BY AUTOMATED COUNT: 23.3 % (ref 11.5–15)
GFR, ESTIMATED: 76 ML/MIN/1.73M2
GLUCOSE SERPL-MCNC: 100 MG/DL (ref 74–99)
HCT VFR BLD AUTO: 24 % (ref 34–48)
HGB BLD-MCNC: 8.1 G/DL (ref 11.5–15.5)
LYMPHOCYTES NFR BLD: 0.18 K/UL (ref 1.5–4)
LYMPHOCYTES RELATIVE PERCENT: 10 % (ref 20–42)
MCH RBC QN AUTO: 32 PG (ref 26–35)
MCHC RBC AUTO-ENTMCNC: 33.8 G/DL (ref 32–34.5)
MCV RBC AUTO: 94.9 FL (ref 80–99.9)
MONOCYTES NFR BLD: 0.04 K/UL (ref 0.1–0.95)
MONOCYTES NFR BLD: 2 % (ref 2–12)
NEUTROPHILS NFR BLD: 86 % (ref 43–80)
NEUTS SEG NFR BLD: 1.55 K/UL (ref 1.8–7.3)
PLATELET # BLD AUTO: 88 K/UL (ref 130–450)
PLATELET CONFIRMATION: NORMAL
PMV BLD AUTO: 10.5 FL (ref 7–12)
POTASSIUM SERPL-SCNC: 3.1 MMOL/L (ref 3.5–5)
PROT SERPL-MCNC: 6 G/DL (ref 6.4–8.3)
RBC # BLD AUTO: 2.53 M/UL (ref 3.5–5.5)
RBC # BLD: ABNORMAL 10*6/UL
SODIUM SERPL-SCNC: 136 MMOL/L (ref 132–146)
WBC OTHER # BLD: 1.8 K/UL (ref 4.5–11.5)

## 2024-07-19 PROCEDURE — 93005 ELECTROCARDIOGRAM TRACING: CPT

## 2024-07-19 PROCEDURE — 85025 COMPLETE CBC W/AUTO DIFF WBC: CPT

## 2024-07-19 PROCEDURE — 80053 COMPREHEN METABOLIC PANEL: CPT

## 2024-07-19 PROCEDURE — 36415 COLL VENOUS BLD VENIPUNCTURE: CPT

## 2024-07-19 RX ORDER — EPINEPHRINE 1 MG/ML
0.3 INJECTION, SOLUTION, CONCENTRATE INTRAVENOUS PRN
OUTPATIENT
Start: 2024-07-19

## 2024-07-19 RX ORDER — POTASSIUM CHLORIDE 20 MEQ/1
20 TABLET, EXTENDED RELEASE ORAL 2 TIMES DAILY
Qty: 60 TABLET | Refills: 0 | Status: SHIPPED | OUTPATIENT
Start: 2024-07-19

## 2024-07-19 RX ORDER — SODIUM CHLORIDE 0.9 % (FLUSH) 0.9 %
5-40 SYRINGE (ML) INJECTION PRN
OUTPATIENT
Start: 2024-07-19

## 2024-07-19 RX ORDER — SODIUM CHLORIDE 9 MG/ML
5-250 INJECTION, SOLUTION INTRAVENOUS PRN
OUTPATIENT
Start: 2024-07-19

## 2024-07-19 RX ORDER — ZOLEDRONIC ACID 0.04 MG/ML
4 INJECTION, SOLUTION INTRAVENOUS ONCE
OUTPATIENT
Start: 2024-07-19 | End: 2024-07-19

## 2024-07-19 RX ORDER — ALBUTEROL SULFATE 90 UG/1
4 AEROSOL, METERED RESPIRATORY (INHALATION) PRN
OUTPATIENT
Start: 2024-07-19

## 2024-07-19 RX ORDER — HEPARIN 100 UNIT/ML
500 SYRINGE INTRAVENOUS PRN
OUTPATIENT
Start: 2024-07-19

## 2024-07-19 RX ORDER — SODIUM CHLORIDE 9 MG/ML
INJECTION, SOLUTION INTRAVENOUS CONTINUOUS
OUTPATIENT
Start: 2024-07-19

## 2024-07-19 RX ORDER — DIPHENHYDRAMINE HYDROCHLORIDE 50 MG/ML
50 INJECTION INTRAMUSCULAR; INTRAVENOUS
OUTPATIENT
Start: 2024-07-19

## 2024-07-19 RX ORDER — FAMOTIDINE 10 MG/ML
20 INJECTION, SOLUTION INTRAVENOUS
OUTPATIENT
Start: 2024-07-19

## 2024-07-19 RX ORDER — ONDANSETRON 2 MG/ML
8 INJECTION INTRAMUSCULAR; INTRAVENOUS
OUTPATIENT
Start: 2024-07-19

## 2024-07-19 RX ORDER — ACETAMINOPHEN 325 MG/1
650 TABLET ORAL
OUTPATIENT
Start: 2024-07-19

## 2024-07-22 ENCOUNTER — OFFICE VISIT (OUTPATIENT)
Dept: ONCOLOGY | Age: 59
End: 2024-07-22
Payer: COMMERCIAL

## 2024-07-22 ENCOUNTER — CLINICAL DOCUMENTATION (OUTPATIENT)
Dept: INFUSION THERAPY | Age: 59
End: 2024-07-22

## 2024-07-22 ENCOUNTER — HOSPITAL ENCOUNTER (OUTPATIENT)
Dept: INFUSION THERAPY | Age: 59
Discharge: HOME | End: 2024-07-22

## 2024-07-22 ENCOUNTER — CLINICAL DOCUMENTATION (OUTPATIENT)
Facility: HOSPITAL | Age: 59
End: 2024-07-22

## 2024-07-22 VITALS
RESPIRATION RATE: 16 BRPM | HEART RATE: 89 BPM | TEMPERATURE: 97.5 F | BODY MASS INDEX: 20.61 KG/M2 | DIASTOLIC BLOOD PRESSURE: 70 MMHG | WEIGHT: 131.6 LBS | OXYGEN SATURATION: 96 % | SYSTOLIC BLOOD PRESSURE: 103 MMHG

## 2024-07-22 DIAGNOSIS — Z51.81 THERAPEUTIC DRUG MONITORING: Primary | ICD-10-CM

## 2024-07-22 DIAGNOSIS — Z85.3 PERSONAL HISTORY OF BREAST CANCER: ICD-10-CM

## 2024-07-22 PROCEDURE — 99214 OFFICE O/P EST MOD 30 MIN: CPT | Performed by: INTERNAL MEDICINE

## 2024-07-22 PROCEDURE — 3017F COLORECTAL CA SCREEN DOC REV: CPT | Performed by: INTERNAL MEDICINE

## 2024-07-22 PROCEDURE — 99212 OFFICE O/P EST SF 10 MIN: CPT

## 2024-07-22 PROCEDURE — G8420 CALC BMI NORM PARAMETERS: HCPCS | Performed by: INTERNAL MEDICINE

## 2024-07-22 PROCEDURE — G8427 DOCREV CUR MEDS BY ELIG CLIN: HCPCS | Performed by: INTERNAL MEDICINE

## 2024-07-22 PROCEDURE — 1036F TOBACCO NON-USER: CPT | Performed by: INTERNAL MEDICINE

## 2024-07-22 NOTE — PROGRESS NOTES
Patient Assistance    Met with: PATIENT at patient appt.  Informed patient about FA and patient would like to apply.      Navigator Type: Infusion  Documentation Type: New Patient  Contact Type: In-person  Status of Patient Insurance Coverage: Patient has active coverage  Form of PAP Assistance: [facility]          Additional notes:

## 2024-07-22 NOTE — PROGRESS NOTES
Uriel Braswell  7/22/2024  Ht Readings from Last 1 Encounters:   04/22/24 1.702 m (5' 7\")     Wt Readings from Last 10 Encounters:   07/22/24 59.7 kg (131 lb 9.6 oz)   07/17/24 59.9 kg (132 lb)   07/01/24 60.1 kg (132 lb 9.6 oz)   06/28/24 55.5 kg (122 lb 6.4 oz)   06/19/24 56.7 kg (125 lb)   06/07/24 64 kg (141 lb 3.2 oz)   04/22/24 62.6 kg (138 lb)   03/06/24 67.6 kg (149 lb)   02/12/24 66.7 kg (147 lb)   02/07/24 66.7 kg (147 lb)     BMI=20.61    Assessment: Met with Uriel and her daughter while in for OV with Dr. Anand Vazquez for metastatic breast cancer. Sarita was ordered Mirtazapine on 7/3/24 and reports that it is helping her appetite. ONS of Boost VHC being taken occasionally. Not using carafate as ordered for gastritis because she said it made her sick. Uses over the counter anti GERD medication.     Weight change: 1# weight loss x 2 weeks, 5.28% significant weight gain x 1 month, 4.64% weight loss x 3 months, 12.62% significant weight loss x 6 months  Appetite: Improving with use of mirtazapine that was ordered 7/3/24, Boost VHC taken intermittently, averages less than 1 per day  Nutritional Side Effects: nausea (utilizes phenergan)  Calculated Needs: 30-32 kcal/kg CBW = 6107-3867 kcal, 1.3-1.5 gm/kg/CBW = 80-90 gm pro, 1 ml/kcal = 1583-0433 ml fluids  Malnutrition Status: Severe malnutrition  Nutrition Diagnosis: Severe malnutrition r/t breast cancer AEB significant weight loss x 6 months, eating <75% of estimated needs for >1 month, moderate subcutaneous fat loss noted to orbital/buccal fat pads, moderate muscle wasting noted to temple/clavicle region.    Recommendations: Encouraged eating at least every 2 hours. Keeping food near by to remind her to eat and to keep it easily accessible. ONS Boost VHC at least 1 per day to help provide calories and protein.     Olinda Mccain RD

## 2024-07-22 NOTE — PROGRESS NOTES
[facility]  [address]  [place] [address]  Dept: [phone]  Loc: [phone]  Attending progress note      Reason for Visit:   Recurrent metastatic breast cancer.    Referring Physician:  Julio Fried MD     PCP:  Ebenezer Mak DO    History of Present Illness:    Mrs. Braswell is a 58-year-old lady, with a past history significant for T3N3 ER positive >90% [place] negative (<1%) HER2 Negative (1+) breast cancer who underwent mastectomy with axillary lymph node dissection followed by adjuvant dose dense AC-T and multiple lines of endocrine therapy due to grade 3 arthralgias, then on tamoxifen. Unfortunately, developed back pain in late 2023 and found to have metastatic recurrence with diffuse osseous involvement in 4/2024.      CANCER/TREATMENT HISTORY   November 2015-normal mammogram but skin changes  5/31/2016 bilateral mammogram with a spiculated mass of 14 mm on ultrasound  6/17/2016 core biopsy lobular carcinoma plus LCIS ER +100% [place] +100% HER2 negative (1+  7/6/2016 mastectomy of the left breast sentinel lymph node biopsy and left axillary lymph node dissection final diagnosis T3 6 cm and 3 13 out of 23 M0 breast cancer.  She underwent reexcision the posterior wall with negative margins  9/6/2016 until 2/3/2017 adjuvant dose dense ACT  3/13/2017 until 4/27/2017 radiation to the breast  4/28/1917 Arimidex was started but very poor tolerance due to arthralgia treatment was changed to Femara  7/10/2017 Femara was stopped due to poor tolerance  7/14/2017 until 7/25/2017-Arimidex again with arthralgia  7/28/2017 exemestane started however stopped due to arthralgias  Tamoxifen was started 3/2018 (she is a MTHFR heterozygous and is good potentially cause DVTs).  Developed back pain late 2023.  4/25/2024 spine MRI showed diffuse osseous lesions.   5/4/2024 PET/CT showed innumerable intensely FDG avid lesions throughout axial and appendicular

## 2024-07-23 DIAGNOSIS — C50.512 MALIGNANT NEOPLASM OF LOWER-OUTER QUADRANT OF LEFT FEMALE BREAST, UNSPECIFIED ESTROGEN RECEPTOR STATUS (HCC): ICD-10-CM

## 2024-07-23 DIAGNOSIS — C79.51 SECONDARY MALIGNANT NEOPLASM OF BONE (HCC): Primary | ICD-10-CM

## 2024-07-26 ENCOUNTER — CLINICAL DOCUMENTATION (OUTPATIENT)
Dept: INFUSION THERAPY | Age: 59
End: 2024-07-26

## 2024-07-29 ENCOUNTER — TELEPHONE (OUTPATIENT)
Dept: INFUSION THERAPY | Age: 59
End: 2024-07-29

## 2024-07-29 DIAGNOSIS — C50.912 BREAST CANCER, STAGE 3, LEFT (HCC): ICD-10-CM

## 2024-07-29 DIAGNOSIS — G89.3 PAIN DUE TO NEOPLASM: ICD-10-CM

## 2024-07-29 DIAGNOSIS — Z51.5 PALLIATIVE CARE BY SPECIALIST: ICD-10-CM

## 2024-07-29 DIAGNOSIS — C79.51 PAIN FROM BONE METASTASES (HCC): ICD-10-CM

## 2024-07-29 DIAGNOSIS — G89.3 PAIN FROM BONE METASTASES (HCC): ICD-10-CM

## 2024-07-29 RX ORDER — OXYCODONE HYDROCHLORIDE 20 MG/1
20 TABLET ORAL
Qty: 112 TABLET | Refills: 0 | Status: SHIPPED | OUTPATIENT
Start: 2024-07-29 | End: 2024-08-12

## 2024-07-29 RX ORDER — MIRTAZAPINE 15 MG/1
15 TABLET, FILM COATED ORAL NIGHTLY
Qty: 30 TABLET | Refills: 0 | Status: SHIPPED | OUTPATIENT
Start: 2024-07-29 | End: 2024-08-28

## 2024-07-29 NOTE — TELEPHONE ENCOUNTER
Patient Uriel's daughter Atilio called for refill oxycodone 20mg and mirtazipine 15mg. Next appointment 8-. [facility]. Pharmacy verified on perfect serve

## 2024-07-29 NOTE — TELEPHONE ENCOUNTER
Patients daughter, Atilio, called. Atilio states patient is confused and not eating. Per Dr. Anand Vazquez patient needs to be evaluated in the ER. Atilio asked if she could bring in patient for labs and possible Zometa. Dr. ANAND Vazquez is agreeable but re-iterated to Atilio this may not be what is needed. Left voicemail asking Atilio to call back and schedule labs and possible Zometa if she chooses not to go to the ER. Awaiting return phone call.  Kathia Pryor, RN, BSN, OCN 7/29/24 0437

## 2024-07-30 ENCOUNTER — HOSPITAL ENCOUNTER (OUTPATIENT)
Dept: INFUSION THERAPY | Age: 59
Discharge: HOME | End: 2024-07-30
Payer: COMMERCIAL

## 2024-07-30 VITALS
HEART RATE: 88 BPM | TEMPERATURE: 97.8 F | DIASTOLIC BLOOD PRESSURE: 81 MMHG | RESPIRATION RATE: 18 BRPM | SYSTOLIC BLOOD PRESSURE: 118 MMHG | OXYGEN SATURATION: 94 %

## 2024-07-30 DIAGNOSIS — C79.51 SECONDARY MALIGNANT NEOPLASM OF BONE (HCC): Primary | ICD-10-CM

## 2024-07-30 DIAGNOSIS — Z51.81 THERAPEUTIC DRUG MONITORING: ICD-10-CM

## 2024-07-30 DIAGNOSIS — Z85.3 PERSONAL HISTORY OF BREAST CANCER: ICD-10-CM

## 2024-07-30 DIAGNOSIS — C50.512 MALIGNANT NEOPLASM OF LOWER-OUTER QUADRANT OF LEFT FEMALE BREAST, UNSPECIFIED ESTROGEN RECEPTOR STATUS (HCC): ICD-10-CM

## 2024-07-30 LAB
ALBUMIN SERPL-MCNC: 3.2 G/DL (ref 3.5–5.2)
ALP SERPL-CCNC: 248 U/L (ref 35–104)
ALT SERPL-CCNC: 5 U/L (ref 0–32)
ANION GAP SERPL CALCULATED.3IONS-SCNC: 10 MMOL/L (ref 7–16)
AST SERPL-CCNC: 17 U/L (ref 0–31)
BASOPHILS # BLD: 0.04 K/UL (ref 0–0.2)
BASOPHILS NFR BLD: 2 % (ref 0–2)
BILIRUB SERPL-MCNC: 0.3 MG/DL (ref 0–1.2)
BUN SERPL-MCNC: 14 MG/DL (ref 6–23)
CALCIUM SERPL-MCNC: 8.2 MG/DL (ref 8.6–10.2)
CEA SERPL-MCNC: 6.2 NG/ML (ref 0–5.2)
CHLORIDE SERPL-SCNC: 100 MMOL/L (ref 98–107)
CO2 SERPL-SCNC: 23 MMOL/L (ref 22–29)
CREAT SERPL-MCNC: 0.9 MG/DL (ref 0.5–1)
EOSINOPHIL # BLD: 0.02 K/UL (ref 0.05–0.5)
EOSINOPHILS RELATIVE PERCENT: 1 % (ref 0–6)
ERYTHROCYTE [DISTWIDTH] IN BLOOD BY AUTOMATED COUNT: 24.9 % (ref 11.5–15)
GFR, ESTIMATED: 71 ML/MIN/1.73M2
GLUCOSE SERPL-MCNC: 98 MG/DL (ref 74–99)
HCT VFR BLD AUTO: 21.8 % (ref 34–48)
HGB BLD-MCNC: 7.3 G/DL (ref 11.5–15.5)
IMM GRANULOCYTES # BLD AUTO: <0.03 K/UL (ref 0–0.58)
IMM GRANULOCYTES NFR BLD: 1 % (ref 0–5)
LYMPHOCYTES NFR BLD: 0.25 K/UL (ref 1.5–4)
LYMPHOCYTES RELATIVE PERCENT: 10 % (ref 20–42)
MCH RBC QN AUTO: 33.8 PG (ref 26–35)
MCHC RBC AUTO-ENTMCNC: 33.5 G/DL (ref 32–34.5)
MCV RBC AUTO: 100.9 FL (ref 80–99.9)
MONOCYTES NFR BLD: 0.15 K/UL (ref 0.1–0.95)
MONOCYTES NFR BLD: 6 % (ref 2–12)
NEUTROPHILS NFR BLD: 81 % (ref 43–80)
NEUTS SEG NFR BLD: 2.11 K/UL (ref 1.8–7.3)
PLATELET # BLD AUTO: 201 K/UL (ref 130–450)
PMV BLD AUTO: 9.7 FL (ref 7–12)
POTASSIUM SERPL-SCNC: 4.6 MMOL/L (ref 3.5–5)
PROT SERPL-MCNC: 6 G/DL (ref 6.4–8.3)
RBC # BLD AUTO: 2.16 M/UL (ref 3.5–5.5)
SODIUM SERPL-SCNC: 133 MMOL/L (ref 132–146)
WBC OTHER # BLD: 2.6 K/UL (ref 4.5–11.5)

## 2024-07-30 PROCEDURE — 96360 HYDRATION IV INFUSION INIT: CPT

## 2024-07-30 PROCEDURE — 86880 COOMBS TEST DIRECT: CPT

## 2024-07-30 PROCEDURE — 85025 COMPLETE CBC W/AUTO DIFF WBC: CPT

## 2024-07-30 PROCEDURE — 86870 RBC ANTIBODY IDENTIFICATION: CPT

## 2024-07-30 PROCEDURE — 80053 COMPREHEN METABOLIC PANEL: CPT

## 2024-07-30 PROCEDURE — 86300 IMMUNOASSAY TUMOR CA 15-3: CPT

## 2024-07-30 PROCEDURE — 86850 RBC ANTIBODY SCREEN: CPT

## 2024-07-30 PROCEDURE — 86922 COMPATIBILITY TEST ANTIGLOB: CPT

## 2024-07-30 PROCEDURE — 86901 BLOOD TYPING SEROLOGIC RH(D): CPT

## 2024-07-30 PROCEDURE — 82378 CARCINOEMBRYONIC ANTIGEN: CPT

## 2024-07-30 PROCEDURE — 86920 COMPATIBILITY TEST SPIN: CPT

## 2024-07-30 PROCEDURE — 86905 BLOOD TYPING RBC ANTIGENS: CPT

## 2024-07-30 PROCEDURE — 86902 BLOOD TYPE ANTIGEN DONOR EA: CPT

## 2024-07-30 PROCEDURE — 2580000003 HC RX 258: Performed by: INTERNAL MEDICINE

## 2024-07-30 PROCEDURE — 86900 BLOOD TYPING SEROLOGIC ABO: CPT

## 2024-07-30 RX ORDER — 0.9 % SODIUM CHLORIDE 0.9 %
1000 INTRAVENOUS SOLUTION INTRAVENOUS ONCE
Status: CANCELLED | OUTPATIENT
Start: 2024-07-30 | End: 2024-07-30

## 2024-07-30 RX ORDER — DIPHENHYDRAMINE HYDROCHLORIDE 50 MG/ML
50 INJECTION INTRAMUSCULAR; INTRAVENOUS
Status: CANCELLED | OUTPATIENT
Start: 2024-07-30

## 2024-07-30 RX ORDER — SODIUM CHLORIDE 9 MG/ML
5-250 INJECTION, SOLUTION INTRAVENOUS PRN
OUTPATIENT
Start: 2024-07-30

## 2024-07-30 RX ORDER — ONDANSETRON 2 MG/ML
8 INJECTION INTRAMUSCULAR; INTRAVENOUS
OUTPATIENT
Start: 2024-07-30

## 2024-07-30 RX ORDER — ACETAMINOPHEN 325 MG/1
650 TABLET ORAL
OUTPATIENT
Start: 2024-07-30

## 2024-07-30 RX ORDER — ALBUTEROL SULFATE 90 UG/1
4 AEROSOL, METERED RESPIRATORY (INHALATION) PRN
OUTPATIENT
Start: 2024-07-30

## 2024-07-30 RX ORDER — SODIUM CHLORIDE 9 MG/ML
INJECTION, SOLUTION INTRAVENOUS CONTINUOUS
Status: CANCELLED | OUTPATIENT
Start: 2024-07-30

## 2024-07-30 RX ORDER — SODIUM CHLORIDE 9 MG/ML
25 INJECTION, SOLUTION INTRAVENOUS PRN
Status: CANCELLED | OUTPATIENT
Start: 2024-07-30

## 2024-07-30 RX ORDER — SODIUM CHLORIDE 9 MG/ML
5-250 INJECTION, SOLUTION INTRAVENOUS PRN
Status: CANCELLED | OUTPATIENT
Start: 2024-07-30

## 2024-07-30 RX ORDER — EPINEPHRINE 1 MG/ML
0.3 INJECTION, SOLUTION, CONCENTRATE INTRAVENOUS PRN
Status: CANCELLED | OUTPATIENT
Start: 2024-07-30

## 2024-07-30 RX ORDER — ACETAMINOPHEN 325 MG/1
650 TABLET ORAL
Status: CANCELLED | OUTPATIENT
Start: 2024-07-30

## 2024-07-30 RX ORDER — ALBUTEROL SULFATE 90 UG/1
4 AEROSOL, METERED RESPIRATORY (INHALATION) PRN
Status: CANCELLED | OUTPATIENT
Start: 2024-07-30

## 2024-07-30 RX ORDER — HEPARIN 100 UNIT/ML
500 SYRINGE INTRAVENOUS PRN
Status: CANCELLED | OUTPATIENT
Start: 2024-07-30

## 2024-07-30 RX ORDER — ONDANSETRON 2 MG/ML
8 INJECTION INTRAMUSCULAR; INTRAVENOUS
Status: CANCELLED | OUTPATIENT
Start: 2024-07-30

## 2024-07-30 RX ORDER — SODIUM CHLORIDE 9 MG/ML
INJECTION, SOLUTION INTRAVENOUS CONTINUOUS
OUTPATIENT
Start: 2024-07-30

## 2024-07-30 RX ORDER — DIPHENHYDRAMINE HYDROCHLORIDE 50 MG/ML
50 INJECTION INTRAMUSCULAR; INTRAVENOUS
OUTPATIENT
Start: 2024-07-30

## 2024-07-30 RX ORDER — FAMOTIDINE 10 MG/ML
20 INJECTION, SOLUTION INTRAVENOUS
Status: CANCELLED | OUTPATIENT
Start: 2024-07-30

## 2024-07-30 RX ORDER — SODIUM CHLORIDE 9 MG/ML
20 INJECTION, SOLUTION INTRAVENOUS CONTINUOUS
Status: CANCELLED | OUTPATIENT
Start: 2024-07-30

## 2024-07-30 RX ORDER — EPINEPHRINE 1 MG/ML
0.3 INJECTION, SOLUTION, CONCENTRATE INTRAVENOUS PRN
OUTPATIENT
Start: 2024-07-30

## 2024-07-30 RX ORDER — SODIUM CHLORIDE 0.9 % (FLUSH) 0.9 %
5-40 SYRINGE (ML) INJECTION PRN
Status: DISCONTINUED | OUTPATIENT
Start: 2024-07-30 | End: 2024-07-31 | Stop reason: HOSPADM

## 2024-07-30 RX ORDER — SODIUM CHLORIDE 0.9 % (FLUSH) 0.9 %
5-40 SYRINGE (ML) INJECTION PRN
OUTPATIENT
Start: 2024-07-30

## 2024-07-30 RX ORDER — SODIUM CHLORIDE 0.9 % (FLUSH) 0.9 %
5-40 SYRINGE (ML) INJECTION PRN
Status: CANCELLED | OUTPATIENT
Start: 2024-07-30

## 2024-07-30 RX ORDER — HEPARIN 100 UNIT/ML
500 SYRINGE INTRAVENOUS PRN
OUTPATIENT
Start: 2024-07-30

## 2024-07-30 RX ORDER — FAMOTIDINE 10 MG/ML
20 INJECTION, SOLUTION INTRAVENOUS
OUTPATIENT
Start: 2024-07-30

## 2024-07-30 RX ORDER — SODIUM CHLORIDE 9 MG/ML
1000 INJECTION, SOLUTION INTRAVENOUS ONCE
Status: COMPLETED | OUTPATIENT
Start: 2024-07-30 | End: 2024-07-30

## 2024-07-30 RX ADMIN — SODIUM CHLORIDE, PRESERVATIVE FREE 10 ML: 5 INJECTION INTRAVENOUS at 14:24

## 2024-07-30 RX ADMIN — SODIUM CHLORIDE 1000 ML: 9 INJECTION, SOLUTION INTRAVENOUS at 14:24

## 2024-07-30 ASSESSMENT — PAIN SCALES - GENERAL: PAINLEVEL_OUTOF10: 8

## 2024-07-30 ASSESSMENT — PAIN DESCRIPTION - ORIENTATION: ORIENTATION: LOWER

## 2024-07-30 ASSESSMENT — PAIN DESCRIPTION - PAIN TYPE: TYPE: CHRONIC PAIN

## 2024-07-30 ASSESSMENT — PAIN DESCRIPTION - LOCATION: LOCATION: BACK

## 2024-07-30 NOTE — PROGRESS NOTES
Patient tolerated infusion well. Patient alert and oriented x3.   No distress noted.   Vital signs stable.   Patient denies any new or worsening pain.

## 2024-07-30 NOTE — PROGRESS NOTES
Per Dr. Anand Vazquez hold Zometa for today, 7/30/24. Patient to receive 1L of hydration today and 1 unit PRBC tomorrow

## 2024-07-31 ENCOUNTER — HOSPITAL ENCOUNTER (OUTPATIENT)
Dept: INFUSION THERAPY | Age: 59
Discharge: HOME | End: 2024-07-31
Payer: COMMERCIAL

## 2024-07-31 VITALS
HEART RATE: 89 BPM | TEMPERATURE: 97.4 F | RESPIRATION RATE: 16 BRPM | DIASTOLIC BLOOD PRESSURE: 71 MMHG | OXYGEN SATURATION: 90 % | SYSTOLIC BLOOD PRESSURE: 116 MMHG

## 2024-07-31 DIAGNOSIS — C50.512 MALIGNANT NEOPLASM OF LOWER-OUTER QUADRANT OF LEFT FEMALE BREAST, UNSPECIFIED ESTROGEN RECEPTOR STATUS (HCC): Primary | ICD-10-CM

## 2024-07-31 PROCEDURE — P9016 RBC LEUKOCYTES REDUCED: HCPCS

## 2024-07-31 PROCEDURE — 36430 TRANSFUSION BLD/BLD COMPNT: CPT

## 2024-07-31 PROCEDURE — 2580000003 HC RX 258: Performed by: INTERNAL MEDICINE

## 2024-07-31 PROCEDURE — 96360 HYDRATION IV INFUSION INIT: CPT

## 2024-07-31 PROCEDURE — 6370000000 HC RX 637 (ALT 250 FOR IP)

## 2024-07-31 PROCEDURE — 96361 HYDRATE IV INFUSION ADD-ON: CPT

## 2024-07-31 RX ORDER — SODIUM CHLORIDE 0.9 % (FLUSH) 0.9 %
5-40 SYRINGE (ML) INJECTION PRN
Status: CANCELLED | OUTPATIENT
Start: 2024-07-31

## 2024-07-31 RX ORDER — SODIUM CHLORIDE 9 MG/ML
20 INJECTION, SOLUTION INTRAVENOUS CONTINUOUS
Status: DISCONTINUED | OUTPATIENT
Start: 2024-07-31 | End: 2024-08-01 | Stop reason: HOSPADM

## 2024-07-31 RX ORDER — ONDANSETRON 2 MG/ML
8 INJECTION INTRAMUSCULAR; INTRAVENOUS
OUTPATIENT
Start: 2024-07-31

## 2024-07-31 RX ORDER — FAMOTIDINE 10 MG/ML
20 INJECTION, SOLUTION INTRAVENOUS
OUTPATIENT
Start: 2024-07-31

## 2024-07-31 RX ORDER — SODIUM CHLORIDE 9 MG/ML
INJECTION, SOLUTION INTRAVENOUS CONTINUOUS
OUTPATIENT
Start: 2024-07-31

## 2024-07-31 RX ORDER — DIPHENHYDRAMINE HCL 25 MG
TABLET ORAL
Status: COMPLETED
Start: 2024-07-31 | End: 2024-07-31

## 2024-07-31 RX ORDER — EPINEPHRINE 1 MG/ML
0.3 INJECTION, SOLUTION, CONCENTRATE INTRAVENOUS PRN
OUTPATIENT
Start: 2024-07-31

## 2024-07-31 RX ORDER — ACETAMINOPHEN 325 MG/1
650 TABLET ORAL ONCE
Status: COMPLETED | OUTPATIENT
Start: 2024-07-31 | End: 2024-07-31

## 2024-07-31 RX ORDER — DIPHENHYDRAMINE HCL 25 MG
25 TABLET ORAL ONCE
Status: COMPLETED | OUTPATIENT
Start: 2024-07-31 | End: 2024-07-31

## 2024-07-31 RX ORDER — SODIUM CHLORIDE 9 MG/ML
25 INJECTION, SOLUTION INTRAVENOUS PRN
OUTPATIENT
Start: 2024-07-31

## 2024-07-31 RX ORDER — ALBUTEROL SULFATE 90 UG/1
4 AEROSOL, METERED RESPIRATORY (INHALATION) PRN
OUTPATIENT
Start: 2024-07-31

## 2024-07-31 RX ORDER — SODIUM CHLORIDE 0.9 % (FLUSH) 0.9 %
5-40 SYRINGE (ML) INJECTION PRN
Status: DISCONTINUED | OUTPATIENT
Start: 2024-07-31 | End: 2024-08-01 | Stop reason: HOSPADM

## 2024-07-31 RX ORDER — ACETAMINOPHEN 325 MG/1
TABLET ORAL
Status: COMPLETED
Start: 2024-07-31 | End: 2024-07-31

## 2024-07-31 RX ORDER — SODIUM CHLORIDE 9 MG/ML
20 INJECTION, SOLUTION INTRAVENOUS CONTINUOUS
Status: CANCELLED | OUTPATIENT
Start: 2024-07-31

## 2024-07-31 RX ORDER — DIPHENHYDRAMINE HYDROCHLORIDE 50 MG/ML
50 INJECTION INTRAMUSCULAR; INTRAVENOUS
OUTPATIENT
Start: 2024-07-31

## 2024-07-31 RX ORDER — ACETAMINOPHEN 325 MG/1
650 TABLET ORAL
OUTPATIENT
Start: 2024-07-31

## 2024-07-31 RX ADMIN — ACETAMINOPHEN 650 MG: 325 TABLET ORAL at 09:54

## 2024-07-31 RX ADMIN — DIPHENHYDRAMINE HYDROCHLORIDE 25 MG: 25 TABLET ORAL at 09:54

## 2024-07-31 RX ADMIN — SODIUM CHLORIDE 20 ML/HR: 9 INJECTION, SOLUTION INTRAVENOUS at 09:36

## 2024-07-31 RX ADMIN — Medication 25 MG: at 09:54

## 2024-07-31 ASSESSMENT — PAIN DESCRIPTION - FREQUENCY: FREQUENCY: CONTINUOUS

## 2024-07-31 ASSESSMENT — PAIN DESCRIPTION - ONSET: ONSET: ON-GOING

## 2024-07-31 ASSESSMENT — PAIN SCALES - GENERAL: PAINLEVEL_OUTOF10: 9

## 2024-07-31 ASSESSMENT — PAIN DESCRIPTION - ORIENTATION: ORIENTATION: LOWER

## 2024-07-31 ASSESSMENT — PAIN DESCRIPTION - LOCATION: LOCATION: BACK

## 2024-07-31 ASSESSMENT — PAIN DESCRIPTION - PAIN TYPE: TYPE: CHRONIC PAIN

## 2024-07-31 NOTE — PROGRESS NOTES
Patient tolerated blood transfusion well. Patient alert and oriented x3.   No distress noted.   Vital signs stable.   Patient denies any new or worsening pain.

## 2024-08-01 LAB
ABO/RH: NORMAL
ANTIBODY IDENTIFICATION: NORMAL
ANTIBODY SCREEN: POSITIVE
ANTIGEN TYPE, PATIENT: NORMAL
ARM BAND NUMBER: NORMAL
BLOOD BANK BLOOD PRODUCT EXPIRATION DATE: NORMAL
BLOOD BANK DISPENSE STATUS: NORMAL
BLOOD BANK ISBT PRODUCT BLOOD TYPE: 6200
BLOOD BANK PRODUCT CODE: NORMAL
BLOOD BANK SAMPLE EXPIRATION: NORMAL
BLOOD BANK UNIT TYPE AND RH: NORMAL
BPU ID: NORMAL
COMPONENT: NORMAL
CROSSMATCH RESULT: NORMAL
DAT, POLYSPECIFIC: NEGATIVE
TRANSFUSION STATUS: NORMAL
UNIT DIVISION: 0
UNIT ISSUE DATE/TIME: NORMAL

## 2024-08-02 LAB — CANCER AG15-3 SERPL-ACNC: 293 U/ML (ref 0–31)

## 2024-08-08 ENCOUNTER — TELEPHONE (OUTPATIENT)
Dept: INFUSION THERAPY | Age: 59
End: 2024-08-08

## 2024-08-08 NOTE — TELEPHONE ENCOUNTER
Patient's daughter, Atilio, called. Patient's legs bilaterally are swollen . Elevation is not helping. Atilio is concerned for cellulitis on left foot. Per Dr. Anand Vazquez, patient needs to go to ER for evaluation. In addition,  labs can be drawn as scheduled for next visit. Call back with questions or concerns.  Kathia Pryor RN, BSN, OCN 8/8/24 [id number]

## 2024-08-15 ENCOUNTER — TELEPHONE (OUTPATIENT)
Dept: PALLATIVE CARE | Age: 59
End: 2024-08-15

## 2024-08-15 DIAGNOSIS — C50.912 BREAST CANCER, STAGE 3, LEFT (HCC): ICD-10-CM

## 2024-08-15 DIAGNOSIS — G89.3 PAIN DUE TO NEOPLASM: ICD-10-CM

## 2024-08-15 DIAGNOSIS — Z51.5 PALLIATIVE CARE BY SPECIALIST: ICD-10-CM

## 2024-08-15 DIAGNOSIS — G89.3 PAIN FROM BONE METASTASES (HCC): ICD-10-CM

## 2024-08-15 DIAGNOSIS — C79.51 PAIN FROM BONE METASTASES (HCC): ICD-10-CM

## 2024-08-15 RX ORDER — OXYCODONE HYDROCHLORIDE 20 MG/1
20 TABLET ORAL
Qty: 112 TABLET | Refills: 0 | Status: SHIPPED
Start: 2024-08-15 | End: 2024-08-15 | Stop reason: SDUPTHER

## 2024-08-15 RX ORDER — OXYCODONE HYDROCHLORIDE 20 MG/1
20 TABLET ORAL
Qty: 112 TABLET | Refills: 0 | Status: SHIPPED
Start: 2024-08-15 | End: 2024-08-30 | Stop reason: SDUPTHER

## 2024-08-15 NOTE — TELEPHONE ENCOUNTER
Patient Uriel's daughter Atilio called for refill oxycodone 20mg. [facility]. Next appointment 8-. Pharmacy verified on perfect serve. Routed call to A Kat, NP

## 2024-08-15 NOTE — TELEPHONE ENCOUNTER
Patient Uriel called and stated that [facility] does not have her oxycodone 20mg and would like to use [facility] in [place]. Please send refill to [place] [facility]. Routed call to A CHAVA Stephens

## 2024-08-17 DIAGNOSIS — Z51.5 PALLIATIVE CARE BY SPECIALIST: ICD-10-CM

## 2024-08-17 DIAGNOSIS — G89.3 CHRONIC PAIN DUE TO NEOPLASM: ICD-10-CM

## 2024-08-19 ENCOUNTER — EVALUATION (OUTPATIENT)
Dept: PHYSICAL THERAPY | Age: 59
End: 2024-08-19
Payer: COMMERCIAL

## 2024-08-19 DIAGNOSIS — R53.0 NEOPLASTIC MALIGNANT RELATED FATIGUE: ICD-10-CM

## 2024-08-19 DIAGNOSIS — C79.51 SECONDARY MALIGNANT NEOPLASM OF BONE (HCC): Primary | ICD-10-CM

## 2024-08-19 DIAGNOSIS — R53.81 DEBILITY: ICD-10-CM

## 2024-08-19 PROCEDURE — 97163 PT EVAL HIGH COMPLEX 45 MIN: CPT | Performed by: PHYSICAL THERAPIST

## 2024-08-19 NOTE — PROGRESS NOTES
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      Chief complaint:  weakness, can't straighten up due to weakness and pain, can't walk    Behavior: condition is staying the same    Pain:   Current: 7/10       Pain frequency: constant  Symptom Type / Quality: sharp, aching  Location:: central lower thoracic and upper lumbar area    Imaging results: Vascular duplex lower extremity venous bilateral    Result Date: 2024  EXAMINATION: DUPLEX VENOUS ULTRASOUND OF THE BILATERAL LOWER EXTREMITIES2024 3:45 pm TECHNIQUE: Duplex ultrasound using B-mode/gray scaled imaging and Doppler spectral analysis and color flow was obtained of the deep venous structures of the bilateral extremities. COMPARISON: None. HISTORY: ORDERING SYSTEM PROVIDED HISTORY: EDEMA FINDINGS: The visualized veins of the bilateral lower extremities are patent and free of echogenic thrombus. The veins demonstrate good compressibility with normal color flow study and spectral analysis.     No evidence of DVT in either lower extremity.       Past Medical History:  Past Medical History:   Diagnosis Date    Breast cancer (HCC)     History of therapeutic radiation     MTHFR mutation     Neuropathy      Past Surgical History:   Procedure Laterality Date      < 4         ASSESSMENT     Uriel became markedly weak and debilitated after having mets to spine that required radiation treatments. We will work on strength and gait w/ emphasis on function and safety.    Problems:   Pain 10/10 intermittent, waxing / waning  ROM decreased  Strength decreased   Balance decreased in standing , walking  Limitations with walking, stairs, standing straight and holding head upright      [x] There are no barriers affecting plan of care or recovery    [] Barriers to this patient's plan of care or recovery include:      Domestic Concerns:  [x] No  [] Yes:    Short Term goals (2-3 weeks)  TU seconds.  30-Sec. Chair Stand Test: 1 reps  Increase Strength to 4+/5 LEs   Able to perform / complete the following functions / tasks: improved gait with less assistance     Long Term goals (4-6 weeks)  TU seconds.  30-Sec. Chair Stand Test: 3 reps  Increase Strength to 5-/5 LEs   Demonstrate improved balance in standing, in walking  Able to perform / complete the following functions / tasks: progress assistive device, improved gait with less assistance   Independent with Home Exercise Programs     Rehab Potential: [x] Good  [] Fair  [] Poor    PLAN   Time: 3270-1058    45 minutes    Treatment Plan:  instruction in home exercise program   therapeutic exercise   therapeutic activity   neuromuscular re-education   gait training   manual therapy     The following CPT codes are likely to be used in the care of this patient:   12683 PT Evaluation: High Complexity     Suggested Professional Referral: [x] No  [] Yes:     Patient Education:  [x] Plans / Goals, Risks / Benefits discussed  [x] Home exercise program  Method of Education: [x] Verbal  [x] Demo  [x] Written  Comprehension of Education:  [x] Verbalizes understanding.  [x] Demonstrates understanding.  [] Needs Review.  [] Demonstrates / verbalizes understanding of HEP / Ed previously given.    Frequency:  1-2 days per week for 4-6

## 2024-08-20 ENCOUNTER — TELEPHONE (OUTPATIENT)
Dept: PALLATIVE CARE | Age: 59
End: 2024-08-20

## 2024-08-20 ENCOUNTER — TREATMENT (OUTPATIENT)
Dept: PHYSICAL THERAPY | Age: 59
End: 2024-08-20
Payer: COMMERCIAL

## 2024-08-20 DIAGNOSIS — R53.0 NEOPLASTIC MALIGNANT RELATED FATIGUE: ICD-10-CM

## 2024-08-20 DIAGNOSIS — R53.81 DEBILITY: ICD-10-CM

## 2024-08-20 DIAGNOSIS — C79.51 SECONDARY MALIGNANT NEOPLASM OF BONE (HCC): Primary | ICD-10-CM

## 2024-08-20 PROCEDURE — 97530 THERAPEUTIC ACTIVITIES: CPT | Performed by: PHYSICAL THERAPIST

## 2024-08-20 RX ORDER — PREGABALIN 100 MG/1
CAPSULE ORAL
Qty: 90 CAPSULE | Refills: 0 | Status: SHIPPED | OUTPATIENT
Start: 2024-08-20 | End: 2024-09-19

## 2024-08-20 NOTE — TELEPHONE ENCOUNTER
Received phone call from Uriel's daugtherAtilio, requesting refill of her Lyrica, she ran out. Prescription sent to pharmacy.

## 2024-08-20 NOTE — TELEPHONE ENCOUNTER
Received call from Uriel's daughter, her mother ran out of her Lyrica, she has an appointment with PM tomorrow. Prescription for home Lyrica sent to [facility] in [place].

## 2024-08-20 NOTE — PROGRESS NOTES
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 She was diagnosed w/ breast CA 5 years ago and completed treatment. We will work on strength and gait w/ emphasis on function and safety.      X = TO BE PERFORMED NEXT VISIT  > = PROGRESS TO THIS    S: See eval  O: Discussed anatomy, physiology, body mechanics, principles of loading, and progressive loading/activity.  Reviewed home exercise program extensively; written copy provided.     Access Code: [email or web address]  URL: [email or web address]  Date: 2024  Prepared by: Cayden Hutson    Exercises  - Standing Marching  - 2 x daily - 7 x weekly - 2-3 sets - 15 reps  - Standing Heel Raise  - 2 x daily - 7 x weekly - 2-3 sets - 15 reps  - Seated Long Arc Quad  - 2 x daily - 7 x weekly - 2 sets - 10 reps - 3 sec hold    Time 7358-8366     Visit 2 Repeat outcome measure at mid point and end.    Pain Pain with activity 7/10     ROM      Modalities            Exercise      Nustep     TE         Squats   TA   Calf Raises 2 x 10  TA   Toe Raises   TA   Marches 2 x 10  TA   Alt. Sidekicks   TA   LAQ 2 x 10 bilateral     Sit/Stands   TA         Gait training   GT   Walking for endurance 8 min  TA   Marching Gait   NR   Sidestepping   NR                                 A:  Tolerated well.    P: Continue with rehab plan  Cayden Hutson PT    Treatment Charges: Mins

## 2024-08-21 ENCOUNTER — HOSPITAL ENCOUNTER (OUTPATIENT)
Dept: INFUSION THERAPY | Age: 59
Discharge: HOME | End: 2024-08-21
Payer: COMMERCIAL

## 2024-08-21 ENCOUNTER — OFFICE VISIT (OUTPATIENT)
Dept: PALLATIVE CARE | Age: 59
End: 2024-08-21
Payer: COMMERCIAL

## 2024-08-21 VITALS
TEMPERATURE: 97.2 F | WEIGHT: 131 LBS | OXYGEN SATURATION: 93 % | HEART RATE: 106 BPM | DIASTOLIC BLOOD PRESSURE: 71 MMHG | SYSTOLIC BLOOD PRESSURE: 126 MMHG | BODY MASS INDEX: 20.52 KG/M2

## 2024-08-21 DIAGNOSIS — C79.51 SECONDARY MALIGNANT NEOPLASM OF BONE (HCC): Primary | ICD-10-CM

## 2024-08-21 DIAGNOSIS — C79.51 SECONDARY MALIGNANT NEOPLASM OF BONE (HCC): ICD-10-CM

## 2024-08-21 DIAGNOSIS — G89.3 CHRONIC PAIN DUE TO NEOPLASM: Primary | ICD-10-CM

## 2024-08-21 DIAGNOSIS — Z51.5 PALLIATIVE CARE BY SPECIALIST: ICD-10-CM

## 2024-08-21 DIAGNOSIS — C80.1 CANCER (HCC): ICD-10-CM

## 2024-08-21 DIAGNOSIS — C79.51 METASTATIC CANCER TO SPINE (HCC): ICD-10-CM

## 2024-08-21 DIAGNOSIS — C50.919 STAGE IV BREAST CANCER IN FEMALE (HCC): ICD-10-CM

## 2024-08-21 LAB
ALBUMIN SERPL-MCNC: 3.2 G/DL (ref 3.5–5.2)
ALP SERPL-CCNC: 193 U/L (ref 35–104)
ALT SERPL-CCNC: <5 U/L (ref 0–32)
ANION GAP SERPL CALCULATED.3IONS-SCNC: 11 MMOL/L (ref 7–16)
AST SERPL-CCNC: 14 U/L (ref 0–31)
BASOPHILS # BLD: 0 K/UL (ref 0–0.2)
BASOPHILS NFR BLD: 0 % (ref 0–2)
BILIRUB SERPL-MCNC: 0.3 MG/DL (ref 0–1.2)
BUN SERPL-MCNC: 5 MG/DL (ref 6–23)
CALCIUM SERPL-MCNC: 8.8 MG/DL (ref 8.6–10.2)
CHLORIDE SERPL-SCNC: 99 MMOL/L (ref 98–107)
CO2 SERPL-SCNC: 26 MMOL/L (ref 22–29)
CREAT SERPL-MCNC: 0.8 MG/DL (ref 0.5–1)
EOSINOPHIL # BLD: 0.04 K/UL (ref 0.05–0.5)
EOSINOPHILS RELATIVE PERCENT: 1 % (ref 0–6)
ERYTHROCYTE [DISTWIDTH] IN BLOOD BY AUTOMATED COUNT: 24.9 % (ref 11.5–15)
GFR, ESTIMATED: 88 ML/MIN/1.73M2
GLUCOSE SERPL-MCNC: 91 MG/DL (ref 74–99)
HCT VFR BLD AUTO: 25.3 % (ref 34–48)
HGB BLD-MCNC: 8.3 G/DL (ref 11.5–15.5)
LYMPHOCYTES NFR BLD: 0.57 K/UL (ref 1.5–4)
LYMPHOCYTES RELATIVE PERCENT: 15 % (ref 20–42)
MCH RBC QN AUTO: 34.6 PG (ref 26–35)
MCHC RBC AUTO-ENTMCNC: 32.8 G/DL (ref 32–34.5)
MCV RBC AUTO: 105.4 FL (ref 80–99.9)
METAMYELOCYTES ABSOLUTE COUNT: 0.04 K/UL (ref 0–0.12)
METAMYELOCYTES: 1 % (ref 0–1)
MONOCYTES NFR BLD: 0.84 K/UL (ref 0.1–0.95)
MONOCYTES NFR BLD: 22 % (ref 2–12)
MYELOCYTES ABSOLUTE COUNT: 0.04 K/UL
MYELOCYTES: 1 %
NEUTROPHILS NFR BLD: 60 % (ref 43–80)
NEUTS SEG NFR BLD: 2.28 K/UL (ref 1.8–7.3)
PLATELET # BLD AUTO: 172 K/UL (ref 130–450)
PMV BLD AUTO: 9.7 FL (ref 7–12)
POTASSIUM SERPL-SCNC: 4.1 MMOL/L (ref 3.5–5)
PROT SERPL-MCNC: 6 G/DL (ref 6.4–8.3)
RBC # BLD AUTO: 2.4 M/UL (ref 3.5–5.5)
RBC # BLD: ABNORMAL 10*6/UL
SODIUM SERPL-SCNC: 136 MMOL/L (ref 132–146)
WBC OTHER # BLD: 3.8 K/UL (ref 4.5–11.5)

## 2024-08-21 PROCEDURE — G8427 DOCREV CUR MEDS BY ELIG CLIN: HCPCS | Performed by: NURSE PRACTITIONER

## 2024-08-21 PROCEDURE — 80053 COMPREHEN METABOLIC PANEL: CPT

## 2024-08-21 PROCEDURE — 1036F TOBACCO NON-USER: CPT | Performed by: NURSE PRACTITIONER

## 2024-08-21 PROCEDURE — 99211 OFF/OP EST MAY X REQ PHY/QHP: CPT | Performed by: NURSE PRACTITIONER

## 2024-08-21 PROCEDURE — 85025 COMPLETE CBC W/AUTO DIFF WBC: CPT

## 2024-08-21 PROCEDURE — 36415 COLL VENOUS BLD VENIPUNCTURE: CPT

## 2024-08-21 PROCEDURE — G8420 CALC BMI NORM PARAMETERS: HCPCS | Performed by: NURSE PRACTITIONER

## 2024-08-21 PROCEDURE — 99214 OFFICE O/P EST MOD 30 MIN: CPT | Performed by: NURSE PRACTITIONER

## 2024-08-21 PROCEDURE — 3017F COLORECTAL CA SCREEN DOC REV: CPT | Performed by: NURSE PRACTITIONER

## 2024-08-21 RX ORDER — METHADONE HYDROCHLORIDE 10 MG/1
10 TABLET ORAL 2 TIMES DAILY
Qty: 60 TABLET | Refills: 0 | Status: SHIPPED | OUTPATIENT
Start: 2024-08-21 | End: 2024-09-20

## 2024-08-21 NOTE — PROGRESS NOTES
Department of Palliative Medicine  Ambulatory visit  Provider: JOSE ANGEL Mercado - CNP       Chief Complaint: Uriel Braswell is a 59 y.o. female with chief complaint of Pain    HPI:  54 y/o post-menopausal  female who underwent Stereotactic Left Breast mass core biopsy on 06/17/2016 revealing Invasive Lobular carcinoma, she has developed severe neuropathy after her last treatment.     Assessment/Plan      Breast cancer:   - tamoxifen stopped   - Follows at [facility]   - Progression of disease with bone/spinal mets noted in April 2024  - completed palliative radiation  -On Kisqali    Chronic pain related to neoplasm/pain due to spinal metastasis:  -Oxycodone 20 mg every 3 hours  -Methadone 10 mg BID   -QTc 457 on July 19, 2024   -Lyrica to 100 mg in the morning, 200 mg nightly    -Cymbalta 20 mg daily   -Alternate with Tylenol and ibuprofen    Anxiety/Depression:   -Xanax 1 mg TID as needed  -Cymbalta 20 mg daily   -started seeing psychiatry, but has stopped going  -She stopped Buspar and trintelix    Constipation:   -Senna S    Decreased oral intake/poor appetite:   -Encourage use of oral nutritional supplements, recommend boost very high-calorie   -Mirtazapine 15 mg at bedtime was added    Nausea;   -Phenergan    Fatigue related neoplasm/physical debility:   -Encourage physical activity as tolerated   -Encouraged to continue to increase oral intake and utilization of oral nutritional supplements   -Will refer for outpatient physical therapy    Follow Up: 4 weeks. Encouraged to call with any questions, concerns, needs, or changes in symptoms.    Subjective:     Uriel Braswell is a 59 y.o. female with significant past medical history of Breast cancer.  Managed at [facility], has been on tamoxifen.  Unfortunately she is found to have metastasis to her spine and bones and April 2024.  She completed palliative radiation therapy.  She is now on kisqali.    8/21/24:  Sarita presents accompanied by her

## 2024-08-22 ENCOUNTER — OFFICE VISIT (OUTPATIENT)
Dept: ONCOLOGY | Age: 59
End: 2024-08-22

## 2024-08-22 ENCOUNTER — TREATMENT (OUTPATIENT)
Dept: PHYSICAL THERAPY | Age: 59
End: 2024-08-22
Payer: COMMERCIAL

## 2024-08-22 VITALS
BODY MASS INDEX: 22.44 KG/M2 | HEIGHT: 67 IN | SYSTOLIC BLOOD PRESSURE: 114 MMHG | HEART RATE: 86 BPM | TEMPERATURE: 97.7 F | OXYGEN SATURATION: 92 % | WEIGHT: 143 LBS | DIASTOLIC BLOOD PRESSURE: 69 MMHG

## 2024-08-22 DIAGNOSIS — Z51.5 ENCOUNTER FOR PALLIATIVE CARE: ICD-10-CM

## 2024-08-22 DIAGNOSIS — Z51.81 THERAPEUTIC DRUG MONITORING: Primary | ICD-10-CM

## 2024-08-22 DIAGNOSIS — C79.51 SECONDARY MALIGNANT NEOPLASM OF BONE (HCC): Primary | ICD-10-CM

## 2024-08-22 DIAGNOSIS — Z85.3 PERSONAL HISTORY OF BREAST CANCER: ICD-10-CM

## 2024-08-22 DIAGNOSIS — R53.0 NEOPLASTIC MALIGNANT RELATED FATIGUE: ICD-10-CM

## 2024-08-22 DIAGNOSIS — R53.81 DEBILITY: ICD-10-CM

## 2024-08-22 PROCEDURE — 97530 THERAPEUTIC ACTIVITIES: CPT

## 2024-08-22 PROCEDURE — 97110 THERAPEUTIC EXERCISES: CPT

## 2024-08-22 RX ORDER — ALPRAZOLAM 1 MG/1
1 TABLET ORAL 3 TIMES DAILY PRN
Qty: 45 TABLET | Refills: 0 | Status: SHIPPED | OUTPATIENT
Start: 2024-08-22 | End: 2024-09-06

## 2024-08-22 NOTE — PROGRESS NOTES
Ther Activities        TA 20 1   Gait Training          GT     Neuro Re-education NR     Modalities     Non-Billable Service Time     Other     Total Time/Units 30 2       Neurosurgery

## 2024-08-22 NOTE — TELEPHONE ENCOUNTER
Call from Atilio requesting refill for Xanax, they forgot to ask for this at clinic wily yesterday. Pharmacy is [facility] in [place]. Next wily 9/18/24.

## 2024-08-22 NOTE — PROGRESS NOTES
[facility]  [address]  [place] [address]  Dept: [phone]  Loc: [phone]  Attending progress note      Reason for Visit:   Recurrent metastatic breast cancer.    Referring Physician:  Julio Fried MD     PCP:  Ebenezer Mak DO    History of Present Illness:    Mrs. Braswell is a 59-year-old lady, with a past history significant for T3N3 ER positive >90% [place] negative (<1%) HER2 Negative (1+) breast cancer who underwent mastectomy with axillary lymph node dissection followed by adjuvant dose dense AC-T and multiple lines of endocrine therapy due to grade 3 arthralgias, then on tamoxifen. Unfortunately, developed back pain in late 2023 and found to have metastatic recurrence with diffuse osseous involvement in 4/2024.      CANCER/TREATMENT HISTORY   November 2015-normal mammogram but skin changes  5/31/2016 bilateral mammogram with a spiculated mass of 14 mm on ultrasound  6/17/2016 core biopsy lobular carcinoma plus LCIS ER +100% [place] +100% HER2 negative (1+  7/6/2016 mastectomy of the left breast sentinel lymph node biopsy and left axillary lymph node dissection final diagnosis T3 6 cm and 3 13 out of 23 M0 breast cancer.  She underwent reexcision the posterior wall with negative margins  9/6/2016 until 2/3/2017 adjuvant dose dense ACT  3/13/2017 until 4/27/2017 radiation to the breast  4/28/1917 Arimidex was started but very poor tolerance due to arthralgia treatment was changed to Femara  7/10/2017 Femara was stopped due to poor tolerance  7/14/2017 until 7/25/2017-Arimidex again with arthralgia  7/28/2017 exemestane started however stopped due to arthralgias  Tamoxifen was started 3/2018 (she is a MTHFR heterozygous and is good potentially cause DVTs).  Developed back pain late 2023.  4/25/2024 spine MRI showed diffuse osseous lesions.   5/4/2024 PET/CT showed innumerable intensely FDG avid lesions throughout axial and appendicular

## 2024-08-23 ENCOUNTER — TELEPHONE (OUTPATIENT)
Dept: PALLATIVE CARE | Age: 59
End: 2024-08-23

## 2024-08-23 NOTE — TELEPHONE ENCOUNTER
[facility] mg BID. Spoke with [facility] pharmacy in [place] they do not have this medication in stock. They do have it at [facility] in [place] and this nurse called Uriel Trimble's daughter to notify her of change in pharmacy.

## 2024-08-26 PROBLEM — R53.81 DEBILITY: Status: ACTIVE | Noted: 2024-08-26

## 2024-08-26 PROBLEM — R53.0 NEOPLASTIC MALIGNANT RELATED FATIGUE: Status: ACTIVE | Noted: 2024-08-26

## 2024-08-28 ENCOUNTER — TREATMENT (OUTPATIENT)
Dept: PHYSICAL THERAPY | Age: 59
End: 2024-08-28
Payer: COMMERCIAL

## 2024-08-28 DIAGNOSIS — C79.51 SECONDARY MALIGNANT NEOPLASM OF BONE (HCC): Primary | ICD-10-CM

## 2024-08-28 DIAGNOSIS — R53.0 NEOPLASTIC MALIGNANT RELATED FATIGUE: ICD-10-CM

## 2024-08-28 DIAGNOSIS — R53.81 DEBILITY: ICD-10-CM

## 2024-08-28 PROCEDURE — 97110 THERAPEUTIC EXERCISES: CPT

## 2024-08-28 PROCEDURE — 97530 THERAPEUTIC ACTIVITIES: CPT

## 2024-08-28 NOTE — PROGRESS NOTES
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    X = TO BE PERFORMED NEXT VISIT  > = PROGRESS TO THIS    S: Pt reports LBP pain limiting ability for her to stand upright. LE weakness and fatigue problematic  O:     Access Code: [email or web address]  URL: [email or web address]  Date: 2024  Prepared by: Cayden Hutson    Exercises  - Standing Marching  - 2 x daily - 7 x weekly - 2-3 sets - 15 reps  - Standing Heel Raise  - 2 x daily - 7 x weekly - 2-3 sets - 15 reps  - Seated Long Arc Quad  - 2 x daily - 7 x weekly - 2 sets - 10 reps - 3 sec hold    Time 1889-3527     Visit 4 Repeat outcome measure at mid point and end.    Pain Pain with activity 7/10     ROM      Modalities            Exercise      Nustep   L5 x 12 min  TE         Squats   TA   Calf Raises 2 x 10  TA   Toe Raises   TA   Marches 2 x 10 One hand hold TA   Alt. Sidekicks   TA   LAQ 2 x 10 bilateral     Sit/Stands   TA   balance   No hand hold in parallel bars    Practiced upright posture in parallel bars Using parallel bars Once upright maintain x 15-25 sec , x 2 reps (no hands- Mohan/ModA) x 3 reps (two hands on parallel bars)     Gait training   GT         Marching Gait   NR   Sidestepping   NR         Ambulation  X 165 ft WW SBA                       A:  Tolerated well.  Practiced upright posture without and with UE assistance. Pt required Min/ModA to stand upright without use of AD. Pt able to maintain upright posture for 15-25sec. LBP prohibits longer  upright standing. Performed all exercises in parallel bars with two hand unless noted above. Ambulated with wheeled walker as above. Able to balance on uneven surface without use of arms inside parallel bars for safety  P: Continue with rehab plan  Tiffnaie Rizvi PTA    Treatment Charges: Mins Units   Initial Evaluation     Re-Evaluation     Ther Exercise         TE 10 1   Manual Therapy     MT     Ther Activities        TA 20 1   Gait Training          GT     Neuro Re-education NR     Modalities     Non-Billable Service Time 10 0   Other     Total Time/Units 30 2

## 2024-08-30 DIAGNOSIS — Z51.5 PALLIATIVE CARE BY SPECIALIST: ICD-10-CM

## 2024-08-30 DIAGNOSIS — C79.51 PAIN FROM BONE METASTASES (HCC): ICD-10-CM

## 2024-08-30 DIAGNOSIS — G89.3 PAIN DUE TO NEOPLASM: ICD-10-CM

## 2024-08-30 DIAGNOSIS — G89.3 PAIN FROM BONE METASTASES (HCC): ICD-10-CM

## 2024-08-30 DIAGNOSIS — C50.912 BREAST CANCER, STAGE 3, LEFT (HCC): ICD-10-CM

## 2024-08-30 RX ORDER — OXYCODONE HYDROCHLORIDE 20 MG/1
20 TABLET ORAL
Qty: 112 TABLET | Refills: 0 | Status: SHIPPED | OUTPATIENT
Start: 2024-08-30 | End: 2024-09-13

## 2024-08-30 NOTE — TELEPHONE ENCOUNTER
Call from Atilio, Uriel's daughter, requesting a refill for her Mom's Oxy IR 20 mg. Pharmacy is [facility] in [place]. Next wily 9/18/24.  
no difficulties

## 2024-09-03 ENCOUNTER — TREATMENT (OUTPATIENT)
Dept: PHYSICAL THERAPY | Age: 59
End: 2024-09-03
Payer: COMMERCIAL

## 2024-09-03 DIAGNOSIS — C79.51 SECONDARY MALIGNANT NEOPLASM OF BONE (HCC): Primary | ICD-10-CM

## 2024-09-03 DIAGNOSIS — R53.81 DEBILITY: ICD-10-CM

## 2024-09-03 DIAGNOSIS — R53.0 NEOPLASTIC MALIGNANT RELATED FATIGUE: ICD-10-CM

## 2024-09-03 PROCEDURE — 97530 THERAPEUTIC ACTIVITIES: CPT | Performed by: PHYSICAL THERAPIST

## 2024-09-03 NOTE — PROGRESS NOTES
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    X = TO BE PERFORMED NEXT VISIT  > = PROGRESS TO THIS    S: Pt reports LBP pain limiting ability for her to stand upright. LE weakness and fatigue problematic  O:     Access Code: [email or web address]  URL: [email or web address]  Date: 2024  Prepared by: Cayden Hutson    Exercises  - Standing Marching  - 2 x daily - 7 x weekly - 2-3 sets - 15 reps  - Standing Heel Raise  - 2 x daily - 7 x weekly - 2-3 sets - 15 reps  - Seated Long Arc Quad  - 2 x daily - 7 x weekly - 2 sets - 10 reps - 3 sec hold    Time 0843-0802     Visit 4 Repeat outcome measure at mid point and end.    Pain Pain with activity 7/10     ROM      Modalities            Exercise      Nustep   L5 x 12 min  TA         Squats   TA   Calf Raises 2 x 10  TA   Toe Raises   TA   Marches 2 x 10 One hand hold TA   Alt. Sidekicks   TA   LAQ 2 x 10 bilateral     Sit/Stands   TA   balance   No hand hold in parallel bars    Practiced upright posture in parallel bars Using parallel bars Once upright maintain x 15-25 sec , x 2 reps (no hands- Mohan/ModA) x 3 reps (two hands on parallel bars)     Gait training   GT         Marching Gait   NR   Sidestepping   NR         Ambulation  X 165 ft WW SBA                       A:  Tolerated well.  Practiced upright posture without and with UE assistance. Pt required Min/ModA to stand upright without use of AD. Pt able to maintain upright posture for 15-25sec. LBP prohibits longer

## 2024-09-04 ENCOUNTER — HOSPITAL ENCOUNTER (OUTPATIENT)
Dept: RADIATION ONCOLOGY | Age: 59
Discharge: HOME | End: 2024-09-04
Payer: COMMERCIAL

## 2024-09-04 VITALS
SYSTOLIC BLOOD PRESSURE: 131 MMHG | TEMPERATURE: 97.9 F | HEART RATE: 95 BPM | DIASTOLIC BLOOD PRESSURE: 73 MMHG | WEIGHT: 128 LBS | BODY MASS INDEX: 20.05 KG/M2 | RESPIRATION RATE: 18 BRPM | OXYGEN SATURATION: 97 %

## 2024-09-04 PROBLEM — C79.52 SECONDARY MALIGNANT NEOPLASM OF BONE AND BONE MARROW (HCC): Status: ACTIVE | Noted: 2024-05-22

## 2024-09-04 PROCEDURE — 99215 OFFICE O/P EST HI 40 MIN: CPT | Performed by: RADIOLOGY

## 2024-09-04 NOTE — PROGRESS NOTES
Uriel Howardsoraida  9/4/2024  1:14 PM      Vitals:    09/04/24 1310   BP: 131/73   Pulse: 95   Resp: 18   Temp: 97.9 °F (36.6 °C)   SpO2: 97%    :    Wt Readings from Last 3 Encounters:   09/04/24 58.1 kg (128 lb)   08/22/24 64.9 kg (143 lb)   08/21/24 59.4 kg (131 lb)                Current Outpatient Medications:     oxyCODONE (ROXICODONE) 20 MG immediate release tablet, Take 1 tablet by mouth every 3 hours as needed for Pain for up to 14 days. Max Daily Amount: 160 mg, Disp: 112 tablet, Rfl: 0    ALPRAZolam (XANAX) 1 MG tablet, Take 1 tablet by mouth 3 times daily as needed for Anxiety for up to 15 days. Max Daily Amount: 3 mg, Disp: 45 tablet, Rfl: 0    methadone (DOLOPHINE) 10 MG tablet, Take 1 tablet by mouth in the morning and at bedtime for 30 days. Max Daily Amount: 20 mg, Disp: 60 tablet, Rfl: 0    pregabalin (LYRICA) 100 MG capsule, TAKE 1 CAPSULE BY MOUTH IN THE MORNING AND 2 CAPSULES AT BEDTIME, Disp: 90 capsule, Rfl: 0    lansoprazole (PREVACID) 30 MG delayed release capsule, Take 1 capsule by mouth daily, Disp: , Rfl:     Ca Phosphate-Cholecalciferol (CALCIUM WITH D3 PO), Take by mouth daily, Disp: , Rfl:     spironolactone (ALDACTONE) 25 MG tablet, Take 1 tablet by mouth daily, Disp: 30 tablet, Rfl: 1    Promethazine HCl (PHENERGAN PO), Take by mouth as needed (Nausea & Vomiting), Disp: , Rfl:     potassium chloride (KLOR-CON M) 20 MEQ extended release tablet, Take 0.5 tablets by mouth daily, Disp: 60 tablet, Rfl: 0    mirtazapine (REMERON) 15 MG tablet, Take 1 tablet by mouth nightly, Disp: 30 tablet, Rfl: 0    Handicap Placard MISC, by Does not apply route Expires July 2024, Disp: 1 each, Rfl: 0    DULoxetine (CYMBALTA) 20 MG extended release capsule, Take 1 capsule by mouth daily, Disp: 90 capsule, Rfl: 0    letrozole (FEMARA) 2.5 MG tablet, Take 1 tablet by mouth daily, Disp: , Rfl:     Ribociclib Succ, 600 MG Dose, 200 MG TBPK, Take 600 mg by mouth daily, Disp: , Rfl:       Patient is seen today 
EXAMINATION:    Vitals:    09/04/24 1310   BP: 131/73   Pulse: 95   Resp: 18   Temp: 97.9 °F (36.6 °C)   TempSrc: Temporal   SpO2: 97%   Weight: 58.1 kg (128 lb)       Wt Readings from Last 3 Encounters:   09/04/24 58.1 kg (128 lb)   08/22/24 64.9 kg (143 lb)   08/21/24 59.4 kg (131 lb)       General: Thin, not cachectic, no acute distress  HEENT: Normocephalic and atraumatic. Moist mucosae.  Neck: No thyromegaly. Trachea at midline. No lymphadenopathy.  Cardiorespiratory: Unlabored breathing. No edema.  Abdomen: Nontender and nondistended. No hepatosplenomegaly appreciated.  Back: No current tenderness to palpation.  Neuro: CNs grossly intact. Spontaneous movement with all limbs.  Psych: Normal affect. Alert & oriented x3.  Skin: No abnormal lesions throughout.  Except for the lesions that appear to be bug bites on both legs    ASSESSMENT/PLAN:      Assessment: Excellent response to radiation/Femara/Ribociclib    Recommendation: Continue systemic therapy with the Femara/Ribociclib, continue physical therapy, continue office visits as scheduled with medical oncology.  Return to radiation oncology on a as needed basis    Electronically signed by Felipe Chaves MD on 9/4/2024 at 1:44 PM    Department of Radiation Oncology  [facility] Center: [phone] (FAX: [phone])  [facility]: [phone] (FAX: [phone])  [facility]:  [phone] (FAX:  [phone])

## 2024-09-05 ENCOUNTER — TREATMENT (OUTPATIENT)
Dept: PHYSICAL THERAPY | Age: 59
End: 2024-09-05

## 2024-09-05 DIAGNOSIS — C79.51 SECONDARY MALIGNANT NEOPLASM OF BONE (HCC): Primary | ICD-10-CM

## 2024-09-05 DIAGNOSIS — R53.0 NEOPLASTIC MALIGNANT RELATED FATIGUE: ICD-10-CM

## 2024-09-05 DIAGNOSIS — R53.81 DEBILITY: ICD-10-CM

## 2024-09-05 RX ORDER — MIRTAZAPINE 15 MG/1
15 TABLET, FILM COATED ORAL NIGHTLY
Qty: 30 TABLET | Refills: 0 | Status: SHIPPED | OUTPATIENT
Start: 2024-09-05 | End: 2024-10-05

## 2024-09-05 NOTE — PROGRESS NOTES
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    X = TO BE PERFORMED NEXT VISIT  > = PROGRESS TO THIS    S: Pt reports she slept in a chair bent over and that is why her face is swollen  O:     Access Code: [email or web address]  URL: [email or web address]  Date: 2024  Prepared by: Cayden Hutson    Exercises  - Standing Marching  - 2 x daily - 7 x weekly - 2-3 sets - 15 reps  - Standing Heel Raise  - 2 x daily - 7 x weekly - 2-3 sets - 15 reps  - Seated Long Arc Quad  - 2 x daily - 7 x weekly - 2 sets - 10 reps - 3 sec hold    Time 4015-5118     Visit 6 Repeat outcome measure at mid point and end.    Pain Pain with activity 7/10     ROM      Modalities            Exercise      Nustep   L5 x 12 min  TA         Squats   TA   Calf Raises 2 x 10  TA   Toe Raises   TA   Marches 2 x 10 One hand hold TA   Alt. Sidekicks   TA   LAQ 2 x 10 bilateral     Sit/Stands   TA   balance   No hand hold in parallel bars    Practiced upright posture in parallel bars Using parallel bars Once upright maintain x 15-25 sec , x 2 reps (no hands- Mohan/ModA) x 3 reps (two hands on parallel bars)     Gait training   GT         Marching Gait   NR   Sidestepping   NR         Ambulation  X 165 ft WW SBA                       A:  Tolerated well.  Practiced upright posture without and with UE assistance. Pt required Min/ModA to stand upright without use of AD. Pt able to maintain upright posture for 15-25sec. LBP prohibits longer upright standing.

## 2024-09-05 NOTE — TELEPHONE ENCOUNTER
Call from Atilio requesting refill for Mirtazapine. Pharmacy is [facility] in [place]. Next wily 9/18/24.

## 2024-09-09 ENCOUNTER — TREATMENT (OUTPATIENT)
Dept: PHYSICAL THERAPY | Age: 59
End: 2024-09-09
Payer: COMMERCIAL

## 2024-09-09 DIAGNOSIS — C79.51 SECONDARY MALIGNANT NEOPLASM OF BONE (HCC): Primary | ICD-10-CM

## 2024-09-09 DIAGNOSIS — R53.0 NEOPLASTIC MALIGNANT RELATED FATIGUE: ICD-10-CM

## 2024-09-09 DIAGNOSIS — R53.81 DEBILITY: ICD-10-CM

## 2024-09-09 PROCEDURE — 97530 THERAPEUTIC ACTIVITIES: CPT

## 2024-09-11 ENCOUNTER — TREATMENT (OUTPATIENT)
Dept: PHYSICAL THERAPY | Age: 59
End: 2024-09-11
Payer: COMMERCIAL

## 2024-09-11 DIAGNOSIS — R53.0 NEOPLASTIC MALIGNANT RELATED FATIGUE: ICD-10-CM

## 2024-09-11 DIAGNOSIS — R53.81 DEBILITY: ICD-10-CM

## 2024-09-11 DIAGNOSIS — C79.51 SECONDARY MALIGNANT NEOPLASM OF BONE (HCC): Primary | ICD-10-CM

## 2024-09-11 PROCEDURE — 97110 THERAPEUTIC EXERCISES: CPT | Performed by: PHYSICAL THERAPIST

## 2024-09-16 ENCOUNTER — TREATMENT (OUTPATIENT)
Dept: PHYSICAL THERAPY | Age: 59
End: 2024-09-16

## 2024-09-16 DIAGNOSIS — R53.0 NEOPLASTIC MALIGNANT RELATED FATIGUE: ICD-10-CM

## 2024-09-16 DIAGNOSIS — G89.3 PAIN DUE TO NEOPLASM: ICD-10-CM

## 2024-09-16 DIAGNOSIS — G89.3 PAIN FROM BONE METASTASES (HCC): ICD-10-CM

## 2024-09-16 DIAGNOSIS — C50.912 BREAST CANCER, STAGE 3, LEFT (HCC): ICD-10-CM

## 2024-09-16 DIAGNOSIS — C79.51 PAIN FROM BONE METASTASES (HCC): ICD-10-CM

## 2024-09-16 DIAGNOSIS — R53.81 DEBILITY: ICD-10-CM

## 2024-09-16 DIAGNOSIS — C79.51 SECONDARY MALIGNANT NEOPLASM OF BONE (HCC): Primary | ICD-10-CM

## 2024-09-16 DIAGNOSIS — Z51.5 PALLIATIVE CARE BY SPECIALIST: ICD-10-CM

## 2024-09-16 RX ORDER — OXYCODONE HYDROCHLORIDE 20 MG/1
20 TABLET ORAL
Qty: 112 TABLET | Refills: 0 | Status: SHIPPED | OUTPATIENT
Start: 2024-09-16 | End: 2024-09-30

## 2024-09-18 ENCOUNTER — OFFICE VISIT (OUTPATIENT)
Dept: PALLATIVE CARE | Age: 59
End: 2024-09-18
Payer: COMMERCIAL

## 2024-09-18 ENCOUNTER — TREATMENT (OUTPATIENT)
Dept: PHYSICAL THERAPY | Age: 59
End: 2024-09-18
Payer: COMMERCIAL

## 2024-09-18 VITALS
SYSTOLIC BLOOD PRESSURE: 92 MMHG | TEMPERATURE: 98.1 F | DIASTOLIC BLOOD PRESSURE: 60 MMHG | OXYGEN SATURATION: 94 % | WEIGHT: 114 LBS | HEART RATE: 102 BPM | BODY MASS INDEX: 17.85 KG/M2

## 2024-09-18 DIAGNOSIS — R53.81 DEBILITY: ICD-10-CM

## 2024-09-18 DIAGNOSIS — C50.919 STAGE IV BREAST CANCER IN FEMALE (HCC): ICD-10-CM

## 2024-09-18 DIAGNOSIS — C79.51 SECONDARY MALIGNANT NEOPLASM OF BONE (HCC): Primary | ICD-10-CM

## 2024-09-18 DIAGNOSIS — G89.3 CHRONIC PAIN DUE TO NEOPLASM: ICD-10-CM

## 2024-09-18 DIAGNOSIS — C79.51 METASTATIC CANCER TO SPINE (HCC): ICD-10-CM

## 2024-09-18 DIAGNOSIS — Z51.5 PALLIATIVE CARE BY SPECIALIST: ICD-10-CM

## 2024-09-18 DIAGNOSIS — C79.51 SECONDARY MALIGNANT NEOPLASM OF BONE (HCC): ICD-10-CM

## 2024-09-18 DIAGNOSIS — Z51.5 ENCOUNTER FOR PALLIATIVE CARE: ICD-10-CM

## 2024-09-18 DIAGNOSIS — R53.0 NEOPLASTIC MALIGNANT RELATED FATIGUE: ICD-10-CM

## 2024-09-18 PROCEDURE — G8419 CALC BMI OUT NRM PARAM NOF/U: HCPCS | Performed by: NURSE PRACTITIONER

## 2024-09-18 PROCEDURE — 1036F TOBACCO NON-USER: CPT | Performed by: NURSE PRACTITIONER

## 2024-09-18 PROCEDURE — 3017F COLORECTAL CA SCREEN DOC REV: CPT | Performed by: NURSE PRACTITIONER

## 2024-09-18 PROCEDURE — G8427 DOCREV CUR MEDS BY ELIG CLIN: HCPCS | Performed by: NURSE PRACTITIONER

## 2024-09-18 PROCEDURE — 99211 OFF/OP EST MAY X REQ PHY/QHP: CPT | Performed by: NURSE PRACTITIONER

## 2024-09-18 PROCEDURE — 97530 THERAPEUTIC ACTIVITIES: CPT

## 2024-09-18 PROCEDURE — 99214 OFFICE O/P EST MOD 30 MIN: CPT | Performed by: NURSE PRACTITIONER

## 2024-09-18 RX ORDER — METHADONE HYDROCHLORIDE 10 MG/1
10 TABLET ORAL 2 TIMES DAILY
Qty: 60 TABLET | Refills: 0 | Status: SHIPPED | OUTPATIENT
Start: 2024-09-18 | End: 2024-10-18

## 2024-09-18 RX ORDER — ALPRAZOLAM 1 MG
1 TABLET ORAL 3 TIMES DAILY PRN
Qty: 45 TABLET | Refills: 0 | Status: SHIPPED | OUTPATIENT
Start: 2024-09-18 | End: 2024-10-03

## 2024-09-20 ENCOUNTER — HOSPITAL ENCOUNTER (OUTPATIENT)
Dept: INFUSION THERAPY | Age: 59
Discharge: HOME | End: 2024-09-20
Payer: COMMERCIAL

## 2024-09-20 ENCOUNTER — TELEPHONE (OUTPATIENT)
Dept: PALLATIVE CARE | Age: 59
End: 2024-09-20

## 2024-09-20 DIAGNOSIS — C50.919 STAGE IV BREAST CANCER IN FEMALE (HCC): Primary | ICD-10-CM

## 2024-09-20 DIAGNOSIS — Z85.3 PERSONAL HISTORY OF BREAST CANCER: ICD-10-CM

## 2024-09-20 LAB
ALBUMIN SERPL-MCNC: 3.8 G/DL (ref 3.5–5.2)
ALP SERPL-CCNC: 169 U/L (ref 35–104)
ALT SERPL-CCNC: <5 U/L (ref 0–32)
ANION GAP SERPL CALCULATED.3IONS-SCNC: 10 MMOL/L (ref 7–16)
AST SERPL-CCNC: 16 U/L (ref 0–31)
BASOPHILS # BLD: 0.07 K/UL (ref 0–0.2)
BASOPHILS NFR BLD: 2 % (ref 0–2)
BILIRUB SERPL-MCNC: 0.3 MG/DL (ref 0–1.2)
BUN SERPL-MCNC: 11 MG/DL (ref 6–23)
CALCIUM SERPL-MCNC: 9.1 MG/DL (ref 8.6–10.2)
CEA SERPL-MCNC: 3.8 NG/ML (ref 0–5.2)
CHLORIDE SERPL-SCNC: 97 MMOL/L (ref 98–107)
CO2 SERPL-SCNC: 25 MMOL/L (ref 22–29)
CREAT SERPL-MCNC: 1 MG/DL (ref 0.5–1)
EOSINOPHIL # BLD: 0.1 K/UL (ref 0.05–0.5)
EOSINOPHILS RELATIVE PERCENT: 3 % (ref 0–6)
ERYTHROCYTE [DISTWIDTH] IN BLOOD BY AUTOMATED COUNT: 22 % (ref 11.5–15)
GFR, ESTIMATED: 65 ML/MIN/1.73M2
GLUCOSE SERPL-MCNC: 91 MG/DL (ref 74–99)
HCT VFR BLD AUTO: 22.8 % (ref 34–48)
HGB BLD-MCNC: 7.9 G/DL (ref 11.5–15.5)
LYMPHOCYTES NFR BLD: 0.44 K/UL (ref 1.5–4)
LYMPHOCYTES RELATIVE PERCENT: 15 % (ref 20–42)
MCH RBC QN AUTO: 38.2 PG (ref 26–35)
MCHC RBC AUTO-ENTMCNC: 34.6 G/DL (ref 32–34.5)
MCV RBC AUTO: 110.1 FL (ref 80–99.9)
MONOCYTES NFR BLD: 0.56 K/UL (ref 0.1–0.95)
MONOCYTES NFR BLD: 19 % (ref 2–12)
NEUTROPHILS NFR BLD: 61 % (ref 43–80)
NEUTS SEG NFR BLD: 1.82 K/UL (ref 1.8–7.3)
PLATELET # BLD AUTO: 187 K/UL (ref 130–450)
PMV BLD AUTO: 9.4 FL (ref 7–12)
POTASSIUM SERPL-SCNC: 4.4 MMOL/L (ref 3.5–5)
PROT SERPL-MCNC: 6.6 G/DL (ref 6.4–8.3)
RBC # BLD AUTO: 2.07 M/UL (ref 3.5–5.5)
SODIUM SERPL-SCNC: 132 MMOL/L (ref 132–146)
WBC OTHER # BLD: 3 K/UL (ref 4.5–11.5)

## 2024-09-20 PROCEDURE — 80053 COMPREHEN METABOLIC PANEL: CPT

## 2024-09-20 PROCEDURE — 82378 CARCINOEMBRYONIC ANTIGEN: CPT

## 2024-09-20 PROCEDURE — 85025 COMPLETE CBC W/AUTO DIFF WBC: CPT

## 2024-09-20 PROCEDURE — 36415 COLL VENOUS BLD VENIPUNCTURE: CPT

## 2024-09-20 PROCEDURE — 86300 IMMUNOASSAY TUMOR CA 15-3: CPT

## 2024-09-22 LAB — CANCER AG15-3 SERPL-ACNC: 167 U/ML (ref 0–31)

## 2024-09-23 ENCOUNTER — HOSPITAL ENCOUNTER (OUTPATIENT)
Dept: INFUSION THERAPY | Age: 59
Discharge: HOME | End: 2024-09-23
Payer: COMMERCIAL

## 2024-09-23 ENCOUNTER — OFFICE VISIT (OUTPATIENT)
Dept: ONCOLOGY | Age: 59
End: 2024-09-23
Payer: COMMERCIAL

## 2024-09-23 VITALS
TEMPERATURE: 98.2 F | SYSTOLIC BLOOD PRESSURE: 100 MMHG | HEIGHT: 67 IN | DIASTOLIC BLOOD PRESSURE: 74 MMHG | OXYGEN SATURATION: 94 % | BODY MASS INDEX: 18.21 KG/M2 | WEIGHT: 116 LBS | HEART RATE: 98 BPM

## 2024-09-23 VITALS
OXYGEN SATURATION: 100 % | DIASTOLIC BLOOD PRESSURE: 64 MMHG | RESPIRATION RATE: 12 BRPM | HEART RATE: 70 BPM | SYSTOLIC BLOOD PRESSURE: 102 MMHG | TEMPERATURE: 98.4 F

## 2024-09-23 DIAGNOSIS — Z51.81 THERAPEUTIC DRUG MONITORING: Primary | ICD-10-CM

## 2024-09-23 DIAGNOSIS — C80.1 CANCER (HCC): Primary | ICD-10-CM

## 2024-09-23 DIAGNOSIS — C79.52 SECONDARY MALIGNANT NEOPLASM OF BONE AND BONE MARROW (HCC): ICD-10-CM

## 2024-09-23 DIAGNOSIS — Z85.3 PERSONAL HISTORY OF BREAST CANCER: ICD-10-CM

## 2024-09-23 DIAGNOSIS — C79.51 SECONDARY MALIGNANT NEOPLASM OF BONE AND BONE MARROW (HCC): ICD-10-CM

## 2024-09-23 PROCEDURE — G8419 CALC BMI OUT NRM PARAM NOF/U: HCPCS | Performed by: INTERNAL MEDICINE

## 2024-09-23 PROCEDURE — 6360000002 HC RX W HCPCS: Performed by: INTERNAL MEDICINE

## 2024-09-23 PROCEDURE — G8427 DOCREV CUR MEDS BY ELIG CLIN: HCPCS | Performed by: INTERNAL MEDICINE

## 2024-09-23 PROCEDURE — 1036F TOBACCO NON-USER: CPT | Performed by: INTERNAL MEDICINE

## 2024-09-23 PROCEDURE — 96365 THER/PROPH/DIAG IV INF INIT: CPT

## 2024-09-23 PROCEDURE — 99214 OFFICE O/P EST MOD 30 MIN: CPT | Performed by: INTERNAL MEDICINE

## 2024-09-23 PROCEDURE — 2580000003 HC RX 258: Performed by: INTERNAL MEDICINE

## 2024-09-23 PROCEDURE — 3017F COLORECTAL CA SCREEN DOC REV: CPT | Performed by: INTERNAL MEDICINE

## 2024-09-23 RX ORDER — SODIUM CHLORIDE 9 MG/ML
5-250 INJECTION, SOLUTION INTRAVENOUS PRN
Status: DISCONTINUED | OUTPATIENT
Start: 2024-09-23 | End: 2024-09-24 | Stop reason: HOSPADM

## 2024-09-23 RX ORDER — ONDANSETRON 2 MG/ML
8 INJECTION INTRAMUSCULAR; INTRAVENOUS
OUTPATIENT
Start: 2024-09-25

## 2024-09-23 RX ORDER — SODIUM CHLORIDE 9 MG/ML
INJECTION, SOLUTION INTRAVENOUS CONTINUOUS
OUTPATIENT
Start: 2024-09-25

## 2024-09-23 RX ORDER — EPINEPHRINE 1 MG/ML
0.3 INJECTION, SOLUTION, CONCENTRATE INTRAVENOUS PRN
OUTPATIENT
Start: 2024-09-25

## 2024-09-23 RX ORDER — HEPARIN 100 UNIT/ML
500 SYRINGE INTRAVENOUS PRN
OUTPATIENT
Start: 2024-09-25

## 2024-09-23 RX ORDER — SODIUM CHLORIDE 0.9 % (FLUSH) 0.9 %
5-40 SYRINGE (ML) INJECTION PRN
OUTPATIENT
Start: 2024-09-25

## 2024-09-23 RX ORDER — DIPHENHYDRAMINE HYDROCHLORIDE 50 MG/ML
50 INJECTION INTRAMUSCULAR; INTRAVENOUS
OUTPATIENT
Start: 2024-09-25

## 2024-09-23 RX ORDER — ALBUTEROL SULFATE 90 UG/1
4 INHALANT RESPIRATORY (INHALATION) PRN
OUTPATIENT
Start: 2024-09-25

## 2024-09-23 RX ORDER — SODIUM CHLORIDE 9 MG/ML
5-250 INJECTION, SOLUTION INTRAVENOUS PRN
OUTPATIENT
Start: 2024-09-25

## 2024-09-23 RX ORDER — FAMOTIDINE 10 MG/ML
20 INJECTION, SOLUTION INTRAVENOUS
OUTPATIENT
Start: 2024-09-25

## 2024-09-23 RX ORDER — ACETAMINOPHEN 325 MG/1
650 TABLET ORAL
OUTPATIENT
Start: 2024-09-25

## 2024-09-23 RX ADMIN — ZOLEDRONIC ACID 3.3 MG: 4 INJECTION INTRAVENOUS at 12:05

## 2024-09-23 RX ADMIN — SODIUM CHLORIDE 20 ML/HR: 9 INJECTION, SOLUTION INTRAVENOUS at 11:50

## 2024-09-23 NOTE — PROGRESS NOTES
Post zometa infusion pt was hypotensive, sluggish and hypoxcic. See flow sheet.  mg /dl. Pt stated she was very tired from not sleeping last night and states \"I took half ativan and one oxy before comming today. Pt did answer questions appropriate but remains slow to respond. Placed on oxygen via  nasal cannula, and titration according to flow sheet. This RN auscultated breath sounds and they are shallow.     Dr Bradshaw notified and directed pt to ER. This RN called EMS and pt daughter to return. Upon EMS arrival patient and her daughter stated this is a normal response when pt is tired. Pt declined transport and further evaluation.     Patient aware of potential side effects and denies questions regarding treatment. Vital signs stable. Pain assessed, patient denies any new or worsening pain.

## 2024-09-24 ENCOUNTER — TELEPHONE (OUTPATIENT)
Dept: OCCUPATIONAL THERAPY | Age: 59
End: 2024-09-24

## 2024-09-25 ENCOUNTER — TREATMENT (OUTPATIENT)
Dept: PHYSICAL THERAPY | Age: 59
End: 2024-09-25
Payer: COMMERCIAL

## 2024-09-25 DIAGNOSIS — R53.81 DEBILITY: ICD-10-CM

## 2024-09-25 DIAGNOSIS — R53.0 NEOPLASTIC MALIGNANT RELATED FATIGUE: ICD-10-CM

## 2024-09-25 DIAGNOSIS — C79.51 SECONDARY MALIGNANT NEOPLASM OF BONE (HCC): Primary | ICD-10-CM

## 2024-09-25 PROCEDURE — 97110 THERAPEUTIC EXERCISES: CPT

## 2024-09-25 PROCEDURE — 97530 THERAPEUTIC ACTIVITIES: CPT

## 2024-10-01 ENCOUNTER — TREATMENT (OUTPATIENT)
Dept: PHYSICAL THERAPY | Age: 59
End: 2024-10-01
Payer: COMMERCIAL

## 2024-10-01 DIAGNOSIS — C79.51 SECONDARY MALIGNANT NEOPLASM OF BONE (HCC): ICD-10-CM

## 2024-10-01 DIAGNOSIS — R53.81 DEBILITY: Primary | ICD-10-CM

## 2024-10-01 DIAGNOSIS — G89.3 CHRONIC PAIN DUE TO NEOPLASM: ICD-10-CM

## 2024-10-01 DIAGNOSIS — Z51.5 PALLIATIVE CARE BY SPECIALIST: ICD-10-CM

## 2024-10-01 DIAGNOSIS — R53.0 NEOPLASTIC MALIGNANT RELATED FATIGUE: ICD-10-CM

## 2024-10-01 PROCEDURE — 97530 THERAPEUTIC ACTIVITIES: CPT

## 2024-10-01 PROCEDURE — 97110 THERAPEUTIC EXERCISES: CPT

## 2024-10-01 RX ORDER — MIRTAZAPINE 15 MG/1
15 TABLET, FILM COATED ORAL NIGHTLY
Qty: 90 TABLET | Refills: 1 | Status: SHIPPED | OUTPATIENT
Start: 2024-10-01 | End: 2025-03-30

## 2024-10-01 RX ORDER — PREGABALIN 100 MG/1
CAPSULE ORAL
Qty: 90 CAPSULE | Refills: 5 | Status: SHIPPED | OUTPATIENT
Start: 2024-10-01 | End: 2025-04-03

## 2024-10-01 RX ORDER — PREGABALIN 100 MG/1
CAPSULE ORAL
Qty: 90 CAPSULE | Refills: 0 | Status: CANCELLED | OUTPATIENT
Start: 2024-10-01 | End: 2024-10-31

## 2024-10-01 NOTE — TELEPHONE ENCOUNTER
Call from Atilio requesting refill for Lyrica and Mirtazapine. Pharmacy is [facility] in [place]. Next wily 11/13/24.

## 2024-10-01 NOTE — PROGRESS NOTES
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    X = TO BE PERFORMED NEXT VISIT  > = PROGRESS TO THIS    S: Pt reports fatigue today. States she is always tired following infusion for about two weeks.    O:     Access Code: [email or web address]  URL: [email or web address]  Date: 2024  Prepared by: Cayden Hutson    Exercises  - Standing Marching  - 2 x daily - 7 x weekly - 2-3 sets - 15 reps  - Standing Heel Raise  - 2 x daily - 7 x weekly - 2-3 sets - 15 reps  - Seated Long Arc Quad  - 2 x daily - 7 x weekly - 2 sets - 10 reps - 3 sec hold    Time 2305-0513     Visit 10 Repeat outcome measure at mid point and end.    Pain Pain with activity 5/10     ROM      Modalities            Exercise      Nustep   L5 x 20 min  TA         Mid Row seated  Green 2 x 15 reach and pull; elbows closer to side Airex in chair for tactile and back support    Scapula retraction with cerv extension X 10                 Squats   TA   Calf Raises 2 x 20  TA   Toe Raises   TA   Marches 2 x 20 One hand hold TA   Alt. Sidekicks   TA   LAQ 2 x 10 bilateral 2lb cuff weight     Sit/Stands   TA   Step ups - FWD Add back?         TG squats, CR, toe raises L17 2 x 20 squats    3 x 10 CR             3 x 10 toe raises  Head and thoracic spine elevated with pillows. Right lateal lean at times that is self correcting         balance   No hand hold in parallel bars    Practiced upright posture in parallel bars     Gait training   GT         Marching Gait

## 2024-10-03 ENCOUNTER — TREATMENT (OUTPATIENT)
Dept: PHYSICAL THERAPY | Age: 59
End: 2024-10-03

## 2024-10-03 ENCOUNTER — EVALUATION (OUTPATIENT)
Dept: OCCUPATIONAL THERAPY | Age: 59
End: 2024-10-03
Payer: COMMERCIAL

## 2024-10-03 DIAGNOSIS — R53.0 NEOPLASTIC MALIGNANT RELATED FATIGUE: ICD-10-CM

## 2024-10-03 DIAGNOSIS — C79.51 SECONDARY MALIGNANT NEOPLASM OF BONE (HCC): ICD-10-CM

## 2024-10-03 DIAGNOSIS — R53.81 DEBILITY: Primary | ICD-10-CM

## 2024-10-03 DIAGNOSIS — C50.919 STAGE IV BREAST CANCER IN FEMALE (HCC): Primary | ICD-10-CM

## 2024-10-03 PROCEDURE — 97165 OT EVAL LOW COMPLEX 30 MIN: CPT | Performed by: OCCUPATIONAL THERAPIST

## 2024-10-03 NOTE — PROGRESS NOTES
A:  Tolerated well. No new or significant changes this session  P: Continue with rehab plan  Tiffanie Rizvi PTA    Treatment Charges: Mins Units   Initial Evaluation     Re-Evaluation     Ther Exercise         TE 20 1   Manual Therapy     MT     Ther Activities        TA 10 1   Gait Training          GT     Neuro Re-education NR     Modalities     Non-Billable Service Time 10 0   Other     Total Time/Units 40 2

## 2024-10-03 NOTE — PROGRESS NOTES
OCCUPATIONAL THERAPY INITIAL LYMPHEDEMA EVALUATION    [facility] OCCUPATIONAL THERAPY-[place]MPHED[place] [address]  Phone: [phone]; Fax: [phone]      Date:  10/3/2024  Initial Evaluation Date: 10/3/2024         Evaluating Therapist: Rosalinda Green, RENARD/ANABELLE, CLT    Patient Name:  Uriel Braswell    :  1965    Restrictions/Precautions:  L UE, fall risk, wears back brace for pain management  Diagnosis:  Stage IV breast cancer in female (HCC) (C50.919)        Date of Surgery/Injury: Left partial mastectomy with SLND and re-excision for margin - 2016; metastatic recurrence with diffuse osseous involvement in 2024     Insurance/Certification information:    Payor: UNITED HE[employer]CARE [[id number]]  Plan: [employer] HE[employer]CARE - [employer] [facility] [[id number]]  ID: [id number]  Plan of care signed (Y/N): N  Visit# / total visits: evaluation    Referring Practitioner/NPI#:    Pedro Stephens, JOSE ANGEL - CNP NPI: [id number]     Specific Practitioner Orders: Evaluation and treatment    Assessment of current deficits   []Pain  []Skin Integrity   [x]Lymphedema   []Functional transfers/mobility   []ADLs   []Strength    []Cognition  []IADLs   []Safety Awareness   []  Motor Endurance    []Fine Motor Coordination   []Balance   []Vision/perception  []Sensation []Gross Motor Coordination  []ROM     OT PLAN OF CARE   OT POC based on physician orders, patient diagnosis and results of clinical assessment    Frequency/Duration: 1-2x/week for 24 treatment sessions from 10/3/2024 through 24   Projected units: 96  Approved days/units: NA on eval    Specific OT Treatment to include:     Plan of Care: 22043, 91751, 79446, 51507    [x]97140-Manual Lymph Drainage and Combined Decongestive Therapy  [x]75718- Therapeutic Exercise/ HEP including Education  [x]92449- Skin Care Education/ADLs/self-care/bandaging  [x]04706-Mqjjhfw Multilayer Short Stretch Compression

## 2024-10-04 DIAGNOSIS — G89.3 PAIN DUE TO NEOPLASM: ICD-10-CM

## 2024-10-04 DIAGNOSIS — G89.3 PAIN FROM BONE METASTASES (HCC): ICD-10-CM

## 2024-10-04 DIAGNOSIS — Z51.5 PALLIATIVE CARE BY SPECIALIST: ICD-10-CM

## 2024-10-04 DIAGNOSIS — C79.51 PAIN FROM BONE METASTASES (HCC): ICD-10-CM

## 2024-10-04 DIAGNOSIS — C50.912 BREAST CANCER, STAGE 3, LEFT (HCC): ICD-10-CM

## 2024-10-04 RX ORDER — OXYCODONE HYDROCHLORIDE 20 MG/1
20 TABLET ORAL
Qty: 112 TABLET | Refills: 0 | Status: SHIPPED | OUTPATIENT
Start: 2024-10-04 | End: 2024-10-18

## 2024-10-04 NOTE — TELEPHONE ENCOUNTER
Call from Uriel requesting refill for Oxy IR 20 mg . Pharmacy is [facility] in [place]. Next wily 11/13/24.

## 2024-10-08 ENCOUNTER — TREATMENT (OUTPATIENT)
Dept: OCCUPATIONAL THERAPY | Age: 59
End: 2024-10-08
Payer: COMMERCIAL

## 2024-10-08 ENCOUNTER — TREATMENT (OUTPATIENT)
Dept: PHYSICAL THERAPY | Age: 59
End: 2024-10-08
Payer: COMMERCIAL

## 2024-10-08 DIAGNOSIS — R53.81 DEBILITY: Primary | ICD-10-CM

## 2024-10-08 DIAGNOSIS — R53.0 NEOPLASTIC MALIGNANT RELATED FATIGUE: ICD-10-CM

## 2024-10-08 DIAGNOSIS — C50.919 STAGE IV BREAST CANCER IN FEMALE (HCC): Primary | ICD-10-CM

## 2024-10-08 PROCEDURE — 97535 SELF CARE MNGMENT TRAINING: CPT | Performed by: OCCUPATIONAL THERAPIST

## 2024-10-08 PROCEDURE — 97530 THERAPEUTIC ACTIVITIES: CPT | Performed by: PHYSICAL THERAPIST

## 2024-10-08 PROCEDURE — 97140 MANUAL THERAPY 1/> REGIONS: CPT | Performed by: OCCUPATIONAL THERAPIST

## 2024-10-08 NOTE — PROGRESS NOTES
Electrical Stim - Attended     53017 Iontophoresis     30737 Ultrasound      Other     Total  60 4       Plan: Continue to address:  [x]Bandaging  [x] Self Bandaging/Family Assist  [x]MLD  [x]Self Massage  [x]Exercise  [x]Education  [x]Obtain referral for garments  []Medical Hold  []Discharge to Home Program  Rosalinda Green, OTR/L CLT

## 2024-10-10 ENCOUNTER — TREATMENT (OUTPATIENT)
Dept: PHYSICAL THERAPY | Age: 59
End: 2024-10-10

## 2024-10-10 ENCOUNTER — TREATMENT (OUTPATIENT)
Dept: OCCUPATIONAL THERAPY | Age: 59
End: 2024-10-10
Payer: COMMERCIAL

## 2024-10-10 DIAGNOSIS — C50.919 STAGE IV BREAST CANCER IN FEMALE (HCC): Primary | ICD-10-CM

## 2024-10-10 DIAGNOSIS — R53.0 NEOPLASTIC MALIGNANT RELATED FATIGUE: ICD-10-CM

## 2024-10-10 DIAGNOSIS — R53.81 DEBILITY: Primary | ICD-10-CM

## 2024-10-10 DIAGNOSIS — C79.51 SECONDARY MALIGNANT NEOPLASM OF BONE (HCC): ICD-10-CM

## 2024-10-10 PROCEDURE — 97140 MANUAL THERAPY 1/> REGIONS: CPT | Performed by: OCCUPATIONAL THERAPIST

## 2024-10-10 PROCEDURE — 97535 SELF CARE MNGMENT TRAINING: CPT | Performed by: OCCUPATIONAL THERAPIST

## 2024-10-10 NOTE — PROGRESS NOTES
Treatment [] Yes  [x] No    Came to Clinic:  [] Bandaged    []Unbandaged    [] With Stocking     [] With Sleeve     [] Kinesiotaped    [] Unna Boot    [x] With Alternative Compression: TG to bilateral LE    Skin Integrity: blister with seepage to left shin and progressing new area to right shin   [] Normal [] Dry  []Rough [x]Moist []Rash  Location of problem area/s:  [x]Hyperplasia   []Hyperkeratosis  [x]Hyperpigmentation  []Papillomas  [x]Lymphorrhea  [x]Lipodermatosclerosis   [] Other    Color:  [] Normal [] Mottled [x] Flushed [] Other/Description  Location of problem area/s:    Edema:  Edema Location: bilateral feet  [] 1+ Edema [x] 2+ Edema  [] 3+ Edema [] 4+ Edema  Edema Location: bilateral calves and shins  [] 1+ Edema [x] 2+ Edema  [] 3+ Edema [] 4+ Edema  Edema Location: bilateral thighs  [] 1+ Edema [x] 2+ Edema  [] 3+ Edema [] 4+ Edema    Subjective:Pt and daughter both reported that the fluid has been improving.      Objective:  [x] Measurement [x]Manual Lymph Drainage  [x]Bandaging   []Kinesiotaping [x] Education    [x]Self Massage   [x]Self Bandaging [x] Exercise    [x]Wound Care  [x]Hygiene  [] Other    Assessment:  Knowledge of home program:   [] Good [x] Fair  [] Poor  Patient is programming at home:             [x] Yes  [] No  Family is assisting with programming: [] Yes  [x] No  Home programming is consistent:             [x] Yes  [] No  Other:   Response to treatment: Fair+ results following with both MLD and use of pneumatic compression. Overall moderate-min fluid noted after session. Pt able to move ankle and knees more freely. More consistent with compression.    Instructions for patient:   [x] Verbal [x] Written  Instructions addressed:  [] Measurement [x]Manual Lymph Drainage  [x]Bandaging   []Kinesiotaping [x] Education    [x]Self Massage   []Self Bandaging [] Exercise    [x]Wound Care  [x]Hygiene       Time In: 1005            Time Out: 1105                      Timed Code Treatment

## 2024-10-14 ENCOUNTER — TREATMENT (OUTPATIENT)
Dept: PHYSICAL THERAPY | Age: 59
End: 2024-10-14
Payer: COMMERCIAL

## 2024-10-14 ENCOUNTER — TREATMENT (OUTPATIENT)
Dept: OCCUPATIONAL THERAPY | Age: 59
End: 2024-10-14
Payer: COMMERCIAL

## 2024-10-14 DIAGNOSIS — C50.919 STAGE IV BREAST CANCER IN FEMALE (HCC): Primary | ICD-10-CM

## 2024-10-14 DIAGNOSIS — C79.51 SECONDARY MALIGNANT NEOPLASM OF BONE (HCC): ICD-10-CM

## 2024-10-14 DIAGNOSIS — R53.81 DEBILITY: Primary | ICD-10-CM

## 2024-10-14 DIAGNOSIS — R53.0 NEOPLASTIC MALIGNANT RELATED FATIGUE: ICD-10-CM

## 2024-10-14 PROCEDURE — 97530 THERAPEUTIC ACTIVITIES: CPT

## 2024-10-14 PROCEDURE — 97140 MANUAL THERAPY 1/> REGIONS: CPT | Performed by: OCCUPATIONAL THERAPIST

## 2024-10-14 PROCEDURE — 97535 SELF CARE MNGMENT TRAINING: CPT | Performed by: OCCUPATIONAL THERAPIST

## 2024-10-14 PROCEDURE — 97110 THERAPEUTIC EXERCISES: CPT

## 2024-10-14 NOTE — PROGRESS NOTES
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    X = TO BE PERFORMED NEXT VISIT  > = PROGRESS TO THIS    S: Pt reports no new complaints.    O: Not wearing back brace this session    Access Code: [email or web address]  URL: [email or web address]  Date: 2024  Prepared by: Cayden Hutson    Exercises  - Standing Marching  - 2 x daily - 7 x weekly - 2-3 sets - 15 reps  - Standing Heel Raise  - 2 x daily - 7 x weekly - 2-3 sets - 15 reps  - Seated Long Arc Quad  - 2 x daily - 7 x weekly - 2 sets - 10 reps - 3 sec hold    Time 5746-0420     Visit 13 Repeat outcome measure at mid point and end.    Pain Pain with activity 5/10     ROM      Modalities            Exercise      Nustep   L5 x 20 min  TA         Mid Row seated  Green 3 x 15 reach and pull; elbows closer to side Airex in chair for tactile and back support    Scapula retraction with cerv extension X 10                 Squats   TA   Calf Raises 2 x 20 One hand hold TA   Toe Raises   TA   Marches 2 x 20 One hand hold TA   Alt. Sidekicks 2 x 20  TA   LAQ     Sit/Stands   TA   Step ups - FWD Add back?         TG squats, CR, toe raises L17 3 x 20 squats    Head and thoracic spine elevated with pillows. Right lateal lean at times that is self correcting TA        balance   No hand hold in parallel bars    Practiced upright posture in parallel bars     Gait training   GT         Marching Gait   NR   Sidestepping   NR         Ambulation  2 X 165 ft WW SBA  1 x 25 ft, 1 x

## 2024-10-16 ENCOUNTER — TREATMENT (OUTPATIENT)
Dept: PHYSICAL THERAPY | Age: 59
End: 2024-10-16
Payer: COMMERCIAL

## 2024-10-16 ENCOUNTER — TREATMENT (OUTPATIENT)
Dept: OCCUPATIONAL THERAPY | Age: 59
End: 2024-10-16
Payer: COMMERCIAL

## 2024-10-16 DIAGNOSIS — R53.0 NEOPLASTIC MALIGNANT RELATED FATIGUE: ICD-10-CM

## 2024-10-16 DIAGNOSIS — C79.51 SECONDARY MALIGNANT NEOPLASM OF BONE (HCC): ICD-10-CM

## 2024-10-16 DIAGNOSIS — R53.81 DEBILITY: Primary | ICD-10-CM

## 2024-10-16 DIAGNOSIS — C50.919 STAGE IV BREAST CANCER IN FEMALE (HCC): Primary | ICD-10-CM

## 2024-10-16 PROCEDURE — 97110 THERAPEUTIC EXERCISES: CPT | Performed by: PHYSICAL THERAPIST

## 2024-10-16 PROCEDURE — 97535 SELF CARE MNGMENT TRAINING: CPT | Performed by: OCCUPATIONAL THERAPIST

## 2024-10-16 PROCEDURE — 97530 THERAPEUTIC ACTIVITIES: CPT | Performed by: PHYSICAL THERAPIST

## 2024-10-16 PROCEDURE — 97140 MANUAL THERAPY 1/> REGIONS: CPT | Performed by: OCCUPATIONAL THERAPIST

## 2024-10-16 NOTE — PROGRESS NOTES
OCCUPATIONAL THERAPY LYMPHEDEMA TREATMENT    [facility] OCCUPATIONAL THERAPY[place] [address]  Phone: [phone]; Fax: [phone]      Date:  10/16/2024   Initial Evaluation Date: 10/3/2024                     Evaluating Therapist: Rosalinda Green, RENARD/NAABELLE, ARNALDO     Patient Name:  Uriel Braswell               :  1965     Restrictions/Precautions:  L UE, fall risk, wears back brace for pain management  Diagnosis:  Stage IV breast cancer in female (HCC) (C50.919)                                                        Date of Surgery/Injury: Left partial mastectomy with SLND and re-excision for margin - 2016; metastatic recurrence with diffuse osseous involvement in 2024      Insurance/Certification information:    Payor: UNITED HEALTHCARE [[id number]]  Plan: UNITED HEALTHCARE - [employer] [facility] [[id number]]  ID: [id number]  Plan of care signed (Y/N): yes 10/8/24-SENT 10/3/24  Visit# / total visits: evaluation + 4     Referring Practitioner/NPI#:    Pedro Stephens, APRN - CNP NPI: [id number]      Specific Practitioner Orders: Evaluation and treatment     Assessment of current deficits   []Pain  []Skin Integrity   [x]Lymphedema   []Functional transfers/mobility   []ADLs   []Strength    []Cognition  []IADLs   []Safety Awareness   []  Motor Endurance    []Fine Motor Coordination   []Balance   []Vision/perception  []Sensation []Gross Motor Coordination  []ROM      OT PLAN OF CARE   OT POC based on physician orders, patient diagnosis and results of clinical assessment     Frequency/Duration: 1-2x/week for 24 treatment sessions from 10/3/2024 through 24   Projected units: 96  Approved days/units: NA on eval     Specific OT Treatment to include:      Plan of Care: 11963, 25558, 07556, 64216     [x]97140-Manual Lymph Drainage and Combined Decongestive Therapy  [x]78701- Therapeutic Exercise/ HEP including

## 2024-10-16 NOTE — PROGRESS NOTES
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    X = TO BE PERFORMED NEXT VISIT  > = PROGRESS TO THIS    S: Pt reports no new complaints.    O: Not wearing back brace this session    Access Code: [email or web address]  URL: [email or web address]  Date: 2024  Prepared by: Cayden Hutson    Exercises  - Standing Marching  - 2 x daily - 7 x weekly - 2-3 sets - 15 reps  - Standing Heel Raise  - 2 x daily - 7 x weekly - 2-3 sets - 15 reps  - Seated Long Arc Quad  - 2 x daily - 7 x weekly - 2 sets - 10 reps - 3 sec hold    Time 3958-1958     Visit 13 Repeat outcome measure at mid point and end.    Pain Pain with activity 5/10     ROM      Modalities            Exercise      Nustep   L5 x 20 min  TA         Mid Row seated  Green 3 x 15 reach and pull; elbows closer to side Airex in chair for tactile and back support    Scapula retraction with cerv extension X 10                 Squats   TA   Calf Raises 2 x 20 One hand hold TA   Toe Raises   TA   Marches 2 x 20 One hand hold TA   Alt. Sidekicks 2 x 20  TA   LAQ     Sit/Stands   TA   Step ups - FWD Add back?         TG squats, CR, toe raises L17 3 x 20 squats    Head and thoracic spine elevated with pillows. Right lateal lean at times that is self correcting TA        balance   No hand hold in parallel bars    Practiced upright posture in parallel bars     Gait training   GT         Marching Gait   NR   Sidestepping   NR         Ambulation  2 X 165 ft WW SBA  1 x 25 ft, 1 x

## 2024-10-18 ENCOUNTER — HOSPITAL ENCOUNTER (OUTPATIENT)
Dept: INFUSION THERAPY | Age: 59
Discharge: HOME | End: 2024-10-18
Payer: COMMERCIAL

## 2024-10-18 DIAGNOSIS — C80.1 CANCER (HCC): Primary | ICD-10-CM

## 2024-10-18 DIAGNOSIS — C79.52 SECONDARY MALIGNANT NEOPLASM OF BONE AND BONE MARROW (HCC): ICD-10-CM

## 2024-10-18 DIAGNOSIS — C79.51 SECONDARY MALIGNANT NEOPLASM OF BONE AND BONE MARROW (HCC): ICD-10-CM

## 2024-10-18 LAB
ALBUMIN SERPL-MCNC: 3.7 G/DL (ref 3.5–5.2)
ALP SERPL-CCNC: 161 U/L (ref 35–104)
ALT SERPL-CCNC: 7 U/L (ref 0–32)
ANION GAP SERPL CALCULATED.3IONS-SCNC: 8 MMOL/L (ref 7–16)
AST SERPL-CCNC: 20 U/L (ref 0–31)
BASOPHILS # BLD: 0.15 K/UL (ref 0–0.2)
BASOPHILS NFR BLD: 4 % (ref 0–2)
BILIRUB SERPL-MCNC: 0.2 MG/DL (ref 0–1.2)
BLASTS ABSOLUTE COUNT: 0.03 K/UL
BLASTS: 1 %
BUN SERPL-MCNC: 17 MG/DL (ref 6–23)
CALCIUM SERPL-MCNC: 8.7 MG/DL (ref 8.6–10.2)
CHLORIDE SERPL-SCNC: 99 MMOL/L (ref 98–107)
CO2 SERPL-SCNC: 26 MMOL/L (ref 22–29)
CREAT SERPL-MCNC: 1 MG/DL (ref 0.5–1)
EOSINOPHIL # BLD: 0.06 K/UL (ref 0.05–0.5)
EOSINOPHILS RELATIVE PERCENT: 2 % (ref 0–6)
ERYTHROCYTE [DISTWIDTH] IN BLOOD BY AUTOMATED COUNT: 18 % (ref 11.5–15)
GFR, ESTIMATED: 65 ML/MIN/1.73M2
GLUCOSE SERPL-MCNC: 87 MG/DL (ref 74–99)
HCT VFR BLD AUTO: 25.5 % (ref 34–48)
HGB BLD-MCNC: 8.5 G/DL (ref 11.5–15.5)
LYMPHOCYTES NFR BLD: 0.32 K/UL (ref 1.5–4)
LYMPHOCYTES RELATIVE PERCENT: 10 % (ref 20–42)
MCH RBC QN AUTO: 38.5 PG (ref 26–35)
MCHC RBC AUTO-ENTMCNC: 33.3 G/DL (ref 32–34.5)
MCV RBC AUTO: 115.4 FL (ref 80–99.9)
MONOCYTES NFR BLD: 0.29 K/UL (ref 0.1–0.95)
MONOCYTES NFR BLD: 9 % (ref 2–12)
MYELOCYTES ABSOLUTE COUNT: 0.03 K/UL
MYELOCYTES: 1 %
NEUTROPHILS NFR BLD: 74 % (ref 43–80)
NEUTS SEG NFR BLD: 2.42 K/UL (ref 1.8–7.3)
PLATELET # BLD AUTO: 224 K/UL (ref 130–450)
PMV BLD AUTO: 9.1 FL (ref 7–12)
POTASSIUM SERPL-SCNC: 4.4 MMOL/L (ref 3.5–5)
PROT SERPL-MCNC: 6.6 G/DL (ref 6.4–8.3)
RBC # BLD AUTO: 2.21 M/UL (ref 3.5–5.5)
RBC # BLD: ABNORMAL 10*6/UL
RBC # BLD: ABNORMAL 10*6/UL
SODIUM SERPL-SCNC: 133 MMOL/L (ref 132–146)
WBC OTHER # BLD: 3.3 K/UL (ref 4.5–11.5)

## 2024-10-18 PROCEDURE — 85025 COMPLETE CBC W/AUTO DIFF WBC: CPT

## 2024-10-18 PROCEDURE — 80053 COMPREHEN METABOLIC PANEL: CPT

## 2024-10-18 PROCEDURE — 36415 COLL VENOUS BLD VENIPUNCTURE: CPT

## 2024-10-21 ENCOUNTER — OFFICE VISIT (OUTPATIENT)
Dept: ONCOLOGY | Age: 59
End: 2024-10-21
Payer: COMMERCIAL

## 2024-10-21 VITALS
WEIGHT: 114.2 LBS | TEMPERATURE: 98.7 F | DIASTOLIC BLOOD PRESSURE: 65 MMHG | HEIGHT: 67 IN | SYSTOLIC BLOOD PRESSURE: 96 MMHG | HEART RATE: 107 BPM | BODY MASS INDEX: 17.92 KG/M2 | OXYGEN SATURATION: 94 %

## 2024-10-21 DIAGNOSIS — C79.51 SECONDARY MALIGNANT NEOPLASM OF BONE (HCC): ICD-10-CM

## 2024-10-21 DIAGNOSIS — C79.51 PAIN FROM BONE METASTASES (HCC): ICD-10-CM

## 2024-10-21 DIAGNOSIS — G89.3 PAIN DUE TO NEOPLASM: ICD-10-CM

## 2024-10-21 DIAGNOSIS — Z85.3 PERSONAL HISTORY OF BREAST CANCER: Primary | ICD-10-CM

## 2024-10-21 DIAGNOSIS — Z51.5 PALLIATIVE CARE BY SPECIALIST: ICD-10-CM

## 2024-10-21 DIAGNOSIS — C50.919 STAGE IV BREAST CANCER IN FEMALE (HCC): ICD-10-CM

## 2024-10-21 DIAGNOSIS — C50.912 BREAST CANCER, STAGE 3, LEFT (HCC): ICD-10-CM

## 2024-10-21 DIAGNOSIS — Z51.81 THERAPEUTIC DRUG MONITORING: ICD-10-CM

## 2024-10-21 DIAGNOSIS — G89.3 PAIN FROM BONE METASTASES (HCC): ICD-10-CM

## 2024-10-21 DIAGNOSIS — G89.3 CHRONIC PAIN DUE TO NEOPLASM: ICD-10-CM

## 2024-10-21 DIAGNOSIS — C79.51 METASTATIC CANCER TO SPINE (HCC): ICD-10-CM

## 2024-10-21 PROCEDURE — G8427 DOCREV CUR MEDS BY ELIG CLIN: HCPCS | Performed by: INTERNAL MEDICINE

## 2024-10-21 PROCEDURE — G8484 FLU IMMUNIZE NO ADMIN: HCPCS | Performed by: INTERNAL MEDICINE

## 2024-10-21 PROCEDURE — 1036F TOBACCO NON-USER: CPT | Performed by: INTERNAL MEDICINE

## 2024-10-21 PROCEDURE — G8419 CALC BMI OUT NRM PARAM NOF/U: HCPCS | Performed by: INTERNAL MEDICINE

## 2024-10-21 PROCEDURE — 99212 OFFICE O/P EST SF 10 MIN: CPT

## 2024-10-21 PROCEDURE — 3017F COLORECTAL CA SCREEN DOC REV: CPT | Performed by: INTERNAL MEDICINE

## 2024-10-21 PROCEDURE — 99214 OFFICE O/P EST MOD 30 MIN: CPT | Performed by: INTERNAL MEDICINE

## 2024-10-21 RX ORDER — OXYCODONE HYDROCHLORIDE 20 MG/1
20 TABLET ORAL
Qty: 112 TABLET | Refills: 0 | Status: SHIPPED | OUTPATIENT
Start: 2024-10-21 | End: 2024-11-04

## 2024-10-21 RX ORDER — METHADONE HYDROCHLORIDE 10 MG/1
10 TABLET ORAL 2 TIMES DAILY
Qty: 60 TABLET | Refills: 0 | Status: SHIPPED | OUTPATIENT
Start: 2024-10-21 | End: 2024-11-20

## 2024-10-21 RX ORDER — DULOXETIN HYDROCHLORIDE 20 MG/1
20 CAPSULE, DELAYED RELEASE ORAL DAILY
Qty: 90 CAPSULE | Refills: 0 | Status: SHIPPED | OUTPATIENT
Start: 2024-10-21 | End: 2025-01-19

## 2024-10-21 NOTE — PROGRESS NOTES
[facility]  [address]  [place] [address]  Dept: [phone]  Loc: [phone]  Attending progress note      Reason for Visit:   Recurrent metastatic breast cancer.    Referring Physician:  Julio Fried MD     PCP:  Ebenezer Mak DO    History of Present Illness:    Mrs. Braswell is a 59-year-old lady, with a past history significant for T3N3 ER positive >90% NM negative (<1%) HER2 Negative (1+) breast cancer who underwent mastectomy with axillary lymph node dissection followed by adjuvant dose dense AC-T and multiple lines of endocrine therapy due to grade 3 arthralgias, then on tamoxifen. Unfortunately, developed back pain in late 2023 and found to have metastatic recurrence with diffuse osseous involvement in 4/2024.      CANCER/TREATMENT HISTORY   November 2015-normal mammogram but skin changes  5/31/2016 bilateral mammogram with a spiculated mass of 14 mm on ultrasound  6/17/2016 core biopsy lobular carcinoma plus LCIS ER +100% NM +100% HER2 negative (1+  7/6/2016 mastectomy of the left breast sentinel lymph node biopsy and left axillary lymph node dissection final diagnosis T3 6 cm and 3 13 out of 23 M0 breast cancer.  She underwent reexcision the posterior wall with negative margins  9/6/2016 until 2/3/2017 adjuvant dose dense ACT  3/13/2017 until 4/27/2017 radiation to the breast  4/28/1917 Arimidex was started but very poor tolerance due to arthralgia treatment was changed to Femara  7/10/2017 Femara was stopped due to poor tolerance  7/14/2017 until 7/25/2017-Arimidex again with arthralgia  7/28/2017 exemestane started however stopped due to arthralgias  Tamoxifen was started 3/2018 (she is a MTHFR heterozygous and is good potentially cause DVTs).  Developed back pain late 2023.  4/25/2024 spine MRI showed diffuse osseous lesions.   5/4/2024 PET/CT showed innumerable intensely FDG avid lesions throughout axial and appendicular

## 2024-10-21 NOTE — TELEPHONE ENCOUNTER
Patient Uriel's daughter Atilio called for refill oxycodone 20mg IR, cymbalta 20mg, and methadone 10mg. Oxycodone and cymbalta send to [facility]. Methadone send to [facility]. Next appointment 11-. Pharmacies verified on perfect serve. Routed call to A Kat, NP

## 2024-10-23 ENCOUNTER — TREATMENT (OUTPATIENT)
Dept: OCCUPATIONAL THERAPY | Age: 59
End: 2024-10-23
Payer: COMMERCIAL

## 2024-10-23 ENCOUNTER — TREATMENT (OUTPATIENT)
Dept: PHYSICAL THERAPY | Age: 59
End: 2024-10-23
Payer: COMMERCIAL

## 2024-10-23 DIAGNOSIS — R53.0 NEOPLASTIC MALIGNANT RELATED FATIGUE: ICD-10-CM

## 2024-10-23 DIAGNOSIS — C79.51 SECONDARY MALIGNANT NEOPLASM OF BONE (HCC): ICD-10-CM

## 2024-10-23 DIAGNOSIS — C50.919 STAGE IV BREAST CANCER IN FEMALE (HCC): Primary | ICD-10-CM

## 2024-10-23 DIAGNOSIS — R53.81 DEBILITY: Primary | ICD-10-CM

## 2024-10-23 PROCEDURE — 97140 MANUAL THERAPY 1/> REGIONS: CPT | Performed by: OCCUPATIONAL THERAPIST

## 2024-10-23 PROCEDURE — 97110 THERAPEUTIC EXERCISES: CPT

## 2024-10-23 PROCEDURE — 97530 THERAPEUTIC ACTIVITIES: CPT

## 2024-10-23 PROCEDURE — 97535 SELF CARE MNGMENT TRAINING: CPT | Performed by: OCCUPATIONAL THERAPIST

## 2024-10-23 NOTE — PROGRESS NOTES
pressure/blood draw in: [x] Left Upper Extremity     [] Right Upper Extremity        [x] Fall Risk       Intervention:   []Other    Pain:  [] No    [x] Yes  Location:central lower thoracic and upper lumbar area; tightness in legs   Pain Rating (0-10 pain scale): moderate  Pain Description:  aching, heaviness, pressure, squeezing, throbbing, and uncomfortable   Interruption of Treatment [] Yes  [x] No    Came to Clinic:  [] Bandaged    []Unbandaged    [] With Stocking     [] With Sleeve     [] Kinesiotaped    [] Unna Boot    [x] With Alternative Compression: TG to bilateral LE    Skin Integrity: blister with seepage to left shin is healing and left scab at knee  [] Normal [] Dry  []Rough [x]Moist []Rash  Location of problem area/s:  [x]Hyperplasia   []Hyperkeratosis  [x]Hyperpigmentation  []Papillomas  [x]Lymphorrhea  [x]Lipodermatosclerosis   [] Other    Color:  [] Normal [] Mottled [x] Flushed [] Other/Description  Location of problem area/s:    Edema:  Edema Location: bilateral feet  [x] 1+ Edema [] 2+ Edema  [] 3+ Edema [] 4+ Edema  Edema Location: bilateral calves and shins  [] 1+ Edema [x] 2+ Edema  [] 3+ Edema [] 4+ Edema  Edema Location: bilateral thighs  [] 1+ Edema [x] 2+ Edema  [] 3+ Edema [] 4+ Edema    Subjective:Pt and daughter both reported that the fluid has been improving. More compliant with compression and MLD, but not elevation throughout the day. Went to a craft show over the weekend and still recovering.       Objective:  [x] Measurement [x]Manual Lymph Drainage  [x]Bandaging   []Kinesiotaping [x] Education    [x]Self Massage   [x]Self Bandaging [x] Exercise    [x]Wound Care  [x]Hygiene  [] Other    Assessment:  Knowledge of home program:   [] Good [x] Fair+  [] Poor  Patient is programming at home:             [x] Yes  [] No  Family is assisting with programming: [] Yes  [x] No  Home programming is consistent:             [x] Yes  [] No  Other:   Response to treatment: Good results following

## 2024-10-23 NOTE — PROGRESS NOTES
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    X = TO BE PERFORMED NEXT VISIT  > = PROGRESS TO THIS    S: Pt reports doing okay. Walked .8 miles in gravel using walker on  and responded well to that.   O: Intermittently wore back brace during session    Access Code: [email or web address]  URL: [email or web address]  Date: 2024  Prepared by: Cayden Hutson    Exercises  - Standing Marching  - 2 x daily - 7 x weekly - 2-3 sets - 15 reps  - Standing Heel Raise  - 2 x daily - 7 x weekly - 2-3 sets - 15 reps  - Seated Long Arc Quad  - 2 x daily - 7 x weekly - 2 sets - 10 reps - 3 sec hold    Time 7820-4894     Visit 15 Repeat outcome measure at mid point and end.    Pain 7-8/10     ROM      Modalities            Exercise      Nustep   L5 x 20 min  TA         Mid Row seated  Green 3 x 15 reach and pull; elbows closer to side Airex in chair for tactile and back support    Scapula retraction with cerv extension X 10                 Squats   TA   Calf Raises 2 x 20 One hand hold TA   Toe Raises   TA   Marches 2 x 20 One hand hold TA   Alt. Sidekicks 2 x 20  TA   LAQ     Sit/Stands   TA   Step ups - FWD Add back?         TG squats, CR, toe raises L17 3 x 20 squats    Head and thoracic spine elevated with pillows. Right lateal lean at times that is self correcting TA        balance   No hand hold in parallel bars    Practiced upright posture in parallel bars     Gait training   GT         Marching Gait   NR

## 2024-10-30 ENCOUNTER — TREATMENT (OUTPATIENT)
Dept: PHYSICAL THERAPY | Age: 59
End: 2024-10-30

## 2024-10-30 ENCOUNTER — TREATMENT (OUTPATIENT)
Dept: OCCUPATIONAL THERAPY | Age: 59
End: 2024-10-30
Payer: COMMERCIAL

## 2024-10-30 DIAGNOSIS — C50.919 STAGE IV BREAST CANCER IN FEMALE (HCC): Primary | ICD-10-CM

## 2024-10-30 DIAGNOSIS — C79.51 SECONDARY MALIGNANT NEOPLASM OF BONE (HCC): ICD-10-CM

## 2024-10-30 DIAGNOSIS — R53.0 NEOPLASTIC MALIGNANT RELATED FATIGUE: ICD-10-CM

## 2024-10-30 DIAGNOSIS — R53.81 DEBILITY: Primary | ICD-10-CM

## 2024-10-30 PROCEDURE — 97140 MANUAL THERAPY 1/> REGIONS: CPT | Performed by: OCCUPATIONAL THERAPIST

## 2024-10-30 PROCEDURE — 97535 SELF CARE MNGMENT TRAINING: CPT | Performed by: OCCUPATIONAL THERAPIST

## 2024-10-30 PROCEDURE — 97110 THERAPEUTIC EXERCISES: CPT | Performed by: OCCUPATIONAL THERAPIST

## 2024-10-30 NOTE — PROGRESS NOTES
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    X = TO BE PERFORMED NEXT VISIT  > = PROGRESS TO THIS    S: Pt reports doing okay. Walked .8 miles in gravel using walker on  and responded well to that.   O: Intermittently wore back brace during session    Access Code: [email or web address]  URL: [email or web address]  Date: 2024  Prepared by: Cayden Hutson    Exercises  - Standing Marching  - 2 x daily - 7 x weekly - 2-3 sets - 15 reps  - Standing Heel Raise  - 2 x daily - 7 x weekly - 2-3 sets - 15 reps  - Seated Long Arc Quad  - 2 x daily - 7 x weekly - 2 sets - 10 reps - 3 sec hold    Time 9649-9098     Visit 15 Repeat outcome measure at mid point and end.    Pain 7-8/10     ROM      Modalities            Exercise      Nustep   L5 x 20 min  TA         Mid Row seated  Green 3 x 15 reach and pull; elbows closer to side Airex in chair for tactile and back support    Scapula retraction with cerv extension X 10                 Squats   TA   Calf Raises 2 x 20 One hand hold TA   Toe Raises   TA   Marches 2 x 20 One hand hold TA   Alt. Sidekicks 2 x 20  TA   LAQ     Sit/Stands   TA   Step ups - FWD Add back?         TG squats, CR, toe raises L17 3 x 20 squats    Head and thoracic spine elevated with pillows. Right lateal lean at times that is self correcting TA        balance   No hand hold in parallel bars    Practiced upright posture in parallel bars     Gait training   GT         Marching Gait   NR

## 2024-10-30 NOTE — PROGRESS NOTES
OCCUPATIONAL THERAPY LYMPHEDEMA TREATMENT    [facility] OCCUPATIONAL THERAPY[place] [address]  Phone: [phone]; Fax: [phone]      Date:  10/30/2024   Initial Evaluation Date: 10/3/2024                     Evaluating Therapist: Rosalinda Green, RENARD/ANABELLE, ARNALDO     Patient Name:  Uriel Braswell               :  1965     Restrictions/Precautions:  L UE, fall risk, wears back brace for pain management  Diagnosis:  Stage IV breast cancer in female (HCC) (C50.919)                                                        Date of Surgery/Injury: Left partial mastectomy with SLND and re-excision for margin - 2016; metastatic recurrence with diffuse osseous involvement in 2024      Insurance/Certification information:    Payor: UNITED HEALTHCARE [[id number]]  Plan: UNITED HEALTHCARE - [employer] [facility] [[id number]]  ID: [id number]  Plan of care signed (Y/N): yes 10/8/24-SENT 10/3/24  Visit# / total visits: evaluation + 6     Referring Practitioner/NPI#:    Pedro Stephens, APRN - CNP NPI: [id number]      Specific Practitioner Orders: Evaluation and treatment     Assessment of current deficits   []Pain  []Skin Integrity   [x]Lymphedema   []Functional transfers/mobility   []ADLs   []Strength    []Cognition  []IADLs   []Safety Awareness   []  Motor Endurance    []Fine Motor Coordination   []Balance   []Vision/perception  []Sensation []Gross Motor Coordination  []ROM      OT PLAN OF CARE   OT POC based on physician orders, patient diagnosis and results of clinical assessment     Frequency/Duration: 1-2x/week for 24 treatment sessions from 10/3/2024 through 24   Projected units: 96  Approved days/units: NA on eval     Specific OT Treatment to include:      Plan of Care: 12664, 16919, 14615, 91304     [x]97140-Manual Lymph Drainage and Combined Decongestive Therapy  [x]74436- Therapeutic Exercise/ HEP including

## 2024-11-04 DIAGNOSIS — G89.3 PAIN FROM BONE METASTASES (HCC): ICD-10-CM

## 2024-11-04 DIAGNOSIS — C79.51 PAIN FROM BONE METASTASES (HCC): ICD-10-CM

## 2024-11-04 DIAGNOSIS — C50.912 BREAST CANCER, STAGE 3, LEFT (HCC): ICD-10-CM

## 2024-11-04 DIAGNOSIS — Z51.5 PALLIATIVE CARE BY SPECIALIST: ICD-10-CM

## 2024-11-04 DIAGNOSIS — G89.3 PAIN DUE TO NEOPLASM: ICD-10-CM

## 2024-11-04 RX ORDER — OXYCODONE HYDROCHLORIDE 20 MG/1
20 TABLET ORAL
Qty: 112 TABLET | Refills: 0 | Status: SHIPPED | OUTPATIENT
Start: 2024-11-04 | End: 2024-11-18

## 2024-11-04 NOTE — TELEPHONE ENCOUNTER
Patient Uriel called for refill oxycodone 20mg. Next appointment 11-. [facility]. Pharmacy verified on perfect serve. Routed call to A Kat, NP

## 2024-11-05 ENCOUNTER — TREATMENT (OUTPATIENT)
Dept: PHYSICAL THERAPY | Age: 59
End: 2024-11-05
Payer: COMMERCIAL

## 2024-11-05 DIAGNOSIS — R53.81 DEBILITY: Primary | ICD-10-CM

## 2024-11-05 PROCEDURE — 97112 NEUROMUSCULAR REEDUCATION: CPT

## 2024-11-05 PROCEDURE — 97110 THERAPEUTIC EXERCISES: CPT

## 2024-11-05 NOTE — PROGRESS NOTES
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    X = TO BE PERFORMED NEXT VISIT  > = PROGRESS TO THIS    S: Pt reports doing okay. Walked .8 miles in gravel using walker on  and responded well to that.   O: Intermittently wore back brace during session    Access Code: [email or web address]  URL: [email or web address]  Date: 2024  Prepared by: Cayden Hutson    Exercises  - Standing Marching  - 2 x daily - 7 x weekly - 2-3 sets - 15 reps  - Standing Heel Raise  - 2 x daily - 7 x weekly - 2-3 sets - 15 reps  - Seated Long Arc Quad  - 2 x daily - 7 x weekly - 2 sets - 10 reps - 3 sec hold    Time 6018-6072     Visit 15 Repeat outcome measure at mid point and end.    Pain 7-8/10     ROM      Modalities            Exercise      Nustep   L5 x 10 min  TA         Mid Row seated  Airex in chair for tactile and back support    Scapula retraction with cerv extension                 Squats   TA   Calf Raises One hand hold TA   Toe Raises  TA   Marches One hand hold TA   Alt. Sidekicks  TA   LAQ     Sit/Stands   TA   Step ups - FWD Add back?         TG squats, CR, toe raises   Head and thoracic spine elevated with pillows. Right lateal lean at times that is self correcting TA        balance   No hand hold in parallel bars    Practiced upright posture in parallel bars     Supine lying to increase lumbar/cervical extension  X 10 min 3 pillows  1 x 5 heel slides per side     Gait training   GT         Marching Gait 1 x

## 2024-11-07 ENCOUNTER — TREATMENT (OUTPATIENT)
Dept: OCCUPATIONAL THERAPY | Age: 59
End: 2024-11-07
Payer: COMMERCIAL

## 2024-11-07 ENCOUNTER — TREATMENT (OUTPATIENT)
Dept: PHYSICAL THERAPY | Age: 59
End: 2024-11-07

## 2024-11-07 DIAGNOSIS — C50.919 STAGE IV BREAST CANCER IN FEMALE (HCC): Primary | ICD-10-CM

## 2024-11-07 DIAGNOSIS — R53.81 DEBILITY: Primary | ICD-10-CM

## 2024-11-07 PROCEDURE — 97110 THERAPEUTIC EXERCISES: CPT | Performed by: OCCUPATIONAL THERAPIST

## 2024-11-07 PROCEDURE — 97140 MANUAL THERAPY 1/> REGIONS: CPT | Performed by: OCCUPATIONAL THERAPIST

## 2024-11-07 PROCEDURE — 97535 SELF CARE MNGMENT TRAINING: CPT | Performed by: OCCUPATIONAL THERAPIST

## 2024-11-07 NOTE — PROGRESS NOTES
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    X = TO BE PERFORMED NEXT VISIT  > = PROGRESS TO THIS    S: Pt reports doing okay. Walked .8 miles in gravel using walker on  and responded well to that.   O: Intermittently wore back brace during session    Access Code: [email or web address]  URL: [email or web address]  Date: 2024  Prepared by: Cayden Hutson    Exercises  - Standing Marching  - 2 x daily - 7 x weekly - 2-3 sets - 15 reps  - Standing Heel Raise  - 2 x daily - 7 x weekly - 2-3 sets - 15 reps  - Seated Long Arc Quad  - 2 x daily - 7 x weekly - 2 sets - 10 reps - 3 sec hold    Time 5109-2977     Visit 15 Repeat outcome measure at mid point and end.    Pain 7-8/10     ROM      Modalities            Exercise      Nustep   L5 x 10 min  TA         Mid Row seated  Green 3 x 15 reach and pull; elbows closer to side Airex in chair for tactile and back support    Scapula retraction with cerv extension                 Squats   TA   Calf Raises 2 x 20 One hand hold TA   Toe Raises   TA   Marches 2 x 20 One hand hold TA   Alt. Sidekicks 2 x 20  TA   LAQ     Sit/Stands   TA   Step ups - FWD Add back?         TG squats, CR, toe raises L17 3 x 20 squats  L17 3 x 20 CR    Head and thoracic spine elevated with pillows. Right lateal lean at times that is self correcting TA        balance   No hand hold in parallel bars    Practiced upright posture in parallel bars     Supine lying to increase lumbar/cervical

## 2024-11-07 NOTE — PROGRESS NOTES
Measurements:  -Lymphedema Life Impact Scale Score:  34  Percent Impairment:  47%     -Functional Assessment of Chronic Illness Therapy Fatigue Scale Version 4:23/52; severe fatigue  Assessment:  Knowledge of home program:                         [] Good         [x] Fair+                       [] Poor  Patient is programming at home:                   [x] Yes             [] No  Family is assisting with programming:           [] Yes             [x] No  Home programming is consistent:                  [x] Yes             [] No  Other:   Response to treatment: Good results following with both MLD and use of pneumatic compression. Overall min fluid noted after session. Pt able to move ankle and knees more freely. More consistent with compression and MLD. To drop down to every other week.     Instructions for patient:                       [x] Verbal         [x] Written  Instructions addressed:  [] Measurement         [x]Manual Lymph Drainage     [x]Bandaging    []Kinesiotaping          [x] Education                            [x]Self Massage           []Self Bandaging       [] Exercise                              [x]Wound Care  [x]Hygiene           Rehab Potential: [] Excellent [x] Good [] Fair  [] Poor     Goal Status:  [] Achieved [x] Partially Achieved  [] Not Achieved     Patient Status: [] Patient now discharged        Time In: 1000  Time Out: 1100  Timed Code Treatment Minutes: 60 minutes     CODE   Minutes  Units   57771 OT Eval Low       99031 OT Eval Medium       66017 OT Eval High       42455 Fluidotherapy       85894 Manual 20 1   52494 Therapeutic Ex 10 1   53635 Therapeutic Activity     40081 ADL/COMP Tech Train 30 2    16824 Neuromuscular Re-Ed       79916 OrthoManagementTraining       31994 Paraffin       75884 Electrical Stim - Attended       64085 Iontophoresis       21232 Ultrasound         Other       Total   60 4         Plan: Continue to address:     [x]Bandaging  [x] Self Bandaging/Family Assist

## 2024-11-08 DIAGNOSIS — I89.0 LYMPHEDEMA: Primary | ICD-10-CM

## 2024-11-08 DIAGNOSIS — C50.919 STAGE IV BREAST CANCER IN FEMALE (HCC): ICD-10-CM

## 2024-11-13 ENCOUNTER — TREATMENT (OUTPATIENT)
Dept: PHYSICAL THERAPY | Age: 59
End: 2024-11-13

## 2024-11-13 ENCOUNTER — OFFICE VISIT (OUTPATIENT)
Dept: PALLATIVE CARE | Age: 59
End: 2024-11-13
Payer: COMMERCIAL

## 2024-11-13 VITALS
SYSTOLIC BLOOD PRESSURE: 108 MMHG | WEIGHT: 116 LBS | HEART RATE: 99 BPM | TEMPERATURE: 98.1 F | OXYGEN SATURATION: 95 % | BODY MASS INDEX: 18.17 KG/M2 | DIASTOLIC BLOOD PRESSURE: 66 MMHG

## 2024-11-13 DIAGNOSIS — C50.919 STAGE IV BREAST CANCER IN FEMALE (HCC): ICD-10-CM

## 2024-11-13 DIAGNOSIS — R53.81 DEBILITY: Primary | ICD-10-CM

## 2024-11-13 DIAGNOSIS — Z51.5 ENCOUNTER FOR PALLIATIVE CARE: ICD-10-CM

## 2024-11-13 DIAGNOSIS — C79.51 METASTATIC CANCER TO SPINE (HCC): Primary | ICD-10-CM

## 2024-11-13 DIAGNOSIS — G89.3 PAIN DUE TO NEOPLASM: ICD-10-CM

## 2024-11-13 PROCEDURE — G8484 FLU IMMUNIZE NO ADMIN: HCPCS | Performed by: NURSE PRACTITIONER

## 2024-11-13 PROCEDURE — 1036F TOBACCO NON-USER: CPT | Performed by: NURSE PRACTITIONER

## 2024-11-13 PROCEDURE — G8419 CALC BMI OUT NRM PARAM NOF/U: HCPCS | Performed by: NURSE PRACTITIONER

## 2024-11-13 PROCEDURE — 99211 OFF/OP EST MAY X REQ PHY/QHP: CPT | Performed by: NURSE PRACTITIONER

## 2024-11-13 PROCEDURE — 3017F COLORECTAL CA SCREEN DOC REV: CPT | Performed by: NURSE PRACTITIONER

## 2024-11-13 PROCEDURE — G8427 DOCREV CUR MEDS BY ELIG CLIN: HCPCS | Performed by: NURSE PRACTITIONER

## 2024-11-13 PROCEDURE — 99213 OFFICE O/P EST LOW 20 MIN: CPT | Performed by: NURSE PRACTITIONER

## 2024-11-13 RX ORDER — MIRTAZAPINE 15 MG/1
22.5 TABLET, FILM COATED ORAL NIGHTLY
Qty: 135 TABLET | Refills: 1
Start: 2024-11-13 | End: 2025-05-12

## 2024-11-13 RX ORDER — PREDNISONE 20 MG/1
20 TABLET ORAL DAILY
Qty: 5 TABLET | Refills: 0 | Status: SHIPPED | OUTPATIENT
Start: 2024-11-13 | End: 2024-11-18

## 2024-11-13 RX ORDER — ALPRAZOLAM 1 MG/1
1 TABLET ORAL 3 TIMES DAILY PRN
Qty: 45 TABLET | Refills: 0 | COMMUNITY
Start: 2024-11-13 | End: 2024-11-13

## 2024-11-13 NOTE — PROGRESS NOTES
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    X = TO BE PERFORMED NEXT VISIT  > = PROGRESS TO THIS    S: Pt reports doing okay. Walked .8 miles in gravel using walker on  and responded well to that.   O: Intermittently wore back brace during session    Access Code: [email or web address]  URL: [email or web address]  Date: 2024  Prepared by: Cayden Hutson    Exercises  - Standing Marching  - 2 x daily - 7 x weekly - 2-3 sets - 15 reps  - Standing Heel Raise  - 2 x daily - 7 x weekly - 2-3 sets - 15 reps  - Seated Long Arc Quad  - 2 x daily - 7 x weekly - 2 sets - 10 reps - 3 sec hold    Time 7565-2869     Visit 15 Repeat outcome measure at mid point and end.    Pain 7-8/10     ROM      Modalities            Exercise      Nustep   L5 x 10 min  TA         Mid Row seated  Airex in chair for tactile and back support    Scapula retraction with cerv extension           Standing PPT X 10 concentration on tightening abs and standing upright     Squats   TA   Calf Raises 2 x 20 One hand hold TA   Toe Raises   TA   Marches 2 x 20 One hand hold TA   Alt. Sidekicks 2 x 20  TA   LAQ     Sit/Stands   TA   Step ups - FWD Add back?         TG squats, CR, toe raises L17 3 x 20 squats  L17 3 x 20 CR    Head and thoracic spine elevated with pillows. Right lateal lean at times that is self correcting TA        balance   No hand hold in parallel bars    Practiced upright posture in parallel bars     Supine lying to increase

## 2024-11-13 NOTE — PROGRESS NOTES
Department of Palliative Medicine  Ambulatory visit  Provider: JOSE ANGEL Mercado - CNP       Chief Complaint: Uriel Braswell is a 59 y.o. female with chief complaint of Pain    HPI:  54 y/o post-menopausal  female who underwent Stereotactic Left Breast mass core biopsy on 06/17/2016 revealing Invasive Lobular carcinoma, she has developed severe neuropathy after her last treatment.     Assessment/Plan      Breast cancer:   - tamoxifen stopped   - Follows at [facility]   - Progression of disease with bone/spinal mets noted in April 2024  - completed palliative radiation  -On Kisqali    Chronic pain related to neoplasm/pain due to spinal metastasis:  -Oxycodone 20 mg every 3 hours  -Methadone 10 mg BID   -QTc 457 on July 19, 2024   -Lyrica to 100 mg in the morning, 200 mg nightly    -Cymbalta 20 mg daily   -Alternate with Tylenol and ibuprofen    Anxiety/Depression:   -Xanax 1 mg TID as needed  -Cymbalta 20 mg daily   -started seeing psychiatry, but has stopped going  -She stopped Buspar and trintelix    Constipation:   -Senna S    Decreased oral intake/poor appetite:   -Encourage use of oral nutritional supplements, recommend boost very high-calorie   -Mirtazapine increase to 25 mg at bedtime was added    Nausea;   -Phenergan    Fatigue related neoplasm/physical debility:   -Encourage physical activity as tolerated   -Encouraged to continue to increase oral intake and utilization of oral nutritional supplements   -Will refer for outpatient physical therapy    Follow Up: 8 weeks. Encouraged to call with any questions, concerns, needs, or changes in symptoms.    Subjective:     Uriel Braswell is a 59 y.o. female with significant past medical history of Breast cancer.  Managed at [facility], has been on tamoxifen.  Unfortunately she is found to have metastasis to her spine and bones and April 2024.  She completed palliative radiation therapy.  She is now on kisqali.    11/13/24:  Sarita presents accompanied by

## 2024-11-15 ENCOUNTER — HOSPITAL ENCOUNTER (OUTPATIENT)
Dept: INFUSION THERAPY | Age: 59
Discharge: HOME | End: 2024-11-15
Payer: COMMERCIAL

## 2024-11-15 ENCOUNTER — CLINICAL DOCUMENTATION (OUTPATIENT)
Facility: HOSPITAL | Age: 59
End: 2024-11-15

## 2024-11-15 ENCOUNTER — TREATMENT (OUTPATIENT)
Dept: PHYSICAL THERAPY | Age: 59
End: 2024-11-15

## 2024-11-15 DIAGNOSIS — C79.52 SECONDARY MALIGNANT NEOPLASM OF BONE AND BONE MARROW (HCC): Primary | ICD-10-CM

## 2024-11-15 DIAGNOSIS — C50.919 STAGE IV BREAST CANCER IN FEMALE (HCC): ICD-10-CM

## 2024-11-15 DIAGNOSIS — C79.51 PAIN FROM BONE METASTASES (HCC): ICD-10-CM

## 2024-11-15 DIAGNOSIS — G89.3 PAIN FROM BONE METASTASES (HCC): ICD-10-CM

## 2024-11-15 DIAGNOSIS — C79.51 METASTATIC CANCER TO SPINE (HCC): ICD-10-CM

## 2024-11-15 DIAGNOSIS — R53.0 NEOPLASTIC MALIGNANT RELATED FATIGUE: ICD-10-CM

## 2024-11-15 DIAGNOSIS — C79.51 SECONDARY MALIGNANT NEOPLASM OF BONE AND BONE MARROW (HCC): Primary | ICD-10-CM

## 2024-11-15 DIAGNOSIS — C79.52 SECONDARY MALIGNANT NEOPLASM OF BONE AND BONE MARROW (HCC): ICD-10-CM

## 2024-11-15 DIAGNOSIS — Z85.3 PERSONAL HISTORY OF BREAST CANCER: ICD-10-CM

## 2024-11-15 DIAGNOSIS — C79.51 SECONDARY MALIGNANT NEOPLASM OF BONE AND BONE MARROW (HCC): ICD-10-CM

## 2024-11-15 DIAGNOSIS — R53.81 DEBILITY: Primary | ICD-10-CM

## 2024-11-15 DIAGNOSIS — C79.51 SECONDARY MALIGNANT NEOPLASM OF BONE (HCC): ICD-10-CM

## 2024-11-15 DIAGNOSIS — C50.912 BREAST CANCER, STAGE 3, LEFT (HCC): ICD-10-CM

## 2024-11-15 DIAGNOSIS — Z51.5 PALLIATIVE CARE BY SPECIALIST: ICD-10-CM

## 2024-11-15 DIAGNOSIS — G89.3 PAIN DUE TO NEOPLASM: ICD-10-CM

## 2024-11-15 DIAGNOSIS — G89.3 CHRONIC PAIN DUE TO NEOPLASM: ICD-10-CM

## 2024-11-15 DIAGNOSIS — C80.1 CANCER (HCC): ICD-10-CM

## 2024-11-15 LAB
ALBUMIN SERPL-MCNC: 3.7 G/DL (ref 3.5–5.2)
ALP SERPL-CCNC: 145 U/L (ref 35–104)
ALT SERPL-CCNC: 9 U/L (ref 0–32)
ANION GAP SERPL CALCULATED.3IONS-SCNC: 10 MMOL/L (ref 7–16)
AST SERPL-CCNC: 16 U/L (ref 0–31)
BASOPHILS # BLD: 0 K/UL (ref 0–0.2)
BASOPHILS NFR BLD: 0 % (ref 0–2)
BILIRUB SERPL-MCNC: 0.2 MG/DL (ref 0–1.2)
BUN SERPL-MCNC: 11 MG/DL (ref 6–23)
CALCIUM SERPL-MCNC: 8.8 MG/DL (ref 8.6–10.2)
CEA SERPL-MCNC: 2.7 NG/ML (ref 0–5.2)
CHLORIDE SERPL-SCNC: 97 MMOL/L (ref 98–107)
CO2 SERPL-SCNC: 29 MMOL/L (ref 22–29)
CREAT SERPL-MCNC: 0.7 MG/DL (ref 0.5–1)
EOSINOPHIL # BLD: 0 K/UL (ref 0.05–0.5)
EOSINOPHILS RELATIVE PERCENT: 0 % (ref 0–6)
ERYTHROCYTE [DISTWIDTH] IN BLOOD BY AUTOMATED COUNT: 16.3 % (ref 11.5–15)
GFR, ESTIMATED: >90 ML/MIN/1.73M2
GLUCOSE SERPL-MCNC: 112 MG/DL (ref 74–99)
HCT VFR BLD AUTO: 24.2 % (ref 34–48)
HGB BLD-MCNC: 8.2 G/DL (ref 11.5–15.5)
LYMPHOCYTES NFR BLD: 0.62 K/UL (ref 1.5–4)
LYMPHOCYTES RELATIVE PERCENT: 11 % (ref 20–42)
MCH RBC QN AUTO: 38 PG (ref 26–35)
MCHC RBC AUTO-ENTMCNC: 33.9 G/DL (ref 32–34.5)
MCV RBC AUTO: 112 FL (ref 80–99.9)
MONOCYTES NFR BLD: 0.56 K/UL (ref 0.1–0.95)
MONOCYTES NFR BLD: 10 % (ref 2–12)
NEUTROPHILS NFR BLD: 79 % (ref 43–80)
NEUTS SEG NFR BLD: 4.42 K/UL (ref 1.8–7.3)
PLATELET # BLD AUTO: 275 K/UL (ref 130–450)
PMV BLD AUTO: 8.9 FL (ref 7–12)
POTASSIUM SERPL-SCNC: 3.4 MMOL/L (ref 3.5–5)
PROT SERPL-MCNC: 6.6 G/DL (ref 6.4–8.3)
RBC # BLD AUTO: 2.16 M/UL (ref 3.5–5.5)
SODIUM SERPL-SCNC: 136 MMOL/L (ref 132–146)
WBC OTHER # BLD: 5.6 K/UL (ref 4.5–11.5)

## 2024-11-15 PROCEDURE — 86300 IMMUNOASSAY TUMOR CA 15-3: CPT

## 2024-11-15 PROCEDURE — 85025 COMPLETE CBC W/AUTO DIFF WBC: CPT

## 2024-11-15 PROCEDURE — 36415 COLL VENOUS BLD VENIPUNCTURE: CPT

## 2024-11-15 PROCEDURE — 80053 COMPREHEN METABOLIC PANEL: CPT

## 2024-11-15 PROCEDURE — 82378 CARCINOEMBRYONIC ANTIGEN: CPT

## 2024-11-15 RX ORDER — METHADONE HYDROCHLORIDE 10 MG/1
10 TABLET ORAL 2 TIMES DAILY
Qty: 60 TABLET | Refills: 0 | Status: SHIPPED | OUTPATIENT
Start: 2024-11-15 | End: 2024-12-15

## 2024-11-15 RX ORDER — OXYCODONE HYDROCHLORIDE 20 MG/1
20 TABLET ORAL
Qty: 112 TABLET | Refills: 0 | Status: SHIPPED | OUTPATIENT
Start: 2024-11-15 | End: 2024-11-29

## 2024-11-15 NOTE — PROGRESS NOTES
lumbar/cervical extension  X 10 min 3 pillows  1 x 10 heel slides per side  then  Knees supported on black round bolsters       Gait training  GT        Marching Gait Cues for upright posture NR   Sidestepping Cues for upright posture NR         Ambulation   Cues for upright posture                      A: Tolerated well . No new or significant changes this session  P: Continue with rehab plan  Tiffanie Rizvi PTA    Treatment Charges: Mins Units   Initial Evaluation     Re-Evaluation     Ther Exercise         TE 25 2   Manual Therapy     MT     Ther Activities        TA 20 1   Gait Training          GT     Neuro Re-education NR     Modalities     Non-Billable Service Time     Other     Total Time/Units 45 3

## 2024-11-15 NOTE — PROGRESS NOTES
Patient Assistance    Met with: patient at patient appt.  Patient provided income documentation and signed FA application.  Application submitted to FA, awaiting determination.     Navigator Type: Infusion  Documentation Type: New Patient  Contact Type: In-person  Form of PAP Assistance: [facility]          Additional notes:

## 2024-11-15 NOTE — TELEPHONE ENCOUNTER
Call from Atilio, Uriel's daughter, requesting for refills for Methadone and Oxy IR to be sent to [facility] in [place]. Uriel will need beginning of next week, but they have concerns as pharmacy needs to order.Next wily 1/8/24.

## 2024-11-18 ENCOUNTER — OFFICE VISIT (OUTPATIENT)
Dept: ONCOLOGY | Age: 59
End: 2024-11-18
Payer: COMMERCIAL

## 2024-11-18 ENCOUNTER — TREATMENT (OUTPATIENT)
Dept: OCCUPATIONAL THERAPY | Age: 59
End: 2024-11-18
Payer: COMMERCIAL

## 2024-11-18 ENCOUNTER — TREATMENT (OUTPATIENT)
Dept: PHYSICAL THERAPY | Age: 59
End: 2024-11-18
Payer: COMMERCIAL

## 2024-11-18 VITALS
BODY MASS INDEX: 18.05 KG/M2 | SYSTOLIC BLOOD PRESSURE: 127 MMHG | WEIGHT: 115 LBS | TEMPERATURE: 97.8 F | HEIGHT: 67 IN | OXYGEN SATURATION: 95 % | DIASTOLIC BLOOD PRESSURE: 77 MMHG | HEART RATE: 93 BPM

## 2024-11-18 DIAGNOSIS — C50.919 STAGE IV BREAST CANCER IN FEMALE (HCC): Primary | ICD-10-CM

## 2024-11-18 DIAGNOSIS — Z85.3 PERSONAL HISTORY OF BREAST CANCER: Primary | ICD-10-CM

## 2024-11-18 DIAGNOSIS — R53.0 NEOPLASTIC MALIGNANT RELATED FATIGUE: ICD-10-CM

## 2024-11-18 DIAGNOSIS — R53.81 DEBILITY: Primary | ICD-10-CM

## 2024-11-18 DIAGNOSIS — Z51.81 THERAPEUTIC DRUG MONITORING: ICD-10-CM

## 2024-11-18 LAB — CANCER AG15-3 SERPL-ACNC: 83 U/ML (ref 0–31)

## 2024-11-18 PROCEDURE — 1036F TOBACCO NON-USER: CPT | Performed by: INTERNAL MEDICINE

## 2024-11-18 PROCEDURE — 99214 OFFICE O/P EST MOD 30 MIN: CPT | Performed by: INTERNAL MEDICINE

## 2024-11-18 PROCEDURE — 97110 THERAPEUTIC EXERCISES: CPT

## 2024-11-18 PROCEDURE — G8419 CALC BMI OUT NRM PARAM NOF/U: HCPCS | Performed by: INTERNAL MEDICINE

## 2024-11-18 PROCEDURE — 97535 SELF CARE MNGMENT TRAINING: CPT | Performed by: OCCUPATIONAL THERAPIST

## 2024-11-18 PROCEDURE — 99213 OFFICE O/P EST LOW 20 MIN: CPT

## 2024-11-18 PROCEDURE — 97140 MANUAL THERAPY 1/> REGIONS: CPT | Performed by: OCCUPATIONAL THERAPIST

## 2024-11-18 PROCEDURE — G8484 FLU IMMUNIZE NO ADMIN: HCPCS | Performed by: INTERNAL MEDICINE

## 2024-11-18 PROCEDURE — 97110 THERAPEUTIC EXERCISES: CPT | Performed by: OCCUPATIONAL THERAPIST

## 2024-11-18 PROCEDURE — 97530 THERAPEUTIC ACTIVITIES: CPT

## 2024-11-18 PROCEDURE — G8427 DOCREV CUR MEDS BY ELIG CLIN: HCPCS | Performed by: INTERNAL MEDICINE

## 2024-11-18 PROCEDURE — 3017F COLORECTAL CA SCREEN DOC REV: CPT | Performed by: INTERNAL MEDICINE

## 2024-11-18 NOTE — PROGRESS NOTES
OCCUPATIONAL THERAPY LYMPHEDEMA UPDATE    [facility] OCCUPATIONAL THERAPY-[place]MPHEDEMA [place] [address]  Phone: [phone]; Fax: [phone]    Date:  2024   Initial Evaluation Date: 10/3/2024                     Evaluating Therapist: Rosalinda Green, RENARD/L, CLT     Patient Name:  Uriel Braswell               :  1965     Restrictions/Precautions:  L UE, fall risk, wears back brace for pain management  Diagnosis:  Stage IV breast cancer in female (HCC) (C50.919)                                                        Date of Surgery/Injury: Left partial mastectomy with SLND and re-excision for margin - 2016; metastatic recurrence with diffuse osseous involvement in 2024      Insurance/Certification information:    Payor: UNITED HEALTHCARE [[id number]]  Plan: UNITED HEALTHCARE - [facility] [[id number]]  ID: [id number]  Plan of care signed (Y/N): yes 10/8/24-SENT 10/3/24  Visit# / total visits: evaluation + 8    Cancels/No-shows to date: 0    Current update duration from 10/3/24 to 2024  Last seen by therapist:  TODAY    Patient reaction to treatment:  Despite regular and compliant use of conservative treatments for 4+ weeks, including exercise, elevation and compression, she continues to present with lymphedema. A good candidate for device at this time due to PMH, CA mets and postural limitations. Measurements listed below.    Garment / DME recommended:  Pt has been wearing daily compression with both knee and thigh high garments. Recommending better fit with thigh high and measurements taken today and requested order from physician.  Mediven plus, thigh high with silicone border 30-40mmHg, size 2, closed toe    Referring Practitioner/NPI#:    Pedro Stephens, APRN - CNP NPI: [id number]      Specific Practitioner Orders: Evaluation and treatment     Assessment of current deficits   []Pain  []Skin Integrity

## 2024-11-18 NOTE — PROGRESS NOTES
min 3 pillows  1 x 15 heel slides per side  then  Knees supported on black round bolsters  30 sec ea LE with one towel roll   Painful on LB and Hips to lay without knee support    Gait training  GT        Marching Gait Cues for upright posture NR   Sidestepping Cues for upright posture NR         Ambulation   Cues for upright posture                      A: Tolerated well . No new or significant changes this session  P: Continue with rehab plan  Tiffanie Rizvi PTA    Treatment Charges: Mins Units   Initial Evaluation     Re-Evaluation     Ther Exercise         TE 20 1   Manual Therapy     MT     Ther Activities        TA 20 1   Gait Training          GT     Neuro Re-education NR     Modalities     Non-Billable Service Time     Other     Total Time/Units 40 2

## 2024-11-18 NOTE — PROGRESS NOTES
[facility] [place] [place] MEDICAL ONCOLOGY  [address]  [place] [address]  Dept: [phone]  Loc: [phone]  Attending progress note      Reason for Visit:   Recurrent metastatic breast cancer.    Referring Physician:  Julio Fried MD     PCP:  Ebenezer Mak DO    History of Present Illness:    Mrs. Braswell is a 59-year-old lady, with a past history significant for T3N3 ER positive >90% UT negative (<1%) HER2 Negative (1+) breast cancer who underwent mastectomy with axillary lymph node dissection followed by adjuvant dose dense AC-T and multiple lines of endocrine therapy due to grade 3 arthralgias, then on tamoxifen. Unfortunately, developed back pain in late 2023 and found to have metastatic recurrence with diffuse osseous involvement in 4/2024.      CANCER/TREATMENT HISTORY   November 2015-normal mammogram but skin changes  5/31/2016 bilateral mammogram with a spiculated mass of 14 mm on ultrasound  6/17/2016 core biopsy lobular carcinoma plus LCIS ER +100% UT +100% HER2 negative (1+  7/6/2016 mastectomy of the left breast sentinel lymph node biopsy and left axillary lymph node dissection final diagnosis T3 6 cm and 3 13 out of 23 M0 breast cancer.  She underwent reexcision the posterior wall with negative margins  9/6/2016 until 2/3/2017 adjuvant dose dense ACT  3/13/2017 until 4/27/2017 radiation to the breast  4/28/1917 Arimidex was started but very poor tolerance due to arthralgia treatment was changed to Femara  7/10/2017 Femara was stopped due to poor tolerance  7/14/2017 until 7/25/2017-Arimidex again with arthralgia  7/28/2017 exemestane started however stopped due to arthralgias  Tamoxifen was started 3/2018 (she is a MTHFR heterozygous and is good potentially cause DVTs).  Developed back pain late 2023.  4/25/2024 spine MRI showed diffuse osseous lesions.   5/4/2024 PET/CT showed innumerable intensely FDG avid lesions throughout axial and appendicular

## 2024-11-20 ENCOUNTER — TREATMENT (OUTPATIENT)
Dept: PHYSICAL THERAPY | Age: 59
End: 2024-11-20

## 2024-11-20 DIAGNOSIS — R53.81 DEBILITY: Primary | ICD-10-CM

## 2024-11-20 NOTE — PROGRESS NOTES
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    X = TO BE PERFORMED NEXT VISIT  > = PROGRESS TO THIS    S: Pt reports no new complaints today.  O: Intermittently wore back brace during session    Access Code: [email or web address]  URL: [email or web address]  Date: 2024  Prepared by: Cayden Hutson    Exercises  - Standing Marching  - 2 x daily - 7 x weekly - 2-3 sets - 15 reps  - Standing Heel Raise  - 2 x daily - 7 x weekly - 2-3 sets - 15 reps  - Seated Long Arc Quad  - 2 x daily - 7 x weekly - 2 sets - 10 reps - 3 sec hold    Time 6544-8639     Visit 21 Repeat outcome measure at mid point and end.    Pain 7-8/10 hips/LB     ROM      Modalities            Exercise      Nustep   L5 x 10 min  TA         Mid Row standing Green 3 x 15 reach and pull; elbows closer to side     Scapula retraction with cerv extension           Standing PPT - with upright posture 1    Squats   TA   Calf Raises 2 x 20 One hand hold TA   Toe Raises   TA   Marches 2 x 20 One hand hold TA   Alt. Sidekicks 2 x 20  TA   LAQ     Sit/Stands   TA   Step ups - FWD Add back?         TG squats, CR, toe raises L17 2 x 20 squats  L17 2 x 20 CR    Head and thoracic spine elevated with pillows. Right lateal lean at times that is self correcting TA        balance   No hand hold in parallel bars    Practiced upright posture in parallel bars     Supine lying to increase lumbar/cervical extension   Painful on LB and Hips to lay without knee support

## 2024-11-25 ENCOUNTER — TREATMENT (OUTPATIENT)
Dept: PHYSICAL THERAPY | Age: 59
End: 2024-11-25
Payer: COMMERCIAL

## 2024-11-25 DIAGNOSIS — R53.81 DEBILITY: Primary | ICD-10-CM

## 2024-11-25 PROCEDURE — 97530 THERAPEUTIC ACTIVITIES: CPT

## 2024-11-25 PROCEDURE — 97110 THERAPEUTIC EXERCISES: CPT

## 2024-11-25 NOTE — PROGRESS NOTES
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    X = TO BE PERFORMED NEXT VISIT  > = PROGRESS TO THIS    S: Pt reports no new complaints today.  O: Ambulated into clinic with WW.     Access Code: [email or web address]  URL: [email or web address]  Date: 2024  Prepared by: Cayden Hutson    Exercises  - Standing Marching  - 2 x daily - 7 x weekly - 2-3 sets - 15 reps  - Standing Heel Raise  - 2 x daily - 7 x weekly - 2-3 sets - 15 reps  - Seated Long Arc Quad  - 2 x daily - 7 x weekly - 2 sets - 10 reps - 3 sec hold    Time 6053-7909     Visit 21 Repeat outcome measure at mid point and end.    Pain 7-8/10 hips/LB     ROM      Modalities            Exercise      Nustep   L5 x 10 min  TA         Mid Row standing Green 3 x 15 reach and pull; elbows closer to side     Scapula retraction with cerv extension           Standing PPT - with upright posture 1    Squats   TA   Calf Raises 2 x 20 AIREX One hand hold TA   Toe Raises   TA   Marches 2 x 20 AIREX One hand hold TA   Alt. Sidekicks 2 x 20 AIREX   TA   LAQ     Sit/Stands   TA   Step ups - FWD Add back?         TG squats, CR, toe raises L17 2 x 20 squats  L17 2 x 20 CR    Head and thoracic spine elevated with pillows. Right lateal lean at times that is self correcting TA        balance   No hand hold in parallel bars    Practiced upright posture in parallel bars     Supine lying to increase lumbar/cervical extension   Painful on LB and Hips to lay without knee support

## 2024-11-26 ENCOUNTER — TELEPHONE (OUTPATIENT)
Dept: OCCUPATIONAL THERAPY | Age: 59
End: 2024-11-26

## 2024-11-26 NOTE — TELEPHONE ENCOUNTER
[facility] OCCUPATIONAL THERAPY   [facility] [address]  [place] [address]  Dept: [phone]  Loc: [phone]   BRANDI Paulina OT Fax: [phone]    Cancellation/No Show Note      Date:  2024    Patient Name:  Uriel Braswell     :  1965         PT ID: [id number]    Total missed visits including today: 1       Total number of no shows: 0    For today's appointment patient:   [x]  Cancelled   []  Rescheduled appointment   []  No-show     Reason given by patient:   []  Patient ill   []  Conflicting appointment   []  No transportation   []  Conflict with work   [x]  No reason given   []  Other:    Comments:                    Comments:      Electronically signed by:  Rosalinda Green OT,

## 2024-12-02 ENCOUNTER — TELEPHONE (OUTPATIENT)
Dept: PHYSICAL THERAPY | Age: 59
End: 2024-12-02

## 2024-12-02 DIAGNOSIS — G89.3 PAIN DUE TO NEOPLASM: ICD-10-CM

## 2024-12-02 DIAGNOSIS — Z51.5 PALLIATIVE CARE BY SPECIALIST: ICD-10-CM

## 2024-12-02 DIAGNOSIS — G89.3 PAIN FROM BONE METASTASES (HCC): ICD-10-CM

## 2024-12-02 DIAGNOSIS — C79.51 PAIN FROM BONE METASTASES (HCC): ICD-10-CM

## 2024-12-02 DIAGNOSIS — C50.912 BREAST CANCER, STAGE 3, LEFT (HCC): ICD-10-CM

## 2024-12-02 RX ORDER — OXYCODONE HYDROCHLORIDE 20 MG/1
20 TABLET ORAL
Qty: 112 TABLET | Refills: 0 | Status: SHIPPED | OUTPATIENT
Start: 2024-12-02 | End: 2024-12-16

## 2024-12-02 NOTE — TELEPHONE ENCOUNTER
Patient Uriel's daughter Atilio called for refill oxycodone 20mg. [facility]. Next appointment 1-8-2025. Pharmacy verified on perfect serve. Routed call to A Kat, NP

## 2024-12-02 NOTE — TELEPHONE ENCOUNTER
Date: 2024       Patient Name: Uriel Braswell  : 1965      MRN: [id number]    Patient canceled PT follow-up appointment scheduled for today  at 11:30 due to illness.    Tiffanie Rizvi, PTA

## 2024-12-04 ENCOUNTER — TREATMENT (OUTPATIENT)
Dept: PHYSICAL THERAPY | Age: 59
End: 2024-12-04

## 2024-12-04 DIAGNOSIS — R53.81 DEBILITY: Primary | ICD-10-CM

## 2024-12-04 NOTE — PROGRESS NOTES
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    X = TO BE PERFORMED NEXT VISIT  > = PROGRESS TO THIS    S: Pt reports no new complaints today.  O: Ambulated into clinic with HH assist; no AD    Access Code: [email or web address]  URL: [email or web address]  Date: 2024  Prepared by: Cayden Hutson    Exercises  - Standing Marching  - 2 x daily - 7 x weekly - 2-3 sets - 15 reps  - Standing Heel Raise  - 2 x daily - 7 x weekly - 2-3 sets - 15 reps  - Seated Long Arc Quad  - 2 x daily - 7 x weekly - 2 sets - 10 reps - 3 sec hold    Time 8010-3063     Visit 24 Repeat outcome measure at mid point and end.    Pain 7-8/10 hips/LB     ROM      Modalities            Exercise      Nustep   L5 x 10 min  TA         Mid Row standing Green 3 x 15 reach and pull; elbows closer to side     Scapula retraction with cerv extension           Standing PPT - with upright posture 1    Squats   TA   Calf Raises 2 x 20 AIREX One hand hold TA   Toe Raises   TA   Marches 2 x 20 AIREX One hand hold TA   Alt. Sidekicks 2 x 20 AIREX   TA   LAQ     Sit/Stands   TA   Step ups - FWD Add back?         TG squats, CR, toe raises L17 2 x 20 squats  L17 2 x 20 CR    Head and thoracic spine elevated with pillows. Right lateal lean at times that is self correcting TA        balance   No hand hold in parallel bars    Practiced upright posture in parallel bars     Supine lying to increase lumbar/cervical extension   Painful on LB and Hips to lay without

## 2024-12-06 ENCOUNTER — TREATMENT (OUTPATIENT)
Dept: PHYSICAL THERAPY | Age: 59
End: 2024-12-06

## 2024-12-06 DIAGNOSIS — R53.81 DEBILITY: Primary | ICD-10-CM

## 2024-12-06 NOTE — PROGRESS NOTES
Sidestepping Cues for upright posture NR         Ambulation  3 x 150ft pushing Pushing cart   Cues for upright posture                      A: Tolerated well. No new or significant changes this session  P: Continue with rehab plan  Tiffanie Rizvi PTA    Treatment Charges: Mins Units   Initial Evaluation     Re-Evaluation     Ther Exercise         TE 10 1   Manual Therapy     MT     Ther Activities        TA 20 1   Gait Training          GT     Neuro Re-education NR     Modalities     Non-Billable Service Time 10 0   Other     Total Time/Units 40 2

## 2024-12-11 ENCOUNTER — TREATMENT (OUTPATIENT)
Dept: PHYSICAL THERAPY | Age: 59
End: 2024-12-11
Payer: COMMERCIAL

## 2024-12-11 DIAGNOSIS — R53.81 DEBILITY: Primary | ICD-10-CM

## 2024-12-11 PROCEDURE — 97110 THERAPEUTIC EXERCISES: CPT

## 2024-12-11 PROCEDURE — 97530 THERAPEUTIC ACTIVITIES: CPT

## 2024-12-11 NOTE — PROGRESS NOTES
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    X = TO BE PERFORMED NEXT VISIT  > = PROGRESS TO THIS    S: Pt reports fatigue today. Having hard time maintaining upright posture this session  O: Ambulates into clinic with no assistance    Access Code: [email or web address]  URL: [email or web address]  Date: 2024  Prepared by: Cayden Hutson    Exercises  - Standing Marching  - 2 x daily - 7 x weekly - 2-3 sets - 15 reps  - Standing Heel Raise  - 2 x daily - 7 x weekly - 2-3 sets - 15 reps  - Seated Long Arc Quad  - 2 x daily - 7 x weekly - 2 sets - 10 reps - 3 sec hold    Time 4304-5346     Visit 25 Repeat outcome measure at mid point and end.    Pain 7-8/10 hips/LB     ROM      Modalities            Exercise      Nustep   L5 x 10 min  TA         Mid Row standing     Scapula retraction with cerv extension           Standing PPT - with upright posture 1    Squats   TA   Calf Raises One hand hold TA   Toe Raises  TA   Marches One hand hold TA   Alt. Sidekicks  TA   LAQ     Sit/Stands   TA   Step ups - FWD Add back?         TG squats, CR, toe raises L17 2 x 20 squats  L17 2 x 20 CR    Head and thoracic spine elevated with pillows. Right lateal lean at times that is self correcting TA        balance   No hand hold in parallel bars    Practiced upright posture in parallel bars     Supine lying to increase lumbar/cervical extension   Painful on LB and Hips to lay without knee support    Gait training  GT

## 2024-12-13 ENCOUNTER — TREATMENT (OUTPATIENT)
Dept: PHYSICAL THERAPY | Age: 59
End: 2024-12-13

## 2024-12-13 ENCOUNTER — HOSPITAL ENCOUNTER (OUTPATIENT)
Dept: INFUSION THERAPY | Age: 59
Discharge: HOME | End: 2024-12-13
Payer: COMMERCIAL

## 2024-12-13 DIAGNOSIS — R53.0 NEOPLASTIC MALIGNANT RELATED FATIGUE: ICD-10-CM

## 2024-12-13 DIAGNOSIS — R53.81 DEBILITY: Primary | ICD-10-CM

## 2024-12-13 DIAGNOSIS — C79.51 SECONDARY MALIGNANT NEOPLASM OF BONE AND BONE MARROW (HCC): Primary | ICD-10-CM

## 2024-12-13 DIAGNOSIS — C50.912 BREAST CANCER, STAGE 3, LEFT (HCC): ICD-10-CM

## 2024-12-13 DIAGNOSIS — G89.3 PAIN DUE TO NEOPLASM: ICD-10-CM

## 2024-12-13 DIAGNOSIS — G89.3 PAIN FROM BONE METASTASES (HCC): ICD-10-CM

## 2024-12-13 DIAGNOSIS — C80.1 CANCER (HCC): ICD-10-CM

## 2024-12-13 DIAGNOSIS — C79.51 PAIN FROM BONE METASTASES (HCC): ICD-10-CM

## 2024-12-13 DIAGNOSIS — C79.52 SECONDARY MALIGNANT NEOPLASM OF BONE AND BONE MARROW (HCC): Primary | ICD-10-CM

## 2024-12-13 DIAGNOSIS — Z51.5 PALLIATIVE CARE BY SPECIALIST: ICD-10-CM

## 2024-12-13 LAB
ALBUMIN SERPL-MCNC: 4 G/DL (ref 3.5–5.2)
ALP SERPL-CCNC: 134 U/L (ref 35–104)
ALT SERPL-CCNC: 8 U/L (ref 0–32)
ANION GAP SERPL CALCULATED.3IONS-SCNC: 9 MMOL/L (ref 7–16)
AST SERPL-CCNC: 18 U/L (ref 0–31)
BASOPHILS # BLD: 0.18 K/UL (ref 0–0.2)
BASOPHILS NFR BLD: 3 % (ref 0–2)
BILIRUB SERPL-MCNC: 0.2 MG/DL (ref 0–1.2)
BUN SERPL-MCNC: 10 MG/DL (ref 6–23)
CALCIUM SERPL-MCNC: 9.1 MG/DL (ref 8.6–10.2)
CHLORIDE SERPL-SCNC: 97 MMOL/L (ref 98–107)
CO2 SERPL-SCNC: 28 MMOL/L (ref 22–29)
CREAT SERPL-MCNC: 0.9 MG/DL (ref 0.5–1)
EOSINOPHIL # BLD: 0.18 K/UL (ref 0.05–0.5)
EOSINOPHILS RELATIVE PERCENT: 3 % (ref 0–6)
ERYTHROCYTE [DISTWIDTH] IN BLOOD BY AUTOMATED COUNT: 16.6 % (ref 11.5–15)
GFR, ESTIMATED: 77 ML/MIN/1.73M2
GLUCOSE SERPL-MCNC: 119 MG/DL (ref 74–99)
HCT VFR BLD AUTO: 25.4 % (ref 34–48)
HGB BLD-MCNC: 8.7 G/DL (ref 11.5–15.5)
LYMPHOCYTES NFR BLD: 0.3 K/UL (ref 1.5–4)
LYMPHOCYTES RELATIVE PERCENT: 4 % (ref 20–42)
MCH RBC QN AUTO: 37.7 PG (ref 26–35)
MCHC RBC AUTO-ENTMCNC: 34.3 G/DL (ref 32–34.5)
MCV RBC AUTO: 110 FL (ref 80–99.9)
METAMYELOCYTES ABSOLUTE COUNT: 0.06 K/UL (ref 0–0.12)
METAMYELOCYTES: 1 % (ref 0–1)
MONOCYTES NFR BLD: 0.78 K/UL (ref 0.1–0.95)
MONOCYTES NFR BLD: 11 % (ref 2–12)
NEUTROPHILS NFR BLD: 78 % (ref 43–80)
NEUTS SEG NFR BLD: 5.31 K/UL (ref 1.8–7.3)
PLATELET # BLD AUTO: 245 K/UL (ref 130–450)
PMV BLD AUTO: 8.6 FL (ref 7–12)
POTASSIUM SERPL-SCNC: 4.1 MMOL/L (ref 3.5–5)
PROT SERPL-MCNC: 6.5 G/DL (ref 6.4–8.3)
RBC # BLD AUTO: 2.31 M/UL (ref 3.5–5.5)
RBC # BLD: ABNORMAL 10*6/UL
SODIUM SERPL-SCNC: 134 MMOL/L (ref 132–146)
WBC OTHER # BLD: 6.8 K/UL (ref 4.5–11.5)

## 2024-12-13 PROCEDURE — 36415 COLL VENOUS BLD VENIPUNCTURE: CPT

## 2024-12-13 PROCEDURE — 85025 COMPLETE CBC W/AUTO DIFF WBC: CPT

## 2024-12-13 PROCEDURE — 80053 COMPREHEN METABOLIC PANEL: CPT

## 2024-12-13 RX ORDER — OXYCODONE HYDROCHLORIDE 20 MG/1
20 TABLET ORAL
Qty: 112 TABLET | Refills: 0 | Status: SHIPPED | OUTPATIENT
Start: 2024-12-13 | End: 2024-12-27

## 2024-12-13 NOTE — PROGRESS NOTES
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    X = TO BE PERFORMED NEXT VISIT  > = PROGRESS TO THIS    S: Pt reports fatigue today. Having hard time maintaining upright posture this session  O: Ambulates into clinic with no assistance    Access Code: [email or web address]  URL: [email or web address]  Date: 2024  Prepared by: Cayden Hutson    Exercises  - Standing Marching  - 2 x daily - 7 x weekly - 2-3 sets - 15 reps  - Standing Heel Raise  - 2 x daily - 7 x weekly - 2-3 sets - 15 reps  - Seated Long Arc Quad  - 2 x daily - 7 x weekly - 2 sets - 10 reps - 3 sec hold    Time 2630-0665     Visit 25 Repeat outcome measure at mid point and end.    Pain 7-8/10 hips/LB     ROM      Modalities            Exercise      Nustep   L5 x 10 min  TA         Mid Row standing Green 3 x 15 reach and pull; elbows closer to side     Scapula retraction with cerv extension           Standing PPT - with upright posture 1    Squats   TA   Calf Raises 2 x 20 AIREX One hand hold TA   Toe Raises   TA   Marches 2 x 20 AIREX One hand hold TA   Alt. Sidekicks 2 x 20 AIREX   TA   LAQ     Sit/Stands   TA   Step ups - FWD Add back?         TG squats, CR, toe raises L17 2 x 20 squats  L17 2 x 20 CR    Head and thoracic spine elevated with pillows. Right lateal lean at times that is self correcting TA        balance   No hand hold in parallel bars    Practiced upright posture in parallel bars     Supine lying to increase lumbar/cervical

## 2024-12-13 NOTE — TELEPHONE ENCOUNTER
Call from Atilio Uriel's daughter requesting refill for Oxy IR 20 mg. Pharmacy is [facility] in [place]. Next wily 1/8/25.

## 2024-12-16 ENCOUNTER — HOSPITAL ENCOUNTER (OUTPATIENT)
Dept: INFUSION THERAPY | Age: 59
Discharge: HOME | End: 2024-12-16
Payer: COMMERCIAL

## 2024-12-16 ENCOUNTER — OFFICE VISIT (OUTPATIENT)
Dept: ONCOLOGY | Age: 59
End: 2024-12-16
Payer: COMMERCIAL

## 2024-12-16 VITALS
RESPIRATION RATE: 12 BRPM | DIASTOLIC BLOOD PRESSURE: 60 MMHG | OXYGEN SATURATION: 98 % | TEMPERATURE: 97.7 F | SYSTOLIC BLOOD PRESSURE: 107 MMHG | HEART RATE: 91 BPM

## 2024-12-16 VITALS
HEIGHT: 67 IN | OXYGEN SATURATION: 94 % | WEIGHT: 113.7 LBS | SYSTOLIC BLOOD PRESSURE: 116 MMHG | BODY MASS INDEX: 17.84 KG/M2 | TEMPERATURE: 97.7 F | DIASTOLIC BLOOD PRESSURE: 70 MMHG | HEART RATE: 108 BPM

## 2024-12-16 DIAGNOSIS — C80.1 CANCER (HCC): Primary | ICD-10-CM

## 2024-12-16 DIAGNOSIS — C79.51 SECONDARY MALIGNANT NEOPLASM OF BONE AND BONE MARROW (HCC): ICD-10-CM

## 2024-12-16 DIAGNOSIS — Z85.3 PERSONAL HISTORY OF BREAST CANCER: Primary | ICD-10-CM

## 2024-12-16 DIAGNOSIS — C79.52 SECONDARY MALIGNANT NEOPLASM OF BONE AND BONE MARROW (HCC): ICD-10-CM

## 2024-12-16 DIAGNOSIS — Z51.81 THERAPEUTIC DRUG MONITORING: ICD-10-CM

## 2024-12-16 PROCEDURE — 96365 THER/PROPH/DIAG IV INF INIT: CPT

## 2024-12-16 PROCEDURE — G8419 CALC BMI OUT NRM PARAM NOF/U: HCPCS | Performed by: INTERNAL MEDICINE

## 2024-12-16 PROCEDURE — 96361 HYDRATE IV INFUSION ADD-ON: CPT

## 2024-12-16 PROCEDURE — G8484 FLU IMMUNIZE NO ADMIN: HCPCS | Performed by: INTERNAL MEDICINE

## 2024-12-16 PROCEDURE — G8427 DOCREV CUR MEDS BY ELIG CLIN: HCPCS | Performed by: INTERNAL MEDICINE

## 2024-12-16 PROCEDURE — 99214 OFFICE O/P EST MOD 30 MIN: CPT | Performed by: INTERNAL MEDICINE

## 2024-12-16 PROCEDURE — 3017F COLORECTAL CA SCREEN DOC REV: CPT | Performed by: INTERNAL MEDICINE

## 2024-12-16 PROCEDURE — 2580000003 HC RX 258: Performed by: INTERNAL MEDICINE

## 2024-12-16 PROCEDURE — 1036F TOBACCO NON-USER: CPT | Performed by: INTERNAL MEDICINE

## 2024-12-16 PROCEDURE — 6360000002 HC RX W HCPCS: Performed by: INTERNAL MEDICINE

## 2024-12-16 RX ORDER — ALBUTEROL SULFATE 90 UG/1
4 INHALANT RESPIRATORY (INHALATION) PRN
OUTPATIENT
Start: 2024-12-16

## 2024-12-16 RX ORDER — ACETAMINOPHEN 325 MG/1
650 TABLET ORAL
OUTPATIENT
Start: 2024-12-16

## 2024-12-16 RX ORDER — FAMOTIDINE 10 MG/ML
20 INJECTION, SOLUTION INTRAVENOUS
OUTPATIENT
Start: 2024-12-16

## 2024-12-16 RX ORDER — SODIUM CHLORIDE 9 MG/ML
INJECTION, SOLUTION INTRAVENOUS CONTINUOUS
OUTPATIENT
Start: 2024-12-16

## 2024-12-16 RX ORDER — DIPHENHYDRAMINE HYDROCHLORIDE 50 MG/ML
50 INJECTION INTRAMUSCULAR; INTRAVENOUS
OUTPATIENT
Start: 2024-12-16

## 2024-12-16 RX ORDER — SODIUM CHLORIDE 9 MG/ML
INJECTION, SOLUTION INTRAVENOUS CONTINUOUS
OUTPATIENT
Start: 2025-01-17

## 2024-12-16 RX ORDER — EPINEPHRINE 1 MG/ML
0.3 INJECTION, SOLUTION, CONCENTRATE INTRAVENOUS PRN
OUTPATIENT
Start: 2024-12-16

## 2024-12-16 RX ORDER — SODIUM CHLORIDE 9 MG/ML
5-250 INJECTION, SOLUTION INTRAVENOUS PRN
OUTPATIENT
Start: 2025-01-17

## 2024-12-16 RX ORDER — SODIUM CHLORIDE 0.9 % (FLUSH) 0.9 %
5-40 SYRINGE (ML) INJECTION PRN
OUTPATIENT
Start: 2025-01-17

## 2024-12-16 RX ORDER — ONDANSETRON 2 MG/ML
8 INJECTION INTRAMUSCULAR; INTRAVENOUS
OUTPATIENT
Start: 2024-12-16

## 2024-12-16 RX ORDER — ACETAMINOPHEN 325 MG/1
650 TABLET ORAL
OUTPATIENT
Start: 2025-01-17

## 2024-12-16 RX ORDER — FAMOTIDINE 10 MG/ML
20 INJECTION, SOLUTION INTRAVENOUS
OUTPATIENT
Start: 2025-01-17

## 2024-12-16 RX ORDER — SODIUM CHLORIDE 0.9 % (FLUSH) 0.9 %
5-40 SYRINGE (ML) INJECTION PRN
OUTPATIENT
Start: 2024-12-16

## 2024-12-16 RX ORDER — ONDANSETRON 2 MG/ML
8 INJECTION INTRAMUSCULAR; INTRAVENOUS
OUTPATIENT
Start: 2025-01-17

## 2024-12-16 RX ORDER — EPINEPHRINE 1 MG/ML
0.3 INJECTION, SOLUTION, CONCENTRATE INTRAVENOUS PRN
OUTPATIENT
Start: 2025-01-17

## 2024-12-16 RX ORDER — HEPARIN 100 UNIT/ML
500 SYRINGE INTRAVENOUS PRN
OUTPATIENT
Start: 2025-01-17

## 2024-12-16 RX ORDER — HYDROCORTISONE SODIUM SUCCINATE 100 MG/2ML
100 INJECTION INTRAMUSCULAR; INTRAVENOUS
OUTPATIENT
Start: 2024-12-16

## 2024-12-16 RX ORDER — HYDROCORTISONE SODIUM SUCCINATE 100 MG/2ML
100 INJECTION INTRAMUSCULAR; INTRAVENOUS
OUTPATIENT
Start: 2025-01-17

## 2024-12-16 RX ORDER — SODIUM CHLORIDE 9 MG/ML
5-250 INJECTION, SOLUTION INTRAVENOUS PRN
OUTPATIENT
Start: 2024-12-16

## 2024-12-16 RX ORDER — SODIUM CHLORIDE 9 MG/ML
5-250 INJECTION, SOLUTION INTRAVENOUS PRN
Status: DISCONTINUED | OUTPATIENT
Start: 2024-12-16 | End: 2024-12-17 | Stop reason: HOSPADM

## 2024-12-16 RX ORDER — DIPHENHYDRAMINE HYDROCHLORIDE 50 MG/ML
50 INJECTION INTRAMUSCULAR; INTRAVENOUS
OUTPATIENT
Start: 2025-01-17

## 2024-12-16 RX ORDER — ALBUTEROL SULFATE 90 UG/1
4 INHALANT RESPIRATORY (INHALATION) PRN
OUTPATIENT
Start: 2025-01-17

## 2024-12-16 RX ORDER — HEPARIN 100 UNIT/ML
500 SYRINGE INTRAVENOUS PRN
OUTPATIENT
Start: 2024-12-16

## 2024-12-16 RX ADMIN — SODIUM CHLORIDE 20 ML/HR: 9 INJECTION, SOLUTION INTRAVENOUS at 12:11

## 2024-12-16 RX ADMIN — ZOLEDRONIC ACID 3.5 MG: 4 INJECTION, SOLUTION, CONCENTRATE INTRAVENOUS at 12:47

## 2024-12-16 NOTE — PROGRESS NOTES
[facility] MEDICAL ONCOLOGY  [address]  [place] [address]  Dept: [phone]  Loc: [phone]  Attending progress note      Reason for Visit:   Recurrent metastatic breast cancer.    Referring Physician:  Julio Fried MD     PCP:  Ebenezer Mak DO    History of Present Illness:    Mrs. Braswell is a 59-year-old lady, with a past history significant for T3N3 ER positive >90% OH negative (<1%) HER2 Negative (1+) breast cancer who underwent mastectomy with axillary lymph node dissection followed by adjuvant dose dense AC-T and multiple lines of endocrine therapy due to grade 3 arthralgias, then on tamoxifen. Unfortunately, developed back pain in late 2023 and found to have metastatic recurrence with diffuse osseous involvement in 4/2024.      CANCER/TREATMENT HISTORY   November 2015-normal mammogram but skin changes  5/31/2016 bilateral mammogram with a spiculated mass of 14 mm on ultrasound  6/17/2016 core biopsy lobular carcinoma plus LCIS ER +100% OH +100% HER2 negative (1+  7/6/2016 mastectomy of the left breast sentinel lymph node biopsy and left axillary lymph node dissection final diagnosis T3 6 cm and 3 13 out of 23 M0 breast cancer.  She underwent reexcision the posterior wall with negative margins  9/6/2016 until 2/3/2017 adjuvant dose dense ACT  3/13/2017 until 4/27/2017 radiation to the breast  4/28/1917 Arimidex was started but very poor tolerance due to arthralgia treatment was changed to Femara  7/10/2017 Femara was stopped due to poor tolerance  7/14/2017 until 7/25/2017-Arimidex again with arthralgia  7/28/2017 exemestane started however stopped due to arthralgias  Tamoxifen was started 3/2018 (she is a MTHFR heterozygous and is good potentially cause DVTs).  Developed back pain late 2023.  4/25/2024 spine MRI showed diffuse osseous lesions.   5/4/2024 PET/CT showed innumerable intensely FDG avid lesions throughout axial and appendicular

## 2024-12-19 ENCOUNTER — TREATMENT (OUTPATIENT)
Dept: PHYSICAL THERAPY | Age: 59
End: 2024-12-19

## 2024-12-19 DIAGNOSIS — R53.81 DEBILITY: Primary | ICD-10-CM

## 2024-12-19 NOTE — PROGRESS NOTES
Cues for upright posture NR   Sidestepping Cues for upright posture NR         Ambulation  3 x 150ft   Pushing cart  Increased wt on cart this session Cues for upright posture                      A: Tolerated well. No new or significant changes this session  P: Continue with rehab plan  Tiffanie Rizvi PTA    Treatment Charges: Mins Units   Initial Evaluation     Re-Evaluation     Ther Exercise         TE 10 1   Manual Therapy     MT     Ther Activities        TA 20 1   Gait Training          GT     Neuro Re-education NR     Modalities     Non-Billable Service Time 10 0   Other     Total Time/Units 40 2

## 2024-12-20 DIAGNOSIS — C79.51 SECONDARY MALIGNANT NEOPLASM OF BONE (HCC): ICD-10-CM

## 2024-12-20 DIAGNOSIS — C50.919 STAGE IV BREAST CANCER IN FEMALE (HCC): ICD-10-CM

## 2024-12-20 DIAGNOSIS — G89.3 CHRONIC PAIN DUE TO NEOPLASM: ICD-10-CM

## 2024-12-20 DIAGNOSIS — Z51.5 PALLIATIVE CARE BY SPECIALIST: ICD-10-CM

## 2024-12-20 DIAGNOSIS — C79.51 METASTATIC CANCER TO SPINE (HCC): ICD-10-CM

## 2024-12-20 RX ORDER — METHADONE HYDROCHLORIDE 10 MG/1
10 TABLET ORAL 2 TIMES DAILY
Qty: 60 TABLET | Refills: 0 | Status: SHIPPED | OUTPATIENT
Start: 2024-12-20 | End: 2025-01-19

## 2024-12-20 NOTE — TELEPHONE ENCOUNTER
Call from Atilio requesting a refill for her mother Uriel's Methadone. Pharmacy is [facility] in [place]. Next wily 1/8/25.

## 2024-12-26 ENCOUNTER — TREATMENT (OUTPATIENT)
Dept: PHYSICAL THERAPY | Age: 59
End: 2024-12-26
Payer: COMMERCIAL

## 2024-12-26 DIAGNOSIS — R53.81 DEBILITY: Primary | ICD-10-CM

## 2024-12-26 PROCEDURE — 97530 THERAPEUTIC ACTIVITIES: CPT

## 2024-12-26 PROCEDURE — 97110 THERAPEUTIC EXERCISES: CPT

## 2024-12-26 NOTE — PROGRESS NOTES
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    X = TO BE PERFORMED NEXT VISIT  > = PROGRESS TO THIS    S: Pt reports no new complaints. Has difficult time standing upright without assistance   O: Ambulates into clinic with no assistance    Access Code: [email or web address]  URL: [email or web address]  Date: 2024  Prepared by: Cayden Hutson    Exercises  - Standing Marching  - 2 x daily - 7 x weekly - 2-3 sets - 15 reps  - Standing Heel Raise  - 2 x daily - 7 x weekly - 2-3 sets - 15 reps  - Seated Long Arc Quad  - 2 x daily - 7 x weekly - 2 sets - 10 reps - 3 sec hold    Time 3220-3405     Visit 28 Repeat outcome measure at mid point and end.    Pain 7-8/10 hips/LB     ROM      Modalities            Exercise      Nustep   L5 x 20 min  TA         Mid Row standing     Scapula retraction with cerv extension           Standing PPT - with upright posture 1    Squats   TA   Calf Raises One hand hold TA   Toe Raises  TA   Marches One hand hold TA   Alt. Sidekicks  TA   LAQ     Sit/Stands   TA   Step ups - FWD Add back?         TG squats, CR, toe raises   Head and thoracic spine elevated with pillows. Right lateal lean at times that is self correcting TA        balance   No hand hold in parallel bars    Practiced upright posture in parallel bars     Supine lying to increase lumbar/cervical extension  X 15 min 3 pillows  1 x 15 heel slides per side  then  One Knee supported on black round bolster     Painful on LB and

## 2024-12-27 DIAGNOSIS — G89.3 PAIN FROM BONE METASTASES (HCC): ICD-10-CM

## 2024-12-27 DIAGNOSIS — C50.912 BREAST CANCER, STAGE 3, LEFT (HCC): ICD-10-CM

## 2024-12-27 DIAGNOSIS — G89.3 PAIN DUE TO NEOPLASM: ICD-10-CM

## 2024-12-27 DIAGNOSIS — Z51.5 PALLIATIVE CARE BY SPECIALIST: ICD-10-CM

## 2024-12-27 DIAGNOSIS — C79.51 PAIN FROM BONE METASTASES (HCC): ICD-10-CM

## 2024-12-27 RX ORDER — OXYCODONE HYDROCHLORIDE 20 MG/1
20 TABLET ORAL
Qty: 112 TABLET | Refills: 0 | Status: SHIPPED | OUTPATIENT
Start: 2024-12-27 | End: 2025-01-10

## 2024-12-27 NOTE — TELEPHONE ENCOUNTER
Patient Uriel called for refill oxycodone 20 mg. Next apointment 1-8-2025. [facility] [place]. Pharmacy verified on perfect serve. Routed call to A Kat, NP

## 2025-01-06 ENCOUNTER — TREATMENT (OUTPATIENT)
Dept: PHYSICAL THERAPY | Age: 60
End: 2025-01-06
Payer: COMMERCIAL

## 2025-01-06 DIAGNOSIS — R53.81 DEBILITY: Primary | ICD-10-CM

## 2025-01-06 PROCEDURE — 97110 THERAPEUTIC EXERCISES: CPT

## 2025-01-06 PROCEDURE — 97530 THERAPEUTIC ACTIVITIES: CPT

## 2025-01-06 NOTE — PROGRESS NOTES
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    X = TO BE PERFORMED NEXT VISIT  > = PROGRESS TO THIS    S: Pt reports no new complaints.   O: Ambulates into clinic with no assistance    Access Code: [email or web address]  URL: [email or web address]  Date: 2024  Prepared by: Cayden Hutson    Exercises  - Standing Marching  - 2 x daily - 7 x weekly - 2-3 sets - 15 reps  - Standing Heel Raise  - 2 x daily - 7 x weekly - 2-3 sets - 15 reps  - Seated Long Arc Quad  - 2 x daily - 7 x weekly - 2 sets - 10 reps - 3 sec hold    Time 8351-4222     Visit 29 Repeat outcome measure at mid point and end.    Pain 4/10 hips/LB     ROM      Modalities            Exercise      Nustep   L5 x 20 min  TA         Mid Row standing     Scapula retraction with cerv extension           Standing PPT - with upright posture 1    Squats   TA   Calf Raises 2 x 20 AIREX One hand hold TA   Toe Raises   TA   Marches 2 x 20 AIREX One hand hold TA   Alt. Sidekicks 2 x 20 AIREX   TA   LAQ     Sit/Stands   TA   Step ups - FWD Add back?         TG squats, CR, toe raises L17 2 x 30 squats  L17 2 x 30 CR    Head and thoracic spine elevated with pillows. Right lateal lean at times that is self correcting TA        balance   No hand hold in parallel bars    Practiced upright posture in parallel bars     Supine lying to increase lumbar/cervical extension     Painful on LB and Hips to lay without knee support    Gait training  GT        Marching Gait Cues

## 2025-01-08 ENCOUNTER — OFFICE VISIT (OUTPATIENT)
Dept: PALLATIVE CARE | Age: 60
End: 2025-01-08
Payer: COMMERCIAL

## 2025-01-08 ENCOUNTER — TREATMENT (OUTPATIENT)
Dept: PHYSICAL THERAPY | Age: 60
End: 2025-01-08
Payer: COMMERCIAL

## 2025-01-08 VITALS
WEIGHT: 113 LBS | DIASTOLIC BLOOD PRESSURE: 75 MMHG | SYSTOLIC BLOOD PRESSURE: 114 MMHG | TEMPERATURE: 98.1 F | HEART RATE: 96 BPM | BODY MASS INDEX: 17.7 KG/M2 | OXYGEN SATURATION: 99 %

## 2025-01-08 DIAGNOSIS — Z51.5 PALLIATIVE CARE BY SPECIALIST: ICD-10-CM

## 2025-01-08 DIAGNOSIS — G89.3 PAIN FROM BONE METASTASES (HCC): ICD-10-CM

## 2025-01-08 DIAGNOSIS — C79.51 PAIN FROM BONE METASTASES (HCC): ICD-10-CM

## 2025-01-08 DIAGNOSIS — C50.912 BREAST CANCER, STAGE 3, LEFT (HCC): ICD-10-CM

## 2025-01-08 DIAGNOSIS — C79.51 SECONDARY MALIGNANT NEOPLASM OF BONE (HCC): ICD-10-CM

## 2025-01-08 DIAGNOSIS — R53.81 DEBILITY: Primary | ICD-10-CM

## 2025-01-08 DIAGNOSIS — Z51.5 ENCOUNTER FOR PALLIATIVE CARE: ICD-10-CM

## 2025-01-08 DIAGNOSIS — G89.3 PAIN DUE TO NEOPLASM: ICD-10-CM

## 2025-01-08 DIAGNOSIS — R53.0 NEOPLASTIC MALIGNANT RELATED FATIGUE: ICD-10-CM

## 2025-01-08 PROCEDURE — 97530 THERAPEUTIC ACTIVITIES: CPT

## 2025-01-08 PROCEDURE — 97110 THERAPEUTIC EXERCISES: CPT

## 2025-01-08 PROCEDURE — 99211 OFF/OP EST MAY X REQ PHY/QHP: CPT | Performed by: NURSE PRACTITIONER

## 2025-01-08 RX ORDER — DULOXETIN HYDROCHLORIDE 20 MG/1
20 CAPSULE, DELAYED RELEASE ORAL DAILY
Qty: 90 CAPSULE | Refills: 0 | Status: SHIPPED | OUTPATIENT
Start: 2025-01-08 | End: 2025-04-08

## 2025-01-08 RX ORDER — ALPRAZOLAM 1 MG/1
1 TABLET ORAL 3 TIMES DAILY PRN
Qty: 45 TABLET | Refills: 0 | Status: SHIPPED | OUTPATIENT
Start: 2025-01-08 | End: 2025-03-03

## 2025-01-08 RX ORDER — MIRTAZAPINE 15 MG/1
22.5 TABLET, FILM COATED ORAL NIGHTLY
Qty: 135 TABLET | Refills: 1 | Status: SHIPPED | OUTPATIENT
Start: 2025-01-08 | End: 2025-07-07

## 2025-01-08 RX ORDER — OXYCODONE HYDROCHLORIDE 20 MG/1
20 TABLET ORAL
Qty: 112 TABLET | Refills: 0 | Status: SHIPPED | OUTPATIENT
Start: 2025-01-08 | End: 2025-01-22

## 2025-01-08 NOTE — PROGRESS NOTES
Department of Palliative Medicine  Ambulatory visit  Provider: JOSE ANGEL Mercado - CNP       Chief Complaint: Uriel Braswell is a 59 y.o. female with chief complaint of Pain    HPI:  54 y/o post-menopausal  female who underwent Stereotactic Left Breast mass core biopsy on 06/17/2016 revealing Invasive Lobular carcinoma, she has developed severe neuropathy after her last treatment.     Assessment/Plan      Breast cancer:   - tamoxifen stopped   - Follows at [facility]   - Progression of disease with bone/spinal mets noted in April 2024  - completed palliative radiation  -On Kisqali    Chronic pain related to neoplasm/pain due to spinal metastasis:  -Oxycodone 20 mg every 3 hours  -Methadone 10 mg BID   -QTc 457 on July 19, 2024   -Lyrica to 100 mg in the morning, 200 mg nightly    -Cymbalta 20 mg daily   -Alternate with Tylenol and ibuprofen    Anxiety/Depression:   -Xanax 1 mg TID as needed  -Cymbalta 20 mg daily   -started seeing psychiatry, but has stopped going  -She stopped Buspar and trintelix    Constipation:   -Senna S    Decreased oral intake/poor appetite:   -Encourage use of oral nutritional supplements, recommend boost very high-calorie   -Mirtazapine increase to 25 mg at bedtime was added    Nausea;   -Phenergan    Fatigue related neoplasm/physical debility:   -Encourage physical activity as tolerated   -Encouraged to continue to increase oral intake and utilization of oral nutritional supplements   -Will refer for outpatient physical therapy    Follow Up: 8 weeks. Encouraged to call with any questions, concerns, needs, or changes in symptoms.    Subjective:     Uriel Braswell is a 59 y.o. female with significant past medical history of Breast cancer.  Managed at [facility], has been on tamoxifen.  Unfortunately she is found to have metastasis to her spine and bones and April 2024.  She completed palliative radiation therapy.  She is now on kisqali.    1/8/25:  Sarita presents accompanied by her

## 2025-01-08 NOTE — PROGRESS NOTES
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    X = TO BE PERFORMED NEXT VISIT  > = PROGRESS TO THIS    S: Pt reports no new complaints.   O: Ambulates into clinic with no assistance    Access Code: [email or web address]  URL: [email or web address]  Date: 2024  Prepared by: Cayden Hutson    Exercises  - Standing Marching  - 2 x daily - 7 x weekly - 2-3 sets - 15 reps  - Standing Heel Raise  - 2 x daily - 7 x weekly - 2-3 sets - 15 reps  - Seated Long Arc Quad  - 2 x daily - 7 x weekly - 2 sets - 10 reps - 3 sec hold    Time 3647-3155     Visit 30 Repeat outcome measure at mid point and end.    Pain 4/10 hips/LB     ROM      Modalities            Exercise      Nustep   L5 x 20 min  TA         Mid Row standing Green 3 x 15 reach and pull; elbows closer to side     Scapula retraction with cerv extension           Standing PPT - with upright posture 1    Squats   TA   Calf Raises 2 x 20 AIREX One hand hold TA   Toe Raises   TA   Marches 2 x 20 AIREX One hand hold TA   Alt. Sidekicks 2 x 20 AIREX   TA   LAQ     Sit/Stands   TA   Step ups - FWD Add back?         TG squats, CR, toe raises   Head and thoracic spine elevated with pillows. Right lateal lean at times that is self correcting TA        balance   No hand hold in parallel bars    Practiced upright posture in parallel bars     Supine lying to increase lumbar/cervical extension     Painful on LB and Hips to lay without knee support    Gait training  GT

## 2025-01-10 ENCOUNTER — HOSPITAL ENCOUNTER (OUTPATIENT)
Dept: INFUSION THERAPY | Age: 60
Discharge: HOME | End: 2025-01-10
Payer: COMMERCIAL

## 2025-01-10 DIAGNOSIS — C79.51 SECONDARY MALIGNANT NEOPLASM OF BONE AND BONE MARROW (HCC): Primary | ICD-10-CM

## 2025-01-10 DIAGNOSIS — C79.52 SECONDARY MALIGNANT NEOPLASM OF BONE AND BONE MARROW (HCC): Primary | ICD-10-CM

## 2025-01-10 DIAGNOSIS — C80.1 CANCER (HCC): ICD-10-CM

## 2025-01-10 DIAGNOSIS — Z85.3 PERSONAL HISTORY OF BREAST CANCER: ICD-10-CM

## 2025-01-10 LAB
ALBUMIN SERPL-MCNC: 4 G/DL (ref 3.5–5.2)
ALP SERPL-CCNC: 114 U/L (ref 35–104)
ALT SERPL-CCNC: 8 U/L (ref 0–32)
ANION GAP SERPL CALCULATED.3IONS-SCNC: 12 MMOL/L (ref 7–16)
AST SERPL-CCNC: 20 U/L (ref 0–31)
BASOPHILS # BLD: 0.16 K/UL (ref 0–0.2)
BASOPHILS NFR BLD: 3 % (ref 0–2)
BILIRUB SERPL-MCNC: 0.2 MG/DL (ref 0–1.2)
BUN SERPL-MCNC: 13 MG/DL (ref 6–23)
CALCIUM SERPL-MCNC: 8.7 MG/DL (ref 8.6–10.2)
CEA SERPL-MCNC: 3.3 NG/ML (ref 0–5.2)
CHLORIDE SERPL-SCNC: 97 MMOL/L (ref 98–107)
CO2 SERPL-SCNC: 26 MMOL/L (ref 22–29)
CREAT SERPL-MCNC: 0.9 MG/DL (ref 0.5–1)
EOSINOPHIL # BLD: 0.21 K/UL (ref 0.05–0.5)
EOSINOPHILS RELATIVE PERCENT: 4 % (ref 0–6)
ERYTHROCYTE [DISTWIDTH] IN BLOOD BY AUTOMATED COUNT: 17 % (ref 11.5–15)
GFR, ESTIMATED: 73 ML/MIN/1.73M2
GLUCOSE SERPL-MCNC: 87 MG/DL (ref 74–99)
HCT VFR BLD AUTO: 27.7 % (ref 34–48)
HGB BLD-MCNC: 9.5 G/DL (ref 11.5–15.5)
LYMPHOCYTES NFR BLD: 0.42 K/UL (ref 1.5–4)
LYMPHOCYTES RELATIVE PERCENT: 7 % (ref 20–42)
MCH RBC QN AUTO: 35.4 PG (ref 26–35)
MCHC RBC AUTO-ENTMCNC: 34.3 G/DL (ref 32–34.5)
MCV RBC AUTO: 103.4 FL (ref 80–99.9)
MONOCYTES NFR BLD: 0.21 K/UL (ref 0.1–0.95)
MONOCYTES NFR BLD: 4 % (ref 2–12)
MYELOCYTES ABSOLUTE COUNT: 0.05 K/UL
MYELOCYTES: 1 %
NEUTROPHILS NFR BLD: 82 % (ref 43–80)
NEUTS SEG NFR BLD: 4.86 K/UL (ref 1.8–7.3)
PLATELET # BLD AUTO: 304 K/UL (ref 130–450)
PMV BLD AUTO: 8.3 FL (ref 7–12)
POTASSIUM SERPL-SCNC: 4.2 MMOL/L (ref 3.5–5)
PROT SERPL-MCNC: 6.6 G/DL (ref 6.4–8.3)
RBC # BLD AUTO: 2.68 M/UL (ref 3.5–5.5)
RBC # BLD: ABNORMAL 10*6/UL
SODIUM SERPL-SCNC: 135 MMOL/L (ref 132–146)
WBC OTHER # BLD: 5.9 K/UL (ref 4.5–11.5)

## 2025-01-10 PROCEDURE — 85025 COMPLETE CBC W/AUTO DIFF WBC: CPT

## 2025-01-10 PROCEDURE — 36415 COLL VENOUS BLD VENIPUNCTURE: CPT

## 2025-01-10 PROCEDURE — 86300 IMMUNOASSAY TUMOR CA 15-3: CPT

## 2025-01-10 PROCEDURE — 80053 COMPREHEN METABOLIC PANEL: CPT

## 2025-01-10 PROCEDURE — 82378 CARCINOEMBRYONIC ANTIGEN: CPT

## 2025-01-13 ENCOUNTER — OFFICE VISIT (OUTPATIENT)
Dept: ONCOLOGY | Age: 60
End: 2025-01-13
Payer: COMMERCIAL

## 2025-01-13 VITALS
WEIGHT: 114.5 LBS | TEMPERATURE: 98.1 F | OXYGEN SATURATION: 91 % | HEIGHT: 67 IN | DIASTOLIC BLOOD PRESSURE: 83 MMHG | BODY MASS INDEX: 17.97 KG/M2 | HEART RATE: 106 BPM | SYSTOLIC BLOOD PRESSURE: 117 MMHG

## 2025-01-13 DIAGNOSIS — Z85.3 PERSONAL HISTORY OF BREAST CANCER: Primary | ICD-10-CM

## 2025-01-13 DIAGNOSIS — Z51.81 THERAPEUTIC DRUG MONITORING: ICD-10-CM

## 2025-01-13 DIAGNOSIS — R60.0 LEG EDEMA: ICD-10-CM

## 2025-01-13 LAB — CANCER AG15-3 SERPL-ACNC: 66 U/ML (ref 0–31)

## 2025-01-13 PROCEDURE — 3017F COLORECTAL CA SCREEN DOC REV: CPT | Performed by: INTERNAL MEDICINE

## 2025-01-13 PROCEDURE — G8427 DOCREV CUR MEDS BY ELIG CLIN: HCPCS | Performed by: INTERNAL MEDICINE

## 2025-01-13 PROCEDURE — G8419 CALC BMI OUT NRM PARAM NOF/U: HCPCS | Performed by: INTERNAL MEDICINE

## 2025-01-13 PROCEDURE — 99214 OFFICE O/P EST MOD 30 MIN: CPT | Performed by: INTERNAL MEDICINE

## 2025-01-13 PROCEDURE — 1036F TOBACCO NON-USER: CPT | Performed by: INTERNAL MEDICINE

## 2025-01-13 PROCEDURE — 99212 OFFICE O/P EST SF 10 MIN: CPT

## 2025-01-13 RX ORDER — FLUTICASONE PROPIONATE 50 MCG
2 SPRAY, SUSPENSION (ML) NASAL DAILY
Qty: 16 G | Refills: 0 | Status: SHIPPED | OUTPATIENT
Start: 2025-01-13

## 2025-01-13 NOTE — PROGRESS NOTES
paramediastinal groundglass opacity is no longer visualized, new ill-defined left lower lobe perifissural groundglass opacity with mild uptake.  Recommended to continue Femara and ribociclib.    PET scan was done on 12/7/2024, results were reviewed, revealing widespread sclerotic changes with minimal to mild uptake, suggestive of treated metastatic osseous disease, postradiation lung changes, with groundglass opacities and atelectatic changes likely inflammatory in nature, recommended to continue Femara and ribociclib.    The patient is doing well clinically, labs reviewed, anemia is improving, hemoglobin 8.5, white count and platelet count are normal, no indication for dose adjustment of the ribociclib.  Tumor markers reviewed, CEA had decreased from 8.5-3.3, CA 15-3 had decreased from 381-66 will monitor the tumor markers.  Will continue to follow her with serial imaging studies.  Will try supplements to help with the hair growth, also discussed referral to dermatology if no improvement.    The patient received Zometa for hypercalcemia, which had resolved, will benefit from continuing Zometa, dental clearance has been obtained, recommended Zometa every 3 months, last Zometa was on 12/16/2024 recommended calcium and vitamin D supplement.    Follow-up with palliative medicine for symptoms management.    Lower leg edema, bilateral lower extremities ultrasounds negative for DVTs.  The lower leg edema had improved, recommend compression stockings    Caris NGS results reviewed.    RTC in 1 month.    Thank you for allowing us to participate in the care of     KATHY MILLAN MD   HEMATOLOGY/MEDICAL ONCOLOGY  [facility] MEDICAL ONCOLOGY  [address] [place] [address]  Dept: [phone]  Loc: [phone]

## 2025-01-15 ENCOUNTER — TREATMENT (OUTPATIENT)
Dept: PHYSICAL THERAPY | Age: 60
End: 2025-01-15
Payer: COMMERCIAL

## 2025-01-15 DIAGNOSIS — R53.0 NEOPLASTIC MALIGNANT RELATED FATIGUE: ICD-10-CM

## 2025-01-15 DIAGNOSIS — C79.51 SECONDARY MALIGNANT NEOPLASM OF BONE (HCC): ICD-10-CM

## 2025-01-15 DIAGNOSIS — R53.81 DEBILITY: Primary | ICD-10-CM

## 2025-01-15 PROCEDURE — 97110 THERAPEUTIC EXERCISES: CPT

## 2025-01-15 PROCEDURE — 97530 THERAPEUTIC ACTIVITIES: CPT

## 2025-01-15 NOTE — PROGRESS NOTES
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    X = TO BE PERFORMED NEXT VISIT  > = PROGRESS TO THIS    S: Pt reports mid/low back soreness as well as right hip pain.   O: Ambulates into clinic with no assistance    Access Code: [email or web address]  URL: [email or web address]  Date: 2024  Prepared by: Cayden Hutson    Exercises  - Standing Marching  - 2 x daily - 7 x weekly - 2-3 sets - 15 reps  - Standing Heel Raise  - 2 x daily - 7 x weekly - 2-3 sets - 15 reps  - Seated Long Arc Quad  - 2 x daily - 7 x weekly - 2 sets - 10 reps - 3 sec hold    Time 8237-8797     Visit 31 Repeat outcome measure at mid point and end.    Pain 4/10 hips/LB/ Thoracic     ROM      Modalities            Exercise      Nustep   L5 x 20 min  TA         Mid Row standing Green 3 x 15 reach and pull; elbows closer to side     Scapula retraction with cerv extension           Standing PPT - with upright posture 1    Wall push ups - using leg press bar 2 x 15      Squats   TA   Calf Raises One hand hold TA   Toe Raises  TA   Marches One hand hold TA   Alt. Sidekicks  TA   LAQ     Sit/Stands X 10  TA   Step ups - FWD Add back?         TG squats, CR, toe raises   Head and thoracic spine elevated with pillows. Right lateal lean at times that is self correcting TA   Leg press - two legged 10# 2-3 x 15  CR 10# 2 -3 x 15     balance   No hand hold in parallel bars    Practiced upright posture in parallel bars     Supine lying to increase

## 2025-01-16 RX ORDER — DULOXETIN HYDROCHLORIDE 20 MG/1
20 CAPSULE, DELAYED RELEASE ORAL DAILY
Qty: 90 CAPSULE | Refills: 0 | OUTPATIENT
Start: 2025-01-16

## 2025-01-17 ENCOUNTER — TREATMENT (OUTPATIENT)
Dept: PHYSICAL THERAPY | Age: 60
End: 2025-01-17

## 2025-01-17 DIAGNOSIS — R53.81 DEBILITY: Primary | ICD-10-CM

## 2025-01-17 DIAGNOSIS — R53.0 NEOPLASTIC MALIGNANT RELATED FATIGUE: ICD-10-CM

## 2025-01-17 DIAGNOSIS — C79.51 SECONDARY MALIGNANT NEOPLASM OF BONE (HCC): ICD-10-CM

## 2025-01-17 NOTE — PROGRESS NOTES
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    X = TO BE PERFORMED NEXT VISIT  > = PROGRESS TO THIS    S: Pt reports mid/low back soreness as well as right hip pain.   O: Ambulates into clinic with no assistance    Access Code: [email or web address]  URL: [email or web address]  Date: 2024  Prepared by: Cayden Hutson    Exercises  - Standing Marching  - 2 x daily - 7 x weekly - 2-3 sets - 15 reps  - Standing Heel Raise  - 2 x daily - 7 x weekly - 2-3 sets - 15 reps  - Seated Long Arc Quad  - 2 x daily - 7 x weekly - 2 sets - 10 reps - 3 sec hold    Time 9861-4825     Visit 32 Repeat outcome measure at mid point and end.    Pain 4/10 hips/LB/ Thoracic     ROM      Modalities            Exercise      Nustep   L5 x 15 min  TA         Mid Row standing     Scapula retraction with cerv extension           Standing PPT - with upright posture 1    Wall push ups - using leg press bar 2 x 15      Squats   TA   Calf Raises One hand hold TA   Toe Raises  TA   Marches One hand hold TA   Alt. Sidekicks  TA   LAQ     Sit/Stands  TA   Step ups - FWD Add back?         TG squats, CR, toe raises   Head and thoracic spine elevated with pillows. Right lateal lean at times that is self correcting TA   Leg press - two legged 10# 2-3 x 15  CR 10# 2 -3 x 15     balance   No hand hold in parallel bars    Practiced upright posture in parallel bars     Supine lying to increase lumbar/cervical extension  X 10 min 3 pillows  1 x 15 heel

## 2025-01-22 ENCOUNTER — TREATMENT (OUTPATIENT)
Dept: PHYSICAL THERAPY | Age: 60
End: 2025-01-22

## 2025-01-22 DIAGNOSIS — C79.51 PAIN FROM BONE METASTASES (HCC): ICD-10-CM

## 2025-01-22 DIAGNOSIS — G89.3 CHRONIC PAIN DUE TO NEOPLASM: ICD-10-CM

## 2025-01-22 DIAGNOSIS — G89.3 PAIN DUE TO NEOPLASM: ICD-10-CM

## 2025-01-22 DIAGNOSIS — C79.51 METASTATIC CANCER TO SPINE (HCC): ICD-10-CM

## 2025-01-22 DIAGNOSIS — R53.0 NEOPLASTIC MALIGNANT RELATED FATIGUE: ICD-10-CM

## 2025-01-22 DIAGNOSIS — C79.51 SECONDARY MALIGNANT NEOPLASM OF BONE (HCC): ICD-10-CM

## 2025-01-22 DIAGNOSIS — C50.912 BREAST CANCER, STAGE 3, LEFT (HCC): ICD-10-CM

## 2025-01-22 DIAGNOSIS — G89.3 PAIN FROM BONE METASTASES (HCC): ICD-10-CM

## 2025-01-22 DIAGNOSIS — C50.919 STAGE IV BREAST CANCER IN FEMALE (HCC): ICD-10-CM

## 2025-01-22 DIAGNOSIS — R53.81 DEBILITY: Primary | ICD-10-CM

## 2025-01-22 DIAGNOSIS — Z51.5 PALLIATIVE CARE BY SPECIALIST: ICD-10-CM

## 2025-01-22 RX ORDER — OXYCODONE HYDROCHLORIDE 20 MG/1
20 TABLET ORAL
Qty: 112 TABLET | Refills: 0 | Status: SHIPPED | OUTPATIENT
Start: 2025-01-22 | End: 2025-02-05

## 2025-01-22 RX ORDER — METHADONE HYDROCHLORIDE 10 MG/1
10 TABLET ORAL 2 TIMES DAILY
Qty: 60 TABLET | Refills: 0 | Status: SHIPPED | OUTPATIENT
Start: 2025-01-22 | End: 2025-02-21

## 2025-01-22 NOTE — PROGRESS NOTES
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    X = TO BE PERFORMED NEXT VISIT  > = PROGRESS TO THIS    S: Pt reports mid/low back soreness as well as right hip pain.   O: Ambulates into clinic with no assistance    Access Code: [email or web address]  URL: [email or web address]  Date: 2024  Prepared by: Cayden Hutson    Exercises  - Standing Marching  - 2 x daily - 7 x weekly - 2-3 sets - 15 reps  - Standing Heel Raise  - 2 x daily - 7 x weekly - 2-3 sets - 15 reps  - Seated Long Arc Quad  - 2 x daily - 7 x weekly - 2 sets - 10 reps - 3 sec hold    Time 3650-3636     Visit 33 Repeat outcome measure at mid point and end.    Pain 4/10 hips/LB/ Thoracic     ROM      Modalities            Exercise      Nustep   L5 x 15 min  TA         Mid Row standing Green 3 x 15 reach and pull; elbows closer to side     Scapula retraction with cerv extension           Standing PPT - with upright posture 1    Wall push ups - using leg press bar 2 x 15      Squats   TA   Calf Raises 2 x 20 AIREX One hand hold TA   Toe Raises   TA   Marches 2 x 20 AIREX  x 10 ea LE no hand hold One hand hold TA   Alt. Sidekicks 2 x 20 AIREX   TA   LAQ     Sit/Stands  TA   Step ups - FWD Add back?         TG squats, CR, toe raises   Head and thoracic spine elevated with pillows. Right lateal lean at times that is self correcting TA   Leg press - two legged 10# 2-3 x 15  CR 10# 2 -3 x 15     balance   No hand hold in parallel bars    Practiced upright

## 2025-01-22 NOTE — TELEPHONE ENCOUNTER
Call from Atilio requesting her mother's refills for Oxy IR and methadone. Pharmacy is [facility] in [place]. Next wily 2/26/25.

## 2025-01-24 ENCOUNTER — TREATMENT (OUTPATIENT)
Dept: PHYSICAL THERAPY | Age: 60
End: 2025-01-24

## 2025-01-24 DIAGNOSIS — R53.81 DEBILITY: Primary | ICD-10-CM

## 2025-01-24 NOTE — PROGRESS NOTES
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    X = TO BE PERFORMED NEXT VISIT  > = PROGRESS TO THIS    S: Pt reports mid/low back soreness . Pain to stand upright in mid back, neck.  O: Ambulates into clinic with no assistance    Access Code: [email or web address]  URL: [email or web address]  Date: 2024  Prepared by: Cayden Hutson    Exercises  - Standing Marching  - 2 x daily - 7 x weekly - 2-3 sets - 15 reps  - Standing Heel Raise  - 2 x daily - 7 x weekly - 2-3 sets - 15 reps  - Seated Long Arc Quad  - 2 x daily - 7 x weekly - 2 sets - 10 reps - 3 sec hold    Time 5063-7604     Visit 34 Repeat outcome measure at mid point and end.    Pain 4/10 hips/LB/ Thoracic     ROM      Modalities            Exercise      Nustep   L5 x 20 min  TA         Mid Row standing     Scapula retraction with cerv extension           Standing PPT - with upright posture 1    Wall push ups - using leg press bar 2 x 15      Squats   TA   Calf Raises 2 x 20 AIREX One hand hold TA   Toe Raises   TA   Marches 2 x 20 AIREX  x 10 ea LE no hand hold One hand hold TA   Alt. Sidekicks 2 x 20 AIREX   TA   LAQ     Sit/Stands  TA   Step ups - FWD Add back?         TG squats, CR, toe raises   Head and thoracic spine elevated with pillows. Right lateal lean at times that is self correcting TA   Leg press - two legged     balance   No hand hold in parallel bars    Practiced upright posture in parallel bars     Supine lying to increase

## 2025-01-29 ENCOUNTER — TREATMENT (OUTPATIENT)
Dept: PHYSICAL THERAPY | Age: 60
End: 2025-01-29
Payer: COMMERCIAL

## 2025-01-29 DIAGNOSIS — R53.81 DEBILITY: Primary | ICD-10-CM

## 2025-01-29 DIAGNOSIS — R53.0 NEOPLASTIC MALIGNANT RELATED FATIGUE: ICD-10-CM

## 2025-01-29 DIAGNOSIS — C79.51 SECONDARY MALIGNANT NEOPLASM OF BONE (HCC): ICD-10-CM

## 2025-01-29 PROCEDURE — 97110 THERAPEUTIC EXERCISES: CPT

## 2025-01-29 NOTE — PROGRESS NOTES
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    X = TO BE PERFORMED NEXT VISIT  > = PROGRESS TO THIS    S: Pt reports mid/low back soreness . Pain to stand upright in mid back, neck.  O: Ambulates into clinic without assistance    Access Code: [email or web address]  URL: [email or web address]  Date: 2024  Prepared by: Cayden Hutson    Exercises  - Standing Marching  - 2 x daily - 7 x weekly - 2-3 sets - 15 reps  - Standing Heel Raise  - 2 x daily - 7 x weekly - 2-3 sets - 15 reps  - Seated Long Arc Quad  - 2 x daily - 7 x weekly - 2 sets - 10 reps - 3 sec hold    Time 1267-4892     Visit 5 Repeat outcome measure at mid point and end.    Pain 4/10 hips/LB/ Thoracic     ROM      Modalities            Exercise      Nustep   L5 x 20 min  TA         Mid Row standing Green 3 x 15 reach and pull; elbows closer to side     Scapula retraction with cerv extension           Standing PPT - with upright posture 1    Wall push ups - using leg press bar     Squats   TA   Calf Raises 2 x 20 AIREX One hand hold TA   Toe Raises   TA   Marches 2 x 20 AIREX  x 10 ea LE no hand hold One hand hold TA   Alt. Sidekicks 2 x 20 AIREX   TA   LAQ     Sit/Stands  TA   Step ups - FWD 6\" 2 x 20 ea LE leading          TG squats, CR, toe raises   Head and thoracic spine elevated with pillows. Right lateal lean at times that is self correcting TA   Leg press - two legged     balance   No hand hold in parallel bars    Practiced upright posture

## 2025-01-31 ENCOUNTER — TREATMENT (OUTPATIENT)
Dept: PHYSICAL THERAPY | Age: 60
End: 2025-01-31

## 2025-01-31 DIAGNOSIS — C79.51 SECONDARY MALIGNANT NEOPLASM OF BONE (HCC): ICD-10-CM

## 2025-01-31 DIAGNOSIS — R53.81 DEBILITY: Primary | ICD-10-CM

## 2025-01-31 DIAGNOSIS — R53.0 NEOPLASTIC MALIGNANT RELATED FATIGUE: ICD-10-CM

## 2025-01-31 NOTE — PROGRESS NOTES
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    X = TO BE PERFORMED NEXT VISIT  > = PROGRESS TO THIS    S: Pt reports mid/low back soreness . Pain to stand upright in mid back, neck. Pt states she has difficult time reaching into upper cabinets due to forward neck.   O: Ambulates into clinic without assistance.     Access Code: [email or web address]  URL: [email or web address]  Date: 2024  Prepared by: Cayden Hutson    Exercises  - Standing Marching  - 2 x daily - 7 x weekly - 2-3 sets - 15 reps  - Standing Heel Raise  - 2 x daily - 7 x weekly - 2-3 sets - 15 reps  - Seated Long Arc Quad  - 2 x daily - 7 x weekly - 2 sets - 10 reps - 3 sec hold    Time 1785-1394     Visit 5 Repeat outcome measure at mid point and end.    Pain 4/10 hips/LB/ Thoracic     ROM      Modalities            Exercise      Nustep   L5 x 20 min  TA         Mid Row standing Green 3 x 15 reach and pull; elbows closer to side     Scapula retraction with cerv extension           Standing PPT - with upright posture 1         Upper body     UBE 1min/1min x 2reps     Wall push ups - using leg press bar 3 x 10      Cervical extension up against the wall 2 x 5      Wall slides  X 7 ea UE           Squats   TA   Calf Raises One hand hold TA   Toe Raises  TA   Marches One hand hold TA   Alt. Sidekicks  TA   LAQ     Sit/Stands  TA   Step ups - FWD          TG squats, CR, toe raises   Head and thoracic spine elevated with pillows. Right lateal lean at

## 2025-02-05 ENCOUNTER — TREATMENT (OUTPATIENT)
Dept: PHYSICAL THERAPY | Age: 60
End: 2025-02-05

## 2025-02-05 ENCOUNTER — TELEPHONE (OUTPATIENT)
Dept: ONCOLOGY | Age: 60
End: 2025-02-05

## 2025-02-05 DIAGNOSIS — C79.51 SECONDARY MALIGNANT NEOPLASM OF BONE (HCC): ICD-10-CM

## 2025-02-05 DIAGNOSIS — R53.81 DEBILITY: Primary | ICD-10-CM

## 2025-02-05 DIAGNOSIS — R53.0 NEOPLASTIC MALIGNANT RELATED FATIGUE: ICD-10-CM

## 2025-02-05 NOTE — TELEPHONE ENCOUNTER
Contacted pt re: paperwork request from [employer].  Pt is 59-year-old female being managed for metastatic breast cancer.  Reviewed paperwork request from [employer].  Discussed pt's concerns re: possible return to work.  Most recent note sent to [employer] per pt's request.  Pt to return call to this provider following upcoming medical oncology visit for further discussion.      BJ Romero, LISW-S  Oncology Social Worker   [phone]

## 2025-02-05 NOTE — PROGRESS NOTES
Head and thoracic spine elevated with pillows. Right lateal lean at times that is self correcting TA   Leg press - two legged 10# 2-3 x 15  CR 10# 2 -3 x 15     balance   No hand hold in parallel bars    Practiced upright posture in parallel bars     Supine lying to increase lumbar/cervical extension     Painful on LB and Hips to lay without knee support    Gait training  GT        Marching Gait Cues for upright posture NR   Sidestepping Cues for upright posture NR         Ambulation  4 x 150ft   Weighted cart Cues for upright posture                      A: Tolerated well.  Continuing to work on improving tolerance to upright posture, endurance. Added cervical/UE exercises. Cues for forward gaze.  Large, functional, dynamic, global movements used to build strength, balance, endurance, and flexibility and to improve physical performance.   P: Continue with rehab plan  Tiffanie Rizvi PTA    Treatment Charges: Mins Units   Initial Evaluation     Re-Evaluation     Ther Exercise         TE 20 1   Manual Therapy     MT     Ther Activities        TA 25 2   Gait Training          GT     Neuro Re-education NR     Modalities     Non-Billable Service Time     Other     Total Time/Units 45 3

## 2025-02-06 DIAGNOSIS — G89.3 PAIN FROM BONE METASTASES (HCC): ICD-10-CM

## 2025-02-06 DIAGNOSIS — C50.912 BREAST CANCER, STAGE 3, LEFT (HCC): ICD-10-CM

## 2025-02-06 DIAGNOSIS — C79.51 PAIN FROM BONE METASTASES (HCC): ICD-10-CM

## 2025-02-06 DIAGNOSIS — G89.3 PAIN DUE TO NEOPLASM: ICD-10-CM

## 2025-02-06 DIAGNOSIS — Z51.5 PALLIATIVE CARE BY SPECIALIST: ICD-10-CM

## 2025-02-06 RX ORDER — OXYCODONE HYDROCHLORIDE 20 MG/1
20 TABLET ORAL
Qty: 112 TABLET | Refills: 0 | Status: SHIPPED | OUTPATIENT
Start: 2025-02-06 | End: 2025-02-20

## 2025-02-06 NOTE — TELEPHONE ENCOUNTER
Call from Uriel requesting refill for Oxy IR 20 mg. Pharmacy is [facility] in [place]. Next wily 2/26/25.

## 2025-02-07 ENCOUNTER — HOSPITAL ENCOUNTER (OUTPATIENT)
Dept: INFUSION THERAPY | Age: 60
Discharge: HOME | End: 2025-02-07
Payer: COMMERCIAL

## 2025-02-07 ENCOUNTER — TREATMENT (OUTPATIENT)
Dept: PHYSICAL THERAPY | Age: 60
End: 2025-02-07
Payer: COMMERCIAL

## 2025-02-07 DIAGNOSIS — R53.0 NEOPLASTIC MALIGNANT RELATED FATIGUE: ICD-10-CM

## 2025-02-07 DIAGNOSIS — C79.51 SECONDARY MALIGNANT NEOPLASM OF BONE (HCC): ICD-10-CM

## 2025-02-07 DIAGNOSIS — R53.81 DEBILITY: Primary | ICD-10-CM

## 2025-02-07 DIAGNOSIS — C80.1 CANCER (HCC): ICD-10-CM

## 2025-02-07 DIAGNOSIS — C79.51 SECONDARY MALIGNANT NEOPLASM OF BONE AND BONE MARROW (HCC): Primary | ICD-10-CM

## 2025-02-07 DIAGNOSIS — C79.52 SECONDARY MALIGNANT NEOPLASM OF BONE AND BONE MARROW (HCC): Primary | ICD-10-CM

## 2025-02-07 LAB
ALBUMIN SERPL-MCNC: 4 G/DL (ref 3.5–5.2)
ALP SERPL-CCNC: 97 U/L (ref 35–104)
ALT SERPL-CCNC: 7 U/L (ref 0–32)
ANION GAP SERPL CALCULATED.3IONS-SCNC: 11 MMOL/L (ref 7–16)
AST SERPL-CCNC: 15 U/L (ref 0–31)
BASOPHILS # BLD: 0.12 K/UL (ref 0–0.2)
BASOPHILS NFR BLD: 3 % (ref 0–2)
BILIRUB SERPL-MCNC: 0.2 MG/DL (ref 0–1.2)
BUN SERPL-MCNC: 11 MG/DL (ref 6–23)
CALCIUM SERPL-MCNC: 8.8 MG/DL (ref 8.6–10.2)
CHLORIDE SERPL-SCNC: 95 MMOL/L (ref 98–107)
CO2 SERPL-SCNC: 26 MMOL/L (ref 22–29)
CREAT SERPL-MCNC: 0.8 MG/DL (ref 0.5–1)
EOSINOPHIL # BLD: 0.27 K/UL (ref 0.05–0.5)
EOSINOPHILS RELATIVE PERCENT: 6 % (ref 0–6)
ERYTHROCYTE [DISTWIDTH] IN BLOOD BY AUTOMATED COUNT: 16.7 % (ref 11.5–15)
GFR, ESTIMATED: 83 ML/MIN/1.73M2
GLUCOSE SERPL-MCNC: 115 MG/DL (ref 74–99)
HCT VFR BLD AUTO: 26.7 % (ref 34–48)
HGB BLD-MCNC: 9.1 G/DL (ref 11.5–15.5)
LYMPHOCYTES NFR BLD: 0.47 K/UL (ref 1.5–4)
LYMPHOCYTES RELATIVE PERCENT: 10 % (ref 20–42)
MCH RBC QN AUTO: 34.2 PG (ref 26–35)
MCHC RBC AUTO-ENTMCNC: 34.1 G/DL (ref 32–34.5)
MCV RBC AUTO: 100.4 FL (ref 80–99.9)
MONOCYTES NFR BLD: 0.51 K/UL (ref 0.1–0.95)
MONOCYTES NFR BLD: 11 % (ref 2–12)
NEUTROPHILS NFR BLD: 70 % (ref 43–80)
NEUTS SEG NFR BLD: 3.13 K/UL (ref 1.8–7.3)
PLATELET # BLD AUTO: 278 K/UL (ref 130–450)
PMV BLD AUTO: 8.5 FL (ref 7–12)
POTASSIUM SERPL-SCNC: 4.1 MMOL/L (ref 3.5–5)
PROT SERPL-MCNC: 6.5 G/DL (ref 6.4–8.3)
RBC # BLD AUTO: 2.66 M/UL (ref 3.5–5.5)
RBC # BLD: ABNORMAL 10*6/UL
SODIUM SERPL-SCNC: 132 MMOL/L (ref 132–146)
WBC OTHER # BLD: 4.5 K/UL (ref 4.5–11.5)

## 2025-02-07 PROCEDURE — 80053 COMPREHEN METABOLIC PANEL: CPT

## 2025-02-07 PROCEDURE — 36415 COLL VENOUS BLD VENIPUNCTURE: CPT

## 2025-02-07 PROCEDURE — 85025 COMPLETE CBC W/AUTO DIFF WBC: CPT

## 2025-02-07 PROCEDURE — 97110 THERAPEUTIC EXERCISES: CPT | Performed by: PHYSICAL THERAPIST

## 2025-02-07 PROCEDURE — 97530 THERAPEUTIC ACTIVITIES: CPT | Performed by: PHYSICAL THERAPIST

## 2025-02-07 NOTE — PROGRESS NOTES
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    X = TO BE PERFORMED NEXT VISIT  > = PROGRESS TO THIS    S: Pt reports mid/low back soreness . Pain to stand upright in mid back, neck. Pt states she has difficult time reaching into upper cabinets due to forward neck.   O: Ambulates into clinic without assistance.     Access Code: [email or web address]  URL: [email or web address]  Date: 2024  Prepared by: Cayden Hutson    Exercises  - Standing Marching  - 2 x daily - 7 x weekly - 2-3 sets - 15 reps  - Standing Heel Raise  - 2 x daily - 7 x weekly - 2-3 sets - 15 reps  - Seated Long Arc Quad  - 2 x daily - 7 x weekly - 2 sets - 10 reps - 3 sec hold    Time 5936-7462     Visit 5 Repeat outcome measure at mid point and end.    Pain 4/10 hips/LB/ Thoracic     ROM      Modalities            Exercise      Nustep   L5 x 20 min  TA         Mid Row standing Green 3 x 15 reach and pull; elbows closer to side     Scapula retraction with cerv extension           Standing PPT - with upright posture 1         Upper body     UBE 1.5/1.5 min     Wall push ups - using leg press bar 3 x 10      Cervical extension up against the wall 2 x 5      Wall slides  X 7 ea UE           Squats   TA   Calf Raises 2 x 20 AIREX One hand hold TA   Toe Raises   TA   Marches 2 x 20 AIREX  x 10 ea LE no hand hold One hand hold TA   Alt. Sidekicks 2 x 20 AIREX   TA   LAQ     Sit/Stands  TA   Step ups - FWD          TG squats, CR, toe raises   Head

## 2025-02-10 ENCOUNTER — OFFICE VISIT (OUTPATIENT)
Dept: ONCOLOGY | Age: 60
End: 2025-02-10
Payer: COMMERCIAL

## 2025-02-10 ENCOUNTER — TELEPHONE (OUTPATIENT)
Dept: ONCOLOGY | Age: 60
End: 2025-02-10

## 2025-02-10 VITALS
HEIGHT: 67 IN | DIASTOLIC BLOOD PRESSURE: 82 MMHG | TEMPERATURE: 98 F | HEART RATE: 99 BPM | BODY MASS INDEX: 18.82 KG/M2 | SYSTOLIC BLOOD PRESSURE: 120 MMHG | OXYGEN SATURATION: 93 % | WEIGHT: 119.9 LBS

## 2025-02-10 DIAGNOSIS — Z85.3 PERSONAL HISTORY OF BREAST CANCER: Primary | ICD-10-CM

## 2025-02-10 DIAGNOSIS — Z51.81 THERAPEUTIC DRUG MONITORING: ICD-10-CM

## 2025-02-10 PROCEDURE — 1036F TOBACCO NON-USER: CPT | Performed by: INTERNAL MEDICINE

## 2025-02-10 PROCEDURE — G8427 DOCREV CUR MEDS BY ELIG CLIN: HCPCS | Performed by: INTERNAL MEDICINE

## 2025-02-10 PROCEDURE — 3017F COLORECTAL CA SCREEN DOC REV: CPT | Performed by: INTERNAL MEDICINE

## 2025-02-10 PROCEDURE — G8420 CALC BMI NORM PARAMETERS: HCPCS | Performed by: INTERNAL MEDICINE

## 2025-02-10 PROCEDURE — 99212 OFFICE O/P EST SF 10 MIN: CPT

## 2025-02-10 PROCEDURE — 99214 OFFICE O/P EST MOD 30 MIN: CPT | Performed by: INTERNAL MEDICINE

## 2025-02-10 NOTE — TELEPHONE ENCOUNTER
Functional Status assessment from [place] received.      Attempted to contact [facility] to forward forms due to pt's upcoming appointment; message left requesting callback.    Attempted to contact pt to discuss above.  Detailed message left requesting callback.    BJ Romero, LISW-S  Oncology Social Worker   [phone]

## 2025-02-10 NOTE — PROGRESS NOTES
[facility]  [address]  [place] [address]  Dept: [phone]  Loc: [phone]  Attending progress note      Reason for Visit:   Recurrent metastatic breast cancer.    Referring Physician:  Julio Fried MD     PCP:  Ebenezer Mak DO    History of Present Illness:    Mrs. Braswell is a 59-year-old lady, with a past history significant for T3N3 ER positive >90% ME negative (<1%) HER2 Negative (1+) breast cancer who underwent mastectomy with axillary lymph node dissection followed by adjuvant dose dense AC-T and multiple lines of endocrine therapy due to grade 3 arthralgias, then on tamoxifen. Unfortunately, developed back pain in late 2023 and found to have metastatic recurrence with diffuse osseous involvement in 4/2024.      CANCER/TREATMENT HISTORY   November 2015-normal mammogram but skin changes  5/31/2016 bilateral mammogram with a spiculated mass of 14 mm on ultrasound  6/17/2016 core biopsy lobular carcinoma plus LCIS ER +100% ME +100% HER2 negative (1+  7/6/2016 mastectomy of the left breast sentinel lymph node biopsy and left axillary lymph node dissection final diagnosis T3 6 cm and 3 13 out of 23 M0 breast cancer.  She underwent reexcision the posterior wall with negative margins  9/6/2016 until 2/3/2017 adjuvant dose dense ACT  3/13/2017 until 4/27/2017 radiation to the breast  4/28/1917 Arimidex was started but very poor tolerance due to arthralgia treatment was changed to Femara  7/10/2017 Femara was stopped due to poor tolerance  7/14/2017 until 7/25/2017-Arimidex again with arthralgia  7/28/2017 exemestane started however stopped due to arthralgias  Tamoxifen was started 3/2018 (she is a MTHFR heterozygous and is good potentially cause DVTs).  Developed back pain late 2023.  4/25/2024 spine MRI showed diffuse osseous lesions.   5/4/2024 PET/CT showed innumerable intensely FDG avid lesions throughout axial and appendicular

## 2025-02-12 ENCOUNTER — TREATMENT (OUTPATIENT)
Dept: PHYSICAL THERAPY | Age: 60
End: 2025-02-12

## 2025-02-12 DIAGNOSIS — C79.51 SECONDARY MALIGNANT NEOPLASM OF BONE (HCC): ICD-10-CM

## 2025-02-12 DIAGNOSIS — R53.81 DEBILITY: Primary | ICD-10-CM

## 2025-02-12 DIAGNOSIS — R53.0 NEOPLASTIC MALIGNANT RELATED FATIGUE: ICD-10-CM

## 2025-02-12 NOTE — PROGRESS NOTES
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    X = TO BE PERFORMED NEXT VISIT  > = PROGRESS TO THIS    S: Pt reports mid/low back soreness . Pain to stand upright in mid back, neck. Pt states she has difficult time reaching into upper cabinets due to forward neck.   O: Ambulates into clinic without assistance.     Access Code: [email or web address]  URL: [email or web address]  Date: 2024  Prepared by: Cayden Hutson    Exercises  - Standing Marching  - 2 x daily - 7 x weekly - 2-3 sets - 15 reps  - Standing Heel Raise  - 2 x daily - 7 x weekly - 2-3 sets - 15 reps  - Seated Long Arc Quad  - 2 x daily - 7 x weekly - 2 sets - 10 reps - 3 sec hold    Time 6203-9627     Visit 5 Repeat outcome measure at mid point and end.    Pain 4/10 hips/LB/ Thoracic     ROM      Modalities            Exercise      Nustep   L5 x 15 min  TA         Mid Row standing Green 2 x 15 reach and pull; elbows closer to side     High row standing Green 2 x 15 reach and pull; elbows closer to side     Scapula retraction with cerv extension           Standing PPT - with upright posture 1         Upper body     UBE 1/1 min F/B     Wall push ups - using leg press bar 2 x 20     Cervical extension up against the wall     Wall slides            Lower Body      Squats   TA   Calf Raises 2 x 20 One hand hold TA   Toe Raises   TA   Marches One hand hold TA   Alt. Sidekicks  TA   LAQ     Sit/Stands  TA   Step ups - FWD 6\" 2 x 20 ea LE leading

## 2025-02-14 ENCOUNTER — TREATMENT (OUTPATIENT)
Dept: PHYSICAL THERAPY | Age: 60
End: 2025-02-14

## 2025-02-14 DIAGNOSIS — R53.81 DEBILITY: Primary | ICD-10-CM

## 2025-02-14 DIAGNOSIS — R53.0 NEOPLASTIC MALIGNANT RELATED FATIGUE: ICD-10-CM

## 2025-02-14 DIAGNOSIS — C79.51 SECONDARY MALIGNANT NEOPLASM OF BONE (HCC): ICD-10-CM

## 2025-02-14 NOTE — PROGRESS NOTES
Head and thoracic spine elevated with pillows. Right lateal lean at times that is self correcting TA   Leg press - two legged     balance   No hand hold in parallel bars    Practiced upright posture in parallel bars     Supine lying to increase lumbar/cervical extension     Painful on LB and Hips to lay without knee support    Gait training  GT        Marching Gait Cues for upright posture NR   Sidestepping Cues for upright posture NR         Ambulation  Cues for upright posture                      A: Tolerated well.  Continuing to work on improving tolerance to upright posture, endurance. Added cervical/UE exercises. Cues for forward gaze.  Large, functional, dynamic, global movements used to build strength, balance, endurance, and flexibility and to improve physical performance.   P: Continue with rehab plan  Tiffanie Rizvi PTA    Treatment Charges: Mins Units   Initial Evaluation     Re-Evaluation     Ther Exercise         TE 15 1   Manual Therapy     MT     Ther Activities        TA 15 1   Gait Training          GT     Neuro Re-education NR     Modalities     Non-Billable Service Time 15    Other     Total Time/Units 45 2

## 2025-02-18 ENCOUNTER — TREATMENT (OUTPATIENT)
Dept: PHYSICAL THERAPY | Age: 60
End: 2025-02-18
Payer: COMMERCIAL

## 2025-02-18 DIAGNOSIS — Z51.5 PALLIATIVE CARE BY SPECIALIST: ICD-10-CM

## 2025-02-18 DIAGNOSIS — C50.912 BREAST CANCER, STAGE 3, LEFT (HCC): ICD-10-CM

## 2025-02-18 DIAGNOSIS — G89.3 PAIN DUE TO NEOPLASM: ICD-10-CM

## 2025-02-18 DIAGNOSIS — C79.51 SECONDARY MALIGNANT NEOPLASM OF BONE (HCC): ICD-10-CM

## 2025-02-18 DIAGNOSIS — C79.51 PAIN FROM BONE METASTASES (HCC): ICD-10-CM

## 2025-02-18 DIAGNOSIS — R53.0 NEOPLASTIC MALIGNANT RELATED FATIGUE: ICD-10-CM

## 2025-02-18 DIAGNOSIS — G89.3 PAIN FROM BONE METASTASES (HCC): ICD-10-CM

## 2025-02-18 DIAGNOSIS — R53.81 DEBILITY: Primary | ICD-10-CM

## 2025-02-18 PROCEDURE — 97110 THERAPEUTIC EXERCISES: CPT

## 2025-02-18 PROCEDURE — 97530 THERAPEUTIC ACTIVITIES: CPT

## 2025-02-18 RX ORDER — OXYCODONE HYDROCHLORIDE 20 MG/1
20 TABLET ORAL
Qty: 112 TABLET | Refills: 0 | Status: SHIPPED | OUTPATIENT
Start: 2025-02-18 | End: 2025-03-04

## 2025-02-18 RX ORDER — MIRTAZAPINE 15 MG/1
15 TABLET, FILM COATED ORAL NIGHTLY
Qty: 30 TABLET | Refills: 0 | Status: SHIPPED | OUTPATIENT
Start: 2025-02-18

## 2025-02-18 NOTE — PROGRESS NOTES
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    X = TO BE PERFORMED NEXT VISIT  > = PROGRESS TO THIS    S: Pt reports mid/low back soreness . Pain to stand upright in mid back, neck. Pt states she has difficult time reaching into upper cabinets due to forward neck.   O: Ambulates into clinic without assistance.     Access Code: [email or web address]  URL: [email or web address]  Date: 2024  Prepared by: Cayden Hutson    Exercises  - Standing Marching  - 2 x daily - 7 x weekly - 2-3 sets - 15 reps  - Standing Heel Raise  - 2 x daily - 7 x weekly - 2-3 sets - 15 reps  - Seated Long Arc Quad  - 2 x daily - 7 x weekly - 2 sets - 10 reps - 3 sec hold    Time 4243-5190     Visit 5 Repeat outcome measure at mid point and end.    Pain 4/10 hips/LB/ Thoracic     ROM      Modalities            Exercise      Nustep   L5 x 15 min  TA         Mid Row standing Green 2 x 15 reach and pull; elbows closer to side     High row standing Green 2 x 15 elbows closer to side     Scapula retraction with cerv extension           Standing PPT - with upright posture 1         Upper body     UBE 2/2 min F/B     Wall push ups - on parallel bar 2 x 20     Cervical extension up against the wall     Wall slides            Lower Body      Squats   TA   Calf Raises 2 x 20 No hands TA   Toe Raises   TA   Marches 2 x 10 AIREX  No hands TA   Alt. Sidekicks  TA   LAQ     Sit/Stands 2 X 10  TA   Step ups - FWD          TG squats, CR, toe raises

## 2025-02-18 NOTE — TELEPHONE ENCOUNTER
Patient Sarita called for refill oxycodone 20 mg. Next appointment 2-. [facility]. Pharmacy verified on perfect serve. Routed call to A CHAVA Stephens. Patient also stated that when she picked up her xanax 1 mg on 1-8-2025 at [facility], she was only given 22 pills and was supposed to receive 45. Called [facility] and they stated that they have for  the remainder 23 pills. Called and LVM to patient about the xanax

## 2025-02-19 ENCOUNTER — TELEPHONE (OUTPATIENT)
Dept: PALLATIVE CARE | Age: 60
End: 2025-02-19

## 2025-02-19 DIAGNOSIS — C50.919 STAGE IV BREAST CANCER IN FEMALE (HCC): ICD-10-CM

## 2025-02-19 DIAGNOSIS — Z51.5 PALLIATIVE CARE BY SPECIALIST: ICD-10-CM

## 2025-02-19 DIAGNOSIS — C79.51 SECONDARY MALIGNANT NEOPLASM OF BONE (HCC): ICD-10-CM

## 2025-02-19 DIAGNOSIS — G89.3 CHRONIC PAIN DUE TO NEOPLASM: ICD-10-CM

## 2025-02-19 DIAGNOSIS — C79.51 METASTATIC CANCER TO SPINE (HCC): ICD-10-CM

## 2025-02-19 RX ORDER — METHADONE HYDROCHLORIDE 10 MG/1
10 TABLET ORAL 2 TIMES DAILY
Qty: 60 TABLET | Refills: 0 | Status: SHIPPED | OUTPATIENT
Start: 2025-02-19 | End: 2025-03-21

## 2025-02-19 NOTE — TELEPHONE ENCOUNTER
Call from Pharmacist at [facility] asking for clinical update on patient for the continued Oxy IR 20 mg prescription. Updates pharmacist over the phone. New regulations require them to get clinical information updates on patients plan of care.

## 2025-02-19 NOTE — TELEPHONE ENCOUNTER
Call from Uriel's daughter Atilio requesting refill for Methadone. Pharmacy is [facility] in [place]. Next wily 2/26/25.

## 2025-02-20 ENCOUNTER — TREATMENT (OUTPATIENT)
Dept: PHYSICAL THERAPY | Age: 60
End: 2025-02-20

## 2025-02-20 DIAGNOSIS — C79.51 SECONDARY MALIGNANT NEOPLASM OF BONE (HCC): ICD-10-CM

## 2025-02-20 DIAGNOSIS — R53.0 NEOPLASTIC MALIGNANT RELATED FATIGUE: ICD-10-CM

## 2025-02-20 DIAGNOSIS — R53.81 DEBILITY: Primary | ICD-10-CM

## 2025-02-20 NOTE — PROGRESS NOTES
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    X = TO BE PERFORMED NEXT VISIT  > = PROGRESS TO THIS    S: Pt reports mid/low back soreness . Pain to stand upright in mid back, neck. Pt states she has difficult time reaching into upper cabinets due to forward neck.   O: Ambulates into clinic without assistance.     Access Code: [email or web address]  URL: [email or web address]  Date: 2024  Prepared by: Cayden Hutson    Exercises  - Standing Marching  - 2 x daily - 7 x weekly - 2-3 sets - 15 reps  - Standing Heel Raise  - 2 x daily - 7 x weekly - 2-3 sets - 15 reps  - Seated Long Arc Quad  - 2 x daily - 7 x weekly - 2 sets - 10 reps - 3 sec hold    Time 6820-8583     Visit 42 Repeat outcome measure at mid point and end.    Pain 4/10 hips/LB/ Thoracic     ROM      Modalities            Exercise      Nustep   L5 x 15 min  TA         Scapula retraction with cerv extension           Standing PPT - with upright posture 1         Upper body     UBE     Rows: high Green 2 x 15 elbows closer to side     Rows: Mid Green 2 x 15 reach and pull; elbows closer to side     Rows: low Green 2 x 20 reach and pull     Bicep curls NEXT     Wall push ups - on parallel bar 2 x 20     Cervical extension up against the wall     Wall slides  Bilateral UE X 10            Lower Body      Squats   TA   Calf Raises 2 x 20 No hands TA   Toe Raises   TA   Marches 2 x 10 AIREX  No hands TA   Alt. Sidekicks  TA   LAQ     Sit/Stands 2 X 10  TA

## 2025-02-26 ENCOUNTER — TREATMENT (OUTPATIENT)
Dept: PHYSICAL THERAPY | Age: 60
End: 2025-02-26
Payer: COMMERCIAL

## 2025-02-26 ENCOUNTER — OFFICE VISIT (OUTPATIENT)
Dept: PALLATIVE CARE | Age: 60
End: 2025-02-26
Payer: COMMERCIAL

## 2025-02-26 VITALS
OXYGEN SATURATION: 96 % | TEMPERATURE: 97.4 F | DIASTOLIC BLOOD PRESSURE: 78 MMHG | BODY MASS INDEX: 18.54 KG/M2 | SYSTOLIC BLOOD PRESSURE: 123 MMHG | WEIGHT: 118.4 LBS | HEART RATE: 97 BPM

## 2025-02-26 DIAGNOSIS — C79.51 METASTATIC CANCER TO SPINE (HCC): ICD-10-CM

## 2025-02-26 DIAGNOSIS — R53.0 NEOPLASTIC MALIGNANT RELATED FATIGUE: ICD-10-CM

## 2025-02-26 DIAGNOSIS — C50.919 STAGE IV BREAST CANCER IN FEMALE (HCC): ICD-10-CM

## 2025-02-26 DIAGNOSIS — Z51.5 PALLIATIVE CARE BY SPECIALIST: Primary | ICD-10-CM

## 2025-02-26 DIAGNOSIS — C79.51 SECONDARY MALIGNANT NEOPLASM OF BONE (HCC): ICD-10-CM

## 2025-02-26 DIAGNOSIS — G89.3 CHRONIC PAIN DUE TO NEOPLASM: ICD-10-CM

## 2025-02-26 DIAGNOSIS — G89.3 PAIN DUE TO NEOPLASM: ICD-10-CM

## 2025-02-26 DIAGNOSIS — R53.81 DEBILITY: Primary | ICD-10-CM

## 2025-02-26 PROCEDURE — 99211 OFF/OP EST MAY X REQ PHY/QHP: CPT | Performed by: NURSE PRACTITIONER

## 2025-02-26 PROCEDURE — 97110 THERAPEUTIC EXERCISES: CPT

## 2025-02-26 PROCEDURE — 97530 THERAPEUTIC ACTIVITIES: CPT

## 2025-02-26 RX ORDER — DULOXETINE 40 MG/1
40 CAPSULE, DELAYED RELEASE ORAL DAILY
Qty: 90 CAPSULE | Refills: 0 | Status: SHIPPED
Start: 2025-02-26 | End: 2025-02-26

## 2025-02-26 RX ORDER — DULOXETINE 40 MG/1
40 CAPSULE, DELAYED RELEASE ORAL DAILY
Qty: 90 CAPSULE | Refills: 0 | Status: SHIPPED | OUTPATIENT
Start: 2025-02-26 | End: 2025-05-27

## 2025-02-26 NOTE — PROGRESS NOTES
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    X = TO BE PERFORMED NEXT VISIT  > = PROGRESS TO THIS    S: Pt reports mid/low back soreness continues. Pain to stand upright in mid back, neck. Pt states she has difficult time reaching into upper cabinets due to forward neck.   O: Ambulated into clinic without assistance     Access Code: [email or web address]  URL: [email or web address]  Date: 2024  Prepared by: Cayden Hutson    Exercises  - Standing Marching  - 2 x daily - 7 x weekly - 2-3 sets - 15 reps  - Standing Heel Raise  - 2 x daily - 7 x weekly - 2-3 sets - 15 reps  - Seated Long Arc Quad  - 2 x daily - 7 x weekly - 2 sets - 10 reps - 3 sec hold    Time 8351-4375     Visit 43 Repeat outcome measure at mid point and end.    Pain 4/10 hips/LB/ Thoracic     ROM      Modalities            Exercise      Nustep   L5 x 20 min  TA         Scapula retraction with cerv extension           Standing PPT - with upright posture 1         Upper body     UBE     Rows: high Green 2 x 15 elbows closer to side     Rows: Mid Green 2 x 15 reach and pull; elbows closer to side     Rows: low Green 2 x 20 reach and pull     Bicep curls      Wall push ups - on parallel bar 2 x 20     Cervical extension up against the wall     Wall slides  Bilateral UE X 10            Lower Body      Squats   TA   Calf Raises 2 x 20 No hands TA   Toe Raises   TA   Marches 2 x 10 AIREX  No hands TA   Alt. Sidekicks  TA   LAQ     Sit/Stands 2 x 10

## 2025-02-26 NOTE — PROGRESS NOTES
[facility] OUTPATIENT REHABILITATION  PHYSICAL THERAPY PROGRESS REPORT    Date:  2025   Patient: Uriel Braswell  : 1965  MRN: [id number]  Referring Provider: Pedro Stephens, APRN - CNP  [address] [place] [address]     Medical Diagnosis:      Diagnosis Orders   1. Debility        2. Neoplastic malignant related fatigue        3. Secondary malignant neoplasm of bone (HCC)            ATTENDANCE: Patient has attended 43 scheduled treatments from 2024  to 2025.    TREATMENTS RECEIVED:  PROM, AROM, stretching, therapeutic exercise, strengthening, balance exercise, proprioception exercise, endurance HEP    INITIAL STATUS:  Amb 1 x  30ft w/ wheeled walker wide based, short step length slow w/ trunk and head both flexed markedly  Unable to stand w/ feet together eyes open or eyes closed  Unable tp perform 30 sec sit to stand  Limited cervical AROM    CURRENT STATUS:  Amb 1 x 246 ft in 2.5 minutes, no device, Independently. Limited by fatigue  Able to stand w/ feet together eyes open and closed for 15 sec each  Performed 10 reps of sit to stand w/o using arms to push  Limited cervical AROM    COMMENTS AND RECOMMENDATIONS:  Based upon Uriel's functional capabilities and limitations I feel a return to work date of 3/10/2025 is appropriate given the following restrictions:  Must work from home due to decreased endurance limiting gait distance  As she has improved in the past 6 months she will be re-evaluated in 6 months, 2025. At that point we will determine if she needs further restrictions or is capable of returning to normal duty.        Cayden Hutson, PT  Manager of Physical Therapy  [facility]

## 2025-02-26 NOTE — PROGRESS NOTES
Department of Palliative Medicine  Ambulatory visit  Provider: JOSE ANGEL Mercado - CNP       Chief Complaint: Uriel Braswell is a 59 y.o. female with chief complaint of Pain    HPI:  56 y/o post-menopausal  female who underwent Stereotactic Left Breast mass core biopsy on 06/17/2016 revealing Invasive Lobular carcinoma, she has developed severe neuropathy after her last treatment.     Assessment/Plan      Breast cancer:   - tamoxifen stopped   - Follows at [facility]   - Progression of disease with bone/spinal mets noted in April 2024  - completed palliative radiation  -On Kisqali    Chronic pain related to neoplasm/pain due to spinal metastasis:  -Oxycodone 20 mg every 3 hours  -Methadone 10 mg BID   -QTc 457 on July 19, 2024   -Lyrica to 100 mg in the morning, 200 mg nightly    -Cymbalta increased to 40 mg daily   -Alternate with Tylenol and ibuprofen    Anxiety/Depression:   -Recommend stop Xanax 1 mg TID as needed  -Cymbalta increased to 40 mg daily   -started seeing psychiatry, but has stopped going  -She stopped Buspar and trintelix    Constipation:   -Senna S    Decreased oral intake/poor appetite:   -Encourage use of oral nutritional supplements, recommend boost very high-calorie   -Mirtazapine increase to 25 mg at bedtime was added    Nausea;   -Phenergan    Fatigue related neoplasm/physical debility:   -Encourage physical activity as tolerated   -Encouraged to continue to increase oral intake and utilization of oral nutritional supplements   -Will refer for outpatient physical therapy    Follow Up: 3 months. Encouraged to call with any questions, concerns, needs, or changes in symptoms.    Subjective:     Uriel Braswell is a 59 y.o. female with significant past medical history of Breast cancer.  Managed at [facility], has been on tamoxifen.  Unfortunately she is found to have metastasis to her spine and bones and April 2024.  She completed palliative radiation therapy.  She is now on

## 2025-02-28 ENCOUNTER — TREATMENT (OUTPATIENT)
Dept: PHYSICAL THERAPY | Age: 60
End: 2025-02-28

## 2025-02-28 DIAGNOSIS — C79.51 SECONDARY MALIGNANT NEOPLASM OF BONE (HCC): ICD-10-CM

## 2025-02-28 DIAGNOSIS — R53.81 DEBILITY: Primary | ICD-10-CM

## 2025-02-28 DIAGNOSIS — R53.0 NEOPLASTIC MALIGNANT RELATED FATIGUE: ICD-10-CM

## 2025-02-28 NOTE — PROGRESS NOTES
Physical Therapy Daily Treatment Note    Date: 10/30/2024  Patient Name: Uriel Braswell  : 1965   MRN: [id number]  DOInjury: n/a  DOSx: n/a    Referring Provider:   Pedro Stephens, APRN - CNP  [address] [place] [address]       Medical Diagnosis:   C79.51 (ICD-10-CM) - Secondary malignant neoplasm of bone (HCC)  C50.919, C79.70 (ICD-10-CM) - Carcinoma of breast metastatic to adrenal gland, unspecified laterality (HCC)  R53.0 (ICD-10-CM) - Neoplastic malignant related fatigue  R53.81 (ICD-10-CM) - Physical debility    Outcome Measure:  .    X = TO BE PERFORMED NEXT VISIT  > = PROGRESS TO THIS    S: Pt reports pain between her shoulder blades today. Difficult to maintain upright posture  O: Ambulated into clinic without assistance     Access Code: [email or web address]  URL: [email or web address]  Date: 2024  Prepared by: Cayden Hutson    Exercises  - Standing Marching  - 2 x daily - 7 x weekly - 2-3 sets - 15 reps  - Standing Heel Raise  - 2 x daily - 7 x weekly - 2-3 sets - 15 reps  - Seated Long Arc Quad  - 2 x daily - 7 x weekly - 2 sets - 10 reps - 3 sec hold    Time 0171-0064     Visit 48 Repeat outcome measure at mid point and end.    Pain 4/10 hips/LB/ Thoracic     ROM      Modalities            Exercise      Nustep   L5 x 20 min  TA         Scapula retraction with cerv extension           Standing PPT - with upright posture 1         Upper body     UBE     Rows: high Green 2 x 15 elbows closer to side     Rows: Mid Green 2 x 15 reach and pull; elbows closer to side     Rows: low Green 2 x 20 reach and pull     Bicep curls      Wall push ups - on parallel bar 2 x 20     Cervical extension up against the wall     Wall slides            Lower Body      Squats   TA   Calf Raises No hands TA   Toe Raises   TA   Marches No hands TA   Alt. Sidekicks  TA   LAQ     Sit/Stands 2 x 10 10 in 30 sec  TA   Step ups - FWD          TG squats, CR, toe raises   Head and thoracic spine elevated with pillows.

## 2025-03-04 ENCOUNTER — TREATMENT (OUTPATIENT)
Dept: PHYSICAL THERAPY | Age: 60
End: 2025-03-04
Payer: COMMERCIAL

## 2025-03-04 DIAGNOSIS — C50.912 BREAST CANCER, STAGE 3, LEFT (HCC): ICD-10-CM

## 2025-03-04 DIAGNOSIS — R53.81 DEBILITY: Primary | ICD-10-CM

## 2025-03-04 DIAGNOSIS — G89.3 PAIN FROM BONE METASTASES (HCC): ICD-10-CM

## 2025-03-04 DIAGNOSIS — C79.51 PAIN FROM BONE METASTASES (HCC): ICD-10-CM

## 2025-03-04 DIAGNOSIS — G89.3 PAIN DUE TO NEOPLASM: ICD-10-CM

## 2025-03-04 DIAGNOSIS — Z51.5 PALLIATIVE CARE BY SPECIALIST: ICD-10-CM

## 2025-03-04 PROCEDURE — 97530 THERAPEUTIC ACTIVITIES: CPT

## 2025-03-04 PROCEDURE — 97110 THERAPEUTIC EXERCISES: CPT

## 2025-03-04 RX ORDER — OXYCODONE HYDROCHLORIDE 20 MG/1
20 TABLET ORAL
Qty: 112 TABLET | Refills: 0 | Status: SHIPPED | OUTPATIENT
Start: 2025-03-04 | End: 2025-03-18

## 2025-03-04 NOTE — PROGRESS NOTES
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    X = TO BE PERFORMED NEXT VISIT  > = PROGRESS TO THIS    S: Pt reports pain between her shoulder blades today. Difficult to maintain upright posture. She is planning to return to work (from home) on 3/10/2025.  O: Ambulated into clinic without assistance     Access Code: [email or web address]  URL: [email or web address]  Date: 2024  Prepared by: Cayden Hutson    Exercises  - Standing Marching  - 2 x daily - 7 x weekly - 2-3 sets - 15 reps  - Standing Heel Raise  - 2 x daily - 7 x weekly - 2-3 sets - 15 reps  - Seated Long Arc Quad  - 2 x daily - 7 x weekly - 2 sets - 10 reps - 3 sec hold    Time 2036-7243     Visit 49 Repeat outcome measure at mid point and end.    Pain 6/10 low back     ROM      Modalities            Exercise      Nustep   L5 x 20 min  TA         Scapula retraction with cerv extension           Standing PPT - with upright posture 1         Upper body     UBE     Rows: high Green 2 x 15 elbows closer to side     Rows: Mid Green 2 x 15 reach and pull; elbows closer to side     Rows: low Green 2 x 15 reach and pull     Bicep curls      Wall push ups - on parallel bar 2 x 20     Cervical extension up against the wall     Wall slides            Lower Body      Squats   TA   Calf Raises No hands TA   Toe Raises   TA   Marches No hands TA   Alt. Sidekicks  TA   LAQ     Sit/Stands 2 x 10 10 in 30 sec  TA   Step ups - FWD          TG squats, CR, toe raises

## 2025-03-04 NOTE — TELEPHONE ENCOUNTER
Cll from Uriel requesting refill for Oxy IR 20 mg. Pharmacy is [facility] in [place]. Next wily 5/21/25.

## 2025-03-07 ENCOUNTER — HOSPITAL ENCOUNTER (OUTPATIENT)
Dept: INFUSION THERAPY | Age: 60
Discharge: HOME | End: 2025-03-07
Payer: COMMERCIAL

## 2025-03-07 DIAGNOSIS — Z51.81 THERAPEUTIC DRUG MONITORING: ICD-10-CM

## 2025-03-07 DIAGNOSIS — Z85.3 PERSONAL HISTORY OF BREAST CANCER: ICD-10-CM

## 2025-03-07 LAB
ALBUMIN SERPL-MCNC: 4.1 G/DL (ref 3.5–5.2)
ALP SERPL-CCNC: 132 U/L (ref 35–104)
ALT SERPL-CCNC: 8 U/L (ref 0–32)
ANION GAP SERPL CALCULATED.3IONS-SCNC: 14 MMOL/L (ref 7–16)
AST SERPL-CCNC: 22 U/L (ref 0–31)
BASOPHILS # BLD: 0.07 K/UL (ref 0–0.2)
BASOPHILS NFR BLD: 1 % (ref 0–2)
BILIRUB SERPL-MCNC: 0.5 MG/DL (ref 0–1.2)
BUN SERPL-MCNC: 10 MG/DL (ref 6–23)
CALCIUM SERPL-MCNC: 9.3 MG/DL (ref 8.6–10.2)
CEA SERPL-MCNC: 3.8 NG/ML (ref 0–5.2)
CHLORIDE SERPL-SCNC: 93 MMOL/L (ref 98–107)
CO2 SERPL-SCNC: 25 MMOL/L (ref 22–29)
CREAT SERPL-MCNC: 0.7 MG/DL (ref 0.5–1)
EOSINOPHIL # BLD: 0.13 K/UL (ref 0.05–0.5)
EOSINOPHILS RELATIVE PERCENT: 2 % (ref 0–6)
ERYTHROCYTE [DISTWIDTH] IN BLOOD BY AUTOMATED COUNT: 16.4 % (ref 11.5–15)
GFR, ESTIMATED: >90 ML/MIN/1.73M2
GLUCOSE SERPL-MCNC: 118 MG/DL (ref 74–99)
HCT VFR BLD AUTO: 29.3 % (ref 34–48)
HGB BLD-MCNC: 9.9 G/DL (ref 11.5–15.5)
LYMPHOCYTES NFR BLD: 0.2 K/UL (ref 1.5–4)
LYMPHOCYTES RELATIVE PERCENT: 3 % (ref 20–42)
MCH RBC QN AUTO: 33.4 PG (ref 26–35)
MCHC RBC AUTO-ENTMCNC: 33.8 G/DL (ref 32–34.5)
MCV RBC AUTO: 99 FL (ref 80–99.9)
MONOCYTES NFR BLD: 0.33 K/UL (ref 0.1–0.95)
MONOCYTES NFR BLD: 4 % (ref 2–12)
NEUTROPHILS NFR BLD: 90 % (ref 43–80)
NEUTS SEG NFR BLD: 6.96 K/UL (ref 1.8–7.3)
PLATELET # BLD AUTO: 326 K/UL (ref 130–450)
PMV BLD AUTO: 8.6 FL (ref 7–12)
POTASSIUM SERPL-SCNC: 3.8 MMOL/L (ref 3.5–5)
PROT SERPL-MCNC: 7.3 G/DL (ref 6.4–8.3)
RBC # BLD AUTO: 2.96 M/UL (ref 3.5–5.5)
RBC # BLD: NORMAL 10*6/UL
SODIUM SERPL-SCNC: 132 MMOL/L (ref 132–146)
WBC OTHER # BLD: 7.7 K/UL (ref 4.5–11.5)

## 2025-03-07 PROCEDURE — 85025 COMPLETE CBC W/AUTO DIFF WBC: CPT

## 2025-03-07 PROCEDURE — 82378 CARCINOEMBRYONIC ANTIGEN: CPT

## 2025-03-07 PROCEDURE — 80053 COMPREHEN METABOLIC PANEL: CPT

## 2025-03-07 PROCEDURE — 36415 COLL VENOUS BLD VENIPUNCTURE: CPT

## 2025-03-07 PROCEDURE — 86300 IMMUNOASSAY TUMOR CA 15-3: CPT

## 2025-03-10 ENCOUNTER — HOSPITAL ENCOUNTER (OUTPATIENT)
Dept: INFUSION THERAPY | Age: 60
Discharge: HOME | End: 2025-03-10
Payer: COMMERCIAL

## 2025-03-10 ENCOUNTER — OFFICE VISIT (OUTPATIENT)
Dept: ONCOLOGY | Age: 60
End: 2025-03-10

## 2025-03-10 VITALS
SYSTOLIC BLOOD PRESSURE: 96 MMHG | OXYGEN SATURATION: 90 % | DIASTOLIC BLOOD PRESSURE: 60 MMHG | RESPIRATION RATE: 16 BRPM | HEART RATE: 106 BPM | TEMPERATURE: 97.5 F

## 2025-03-10 VITALS
BODY MASS INDEX: 18.02 KG/M2 | HEIGHT: 67 IN | WEIGHT: 114.8 LBS | HEART RATE: 106 BPM | OXYGEN SATURATION: 95 % | TEMPERATURE: 97.6 F | DIASTOLIC BLOOD PRESSURE: 82 MMHG | SYSTOLIC BLOOD PRESSURE: 125 MMHG

## 2025-03-10 DIAGNOSIS — R05.9 COUGH, UNSPECIFIED TYPE: ICD-10-CM

## 2025-03-10 DIAGNOSIS — Z85.3 PERSONAL HISTORY OF BREAST CANCER: Primary | ICD-10-CM

## 2025-03-10 DIAGNOSIS — C50.512 MALIGNANT NEOPLASM OF LOWER-OUTER QUADRANT OF LEFT FEMALE BREAST, UNSPECIFIED ESTROGEN RECEPTOR STATUS (HCC): ICD-10-CM

## 2025-03-10 DIAGNOSIS — C79.51 SECONDARY MALIGNANT NEOPLASM OF BONE (HCC): ICD-10-CM

## 2025-03-10 DIAGNOSIS — C80.1 CANCER (HCC): Primary | ICD-10-CM

## 2025-03-10 DIAGNOSIS — C50.912 BREAST CANCER, STAGE 3, LEFT (HCC): ICD-10-CM

## 2025-03-10 DIAGNOSIS — Z51.81 THERAPEUTIC DRUG MONITORING: ICD-10-CM

## 2025-03-10 LAB — CANCER AG15-3 SERPL-ACNC: 50 U/ML (ref 0–31)

## 2025-03-10 PROCEDURE — 6360000002 HC RX W HCPCS: Performed by: INTERNAL MEDICINE

## 2025-03-10 PROCEDURE — 2580000003 HC RX 258: Performed by: INTERNAL MEDICINE

## 2025-03-10 PROCEDURE — 96365 THER/PROPH/DIAG IV INF INIT: CPT

## 2025-03-10 PROCEDURE — 2500000003 HC RX 250 WO HCPCS: Performed by: INTERNAL MEDICINE

## 2025-03-10 RX ORDER — SODIUM CHLORIDE 9 MG/ML
INJECTION, SOLUTION INTRAVENOUS CONTINUOUS
OUTPATIENT
Start: 2025-03-14

## 2025-03-10 RX ORDER — ACETAMINOPHEN 325 MG/1
650 TABLET ORAL
OUTPATIENT
Start: 2025-03-14

## 2025-03-10 RX ORDER — SODIUM CHLORIDE 9 MG/ML
5-250 INJECTION, SOLUTION INTRAVENOUS PRN
OUTPATIENT
Start: 2025-03-14

## 2025-03-10 RX ORDER — GUAIFENESIN 600 MG/1
600 TABLET, EXTENDED RELEASE ORAL 2 TIMES DAILY
Qty: 30 TABLET | Refills: 0 | Status: SHIPPED | OUTPATIENT
Start: 2025-03-10 | End: 2025-03-25

## 2025-03-10 RX ORDER — HEPARIN 100 UNIT/ML
500 SYRINGE INTRAVENOUS PRN
OUTPATIENT
Start: 2025-03-14

## 2025-03-10 RX ORDER — ALBUTEROL SULFATE 90 UG/1
4 INHALANT RESPIRATORY (INHALATION) PRN
OUTPATIENT
Start: 2025-03-14

## 2025-03-10 RX ORDER — ONDANSETRON 2 MG/ML
8 INJECTION INTRAMUSCULAR; INTRAVENOUS
OUTPATIENT
Start: 2025-03-14

## 2025-03-10 RX ORDER — AZITHROMYCIN 250 MG/1
TABLET, FILM COATED ORAL
Qty: 6 TABLET | Refills: 0 | Status: SHIPPED | OUTPATIENT
Start: 2025-03-10 | End: 2025-03-20

## 2025-03-10 RX ORDER — ZOLEDRONIC ACID 0.04 MG/ML
4 INJECTION, SOLUTION INTRAVENOUS ONCE
Status: COMPLETED | OUTPATIENT
Start: 2025-03-10 | End: 2025-03-10

## 2025-03-10 RX ORDER — SODIUM CHLORIDE 9 MG/ML
5-250 INJECTION, SOLUTION INTRAVENOUS PRN
Status: DISCONTINUED | OUTPATIENT
Start: 2025-03-10 | End: 2025-03-11 | Stop reason: HOSPADM

## 2025-03-10 RX ORDER — EPINEPHRINE 1 MG/ML
0.3 INJECTION, SOLUTION, CONCENTRATE INTRAVENOUS PRN
OUTPATIENT
Start: 2025-03-14

## 2025-03-10 RX ORDER — HYDROCORTISONE SODIUM SUCCINATE 100 MG/2ML
100 INJECTION INTRAMUSCULAR; INTRAVENOUS
OUTPATIENT
Start: 2025-03-14

## 2025-03-10 RX ORDER — FAMOTIDINE 10 MG/ML
20 INJECTION, SOLUTION INTRAVENOUS
OUTPATIENT
Start: 2025-03-14

## 2025-03-10 RX ORDER — SODIUM CHLORIDE 0.9 % (FLUSH) 0.9 %
5-40 SYRINGE (ML) INJECTION PRN
OUTPATIENT
Start: 2025-03-14

## 2025-03-10 RX ORDER — DIPHENHYDRAMINE HYDROCHLORIDE 50 MG/ML
50 INJECTION, SOLUTION INTRAMUSCULAR; INTRAVENOUS
OUTPATIENT
Start: 2025-03-14

## 2025-03-10 RX ORDER — SODIUM CHLORIDE 0.9 % (FLUSH) 0.9 %
5-40 SYRINGE (ML) INJECTION PRN
Status: DISCONTINUED | OUTPATIENT
Start: 2025-03-10 | End: 2025-03-11 | Stop reason: HOSPADM

## 2025-03-10 RX ADMIN — SODIUM CHLORIDE, PRESERVATIVE FREE 10 ML: 5 INJECTION INTRAVENOUS at 12:33

## 2025-03-10 RX ADMIN — ZOLEDRONIC ACID 4 MG: 0.04 INJECTION, SOLUTION INTRAVENOUS at 12:43

## 2025-03-10 RX ADMIN — SODIUM CHLORIDE 20 ML/HR: 9 INJECTION, SOLUTION INTRAVENOUS at 12:36

## 2025-03-10 NOTE — PROGRESS NOTES
cancer    History of lumpectomy    Moderate COPD (chronic obstructive pulmonary disease) (HCC)    Lumbar radiculopathy    Secondary malignant neoplasm of bone (HCC)    Debility    Neoplastic malignant related fatigue        Past Surgical History:      Procedure Laterality Date    BONE BIOPSY  05/15/2024    BREAST LUMPECTOMY       SECTION         Family History:  Family History   Problem Relation Age of Onset    Cancer Father         prostate    Diabetes Mother         HTN / cholesterol    Diabetes Brother        Medications:  Reviewed and reconciled.    Social History:  Social History     Socioeconomic History    Marital status:      Spouse name: Not on file    Number of children: 2    Years of education: Not on file    Highest education level: Not on file   Occupational History    Occupation: nurse- RN     Employer: [employer]   Tobacco Use    Smoking status: Former     Current packs/day: 0.00     Average packs/day: 1.5 packs/day for 20.5 years (30.7 ttl pk-yrs)     Types: Cigarettes     Start date:      Quit date: 2016     Years since quittin.7     Passive exposure: Past    Smokeless tobacco: Never   Vaping Use    Vaping status: Never Used    Passive vaping exposure: Yes   Substance and Sexual Activity    Alcohol use: No    Drug use: No    Sexual activity: Not Currently   Other Topics Concern    Not on file   Social History Narrative    Lives in [place] with daughter Chaim.    Works as an RN.      Social Drivers of Health     Financial Resource Strain: Low Risk  (2021)    Overall Financial Resource Strain (CARDIA)     Difficulty of Paying Living Expenses: Not hard at all   Food Insecurity: No Food Insecurity (2021)    Hunger Vital Sign     Worried About Running Out of Food in the Last Year: Never true     Ran Out of Food in the Last Year: Never true   Transportation Needs: Not on file   Physical Activity: Not on file   Stress: Not on file   Social Connections: Not on file

## 2025-03-11 ENCOUNTER — TREATMENT (OUTPATIENT)
Dept: PHYSICAL THERAPY | Age: 60
End: 2025-03-11

## 2025-03-11 DIAGNOSIS — R53.81 DEBILITY: Primary | ICD-10-CM

## 2025-03-11 RX ORDER — DOXYCYCLINE 100 MG/1
100 CAPSULE ORAL 2 TIMES DAILY
Qty: 20 CAPSULE | Refills: 0 | Status: SHIPPED | OUTPATIENT
Start: 2025-03-11 | End: 2025-03-21

## 2025-03-11 RX ORDER — DOXYCYCLINE 100 MG/1
100 CAPSULE ORAL 2 TIMES DAILY
Qty: 20 CAPSULE | Refills: 0 | Status: SHIPPED
Start: 2025-03-11 | End: 2025-03-11 | Stop reason: SDUPTHER

## 2025-03-11 RX ORDER — PREDNISONE 20 MG/1
40 TABLET ORAL PRN
Qty: 30 TABLET | Refills: 0 | Status: SHIPPED
Start: 2025-03-11 | End: 2025-03-11 | Stop reason: SDUPTHER

## 2025-03-11 RX ORDER — PREDNISONE 20 MG/1
40 TABLET ORAL PRN
Qty: 30 TABLET | Refills: 0 | Status: SHIPPED | OUTPATIENT
Start: 2025-03-11 | End: 2025-03-18

## 2025-03-11 NOTE — PROGRESS NOTES
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    X = TO BE PERFORMED NEXT VISIT  > = PROGRESS TO THIS    S: Pt reports pain between her shoulder blades today. Difficult to maintain upright posture. She is planning to return to work (from home) on 3/10/2025.  O: Ambulated into clinic without assistance     Access Code: [email or web address]  URL: [email or web address]  Date: 2024  Prepared by: Cayden Hutson    Exercises  - Standing Marching  - 2 x daily - 7 x weekly - 2-3 sets - 15 reps  - Standing Heel Raise  - 2 x daily - 7 x weekly - 2-3 sets - 15 reps  - Seated Long Arc Quad  - 2 x daily - 7 x weekly - 2 sets - 10 reps - 3 sec hold    Time 6694-7185     Visit 50 Repeat outcome measure at mid point and end.    Pain 6/10 low back     ROM      Modalities            Exercise      Nustep   L5 x 15 min  TA         Scapula retraction with cerv extension           Standing PPT - with upright posture 1         Upper body     UBE 2/2 min F/B     Rows: high     Rows: Mid Green 2 x 15 reach and pull; elbows closer to side     Rows: low     Bicep curls      Wall push ups - on parallel bar 1 x 20     Cervical extension up against the wall     Wall slides            Lower Body      Squats   TA   Calf Raises 2 x 20 AIREX One hand TA   Toe Raises   TA   Marches 2 x 10 AIREX  No hands TA   Alt. Sidekicks  TA   LAQ     Sit/Stands 2 x 10 10 in 30 sec  TA   Step ups - FWD          TG squats, CR, toe raises   Head and thoracic

## 2025-03-13 ENCOUNTER — TREATMENT (OUTPATIENT)
Dept: PHYSICAL THERAPY | Age: 60
End: 2025-03-13

## 2025-03-13 DIAGNOSIS — R53.81 DEBILITY: Primary | ICD-10-CM

## 2025-03-13 DIAGNOSIS — R53.0 NEOPLASTIC MALIGNANT RELATED FATIGUE: ICD-10-CM

## 2025-03-13 DIAGNOSIS — C79.51 SECONDARY MALIGNANT NEOPLASM OF BONE (HCC): ICD-10-CM

## 2025-03-13 RX ORDER — DULOXETIN HYDROCHLORIDE 20 MG/1
20 CAPSULE, DELAYED RELEASE ORAL DAILY
Qty: 90 CAPSULE | Refills: 0 | Status: SHIPPED | OUTPATIENT
Start: 2025-03-13 | End: 2025-06-11

## 2025-03-13 NOTE — TELEPHONE ENCOUNTER
Call from Atilio , Uriel's daughter ,  stating that Uriel is not tolerating the increase in Cymbalta that was started at last clinic appointment. Uriel states the 40 mg Cymbalta is making her hands tremble and and it just doesn't agree with her. Atilio states Uriel wants to go back to 20 mg. A new prescription will need sent to [facility] in [place] as they cannot break capsules in 1/2. Next wily 5/21/25.

## 2025-03-13 NOTE — TELEPHONE ENCOUNTER
Received message from Atilio, not tolerating recent increase in Cymbalta, requesting to go back to half strength, 20 mg Cymbalta prescription sent to pharmacy.

## 2025-03-13 NOTE — PROGRESS NOTES
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    X = TO BE PERFORMED NEXT VISIT  > = PROGRESS TO THIS    S: Pt reports no new complaint. Mid back bothers her all the time. States she is slouched and feels like she is always bent over like walking under a tree.  O: Ambulated into clinic without assistance     Access Code: [email or web address]  URL: [email or web address]  Date: 2024  Prepared by: Cayden Hutson    Exercises  - Standing Marching  - 2 x daily - 7 x weekly - 2-3 sets - 15 reps  - Standing Heel Raise  - 2 x daily - 7 x weekly - 2-3 sets - 15 reps  - Seated Long Arc Quad  - 2 x daily - 7 x weekly - 2 sets - 10 reps - 3 sec hold    Time 4481-6972     Visit 50 Repeat outcome measure at mid point and end.    Pain 6/10 low back     ROM      Modalities            Exercise      Nustep   L5 x 15 min  TA         Scapula retraction with cerv extension           Standing PPT - with upright posture 1         Upper body     UBE 2/2 min F/B     Rows: high     Rows: Mid Green 2 x 15 reach and pull; elbows closer to side     Rows: low Green 2 x 15 reach and pull     Bicep curls      Wall push ups - on parallel bar 1 x 20     Cervical extension up against the wall     Wall slides            Lower Body      Squats   TA   Calf Raises One hand TA   Toe Raises  TA   Marches No hands TA   Alt. Sidekicks  TA   LAQ     Sit/Stands 10 in 30 sec  TA   Step ups - FWD          TG squats, CR, toe raises   Head and thoracic spine

## 2025-03-18 DIAGNOSIS — G89.3 PAIN FROM BONE METASTASES (HCC): ICD-10-CM

## 2025-03-18 DIAGNOSIS — G89.3 PAIN DUE TO NEOPLASM: ICD-10-CM

## 2025-03-18 DIAGNOSIS — C79.51 PAIN FROM BONE METASTASES (HCC): ICD-10-CM

## 2025-03-18 DIAGNOSIS — C50.912 BREAST CANCER, STAGE 3, LEFT: ICD-10-CM

## 2025-03-18 DIAGNOSIS — Z51.5 PALLIATIVE CARE BY SPECIALIST: ICD-10-CM

## 2025-03-18 RX ORDER — OXYCODONE HYDROCHLORIDE 20 MG/1
20 TABLET ORAL
Qty: 112 TABLET | Refills: 0 | Status: SHIPPED | OUTPATIENT
Start: 2025-03-18 | End: 2025-04-01

## 2025-03-18 RX ORDER — OXYCODONE HYDROCHLORIDE 20 MG/1
20 TABLET ORAL
Qty: 112 TABLET | Refills: 0 | Status: SHIPPED
Start: 2025-03-18 | End: 2025-03-18 | Stop reason: SDUPTHER

## 2025-03-18 NOTE — TELEPHONE ENCOUNTER
Uriel received a call from [facility] , they do not have her Oxy IR in stock. Please resend to [facility] in [place].

## 2025-03-18 NOTE — TELEPHONE ENCOUNTER
Patient Uriel called for refill oxycodone 20 mg. Next appointment 5-. [facility]. Pharmacy verified on perfect serve. Routed call to A Kat, NP

## 2025-03-19 DIAGNOSIS — C79.51 METASTATIC CANCER TO SPINE (HCC): ICD-10-CM

## 2025-03-19 DIAGNOSIS — C50.919 STAGE IV BREAST CANCER IN FEMALE: ICD-10-CM

## 2025-03-19 DIAGNOSIS — C79.51 SECONDARY MALIGNANT NEOPLASM OF BONE (HCC): ICD-10-CM

## 2025-03-19 DIAGNOSIS — Z51.5 PALLIATIVE CARE BY SPECIALIST: ICD-10-CM

## 2025-03-19 DIAGNOSIS — G89.3 CHRONIC PAIN DUE TO NEOPLASM: ICD-10-CM

## 2025-03-19 RX ORDER — METHADONE HYDROCHLORIDE 10 MG/1
10 TABLET ORAL 2 TIMES DAILY
Qty: 60 TABLET | Refills: 0 | Status: SHIPPED | OUTPATIENT
Start: 2025-03-19 | End: 2025-04-18

## 2025-03-19 NOTE — TELEPHONE ENCOUNTER
Call from Atilio, Uriel's daughter, to request a refill for Methadone. Pharmacy is [facility] in [place]. Next wily 5/21/25.

## 2025-03-21 ENCOUNTER — TREATMENT (OUTPATIENT)
Dept: PHYSICAL THERAPY | Age: 60
End: 2025-03-21

## 2025-03-21 DIAGNOSIS — R53.81 DEBILITY: Primary | ICD-10-CM

## 2025-03-21 NOTE — PROGRESS NOTES
Balance No hand hold in parallel bars    Supine lying to increase lumbar/cervical extension     Painful on LB and Hips to lay without knee support    Marching Gait Cues for upright posture NR   Sidestepping Cues for upright posture NR         Ambulation  Cues for upright posture    Ambulation X 4 laps  Accumulated throughout session TA   Seated Dynadisc balance 1 min x 5  Accumulated throughout session during rest breaks TA         A: Tolerated well. Continuing to work on building endurance. Patient increased total ambulation to 4 laps and incorporated dynadisc balance between there ex sets. Cues for forward gaze. Large, functional, dynamic, global movements used to build strength, balance, endurance, and flexibility and to improve physical performance.   P: Continue with rehab plan.   ROGE Patrick, [facility]  Treatment Charges: Mins Units   Initial Evaluation     Re-Evaluation     Ther Exercise         TE 15 1   Manual Therapy     MT     Ther Activities        TA 15 1   Gait Training          GT     Neuro Re-education NR     Modalities     Non-Billable Service Time 15 0   Other     Total Time/Units 45 2

## 2025-03-25 ENCOUNTER — TREATMENT (OUTPATIENT)
Dept: PHYSICAL THERAPY | Age: 60
End: 2025-03-25
Payer: COMMERCIAL

## 2025-03-25 DIAGNOSIS — R53.81 DEBILITY: Primary | ICD-10-CM

## 2025-03-25 PROCEDURE — 97110 THERAPEUTIC EXERCISES: CPT

## 2025-03-25 PROCEDURE — 97530 THERAPEUTIC ACTIVITIES: CPT

## 2025-03-25 NOTE — PROGRESS NOTES
Physical Therapy Daily Treatment Note    Date: 10/30/2024  Patient Name: Uriel Braswell  : 1965   MRN: [id number]  DOInjury: n/a  DOSx: n/a    Referring Provider:   Pedro Stephens, APRN - CNP  [address] [place] [address]       Medical Diagnosis:   C79.51 (ICD-10-CM) - Secondary malignant neoplasm of bone (HCC)  C50.919, C79.70 (ICD-10-CM) - Carcinoma of breast metastatic to adrenal gland, unspecified laterality (HCC)  R53.0 (ICD-10-CM) - Neoplastic malignant related fatigue  R53.81 (ICD-10-CM) - Physical debility    Outcome Measure:  .    X = TO BE PERFORMED NEXT VISIT  > = PROGRESS TO THIS    S: Pt reports no new complaint. Mid back has increased pain in the past few days at 6/10 intensity without specific reason. States she is slouched and feels like she is always bent over like walking under a tree.  O: Ambulated into clinic without assistance     Access Code: [email or web address]  URL: [email or web address]  Date: 2024  Prepared by: Cayden Hutson    Exercises  - Standing Marching  - 2 x daily - 7 x weekly - 2-3 sets - 15 reps  - Standing Heel Raise  - 2 x daily - 7 x weekly - 2-3 sets - 15 reps  - Seated Long Arc Quad  - 2 x daily - 7 x weekly - 2 sets - 10 reps - 3 sec hold    Time 2710-6835     Visit 53 Repeat outcome measure at mid point and end.    Pain 6/10 mid back     ROM      Modalities            Exercise      Nustep   L5 x 10 min  TA         Scapula retraction with cerv extension           Standing PPT - with upright posture 1         Upper body     UBE 2/3 min F/B, 2/2min F/B     Rows: high     Rows: Mid Green 2 x 15      Rows: low     Bicep curls      Band Chest Press Green 2 x 10     Cervical extension up against the wall     Wall slides            Lower Body      Squats   TA   Calf Raises One hand TA   Toe Raises  TA   Marches No hands TA   Alt. Sidekicks  TA   LAQ     Sit/Stands  TA   Step ups - FWD          CR, toe raises- On leg press TE   Leg press - two legged  TE

## 2025-03-27 ENCOUNTER — TREATMENT (OUTPATIENT)
Dept: PHYSICAL THERAPY | Age: 60
End: 2025-03-27

## 2025-03-27 DIAGNOSIS — C79.51 SECONDARY MALIGNANT NEOPLASM OF BONE (HCC): ICD-10-CM

## 2025-03-27 DIAGNOSIS — R53.0 NEOPLASTIC MALIGNANT RELATED FATIGUE: ICD-10-CM

## 2025-03-27 DIAGNOSIS — R53.81 DEBILITY: Primary | ICD-10-CM

## 2025-03-27 NOTE — PROGRESS NOTES
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    X = TO BE PERFORMED NEXT VISIT  > = PROGRESS TO THIS    S: Pt reports no new complaint. Mid back has increased pain in the past few days at 6/10 intensity without specific reason. States she is slouched and feels like she is always bent over like walking under a tree.  O: Ambulated into clinic without assistance     Access Code: [email or web address]  URL: [email or web address]  Date: 2024  Prepared by: Cayden Hutson    Exercises  - Standing Marching  - 2 x daily - 7 x weekly - 2-3 sets - 15 reps  - Standing Heel Raise  - 2 x daily - 7 x weekly - 2-3 sets - 15 reps  - Seated Long Arc Quad  - 2 x daily - 7 x weekly - 2 sets - 10 reps - 3 sec hold    Time 4324-2346     Visit 54 Repeat outcome measure at mid point and end.    Pain 6/10 mid back     ROM      Modalities            Exercise      Nustep   L5 x 10 min  TA         Scapula retraction with cerv extension           Standing PPT - with upright posture 1         Upper body     UBE 2/3 min F/B, 2/2min F/B     Rows: high     Rows: Mid Green 2 x 15      Rows: low     Bicep curls      Band Chest Press Green 2 x 10     Cervical extension up against the wall     Wall slides            Lower Body      Squats   TA   Calf Raises One hand TA   Toe Raises  TA   Marches No hands TA   Alt. Sidekicks  TA   LAQ     Sit/Stands  TA   Step ups - FWD          CR, toe raises- On leg press TE   Leg press - two legged  TE

## 2025-03-31 ENCOUNTER — TELEPHONE (OUTPATIENT)
Dept: INFUSION THERAPY | Age: 60
End: 2025-03-31

## 2025-03-31 DIAGNOSIS — Z51.5 ENCOUNTER FOR PALLIATIVE CARE: ICD-10-CM

## 2025-03-31 DIAGNOSIS — C79.51 PAIN FROM BONE METASTASES (HCC): ICD-10-CM

## 2025-03-31 DIAGNOSIS — C50.912 BREAST CANCER, STAGE 3, LEFT: ICD-10-CM

## 2025-03-31 DIAGNOSIS — G89.3 PAIN FROM BONE METASTASES (HCC): ICD-10-CM

## 2025-03-31 DIAGNOSIS — Z51.5 PALLIATIVE CARE BY SPECIALIST: ICD-10-CM

## 2025-03-31 DIAGNOSIS — G89.3 PAIN DUE TO NEOPLASM: ICD-10-CM

## 2025-03-31 RX ORDER — ALPRAZOLAM 1 MG/1
1 TABLET ORAL 3 TIMES DAILY PRN
Qty: 45 TABLET | Refills: 0 | Status: SHIPPED
Start: 2025-03-31 | End: 2025-03-31 | Stop reason: SDUPTHER

## 2025-03-31 RX ORDER — OXYCODONE HYDROCHLORIDE 20 MG/1
20 TABLET ORAL
Qty: 112 TABLET | Refills: 0 | Status: SHIPPED | OUTPATIENT
Start: 2025-03-31 | End: 2025-04-14

## 2025-03-31 RX ORDER — ALPRAZOLAM 1 MG/1
1 TABLET ORAL 3 TIMES DAILY PRN
Qty: 45 TABLET | Refills: 0 | Status: SHIPPED | OUTPATIENT
Start: 2025-03-31 | End: 2025-04-15

## 2025-03-31 NOTE — TELEPHONE ENCOUNTER
Patient completed PO antibiotics prescribed by Dr. Anand Vazquez. She  is still having fever, chest congestion. CT chest showed pneumonitis. Per Dr. Anand Vazquez since symptoms have not resolved, patient needs to go to ER. Patient verbalized understanding.  Kathia Pryor, MIKEN, RN, OCN 3/31/25 1172

## 2025-03-31 NOTE — TELEPHONE ENCOUNTER
Patient Uriel called for refill oxycodone 20 mg and xanax 1 mg. Next appointment 5-. [facility]

## 2025-04-02 ENCOUNTER — HOSPITAL ENCOUNTER (EMERGENCY)
Age: 60
Discharge: ANOTHER HOSPITAL | End: 2025-04-02
Attending: EMERGENCY MEDICINE
Payer: COMMERCIAL

## 2025-04-02 ENCOUNTER — APPOINTMENT (OUTPATIENT)
Dept: CT IMAGING | Age: 60
End: 2025-04-02
Payer: COMMERCIAL

## 2025-04-02 ENCOUNTER — APPOINTMENT (OUTPATIENT)
Dept: GENERAL RADIOLOGY | Age: 60
End: 2025-04-02
Payer: COMMERCIAL

## 2025-04-02 VITALS
RESPIRATION RATE: 26 BRPM | TEMPERATURE: 97.9 F | WEIGHT: 114.64 LBS | SYSTOLIC BLOOD PRESSURE: 132 MMHG | BODY MASS INDEX: 17.96 KG/M2 | DIASTOLIC BLOOD PRESSURE: 103 MMHG | OXYGEN SATURATION: 100 % | HEART RATE: 118 BPM

## 2025-04-02 DIAGNOSIS — I62.9 INTRACRANIAL HEMORRHAGE (HCC): Primary | ICD-10-CM

## 2025-04-02 DIAGNOSIS — A41.9 SEPSIS, DUE TO UNSPECIFIED ORGANISM, UNSPECIFIED WHETHER ACUTE ORGAN DYSFUNCTION PRESENT (HCC): ICD-10-CM

## 2025-04-02 DIAGNOSIS — E87.1 HYPONATREMIA: ICD-10-CM

## 2025-04-02 DIAGNOSIS — R06.03 RESPIRATORY DISTRESS: ICD-10-CM

## 2025-04-02 DIAGNOSIS — R18.8 OTHER ASCITES: ICD-10-CM

## 2025-04-02 LAB
AADO2: 269.1 MMHG
ALBUMIN SERPL-MCNC: 3.9 G/DL (ref 3.5–5.2)
ALP SERPL-CCNC: 86 U/L (ref 35–104)
ALT SERPL-CCNC: 16 U/L (ref 0–32)
AMPHET UR QL SCN: POSITIVE
ANION GAP SERPL CALCULATED.3IONS-SCNC: 18 MMOL/L (ref 7–16)
APAP SERPL-MCNC: <5 UG/ML (ref 10–30)
AST SERPL-CCNC: 41 U/L (ref 0–31)
B.E.: -6.5 MMOL/L (ref -3–3)
BARBITURATES UR QL SCN: NEGATIVE
BASOPHILS # BLD: 0.05 K/UL (ref 0–0.2)
BASOPHILS NFR BLD: 0 % (ref 0–2)
BENZODIAZ UR QL: NEGATIVE
BILIRUB SERPL-MCNC: 0.5 MG/DL (ref 0–1.2)
BUN SERPL-MCNC: 10 MG/DL (ref 6–23)
BUPRENORPHINE UR QL: NEGATIVE
CALCIUM SERPL-MCNC: 8.9 MG/DL (ref 8.6–10.2)
CANNABINOIDS UR QL SCN: NEGATIVE
CHLORIDE SERPL-SCNC: 89 MMOL/L (ref 98–107)
CHP ED QC CHECK: YES
CK SERPL-CCNC: 382 U/L (ref 20–180)
CO2 SERPL-SCNC: 20 MMOL/L (ref 22–29)
COCAINE UR QL SCN: NEGATIVE
COHB: 0.7 % (ref 0–1.5)
CREAT SERPL-MCNC: 0.6 MG/DL (ref 0.5–1)
CRITICAL: ABNORMAL
DATE ANALYZED: ABNORMAL
DATE OF COLLECTION: ABNORMAL
EKG ATRIAL RATE: 144 BPM
EKG P AXIS: 49 DEGREES
EKG P-R INTERVAL: 136 MS
EKG Q-T INTERVAL: 336 MS
EKG QRS DURATION: 84 MS
EKG QTC CALCULATION (BAZETT): 520 MS
EKG R AXIS: -55 DEGREES
EKG T AXIS: 93 DEGREES
EKG VENTRICULAR RATE: 144 BPM
EOSINOPHIL # BLD: 0.01 K/UL (ref 0.05–0.5)
EOSINOPHILS RELATIVE PERCENT: 0 % (ref 0–6)
ERYTHROCYTE [DISTWIDTH] IN BLOOD BY AUTOMATED COUNT: 17.2 % (ref 11.5–15)
ETHANOLAMINE SERPL-MCNC: <10 MG/DL (ref 0–0.08)
FENTANYL UR QL: NEGATIVE
FIO2: 60 %
FLUAV RNA RESP QL NAA+PROBE: NOT DETECTED
FLUBV RNA RESP QL NAA+PROBE: NOT DETECTED
GFR, ESTIMATED: >90 ML/MIN/1.73M2
GLUCOSE BLD-MCNC: 199 MG/DL
GLUCOSE SERPL-MCNC: 195 MG/DL (ref 74–99)
HCO3: 20.1 MMOL/L (ref 22–26)
HCT VFR BLD AUTO: 27 % (ref 34–48)
HGB BLD-MCNC: 9.3 G/DL (ref 11.5–15.5)
HHB: 4 % (ref 0–5)
IMM GRANULOCYTES # BLD AUTO: 0.12 K/UL (ref 0–0.58)
IMM GRANULOCYTES NFR BLD: 1 % (ref 0–5)
INR PPP: 1.3
LAB: ABNORMAL
LACTATE BLDV-SCNC: 4.6 MMOL/L (ref 0.5–2.2)
LACTATE BLDV-SCNC: 6.4 MMOL/L (ref 0.5–2.2)
LIPASE SERPL-CCNC: 11 U/L (ref 13–60)
LYMPHOCYTES NFR BLD: 1.03 K/UL (ref 1.5–4)
LYMPHOCYTES RELATIVE PERCENT: 9 % (ref 20–42)
Lab: 1808
MCH RBC QN AUTO: 32.7 PG (ref 26–35)
MCHC RBC AUTO-ENTMCNC: 34.4 G/DL (ref 32–34.5)
MCV RBC AUTO: 95.1 FL (ref 80–99.9)
METHADONE UR QL: POSITIVE
METHB: 0.3 % (ref 0–1.5)
MODE: AC
MONOCYTES NFR BLD: 1.79 K/UL (ref 0.1–0.95)
MONOCYTES NFR BLD: 15 % (ref 2–12)
NEUTROPHILS NFR BLD: 75 % (ref 43–80)
NEUTS SEG NFR BLD: 9.17 K/UL (ref 1.8–7.3)
O2 CONTENT: 13.6 ML/DL
O2 SATURATION: 96 % (ref 92–98.5)
O2HB: 95 % (ref 94–97)
OPERATOR ID: ABNORMAL
OPIATES UR QL SCN: NEGATIVE
OXYCODONE UR QL SCN: POSITIVE
PATIENT TEMP: 37 C
PCO2: 44.4 MMHG (ref 35–45)
PCP UR QL SCN: NEGATIVE
PEEP/CPAP: 5 CMH2O
PFO2: 1.58 MMHG/%
PH BLOOD GAS: 7.27 (ref 7.35–7.45)
PLATELET # BLD AUTO: 374 K/UL (ref 130–450)
PMV BLD AUTO: 9.1 FL (ref 7–12)
PO2: 94.9 MMHG (ref 75–100)
POTASSIUM SERPL-SCNC: 4 MMOL/L (ref 3.5–5)
PROT SERPL-MCNC: 6.7 G/DL (ref 6.4–8.3)
PROTHROMBIN TIME: 14 SEC (ref 9.3–12.4)
RBC # BLD AUTO: 2.84 M/UL (ref 3.5–5.5)
RI(T): 2.84
RR MECHANICAL: 20 B/MIN
SALICYLATES SERPL-MCNC: <0.3 MG/DL (ref 0–30)
SARS-COV-2 RNA RESP QL NAA+PROBE: NOT DETECTED
SODIUM SERPL-SCNC: 127 MMOL/L (ref 132–146)
SODIUM SERPL-SCNC: 130 MMOL/L (ref 132–146)
SOURCE, BLOOD GAS: ABNORMAL
SOURCE: NORMAL
SPECIMEN DESCRIPTION: NORMAL
TEST INFORMATION: ABNORMAL
THB: 10.1 G/DL (ref 11.5–16.5)
TIME ANALYZED: 1817
TOXIC TRICYCLIC SC,BLOOD: NEGATIVE
TROPONIN I SERPL HS-MCNC: 194 NG/L (ref 0–9)
TROPONIN I SERPL HS-MCNC: 207 NG/L (ref 0–9)
VT MECHANICAL: 350 ML
WBC OTHER # BLD: 12.2 K/UL (ref 4.5–11.5)

## 2025-04-02 PROCEDURE — 80179 DRUG ASSAY SALICYLATE: CPT

## 2025-04-02 PROCEDURE — 82550 ASSAY OF CK (CPK): CPT

## 2025-04-02 PROCEDURE — 70450 CT HEAD/BRAIN W/O DYE: CPT

## 2025-04-02 PROCEDURE — 84295 ASSAY OF SERUM SODIUM: CPT

## 2025-04-02 PROCEDURE — 96375 TX/PRO/DX INJ NEW DRUG ADDON: CPT

## 2025-04-02 PROCEDURE — 80143 DRUG ASSAY ACETAMINOPHEN: CPT

## 2025-04-02 PROCEDURE — 80307 DRUG TEST PRSMV CHEM ANLYZR: CPT

## 2025-04-02 PROCEDURE — G0480 DRUG TEST DEF 1-7 CLASSES: HCPCS

## 2025-04-02 PROCEDURE — 85610 PROTHROMBIN TIME: CPT

## 2025-04-02 PROCEDURE — 80053 COMPREHEN METABOLIC PANEL: CPT

## 2025-04-02 PROCEDURE — 70496 CT ANGIOGRAPHY HEAD: CPT

## 2025-04-02 PROCEDURE — 96361 HYDRATE IV INFUSION ADD-ON: CPT

## 2025-04-02 PROCEDURE — 71275 CT ANGIOGRAPHY CHEST: CPT

## 2025-04-02 PROCEDURE — 74177 CT ABD & PELVIS W/CONTRAST: CPT

## 2025-04-02 PROCEDURE — 6360000002 HC RX W HCPCS: Performed by: EMERGENCY MEDICINE

## 2025-04-02 PROCEDURE — 2580000003 HC RX 258

## 2025-04-02 PROCEDURE — 82805 BLOOD GASES W/O2 SATURATION: CPT

## 2025-04-02 PROCEDURE — 83605 ASSAY OF LACTIC ACID: CPT

## 2025-04-02 PROCEDURE — 74018 RADEX ABDOMEN 1 VIEW: CPT

## 2025-04-02 PROCEDURE — 6360000002 HC RX W HCPCS

## 2025-04-02 PROCEDURE — 93010 ELECTROCARDIOGRAM REPORT: CPT | Performed by: INTERNAL MEDICINE

## 2025-04-02 PROCEDURE — 2500000003 HC RX 250 WO HCPCS

## 2025-04-02 PROCEDURE — 71045 X-RAY EXAM CHEST 1 VIEW: CPT

## 2025-04-02 PROCEDURE — 70498 CT ANGIOGRAPHY NECK: CPT

## 2025-04-02 PROCEDURE — 2580000003 HC RX 258: Performed by: EMERGENCY MEDICINE

## 2025-04-02 PROCEDURE — 96365 THER/PROPH/DIAG IV INF INIT: CPT

## 2025-04-02 PROCEDURE — 31500 INSERT EMERGENCY AIRWAY: CPT

## 2025-04-02 PROCEDURE — 99285 EMERGENCY DEPT VISIT HI MDM: CPT

## 2025-04-02 PROCEDURE — 93005 ELECTROCARDIOGRAM TRACING: CPT | Performed by: EMERGENCY MEDICINE

## 2025-04-02 PROCEDURE — 84484 ASSAY OF TROPONIN QUANT: CPT

## 2025-04-02 PROCEDURE — 94002 VENT MGMT INPAT INIT DAY: CPT

## 2025-04-02 PROCEDURE — 83690 ASSAY OF LIPASE: CPT

## 2025-04-02 PROCEDURE — 87636 SARSCOV2 & INF A&B AMP PRB: CPT

## 2025-04-02 PROCEDURE — 6360000004 HC RX CONTRAST MEDICATION: Performed by: RADIOLOGY

## 2025-04-02 PROCEDURE — 2500000003 HC RX 250 WO HCPCS: Performed by: EMERGENCY MEDICINE

## 2025-04-02 PROCEDURE — 85025 COMPLETE CBC W/AUTO DIFF WBC: CPT

## 2025-04-02 RX ORDER — PROPOFOL 10 MG/ML
5-50 INJECTION, EMULSION INTRAVENOUS CONTINUOUS
Status: DISCONTINUED | OUTPATIENT
Start: 2025-04-02 | End: 2025-04-02 | Stop reason: HOSPADM

## 2025-04-02 RX ORDER — IOPAMIDOL 755 MG/ML
200 INJECTION, SOLUTION INTRAVASCULAR
Status: COMPLETED | OUTPATIENT
Start: 2025-04-02 | End: 2025-04-02

## 2025-04-02 RX ORDER — FENTANYL CITRATE 50 UG/ML
50 INJECTION, SOLUTION INTRAMUSCULAR; INTRAVENOUS ONCE
Status: DISCONTINUED | OUTPATIENT
Start: 2025-04-02 | End: 2025-04-02 | Stop reason: HOSPADM

## 2025-04-02 RX ORDER — ETOMIDATE 2 MG/ML
15 INJECTION INTRAVENOUS ONCE
Status: COMPLETED | OUTPATIENT
Start: 2025-04-02 | End: 2025-04-02

## 2025-04-02 RX ORDER — ROCURONIUM BROMIDE 10 MG/ML
60 INJECTION, SOLUTION INTRAVENOUS ONCE
Status: COMPLETED | OUTPATIENT
Start: 2025-04-02 | End: 2025-04-02

## 2025-04-02 RX ORDER — 0.9 % SODIUM CHLORIDE 0.9 %
1000 INTRAVENOUS SOLUTION INTRAVENOUS ONCE
Status: COMPLETED | OUTPATIENT
Start: 2025-04-02 | End: 2025-04-02

## 2025-04-02 RX ORDER — PROPOFOL 10 MG/ML
5-50 INJECTION, EMULSION INTRAVENOUS ONCE
Status: DISCONTINUED | OUTPATIENT
Start: 2025-04-02 | End: 2025-04-02 | Stop reason: HOSPADM

## 2025-04-02 RX ORDER — PROPOFOL 10 MG/ML
INJECTION, EMULSION INTRAVENOUS
Status: COMPLETED
Start: 2025-04-02 | End: 2025-04-02

## 2025-04-02 RX ORDER — MIDAZOLAM HYDROCHLORIDE 1 MG/ML
2 INJECTION, SOLUTION INTRAMUSCULAR; INTRAVENOUS ONCE
Status: COMPLETED | OUTPATIENT
Start: 2025-04-02 | End: 2025-04-02

## 2025-04-02 RX ORDER — LEVETIRACETAM 500 MG/5ML
1000 INJECTION, SOLUTION, CONCENTRATE INTRAVENOUS ONCE
Status: COMPLETED | OUTPATIENT
Start: 2025-04-02 | End: 2025-04-02

## 2025-04-02 RX ADMIN — LEVETIRACETAM 1000 MG: 500 INJECTION, SOLUTION, CONCENTRATE INTRAVENOUS at 17:43

## 2025-04-02 RX ADMIN — PIPERACILLIN AND TAZOBACTAM 4500 MG: 4; .5 INJECTION, POWDER, LYOPHILIZED, FOR SOLUTION INTRAVENOUS; PARENTERAL at 18:59

## 2025-04-02 RX ADMIN — PROPOFOL 15 MCG/KG/MIN: 10 INJECTION, EMULSION INTRAVENOUS at 16:00

## 2025-04-02 RX ADMIN — IOPAMIDOL 80 ML: 755 INJECTION, SOLUTION INTRAVENOUS at 17:36

## 2025-04-02 RX ADMIN — MIDAZOLAM 2 MG: 1 INJECTION INTRAMUSCULAR; INTRAVENOUS at 18:56

## 2025-04-02 RX ADMIN — ETOMIDATE 15 MG: 2 INJECTION, SOLUTION INTRAVENOUS at 15:57

## 2025-04-02 RX ADMIN — ROCURONIUM BROMIDE 60 MG: 10 INJECTION, SOLUTION INTRAVENOUS at 15:58

## 2025-04-02 RX ADMIN — SODIUM CHLORIDE 250 ML: 3 INJECTION, SOLUTION INTRAVENOUS at 17:45

## 2025-04-02 RX ADMIN — SODIUM CHLORIDE 1000 ML: 0.9 INJECTION, SOLUTION INTRAVENOUS at 17:47

## 2025-04-02 NOTE — PROGRESS NOTES
Transported patient from ED to CT scan and back again using ParaPAC portable ventilator. No problems encountered. Total time 40 minutes.

## 2025-04-02 NOTE — ED PROVIDER NOTES
-- (!) 146 13 100 % -- --   04/02/25 1636 -- 97.9 °F (36.6 °C) -- -- -- -- --   04/02/25 1758 -- -- (!) 126 20 99 % -- --   04/02/25 1800 (!) 129/98 -- (!) 125 20 99 % -- --   04/02/25 1815 (!) 127/97 -- (!) 126 21 99 % -- --   04/02/25 1830 (!) 130/95 -- (!) 128 20 100 % -- --   04/02/25 1845 (!) 139/97 -- (!) 138 19 99 % -- --   04/02/25 1908 -- -- (!) 126 26 96 % -- --   04/02/25 1930 (!) 132/103 -- (!) 118 26 100 % -- --      Recent Labs     04/02/25  1610 04/02/25  1915   WBC 12.2*  --    LACTA 6.4* 4.6*   CREATININE 0.6  --    BILITOT 0.5  --    INR 1.3  --      --          Sepsis Time Identified: 1610    Fluid Resuscitation Rational: at least 30mL/kg based on entered actual weight at time of triage    Infection Source: Unknown    Repeat lactate level: improving    Reassessment Exam:   I have reassessed tissue perfusion and hemodynamic status after fluid bolus at this time: Hemodynamically stable      DDx includes but is not limited to intracranial hemorrhage, stroke, pneumonia, pulmonary embolism.    Discussion With Other Professionals:  Consultation with Neuro ICU Fellow  .  The patient will be admitted for further treatment and evaluation for   1. Intracranial hemorrhage (HCC)    2. Respiratory distress    3. Hyponatremia    4. Other ascites      None    Please see ED course for more details:    ED Course as of 04/02/25 2019 Wed Apr 02, 2025   1701 Spoke with patient's daughter Atilio who arrived to the emergency department.  Patient daughter states that patient did start something with no complaints yesterday.  She started noticing that she was not making much sense, when she was speaking.  Patient did walk this morning and she has no known history of prior strokes.  Patient currently not on any chemo infusions. [HR]   1853 Spoke with Neuro ICU fellow at the the given clinic with the patient for admission. [HR]   2008 EKG interpreted by myself:    EKG shows sinus tachycardia, with a ventricular

## 2025-04-02 NOTE — PROGRESS NOTES
Assisted resident with intubation. 7.5 ETT was secured 23 cm at the upper lip. BR/S were bilateral and expiratory condensation was present. There were no epigastric ventilatory sounds. Placed ventilator on patient as per physician's orders. Total time 45 minutes.

## 2025-04-03 NOTE — ED NOTES
Patient moving left arm and attempting to sit up. Attempting to pull ETT. Provider notified. Versed ordered at this time. Propofol infusion increased.

## 2025-04-03 NOTE — ED NOTES
Left sided soft restraint removed at this time. Patient movements calm after versed administration. Skin integrity good/unchanged. Provider notified. No further orders at this time.

## 2025-04-07 ENCOUNTER — TELEPHONE (OUTPATIENT)
Dept: PHYSICAL THERAPY | Age: 60
End: 2025-04-07

## 2025-04-07 NOTE — TELEPHONE ENCOUNTER
4/7/2025  [id number]        Atilio García, informed us her mother has passed away at [facility].         Eboni Evans, [facility] [place]  LIC# [id number]